# Patient Record
Sex: MALE | Race: WHITE | NOT HISPANIC OR LATINO | Employment: OTHER | URBAN - METROPOLITAN AREA
[De-identification: names, ages, dates, MRNs, and addresses within clinical notes are randomized per-mention and may not be internally consistent; named-entity substitution may affect disease eponyms.]

---

## 2019-09-09 ENCOUNTER — APPOINTMENT (OUTPATIENT)
Dept: RADIOLOGY | Facility: MEDICAL CENTER | Age: 79
DRG: 057 | End: 2019-09-09
Attending: EMERGENCY MEDICINE
Payer: MEDICARE

## 2019-09-09 ENCOUNTER — HOSPITAL ENCOUNTER (INPATIENT)
Facility: MEDICAL CENTER | Age: 79
LOS: 1 days | DRG: 057 | End: 2019-09-11
Attending: EMERGENCY MEDICINE | Admitting: INTERNAL MEDICINE
Payer: MEDICARE

## 2019-09-09 LAB
ALBUMIN SERPL BCP-MCNC: 4.3 G/DL (ref 3.2–4.9)
ALBUMIN/GLOB SERPL: 1.8 G/DL
ALP SERPL-CCNC: 70 U/L (ref 30–99)
ALT SERPL-CCNC: 24 U/L (ref 2–50)
ANION GAP SERPL CALC-SCNC: 17 MMOL/L (ref 0–11.9)
APTT PPP: 26.1 SEC (ref 24.7–36)
AST SERPL-CCNC: 23 U/L (ref 12–45)
BASOPHILS # BLD AUTO: 0.6 % (ref 0–1.8)
BASOPHILS # BLD: 0.04 K/UL (ref 0–0.12)
BILIRUB SERPL-MCNC: 0.9 MG/DL (ref 0.1–1.5)
BUN SERPL-MCNC: 19 MG/DL (ref 8–22)
CALCIUM SERPL-MCNC: 9.5 MG/DL (ref 8.5–10.5)
CHLORIDE SERPL-SCNC: 101 MMOL/L (ref 96–112)
CO2 SERPL-SCNC: 22 MMOL/L (ref 20–33)
CREAT SERPL-MCNC: 1.28 MG/DL (ref 0.5–1.4)
EKG IMPRESSION: NORMAL
EOSINOPHIL # BLD AUTO: 0.15 K/UL (ref 0–0.51)
EOSINOPHIL NFR BLD: 2.3 % (ref 0–6.9)
ERYTHROCYTE [DISTWIDTH] IN BLOOD BY AUTOMATED COUNT: 43.6 FL (ref 35.9–50)
GLOBULIN SER CALC-MCNC: 2.4 G/DL (ref 1.9–3.5)
GLUCOSE SERPL-MCNC: 125 MG/DL (ref 65–99)
HCT VFR BLD AUTO: 41.9 % (ref 42–52)
HGB BLD-MCNC: 14 G/DL (ref 14–18)
IMM GRANULOCYTES # BLD AUTO: 0.03 K/UL (ref 0–0.11)
IMM GRANULOCYTES NFR BLD AUTO: 0.5 % (ref 0–0.9)
INR PPP: 0.99 (ref 0.87–1.13)
LYMPHOCYTES # BLD AUTO: 1.69 K/UL (ref 1–4.8)
LYMPHOCYTES NFR BLD: 25.4 % (ref 22–41)
MCH RBC QN AUTO: 33.5 PG (ref 27–33)
MCHC RBC AUTO-ENTMCNC: 33.4 G/DL (ref 33.7–35.3)
MCV RBC AUTO: 100.2 FL (ref 81.4–97.8)
MONOCYTES # BLD AUTO: 0.58 K/UL (ref 0–0.85)
MONOCYTES NFR BLD AUTO: 8.7 % (ref 0–13.4)
NEUTROPHILS # BLD AUTO: 4.17 K/UL (ref 1.82–7.42)
NEUTROPHILS NFR BLD: 62.5 % (ref 44–72)
NRBC # BLD AUTO: 0 K/UL
NRBC BLD-RTO: 0 /100 WBC
PLATELET # BLD AUTO: 157 K/UL (ref 164–446)
PMV BLD AUTO: 10.5 FL (ref 9–12.9)
POTASSIUM SERPL-SCNC: 3.5 MMOL/L (ref 3.6–5.5)
PROT SERPL-MCNC: 6.7 G/DL (ref 6–8.2)
PROTHROMBIN TIME: 13.3 SEC (ref 12–14.6)
RBC # BLD AUTO: 4.18 M/UL (ref 4.7–6.1)
SODIUM SERPL-SCNC: 140 MMOL/L (ref 135–145)
TROPONIN T SERPL-MCNC: 14 NG/L (ref 6–19)
WBC # BLD AUTO: 6.7 K/UL (ref 4.8–10.8)

## 2019-09-09 PROCEDURE — 70496 CT ANGIOGRAPHY HEAD: CPT

## 2019-09-09 PROCEDURE — 85025 COMPLETE CBC W/AUTO DIFF WBC: CPT

## 2019-09-09 PROCEDURE — 84484 ASSAY OF TROPONIN QUANT: CPT

## 2019-09-09 PROCEDURE — 0042T CT-CEREBRAL PERFUSION ANALYSIS: CPT

## 2019-09-09 PROCEDURE — 94760 N-INVAS EAR/PLS OXIMETRY 1: CPT

## 2019-09-09 PROCEDURE — 70450 CT HEAD/BRAIN W/O DYE: CPT

## 2019-09-09 PROCEDURE — 700117 HCHG RX CONTRAST REV CODE 255: Performed by: EMERGENCY MEDICINE

## 2019-09-09 PROCEDURE — 99285 EMERGENCY DEPT VISIT HI MDM: CPT

## 2019-09-09 PROCEDURE — 70498 CT ANGIOGRAPHY NECK: CPT

## 2019-09-09 PROCEDURE — 36415 COLL VENOUS BLD VENIPUNCTURE: CPT

## 2019-09-09 PROCEDURE — 304561 HCHG STAT O2

## 2019-09-09 PROCEDURE — 85610 PROTHROMBIN TIME: CPT

## 2019-09-09 PROCEDURE — 93005 ELECTROCARDIOGRAM TRACING: CPT | Performed by: EMERGENCY MEDICINE

## 2019-09-09 PROCEDURE — 80053 COMPREHEN METABOLIC PANEL: CPT

## 2019-09-09 PROCEDURE — 85730 THROMBOPLASTIN TIME PARTIAL: CPT

## 2019-09-09 RX ORDER — CARVEDILOL 12.5 MG/1
12.5 TABLET ORAL 2 TIMES DAILY WITH MEALS
Status: ON HOLD | COMMUNITY
End: 2022-01-01

## 2019-09-09 RX ORDER — HYDROCHLOROTHIAZIDE 25 MG/1
25 TABLET ORAL DAILY
Status: ON HOLD | COMMUNITY
End: 2022-01-01

## 2019-09-09 RX ORDER — LISINOPRIL 10 MG/1
40 TABLET ORAL DAILY
Status: ON HOLD | COMMUNITY
End: 2022-01-01

## 2019-09-09 RX ORDER — AMLODIPINE BESYLATE 5 MG/1
10 TABLET ORAL 2 TIMES DAILY
Status: ON HOLD | COMMUNITY
End: 2022-01-01

## 2019-09-09 RX ORDER — HYDROCODONE BITARTRATE AND ACETAMINOPHEN 5; 325 MG/1; MG/1
1-2 TABLET ORAL EVERY 4 HOURS PRN
Status: ON HOLD | COMMUNITY
End: 2022-01-01

## 2019-09-09 RX ORDER — ATORVASTATIN CALCIUM 20 MG/1
80 TABLET, FILM COATED ORAL NIGHTLY
Status: ON HOLD | COMMUNITY
End: 2019-09-11

## 2019-09-09 RX ORDER — CLONIDINE HYDROCHLORIDE 0.1 MG/1
0.1 TABLET ORAL 3 TIMES DAILY
Status: ON HOLD | COMMUNITY
End: 2022-01-01

## 2019-09-09 RX ADMIN — IOHEXOL 120 ML: 350 INJECTION, SOLUTION INTRAVENOUS at 23:41

## 2019-09-09 ASSESSMENT — ENCOUNTER SYMPTOMS
ABDOMINAL PAIN: 0
VOMITING: 0
FEVER: 0
NAUSEA: 0
LOSS OF CONSCIOUSNESS: 1
CHILLS: 0

## 2019-09-10 ENCOUNTER — APPOINTMENT (OUTPATIENT)
Dept: RADIOLOGY | Facility: MEDICAL CENTER | Age: 79
DRG: 057 | End: 2019-09-10
Attending: PSYCHIATRY & NEUROLOGY
Payer: MEDICARE

## 2019-09-10 PROBLEM — R56.9 SEIZURE (HCC): Status: ACTIVE | Noted: 2019-09-10

## 2019-09-10 PROBLEM — E78.5 HYPERLIPIDEMIA: Status: ACTIVE | Noted: 2019-09-10

## 2019-09-10 PROBLEM — I10 ESSENTIAL HYPERTENSION: Status: ACTIVE | Noted: 2019-09-10

## 2019-09-10 PROBLEM — G45.9 TIA (TRANSIENT ISCHEMIC ATTACK): Status: ACTIVE | Noted: 2019-09-10

## 2019-09-10 LAB
ABO + RH BLD: NORMAL
ABO GROUP BLD: NORMAL
BLD GP AB SCN SERPL QL: NORMAL
EST. AVERAGE GLUCOSE BLD GHB EST-MCNC: 103 MG/DL
HBA1C MFR BLD: 5.2 % (ref 0–5.6)
RH BLD: NORMAL

## 2019-09-10 PROCEDURE — 86901 BLOOD TYPING SEROLOGIC RH(D): CPT

## 2019-09-10 PROCEDURE — 99232 SBSQ HOSP IP/OBS MODERATE 35: CPT | Mod: 25 | Performed by: PSYCHIATRY & NEUROLOGY

## 2019-09-10 PROCEDURE — 700111 HCHG RX REV CODE 636 W/ 250 OVERRIDE (IP): Performed by: INTERNAL MEDICINE

## 2019-09-10 PROCEDURE — 70553 MRI BRAIN STEM W/O & W/DYE: CPT

## 2019-09-10 PROCEDURE — 95951 EEG: CPT | Mod: 52 | Performed by: PSYCHIATRY & NEUROLOGY

## 2019-09-10 PROCEDURE — 92610 EVALUATE SWALLOWING FUNCTION: CPT

## 2019-09-10 PROCEDURE — 700105 HCHG RX REV CODE 258: Performed by: INTERNAL MEDICINE

## 2019-09-10 PROCEDURE — 700102 HCHG RX REV CODE 250 W/ 637 OVERRIDE(OP): Performed by: HOSPITALIST

## 2019-09-10 PROCEDURE — 95951 EEG: CPT | Mod: 26,52 | Performed by: PSYCHIATRY & NEUROLOGY

## 2019-09-10 PROCEDURE — 97161 PT EVAL LOW COMPLEX 20 MIN: CPT

## 2019-09-10 PROCEDURE — 71045 X-RAY EXAM CHEST 1 VIEW: CPT

## 2019-09-10 PROCEDURE — 86850 RBC ANTIBODY SCREEN: CPT

## 2019-09-10 PROCEDURE — 99223 1ST HOSP IP/OBS HIGH 75: CPT | Performed by: INTERNAL MEDICINE

## 2019-09-10 PROCEDURE — 36415 COLL VENOUS BLD VENIPUNCTURE: CPT

## 2019-09-10 PROCEDURE — 700102 HCHG RX REV CODE 250 W/ 637 OVERRIDE(OP): Performed by: INTERNAL MEDICINE

## 2019-09-10 PROCEDURE — 83036 HEMOGLOBIN GLYCOSYLATED A1C: CPT

## 2019-09-10 PROCEDURE — 86900 BLOOD TYPING SEROLOGIC ABO: CPT

## 2019-09-10 PROCEDURE — A9270 NON-COVERED ITEM OR SERVICE: HCPCS | Performed by: INTERNAL MEDICINE

## 2019-09-10 PROCEDURE — A9270 NON-COVERED ITEM OR SERVICE: HCPCS | Performed by: HOSPITALIST

## 2019-09-10 PROCEDURE — 770020 HCHG ROOM/CARE - TELE (206)

## 2019-09-10 PROCEDURE — 4A10X4Z MONITORING OF CENTRAL NERVOUS ELECTRICAL ACTIVITY, EXTERNAL APPROACH: ICD-10-PCS | Performed by: PSYCHIATRY & NEUROLOGY

## 2019-09-10 PROCEDURE — 97165 OT EVAL LOW COMPLEX 30 MIN: CPT

## 2019-09-10 RX ORDER — ONDANSETRON 4 MG/1
4 TABLET, ORALLY DISINTEGRATING ORAL EVERY 4 HOURS PRN
Status: DISCONTINUED | OUTPATIENT
Start: 2019-09-10 | End: 2019-09-11 | Stop reason: HOSPADM

## 2019-09-10 RX ORDER — ASPIRIN 300 MG/1
300 SUPPOSITORY RECTAL DAILY
Status: DISCONTINUED | OUTPATIENT
Start: 2019-09-10 | End: 2019-09-11 | Stop reason: HOSPADM

## 2019-09-10 RX ORDER — ASPIRIN 81 MG/1
324 TABLET, CHEWABLE ORAL DAILY
Status: DISCONTINUED | OUTPATIENT
Start: 2019-09-10 | End: 2019-09-11 | Stop reason: HOSPADM

## 2019-09-10 RX ORDER — SODIUM CHLORIDE 9 MG/ML
INJECTION, SOLUTION INTRAVENOUS CONTINUOUS
Status: DISCONTINUED | OUTPATIENT
Start: 2019-09-10 | End: 2019-09-11 | Stop reason: HOSPADM

## 2019-09-10 RX ORDER — ACETAMINOPHEN 325 MG/1
650 TABLET ORAL EVERY 6 HOURS PRN
Status: DISCONTINUED | OUTPATIENT
Start: 2019-09-10 | End: 2019-09-11 | Stop reason: HOSPADM

## 2019-09-10 RX ORDER — ONDANSETRON 2 MG/ML
4 INJECTION INTRAMUSCULAR; INTRAVENOUS EVERY 4 HOURS PRN
Status: DISCONTINUED | OUTPATIENT
Start: 2019-09-10 | End: 2019-09-11 | Stop reason: HOSPADM

## 2019-09-10 RX ORDER — POLYETHYLENE GLYCOL 3350 17 G/17G
1 POWDER, FOR SOLUTION ORAL
Status: DISCONTINUED | OUTPATIENT
Start: 2019-09-10 | End: 2019-09-11 | Stop reason: HOSPADM

## 2019-09-10 RX ORDER — LABETALOL HYDROCHLORIDE 5 MG/ML
10 INJECTION, SOLUTION INTRAVENOUS EVERY 4 HOURS PRN
Status: DISCONTINUED | OUTPATIENT
Start: 2019-09-10 | End: 2019-09-11 | Stop reason: HOSPADM

## 2019-09-10 RX ORDER — AMOXICILLIN 250 MG
2 CAPSULE ORAL 2 TIMES DAILY
Status: DISCONTINUED | OUTPATIENT
Start: 2019-09-10 | End: 2019-09-11 | Stop reason: HOSPADM

## 2019-09-10 RX ORDER — LEVETIRACETAM 500 MG/1
500 TABLET ORAL 2 TIMES DAILY
Status: DISCONTINUED | OUTPATIENT
Start: 2019-09-10 | End: 2019-09-11 | Stop reason: HOSPADM

## 2019-09-10 RX ORDER — HYDRALAZINE HYDROCHLORIDE 20 MG/ML
10 INJECTION INTRAMUSCULAR; INTRAVENOUS
Status: DISCONTINUED | OUTPATIENT
Start: 2019-09-10 | End: 2019-09-11 | Stop reason: HOSPADM

## 2019-09-10 RX ORDER — ATORVASTATIN CALCIUM 40 MG/1
40 TABLET, FILM COATED ORAL EVERY EVENING
Status: DISCONTINUED | OUTPATIENT
Start: 2019-09-10 | End: 2019-09-11 | Stop reason: HOSPADM

## 2019-09-10 RX ORDER — LORAZEPAM 2 MG/ML
2 INJECTION INTRAMUSCULAR
Status: DISCONTINUED | OUTPATIENT
Start: 2019-09-10 | End: 2019-09-11 | Stop reason: HOSPADM

## 2019-09-10 RX ORDER — ASPIRIN 325 MG
325 TABLET ORAL DAILY
Status: DISCONTINUED | OUTPATIENT
Start: 2019-09-10 | End: 2019-09-11 | Stop reason: HOSPADM

## 2019-09-10 RX ORDER — BISACODYL 10 MG
10 SUPPOSITORY, RECTAL RECTAL
Status: DISCONTINUED | OUTPATIENT
Start: 2019-09-10 | End: 2019-09-11 | Stop reason: HOSPADM

## 2019-09-10 RX ADMIN — SODIUM CHLORIDE: 9 INJECTION, SOLUTION INTRAVENOUS at 02:30

## 2019-09-10 RX ADMIN — ENOXAPARIN SODIUM 40 MG: 100 INJECTION SUBCUTANEOUS at 05:24

## 2019-09-10 RX ADMIN — ATORVASTATIN CALCIUM 40 MG: 40 TABLET, FILM COATED ORAL at 17:23

## 2019-09-10 RX ADMIN — LEVETIRACETAM 500 MG: 500 TABLET ORAL at 17:23

## 2019-09-10 RX ADMIN — ASPIRIN 300 MG: 300 SUPPOSITORY RECTAL at 05:24

## 2019-09-10 ASSESSMENT — COGNITIVE AND FUNCTIONAL STATUS - GENERAL
SUGGESTED CMS G CODE MODIFIER MOBILITY: CJ
DAILY ACTIVITIY SCORE: 24
SUGGESTED CMS G CODE MODIFIER DAILY ACTIVITY: CH
CLIMB 3 TO 5 STEPS WITH RAILING: A LITTLE
WALKING IN HOSPITAL ROOM: A LITTLE
MOBILITY SCORE: 21
STANDING UP FROM CHAIR USING ARMS: A LITTLE

## 2019-09-10 ASSESSMENT — LIFESTYLE VARIABLES
TOTAL SCORE: 0
HAVE PEOPLE ANNOYED YOU BY CRITICIZING YOUR DRINKING: NO
TOTAL SCORE: 0
CONSUMPTION TOTAL: NEGATIVE
ALCOHOL_USE: YES
DOES PATIENT WANT TO STOP DRINKING: NO
ON A TYPICAL DAY WHEN YOU DRINK ALCOHOL HOW MANY DRINKS DO YOU HAVE: 2
EVER HAD A DRINK FIRST THING IN THE MORNING TO STEADY YOUR NERVES TO GET RID OF A HANGOVER: NO
EVER FELT BAD OR GUILTY ABOUT YOUR DRINKING: NO
AVERAGE NUMBER OF DAYS PER WEEK YOU HAVE A DRINK CONTAINING ALCOHOL: 7
HOW MANY TIMES IN THE PAST YEAR HAVE YOU HAD 5 OR MORE DRINKS IN A DAY: 0
EVER_SMOKED: NEVER
HAVE YOU EVER FELT YOU SHOULD CUT DOWN ON YOUR DRINKING: NO
TOTAL SCORE: 0
EVER_SMOKED: NEVER

## 2019-09-10 ASSESSMENT — ENCOUNTER SYMPTOMS
DIAPHORESIS: 0
FEVER: 0
WEAKNESS: 1
DIZZINESS: 0
FOCAL WEAKNESS: 1
SPUTUM PRODUCTION: 0
SORE THROAT: 0
FLANK PAIN: 0
COUGH: 0
PALPITATIONS: 0
SHORTNESS OF BREATH: 0
ABDOMINAL PAIN: 0
NECK PAIN: 0
BLURRED VISION: 0
WHEEZING: 0
DIARRHEA: 0
MYALGIAS: 0
NAUSEA: 0
BRUISES/BLEEDS EASILY: 0
CHILLS: 0
SEIZURES: 1
BACK PAIN: 0
BLOOD IN STOOL: 0
HEADACHES: 0
VOMITING: 0

## 2019-09-10 ASSESSMENT — PATIENT HEALTH QUESTIONNAIRE - PHQ9
SUM OF ALL RESPONSES TO PHQ9 QUESTIONS 1 AND 2: 0
1. LITTLE INTEREST OR PLEASURE IN DOING THINGS: NOT AT ALL
2. FEELING DOWN, DEPRESSED, IRRITABLE, OR HOPELESS: NOT AT ALL

## 2019-09-10 ASSESSMENT — GAIT ASSESSMENTS
GAIT LEVEL OF ASSIST: SUPERVISED
DISTANCE (FEET): 300
DEVIATION: INCREASED BASE OF SUPPORT;DECREASED HEEL STRIKE;DECREASED TOE OFF

## 2019-09-10 ASSESSMENT — COPD QUESTIONNAIRES
DURING THE PAST 4 WEEKS HOW MUCH DID YOU FEEL SHORT OF BREATH: NONE/LITTLE OF THE TIME
COPD SCREENING SCORE: 2
DO YOU EVER COUGH UP ANY MUCUS OR PHLEGM?: NO/ONLY WITH OCCASIONAL COLDS OR INFECTIONS
HAVE YOU SMOKED AT LEAST 100 CIGARETTES IN YOUR ENTIRE LIFE: NO/DON'T KNOW

## 2019-09-10 ASSESSMENT — ACTIVITIES OF DAILY LIVING (ADL): TOILETING: INDEPENDENT

## 2019-09-10 NOTE — ED NOTES
Assist RN: Report given at bedside to Neuroscience RN Sandra. Patient transported to floor via gurney with cardiac monitor in place accompanied by Neuroscience RN x 2 and family. All belongings accounted for.

## 2019-09-10 NOTE — PROGRESS NOTES
2 RN skin check with Dk, RN    Heels, elbows, and buttocks are pink and blanching  Feet/toes are dry and cracked  Blood on lips and cut on tongue from his fall late last night at home  Patient cleaned up and in hospital clothing at this time

## 2019-09-10 NOTE — CARE PLAN
Problem: Communication  Goal: The ability to communicate needs accurately and effectively will improve  Outcome: PROGRESSING AS EXPECTED     Problem: Safety  Goal: Will remain free from injury  Outcome: PROGRESSING AS EXPECTED  Goal: Will remain free from falls  Outcome: PROGRESSING AS EXPECTED     Problem: Knowledge Deficit  Goal: Knowledge of disease process/condition, treatment plan, diagnostic tests, and medications will improve  Outcome: PROGRESSING AS EXPECTED

## 2019-09-10 NOTE — ASSESSMENT & PLAN NOTE
Patient's presentation is concerning for seizure and Brandyn's paralysis which has completely resolved, rule out TIA/stroke  Patient will be placed on continuous cardiac monitoring to evaluate for any arrhythmias  Q4 hours neuro checks  I will order MRI brain and EEG  Check 2-D echo  Patient will be started on full dose aspirin and high intensity statin  Allow permissive hypertension  Check TSH, lipid panel and hemoglobin A1c  PTOT and ST evaluation

## 2019-09-10 NOTE — ASSESSMENT & PLAN NOTE
Hold oral antihypertensives  Allow permissive hypertension  IV labetalol and hydralazine PRN for SBP greater than 220 and DBP greater than 120

## 2019-09-10 NOTE — H&P
Hospital Medicine History & Physical Note    Date of Service  9/10/2019    Primary Care Physician  No primary care provider on file.    Consultants  Neurology     Code Status  Full code    Chief Complaint  Altered mental status    History of Presenting Illness  79 y.o. male with a past medical history of CAD, dyslipidemia, hypertension, peripheral arterial disease status post bilateral CEA, TIA, sinus pause status post pacemaker placement who presented 9/9/2019 with unresponsiveness and seizure-like activity.  History is obtained from the wife at bedside.  Family at around 10:35 PM the wife rushed to the patient's room after she heard some moaning noises from his room.  The patient was found down and had defecated on the floor.  The patient was altered and not responding to his wife.  EMS was called and found the patient to be awake but completely disoriented with dense paralysis in his left upper and lower extremity and left sided neglect.  En route to the hospital the patient's symptoms started to resolve.  At the time of my evaluation the patient has no focal weakness, is alert and oriented x3, though he is still slow to respond.  Patient was evaluated by neurology in the ER    EKG interpreted by me reveals atrial paced rhythm  Chest x-ray interpreted by me reveals no acute cardiopulmonary process    The patient patient was admitted to [facility] in June 2019 for altered mental status and TIA-like symptoms.  I reviewed the medical records from the facility which are summarized as follows:    Sodium 144, potassium 3.7, chloride 109, CO2 27, anion gap 8, glucose 116, BUN 19, creatinine 1.3    WBC 10.3, hemoglobin 15.4, platelets 171    Urine toxicology negative    Urine analysis negative for infection    CT head without contrast: Old lacunar type infarction is seen in the right basal ganglia extending superiorly to the central cerebrum in the right frontoparietal junction region unchanged  from December 7, 2018.  No acute pathology is appreciated.    MRI brain without contrast: No acute cerebrovascular accident, mass or hemorrhage are seen.  Old small lacunar infarctions are present bilaterally with evidence of old small vessel vascular disease.    MRA without contrast: No aneurysm is seen.  Severe focal narrowing of the basilar artery is present of 75% but the artery is patent.  Left vertebral artery is larger than the right distally.  The posterior inferior cerebellar arteries and superior cerebellar arteries bilaterally are intact.  The bilateral internal carotids, anterior Perez arteries, A1 segments and middle cerebral arteries are intact.  Areas of focal narrowing are seen in the branch of the right and left middle cerebral arteries from severe atherosclerotic disease.  Areas of narrowing are seen in the right posterior cerebral artery.  Truncation and areas of narrowing are seen in the left cerebral artery from arteriosclerotic disease.  Ultrasound carotid duplex bilateral: No significant narrowing is seen in the right or left common or internal carotid artery.  Narrowing of greater than 70% is present in the right and left external carotid artery.  Vertebral arteries have antegrade flow but there is resistive waveform right vertebral artery suggestive of a distal area of significant narrowing without complete occlusion.    Patient was noted to have a 6-second pause during his hospitalization for which she was divided by cardiology  and underwent placement of dual-chamber pacemaker.      Review of Systems  Review of Systems   Constitutional: Negative for chills, diaphoresis and fever.   HENT: Negative for hearing loss and sore throat.    Eyes: Negative for blurred vision.   Respiratory: Negative for cough, sputum production, shortness of breath and wheezing.    Cardiovascular: Negative for chest pain, palpitations and leg swelling.   Gastrointestinal: Negative for abdominal pain,  blood in stool, diarrhea, nausea and vomiting.   Genitourinary: Negative for dysuria, flank pain and urgency.   Musculoskeletal: Negative for back pain, joint pain, myalgias and neck pain.   Skin: Negative for rash.   Neurological: Positive for focal weakness, seizures and weakness. Negative for dizziness and headaches.   Endo/Heme/Allergies: Does not bruise/bleed easily.   Psychiatric/Behavioral: Negative for suicidal ideas.   All other systems reviewed and are negative.      Past Medical History   has a past medical history of Chronic pain, Hyperlipidemia, Hypertension, and Pacemaker.    Surgical History   has a past surgical history that includes pacemaker insertion.     Family History  No pertinent family    Social History   Denies smoking or significant alcohol use    Allergies  No Known Allergies    Medications  Prior to Admission Medications   Prescriptions Last Dose Informant Patient Reported? Taking?   HYDROcodone-acetaminophen (NORCO) 5-325 MG Tab per tablet   Yes Yes   Sig: Take 1-2 Tabs by mouth every four hours as needed.   amLODIPine (NORVASC) 10 MG Tab   Yes Yes   Sig: Take 10 mg by mouth every day.   atorvastatin (LIPITOR) 20 MG Tab   Yes Yes   Sig: Take 20 mg by mouth every evening.   carvedilol (COREG) 12.5 MG Tab   Yes Yes   Sig: Take 12.5 mg by mouth 2 times a day, with meals.   cloNIDine (CATAPRES) 0.1 MG Tab   Yes Yes   Sig: Take 0.1 mg by mouth 2 times a day.   hydroCHLOROthiazide (HYDRODIURIL) 50 MG Tab   Yes Yes   Sig: Take 50 mg by mouth every day.   lisinopril (PRINIVIL) 10 MG Tab   Yes Yes   Sig: Take 10 mg by mouth every day.      Facility-Administered Medications: None       Physical Exam  Temp:  [36.6 °C (97.8 °F)] 36.6 °C (97.8 °F)  Pulse:  [] 60  Resp:  [12-18] 18  BP: (116-182)/(57-90) 126/59  SpO2:  [95 %-98 %] 96 %    Physical Exam   Constitutional: He is oriented to person, place, and time. He appears well-developed and well-nourished. No distress.   HENT:   Head:  Normocephalic and atraumatic.   Mouth/Throat: Oropharynx is clear and moist.   Eyes: Pupils are equal, round, and reactive to light. Conjunctivae are normal. No scleral icterus.   Neck: Normal range of motion. Neck supple.   Cardiovascular: Normal rate, regular rhythm and normal heart sounds.   Pulmonary/Chest: Effort normal and breath sounds normal. No respiratory distress. He has no wheezes. He has no rales.   Abdominal: Soft. Bowel sounds are normal. He exhibits no distension. There is no tenderness. There is no rebound.   Musculoskeletal: Normal range of motion. He exhibits no edema or tenderness.   Lymphadenopathy:     He has no cervical adenopathy.   Neurological: He is alert and oriented to person, place, and time. No cranial nerve deficit. Coordination normal.   Speech intact  No facial droop  Strength and sensation intact bilaterally   Skin: Skin is warm. No rash noted.   Psychiatric: He has a normal mood and affect. His behavior is normal.   Nursing note and vitals reviewed.      Laboratory:  Recent Labs     09/09/19 2319   WBC 6.7   RBC 4.18*   HEMOGLOBIN 14.0   HEMATOCRIT 41.9*   .2*   MCH 33.5*   MCHC 33.4*   RDW 43.6   PLATELETCT 157*   MPV 10.5     Recent Labs     09/09/19  2319   SODIUM 140   POTASSIUM 3.5*   CHLORIDE 101   CO2 22   GLUCOSE 125*   BUN 19   CREATININE 1.28   CALCIUM 9.5     Recent Labs     09/09/19  2319   ALTSGPT 24   ASTSGOT 23   ALKPHOSPHAT 70   TBILIRUBIN 0.9   GLUCOSE 125*     Recent Labs     09/09/19 2319   APTT 26.1   INR 0.99     No results for input(s): NTPROBNP in the last 72 hours.      Recent Labs     09/09/19  2319   TROPONINT 14       Urinalysis:    No results found     Imaging:  DX-CHEST-PORTABLE (1 VIEW)   Final Result      No acute cardiopulmonary abnormality.      CT-CTA HEAD WITH & W/O-POST PROCESS   Final Result      1.  Significant multifocal irregularity and narrowing of the intradural right vertebral artery, the basilar artery and bilateral posterior  cerebral arteries detail.   2.  Narrowing of the proximal M2 segments middle cerebral arteries, right greater than left.   3.  Chronic ischemic changes.      CT-CTA NECK WITH & W/O-POST PROCESSING   Final Result      1.  Luminal irregularity of the proximal internal carotid arteries without significant stenosis.   2.  At least moderate focal stenosis of the proximal external carotid arteries.   3.  Stenosis at the origin of the nondominant right vertebral artery and distal right vertebral artery.      CT-CEREBRAL PERFUSION ANALYSIS   Final Result      1.  Cerebral blood flow less than 30% likely representing completed infarct = 0 mL.      2.  T Max more than 6 seconds likely representing combination of completed infarct and ischemia = 0 mL.      3.  Mismatched volume likely representing ischemic brain/penumbra = None      4.  Please note that the cerebral perfusion was performed on the limited brain tissue around the basal ganglia region. Infarct/ischemia outside the CT perfusion sections can be missed in this study.      CT-HEAD W/O   Final Result      1.  Chronic microvascular ischemic type changes. 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   2.  Mild generalized atrophy.   3.  No acute intracranial abnormality.      MR-BRAIN-W/O    (Results Pending)   EC-ECHOCARDIOGRAM COMPLETE W/O CONT    (Results Pending)         Assessment/Plan:  I anticipate this patient will require at least two midnights for appropriate medical management, necessitating inpatient admission.    Seizure (HCC)- (present on admission)  Assessment & Plan  Patient's presentation is concerning for seizure and Brandyn's paralysis which has completely resolved, rule out TIA/stroke  Patient will be placed on continuous cardiac monitoring to evaluate for any arrhythmias  Q4 hours neuro checks  I will order MRI brain and EEG  Check 2-D echo  Patient will be started on full dose aspirin and high intensity statin  Allow permissive hypertension  Check TSH, lipid  panel and hemoglobin A1c  PTOT and ST evaluation          Hyperlipidemia- (present on admission)  Assessment & Plan  Continue atorvastatin    Essential hypertension- (present on admission)  Assessment & Plan  Hold oral antihypertensives  Allow permissive hypertension  IV labetalol and hydralazine PRN for SBP greater than 220 and DBP greater than 120    TIA (transient ischemic attack)- (present on admission)  Assessment & Plan  History of previous TIA  Continue aspirin and Lipitor      VTE prophylaxis: Lovenox

## 2019-09-10 NOTE — PROGRESS NOTES
Patient arrived to the unit at 0300. Patient AOx4, denies any HA, CP, N/V, SOB at this time. Skin check and NIH completed, VSS, tele in place. Oriented patient to the unit and all questions answered at this time.    0330: NIHSS complete upon admission to the unit. No deficits noticed other than a slight left facial droop, confirmed with another RN. This RN failed the patient's dysphagia screening, made the patient NPO, and ordered a speech consult. Patient understands and is compliant.

## 2019-09-10 NOTE — PROGRESS NOTES
IV in the patients right AC was flushed and attached to the contrast tubing. Blood started to back flow up the tubing and was pulsating. Per RN Alistair the IV will be removed when the patient returns to the ED. IV in the patients left AC was flushed and was used without incident.

## 2019-09-10 NOTE — PROCEDURES
VIDEO ELECTROENCEPHALOGRAM REPORT      Referring provider: Dr. Dewey.     DOS: 9/10/2019 (total recording of 25 minutes).     INDICATION:  Gilmer Mae 79 y.o. male presenting with weakness.     CURRENT ANTIEPILEPTIC REGIMEN: None.     TECHNIQUE: 30 channel video electroencephalogram (EEG) was performed in accordance with the international 10-20 system. The study was reviewed in bipolar and referential montages. The recording examined the patient during wakeful and drowsy/sleep state(s).     DESCRIPTION OF THE RECORD:  During the wakefulness, the background showed a symmetrical 9 Hz alpha activity posteriorly with amplitude of 70 mV.  There was reactivity to eye closure/opening.  A normal anterior-posterior gradient was noted with faster beta frequencies seen anteriorly.  During drowsiness, theta/delta frequencies were seen.    During the sleep state, background shows diffuse high-amplitude 4-5 Hz delta activity.  Symmetrical high-amplitude sleep spindles and vertex sharps were seen in the leads over the central regions.     ACTIVATION PROCEDURES:   Intermittent Photic stimulation was performed in a stepwise fashion from 1 to 30 Hz and elicited a normal response (photic driving), most noticeable in the posterior leads.    ICTAL AND/OR INTERICTAL FINDINGS:   No focal or generalized epileptiform activity noted. No regional slowing was seen during this routine study.  No clinical events or seizures were reported or recorded during the study.     EKG: sampling of the EKG recording demonstrated sinus rhythm.     EVENTS:  None.     INTERPRETATION:  This is a normal video EEG recording in the awake, drowsy, and sleep state(s).  Clinical correlation is recommended.    Note: A normal EEG does not rule out epilepsy.  If the clinical suspicion remains high for seizures, a prolonged recording to capture clinical or subclinical events may be helpful.        Gino Ko MD   Epilepsy and Neurodiagnostics.    Clinical  of Neurology [facility].   Diplomate in Neurology, Epilepsy, and Electrodiagnostic Medicine.   Office: [phone]  Fax: [phone]

## 2019-09-10 NOTE — DISCHARGE PLANNING
Medical Social Work    Referral: Stroke    Intervention: Pt is a 79 year old male brought in by ANTHONY from home for possible stroke.  Pt is Gilmer Mae (: 1940).  Per ANTHONY pt's wife is on her way.    Plan: SW will follow.

## 2019-09-10 NOTE — PROGRESS NOTES
Please refer to full H&P by my colleague.  Patient admitted for seizure-like activity.  This appears to be his second admission for the same neuro hospitalist has been consulted and recommends Keppra 500 twice daily, EEG has been ordered  I will start him on aspirin and statin  We will also obtain MRI with and without contrast to rule out other pathologic causes

## 2019-09-10 NOTE — ED PROVIDER NOTES
ED Provider Note    CHIEF COMPLAINT  Chief Complaint   Patient presents with   • Possible Stroke       HPI  Gilmer Mae is a 79 y.o. male who presents brought in by ambulance for potential stroke.  Patient apparently called out to family and then fell on the ground, patient had a large bowel movement shortly thereafter..  He was altered per EMS upon their arrival.  He was ANO x0 with dense paralysis in left upper and lower extremity and face.  Apparently per EMS patient also had neglect on the left and was not tracking past midline on the left.  Throughout the patient's brief transfer to the emergency department his symptoms began to resolve.  Upon arrival to the emergency department patient knows his name and has no complaints.  He denies any chest pain headache abdominal pain.  He follows commands but he remains mildly altered and therefore the history remains limited.    REVIEW OF SYSTEMS  Review of Systems   Constitutional: Negative for chills and fever.   Cardiovascular: Negative for chest pain.   Gastrointestinal: Negative for abdominal pain, nausea and vomiting.   Genitourinary: Negative for dysuria and urgency.   Neurological: Positive for loss of consciousness.       See HPI for further details. All other systems are negative.     PAST MEDICAL HISTORY   has a past medical history of Chronic pain, Hyperlipidemia, Hypertension, and Pacemaker.    SOCIAL HISTORY  Social History     Tobacco Use   • Smoking status: Unknown If Ever Smoked   Substance and Sexual Activity   • Alcohol use: Not on file   • Drug use: Not on file   • Sexual activity: Not on file       SURGICAL HISTORY   has a past surgical history that includes pacemaker insertion.    CURRENT MEDICATIONS  Home Medications     Reviewed by Theresa Fernandez R.N. (Registered Nurse) on 09/09/19 at 1826  Med List Status: Partial   Medication Last Dose Status   amLODIPine (NORVASC) 10 MG Tab  Active   atorvastatin (LIPITOR) 20 MG Tab  Active   carvedilol  (COREG) 12.5 MG Tab  Active   cloNIDine (CATAPRES) 0.1 MG Tab  Active   hydroCHLOROthiazide (HYDRODIURIL) 50 MG Tab  Active   HYDROcodone-acetaminophen (NORCO) 5-325 MG Tab per tablet  Active   lisinopril (PRINIVIL) 10 MG Tab  Active                ALLERGIES  No Known Allergies    PHYSICAL EXAM  Physical Exam   Constitutional: He appears well-developed and well-nourished.   HENT:   Head: Normocephalic and atraumatic.   Bilateral tongue contusions   Eyes: Pupils are equal, round, and reactive to light. Conjunctivae are normal.   Neck: Normal range of motion. Neck supple.   Cardiovascular: Normal rate and regular rhythm.   Pulmonary/Chest: Effort normal and breath sounds normal.   Abdominal: Soft. Bowel sounds are normal. He exhibits no distension. There is no tenderness.   Musculoskeletal:   C/T/L without any midline TTP or bony abn/stepoffs     No bony abn of clavicles, shoulders, elbows wrists and hands without any TTP or major ROM limitation; compartments soft    No chest TTP on compression    Abd without any TTP or ecchymosis    Pelvis is stable on compression    BL hips, knees ankle without TTP or major limitations of ROM; compartments soft    All distal pulses are intact     no focal motor or sensory deficit        Neurological: He is alert.   Oriented to person and place, unsure of context of visit or year. Distal and proximal strength 5/5 in upper and lower extremities. Normal gait. No dysmetria. No sensation deficits. No visual field deficits. Cranial nerves intact.  No word finding difficulty, no dysarthria.  No facial asymmetry.  No neglect.   Skin: Skin is warm and dry.   Psychiatric: He has a normal mood and affect. His behavior is normal.         DIAGNOSTIC STUDIES / PROCEDURES    EKG  See below    LABS  Results for orders placed or performed during the hospital encounter of 09/09/19   CBC WITH DIFFERENTIAL   Result Value Ref Range    WBC 6.7 4.8 - 10.8 K/uL    RBC 4.18 (L) 4.70 - 6.10 M/uL     Hemoglobin 14.0 14.0 - 18.0 g/dL    Hematocrit 41.9 (L) 42.0 - 52.0 %    .2 (H) 81.4 - 97.8 fL    MCH 33.5 (H) 27.0 - 33.0 pg    MCHC 33.4 (L) 33.7 - 35.3 g/dL    RDW 43.6 35.9 - 50.0 fL    Platelet Count 157 (L) 164 - 446 K/uL    MPV 10.5 9.0 - 12.9 fL    Neutrophils-Polys 62.50 44.00 - 72.00 %    Lymphocytes 25.40 22.00 - 41.00 %    Monocytes 8.70 0.00 - 13.40 %    Eosinophils 2.30 0.00 - 6.90 %    Basophils 0.60 0.00 - 1.80 %    Immature Granulocytes 0.50 0.00 - 0.90 %    Nucleated RBC 0.00 /100 WBC    Neutrophils (Absolute) 4.17 1.82 - 7.42 K/uL    Lymphs (Absolute) 1.69 1.00 - 4.80 K/uL    Monos (Absolute) 0.58 0.00 - 0.85 K/uL    Eos (Absolute) 0.15 0.00 - 0.51 K/uL    Baso (Absolute) 0.04 0.00 - 0.12 K/uL    Immature Granulocytes (abs) 0.03 0.00 - 0.11 K/uL    NRBC (Absolute) 0.00 K/uL   COMP METABOLIC PANEL   Result Value Ref Range    Sodium 140 135 - 145 mmol/L    Potassium 3.5 (L) 3.6 - 5.5 mmol/L    Chloride 101 96 - 112 mmol/L    Co2 22 20 - 33 mmol/L    Anion Gap 17.0 (H) 0.0 - 11.9    Glucose 125 (H) 65 - 99 mg/dL    Bun 19 8 - 22 mg/dL    Creatinine 1.28 0.50 - 1.40 mg/dL    Calcium 9.5 8.5 - 10.5 mg/dL    AST(SGOT) 23 12 - 45 U/L    ALT(SGPT) 24 2 - 50 U/L    Alkaline Phosphatase 70 30 - 99 U/L    Total Bilirubin 0.9 0.1 - 1.5 mg/dL    Albumin 4.3 3.2 - 4.9 g/dL    Total Protein 6.7 6.0 - 8.2 g/dL    Globulin 2.4 1.9 - 3.5 g/dL    A-G Ratio 1.8 g/dL   PROTHROMBIN TIME   Result Value Ref Range    PT 13.3 12.0 - 14.6 sec    INR 0.99 0.87 - 1.13   APTT   Result Value Ref Range    APTT 26.1 24.7 - 36.0 sec   TROPONIN   Result Value Ref Range    Troponin T 14 6 - 19 ng/L   ESTIMATED GFR   Result Value Ref Range    GFR If African American >60 >60 mL/min/1.73 m 2    GFR If Non African American 54 (A) >60 mL/min/1.73 m 2   EKG (NOW)   Result Value Ref Range    Report       [facility]    Test Date:  2019-09-09  Pt Name:    ZOE SARMIENTO                 Department:  [facility]  MRN:        [id number]                      Room:        04  Gender:     Male                         Technician: [id number]  :        1940                   Requested By:ALYSSA ZAVALA  Order #:    [id number]                    Reading MD: Yaneli Brown MD    Measurements  Intervals                                Axis  Rate:       73                           P:  WI:         169                          QRS:        -33  QRSD:       119                          T:          7  QT:         460  QTc:        507    Interpretive Statements  Atrial paced rhythm, left axis deviation poor R wave progression.  No ST  elevation or depression  Electronically Signed On 2019 23:57:38 PDT by Yaneli Brown MD           RADIOLOGY  CT-CTA HEAD WITH & W/O-POST PROCESS   Final Result      1.  Significant multifocal irregularity and narrowing of the intradural right vertebral artery, the basilar artery and bilateral posterior cerebral arteries detail.   2.  Narrowing of the proximal M2 segments middle cerebral arteries, right greater than left.   3.  Chronic ischemic changes.      CT-CEREBRAL PERFUSION ANALYSIS   Final Result      1.  Cerebral blood flow less than 30% likely representing completed infarct = 0 mL.      2.  T Max more than 6 seconds likely representing combination of completed infarct and ischemia = 0 mL.      3.  Mismatched volume likely representing ischemic brain/penumbra = None      4.  Please note that the cerebral perfusion was performed on the limited brain tissue around the basal ganglia region. Infarct/ischemia outside the CT perfusion sections can be missed in this study.      CT-HEAD W/O   Final Result      1.  Chronic microvascular ischemic type changes. 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   2.  Mild generalized atrophy.   3.  No acute intracranial abnormality.      DX-CHEST-PORTABLE (1 VIEW)    (Results Pending)   CT-CTA NECK WITH & W/O-POST PROCESSING    (Results Pending)       38 minutes of  critical care time were spent taking care of this patient.    COURSE & MEDICAL DECISION MAKING  Pertinent Labs & Imaging studies reviewed. (See chart for details)  Well-appearing patient here with symptoms most concerning for possible TIA versus Brandyn's paralysis and seizure.  Encouragingly patient's symptoms have entirely resolved although he is mildly altered here.  Will check for possible metabolic cause.  Patient will need CT and CTA for possible CVA.  Will also need to be admitted for further work-up.  Awaiting EKG and basic labs.  Patient without any cardiopulmonary complaints however this could have been a syncopal event.  No evidence of major trauma on exam aside from tongue bite  Apparently per wife patient was seen at [facility] for similar episode.  We have requested records.  Patient CT is read as negative.  Patient has been evaluated by neurology who believes paralysis was likely cause paralysis and patient symptoms are most consistent with seizure.  They do not want any seizure medicine started at this point.  We will like patient admitted for MRI and further work-up.  Will admit to hospitalist.  CT does reveal narrowing without thrombosis of vertebral arteries and M2.    FINAL IMPRESSION  1.  Transient lateral paralysis, focal weakness, altered mental status    Electronically signed by: Kevin Groves, 9/9/2019 11:29 PM

## 2019-09-10 NOTE — THERAPY
"Speech Language Therapy Clinical Swallow Evaluation completed.    Functional Status:  Pt AAOx3, with confusion to date.  He was noted to have a mild left sided facial droop in oral exam, however no dysarthria was noted.  Vocal quality was strong and cough was strong.  Pt consumed PO trials of ice chips, nectars, puree, soft solids, crackers and thin liquids without any overt s/sx of aspiration.  Vocal quality remained clear throughout evaluation and laryngeal elevation palpated as complete with all textures.  Recommend a regular diet with thin liquids.  SLP will continue to follow to ensure diet tolerance.      Recommendations - Diet:  Regular, Thin Liquid                          Strategies: Monitor during meals and Head of Bed at 90 Degrees                          Medication Administration: Medication Administration : Whole with Liquid Wash    Plan of Care: Will benefit from Speech Therapy 3 times per week    Post-Acute Therapy: Anticipate that the patient will have no further speech therapy needs after discharge from the hospital.    See \"Rehab Therapy-Acute\" Patient Summary Report for complete documentation. Thank you for the consult.  "

## 2019-09-10 NOTE — CONSULTS
Neurology Consult    Requesting Physician: Kevin Groves M.D.  Patient name:      Gilmer Mae  :         1940  MRN:         [id number]  Date of Service: 2019    Chief Complaint: ED Acute stroke code Chief Complaint   Patient presents with   • Possible Stroke       History of Present Illness:  Gilmer Mae is a 79 y.o. male  Who has a last known well that is a little uncertain at this time but was heard yelling from his room at 10:35pm and family rushed in to find him on the floor incontinent of stool and poorly responsive.  EMS was called and when they arrived they noted a left gaze preference and left sided weakness.  He was not answering questions and minimally following commands.  A stroke was alerted pre-arrival.  The patient started improving en route and by the time I evaluated him after the CT scan he had no focal weakness but remained confused.  He is not sure why he is in the hospital.  He cannot tell me what the last thing he recollects at home.  He denies any previous history of stroke or seizure.      Time of symptom onset: 10:35  TLKW: unknown          No alleviating or exacerbating factors identified. No other associated symptoms reported.      ROS: The patient has blood on his mouth from biting his tongue.  He denies any other complaints but may not be reliable due to confusion.    Allergies:  No Known Allergies    Hospital Medications:  No current facility-administered medications for this encounter.     Current Outpatient Medications:   •  lisinopril (PRINIVIL) 10 MG Tab, Take 10 mg by mouth every day., Disp: , Rfl:   •  hydroCHLOROthiazide (HYDRODIURIL) 50 MG Tab, Take 50 mg by mouth every day., Disp: , Rfl:   •  atorvastatin (LIPITOR) 20 MG Tab, Take 20 mg by mouth every evening., Disp: , Rfl:   •  HYDROcodone-acetaminophen (NORCO) 5-325 MG Tab per tablet, Take 1-2 Tabs by mouth every four hours as needed., Disp: , Rfl:   •  amLODIPine (NORVASC) 10 MG Tab, Take 10 mg by mouth every  day., Disp: , Rfl:   •  carvedilol (COREG) 12.5 MG Tab, Take 12.5 mg by mouth 2 times a day, with meals., Disp: , Rfl:   •  cloNIDine (CATAPRES) 0.1 MG Tab, Take 0.1 mg by mouth 2 times a day., Disp: , Rfl:       Past Medical History:  Past Medical History:   Diagnosis Date   • Chronic pain    • Hyperlipidemia    • Hypertension    • Pacemaker        Social History:  Social History     Socioeconomic History   • Marital status:      Spouse name: Not on file   • Number of children: Not on file   • Years of education: Not on file   • Highest education level: Not on file   Occupational History   • Not on file   Social Needs   • Financial resource strain: Not on file   • Food insecurity:     Worry: Not on file     Inability: Not on file   • Transportation needs:     Medical: Not on file     Non-medical: Not on file   Tobacco Use   • Smoking status: Unknown If Ever Smoked   Substance and Sexual Activity   • Alcohol use: Not on file   • Drug use: Not on file   • Sexual activity: Not on file   Lifestyle   • Physical activity:     Days per week: Not on file     Minutes per session: Not on file   • Stress: Not on file   Relationships   • Social connections:     Talks on phone: Not on file     Gets together: Not on file     Attends Confucianism service: Not on file     Active member of club or organization: Not on file     Attends meetings of clubs or organizations: Not on file     Relationship status: Not on file   • Intimate partner violence:     Fear of current or ex partner: Not on file     Emotionally abused: Not on file     Physically abused: Not on file     Forced sexual activity: Not on file   Other Topics Concern   • Not on file   Social History Narrative   • Not on file       Family History (If relevant):  History reviewed. No pertinent family history.    Vitals:  Vitals:    09/09/19 2324 09/09/19 2325 09/09/19 2342   BP:  (!) 182/90 140/67   Pulse:  (!) 104 71   Resp:  18 16   Temp:  36.6 °C (97.8 °F)    TempSrc:  " Temporal    SpO2:  98% 97%   Weight: 83.9 kg (185 lb)     Height: 1.727 m (5' 8\")         Physical Exam:  GENERAL:  Lying in the hospital bed, confused  HEENT:  Multiple small lacerations on both sides of the tongue  MENTAL STATUS:  Awake, alert, oriented times 3.  Speech is fluent, comprehension is intact.  CRANIAL NERVES:  PERRL, EOMI with no nystagmus, face is symmetric, facial sensation is intact, tongue is in the midline, palate is symmetric.  MOTOR:  5/5 throughout  REFLEXES:  2+ and symmetric, toes are downgoing bilaterally  SENSATION:  Intact to light touch and proprioception throughout  COORDINATION:  Normal finger to nose and heel to shin bilaterally  GAIT:  Deferred              NIH Stroke Scale:    1a. Level of Consciousness (Alert, drowsy, etc): 0= Alert    1b. LOC Questions (Month, age): 1= Answers one correctly    1c. LOC Commands (Open/close eyes make fist/let go): 0= Obeys both correctly    2.   Best Gaze (Eyes open - patient follows examiner's finger on face): 0= Normal    3.   Visual Fields (introduce visual stimulus/threat to patient's field quadrants): 0= No visual loss    4.   Facial Paresis (Show teeth, raise eyebrows and squeeze eyes shut): 0= Normal     5a. Motor Arm - Left (Elevate arm to 90 degrees if patient is sitting, 45 degrees if  supine): 0= No drift    5b. Motor Arm - Right (Elevate arm to 90 degrees if patient is sitting, 45 degrees if supine): 0= No drift    6a. Motor Leg - Left (Elevate leg 30 degrees with patient supine): 0= No drift    6b. Motor Leg - Right  (Elevate leg 30 degrees with patient supine): 0= No drift    7.   Limb Ataxia (Finger-nose, heel down shin): 0= No ataxia    8.   Sensory (Pin prick to face, arm, trunk and leg - compare side to side): 0= Normal    9.  Best Language (Name item, describe a picture and read sentences): 0= No aphasia    10. Dysarthria (Evaluate speech clarity by patient repeating listed words): 0= Normal articulation    11. Extinction and " Inattention (Use information from prior testing to identify neglect or  double simultaneous stimuli testing): 0= No neglect    Total NIH Score: 1  Decision NOT to give alteplase:  not disabling deficit per assessing MD       Imaging reviewed &  Visualized by me:    CT HEAD W/O CONTRAST: No acute changes  CTA HEAD: no large vessel occlusion  CTA NECK:   CTP HEAD:     Laboratory:  Lab Results   Component Value Date/Time    PLATELETCT 157 (L) 09/09/2019 2319     Lab Results   Component Value Date/Time    PROTHROMBTM 13.3 09/09/2019 2319    INR 0.99 09/09/2019 2319    APTT 26.1 09/09/2019 2319     No results found for: GLUCOSE  No results found for: CREATININE  No results found for: IFAFRICA, IFNOTAFR    Assessment:     Mr. Mae was evaluated as a stroke alert but almost certainly suffered an unwitnessed generalized seizure at home.  This is apparently a first time seizure.  Etiology unclear but he is rapidly coming back to baseline.  No indication for an infectious cause at this time.        Recommendations:   1.  Admit for new seizure work up and observation  2.  Hold on seizure medication for now given presumed first time seizure  3.  Get routine EEG  4.  He appears to have a pacemaker so may not be able to get MRI  5.  Low threshold for LP if any signs of infection develop  6.  Neurology will continue to follow.

## 2019-09-10 NOTE — PROGRESS NOTES
Pt's AICD set to MRI safe mode by rep prior to MRI. During MRI scan, pt monitored with ECG and SPO2. Pt tolerated scan well. Device returned to pre MRI state by rep after scan. Returned to floor via wheelchair

## 2019-09-10 NOTE — THERAPY
"Physical Therapy Evaluation completed.   Bed Mobility:  Supine to Sit: Supervised  Transfers: Sit to Stand: Supervised  Gait: Level Of Assist: Supervised with No Equipment Needed       Plan of Care: Will benefit from Physical Therapy 3 times per week  Discharge Recommendations: Equipment: No Equipment Needed. Post-acute therapy Discharge to home with outpatient or home health for additional skilled therapy services.    Pt is 78 y/o M hospitalized 2/2 unresponsiveness and seizure-like activity. Pt additionally found to have L sided paralysis and neglect, MRI pending. Pt performing functional mobility at SPV level. Pt demonstrate amb x300ft without AD and SPV. He demonstrated good balance with negotiation of obstacles in his pathway. Pt demonstrating safe negotiation of full flight of stairs with single HR. Pt demonstrating equal strength B. Anticipate DC home when medically cleared.    See \"Rehab Therapy-Acute\" Patient Summary Report for complete documentation.     "

## 2019-09-10 NOTE — ED TRIAGE NOTES
"Gilmer Mae  79 y.o.  Chief Complaint   Patient presents with   • Possible Stroke     BIB EMS for above. Stroke Alert Activated PTA per protocol. ERP Dr. Groves present with patient upon arrival to ED.    ONSET: 2230    Per EMS report: Patient was getting up out of bed. Family heard him yell then fall and hit the floor. At that time patient was incontinent of stool and extremely confused. Upon EMS arrival Patient was A & O x 0, not speaking or following commands. Noted with marked L facial droop and L-sided weakness.    Per family patient had a similar episode in the past, and after that he got a pacemaker placed.    Upon arrival patient Alert and oriented, conversing and answering questions appropriately. Stating \"What hospital am I at? Why am I here?\" No facial droop of deficits noted. EMS states that patient's symptoms showed marked improvement en route to this hospital.    Fingerstick 118 for EMS.    Blood drawn and sent to lab. Patient to CT scan with vitals monitor in place accompanied by ED RN.    Report given to Hardik Hill RN Tuyet.  "

## 2019-09-10 NOTE — PROGRESS NOTES
Neurology Consult Daily Progress Note  Chief Complaint: Transient Altered Mental Status     History of Present Illness: A 79 y.o. male with PMHx of ?sick sinus syndrome with pacemake in placed on June 2019, HTN was brought to ER after an episode of Transient Altered Mental Status . Around 10:30 PM his wife stated that he is yelling & LOC x 5 mis associated with tongue biting & bowel incontinence , pt was confused for about an hour , EMS was called and when they arrived they noted a left gaze preference and left sided weakness.  He was not answering questions and minimally following commands.  Stroke alerted on arrival.  Per pt & wife, pt did have a similar episode of altered mentation on June 2019, went into [facility] , CT head & MRi brain there: nil acute. But he was having 10sec - 20 sec pauses on Tele, so pacemaker was placed. Also pt drinks 2-3 beers everyday & last drink was on 9/8 with dinner. Denied any withdrawal in the past since he never try to stop drinking.     Interval Update: Pt is AAO x 4 , no neurological focal deficits noted.     ROS:   Review of Systems   Constitutional: Negative for chills, diaphoresis and fever.   HENT: Negative for hearing loss and sore throat.    Eyes: Negative for blurred vision.   Respiratory: Negative for cough, sputum production, shortness of breath and wheezing.    Cardiovascular: Negative for chest pain, palpitations and leg swelling.   Gastrointestinal: Negative for abdominal pain, blood in stool, diarrhea, nausea and vomiting.   Genitourinary: Negative for dysuria, flank pain and urgency.   Musculoskeletal: Negative for back pain, joint pain, myalgias and neck pain.   Skin: Negative for rash.   Neurological:  Negative for dizziness and headaches, focal weakness / numbness.   Endo/Heme/Allergies: Does not bruise/bleed easily.   Psychiatric/Behavioral: Negative for suicidal ideas.   All other systems reviewed and are negative.    Allergies:  No Known  Allergies    Hospital Medications:    Current Facility-Administered Medications:   •  senna-docusate (PERICOLACE or SENOKOT S) 8.6-50 MG per tablet 2 Tab, 2 Tab, Oral, BID, Stopped at 09/10/19 0600 **AND** polyethylene glycol/lytes (MIRALAX) PACKET 1 Packet, 1 Packet, Oral, QDAY PRN **AND** magnesium hydroxide (MILK OF MAGNESIA) suspension 30 mL, 30 mL, Oral, QDAY PRN **AND** bisacodyl (DULCOLAX) suppository 10 mg, 10 mg, Rectal, QDAY PRN, Isacc Dewey M.D.  •  Pharmacy consult request - Allow for permissive hypertension: SBP up to 220 mmHg/DBP up to 120 mmHg x 48 hours, , Other, PHARMACY TO DOSE, Isacc Dewey M.D.  •  Respiratory Care per Protocol, , Nebulization, Continuous RT, Isacc Dewey M.D.  •  NS infusion, , Intravenous, Continuous, Isacc Dewey M.D., Last Rate: 75 mL/hr at 09/10/19 0230  •  enoxaparin (LOVENOX) inj 40 mg, 40 mg, Subcutaneous, DAILY, Isacc Dewey M.D., 40 mg at 09/10/19 0524  •  acetaminophen (TYLENOL) tablet 650 mg, 650 mg, Oral, Q6HRS PRN, Isacc Dewey M.D.  •  ondansetron (ZOFRAN) syringe/vial injection 4 mg, 4 mg, Intravenous, Q4HRS PRN, Isacc Dewey M.D.  •  ondansetron (ZOFRAN ODT) dispertab 4 mg, 4 mg, Oral, Q4HRS PRN, Isacc Dewey M.D.  •  LORazepam (ATIVAN) injection 2 mg, 2 mg, Intravenous, Q10 MIN PRN, Isacc Dewey M.D.  •  labetalol (NORMODYNE,TRANDATE) injection 10 mg, 10 mg, Intravenous, Q4HRS PRN **OR** hydrALAZINE (APRESOLINE) injection 10 mg, 10 mg, Intravenous, Q2HRS PRN, Isacc Zuleima, M.D.  •  aspirin (ASA) tablet 325 mg, 325 mg, Oral, DAILY **OR** aspirin (ASA) chewable tab 324 mg, 324 mg, Oral, DAILY **OR** aspirin (ASA) suppository 300 mg, 300 mg, Rectal, DAILY, Isacc Dewey M.D., 300 mg at 09/10/19 0524  •  atorvastatin (LIPITOR) tablet 40 mg, 40 mg, Oral, Q EVENING, Isacc Dewey M.D.  •  levETIRAcetam (KEPPRA) tablet 500 mg, 500 mg, Oral, BID, Teresita Ricketts M.D.      Vitals:  Vitals:    09/10/19 0310 09/10/19 0415 09/10/19 0800 09/10/19 1200   BP: 142/66  132/60  147/67   Pulse: 71 70 61 61   Resp: 16 16 15 14   Temp: 36.4 °C (97.5 °F)  37 °C (98.6 °F) 36.7 °C (98 °F)   TempSrc: Temporal  Temporal Temporal   SpO2: 94% 94% 92% 94%   Weight: 77.5 kg (170 lb 13.7 oz)      Height:           Physical Exam:  GPhysical Exam   Constitutional:  No distress.   HENT:   Head: Normocephalic and atraumatic.   Mouth/Throat: Oropharynx is clear and moist. Bruises on bilateral lateral tongue.   Eyes: Pupils are equal, round, and reactive to light. Conjunctivae are normal. No scleral icterus.   Neck: Normal range of motion. Neck supple.   Cardiovascular: Systolic murmur in aortic area radiating to neck.    Pulmonary/Chest: Effort normal and breath sounds normal. No respiratory distress. He has no wheezes. He has no rales.   Abdominal: Soft. Bowel sounds are normal. He exhibits no distension. There is no tenderness. There is no rebound.   Musculoskeletal: Normal range of motion. He exhibits no edema or tenderness.   Lymphadenopathy:     He has no cervical adenopathy.   Neurological: He is alert and oriented to person, place, and time. No cranial nerve deficit. Coordination normal.   Motor 5/5 throughout.   Normal speech. No facial droop. CN 2-12 & sensation grossly intact. DTRs 2+ symmetrical.   Skin: Skin is warm. No rash noted.   Psychiatric: He has a normal mood and affect. His behavior is normal.   Nursing note and vitals reviewed.    Laboratory:  Lab Results   Component Value Date/Time    PLATELETCT 157 (L) 09/09/2019 2319     Lab Results   Component Value Date/Time    PROTHROMBTM 13.3 09/09/2019 2319    INR 0.99 09/09/2019 2319    APTT 26.1 09/09/2019 2319       Assessment & Plan:     #Mr. Mae is a 78 yo man was being evaluated as a stroke alert but almost certainly suffered from seizure episode at home. Not sure whether this is his first or 2nd episode of seizure. Etiology less likely from infection or brain mass.   - On Neuro floor, frequent Neuro check   - Fall / Seizure /  Aspiration precautions   - CT head showed Chronic microvascular ischemic type changes. 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  Narrowing of the proximal M2 segments middle cerebral arteries, right greater than left.  - EEG this AM showed no epileptic activities   - UDS ordered   - MRI brain with & w/o contrast ordered , his pacemaker is MRI friendly , will follow MRI result   - recommended to start Keppra 500 mg BID   - continue ASA & Statin   - Paperwork to [facility] to be sent by primary team     ---------    The patient was seen and examined, I agree with plan above, SEE ANY CORRECTIONS BELOW.    I saw and evaluated the patient and discussed the management with the resident staff. I reviewed Dr. Patterson's note and agree with the resident's findings and plan as documented in the note except as documented below. The chart was reviewed and summarized.  As listed in reviewed note any/all available labs, imaging, vitals were reviewed and neuroimaging visualized. Available nursing, consultant, and resident notes were reviewed.    79-year-old man with a second epileptic event, the first being in April or June.  Transient left gaze and postictal left-sided weakness suggestive of a right-sided focus.  Pending MRI brain imaging.  In the setting of 2 seemingly unprovoked events, recommend Keppra.  Secondary stroke prevention with antiplatelet and statin.  Remainder of plan as above.    Yovani Chris MD  Director, Comprehensive Stroke Center, [facility]  Neurohospitalist, [facility]  Clinical  of Neurology, [facility] [employer]  t) [phone] (f) [phone]    Referring Provider-  Teresita Ricketts,  M.D.

## 2019-09-10 NOTE — CARE PLAN
Problem: Safety  Goal: Will remain free from injury  Outcome: PROGRESSING AS EXPECTED  Goal: Will remain free from falls  Outcome: PROGRESSING AS EXPECTED  Note:   Uses call light appropriately      Problem: Knowledge Deficit  Goal: Knowledge of disease process/condition, treatment plan, diagnostic tests, and medications will improve  Outcome: PROGRESSING AS EXPECTED  Goal: Knowledge of the prescribed therapeutic regimen will improve  Outcome: PROGRESSING AS EXPECTED  Note:   Stroke education given, MRI screening form and procedure explained at bedside, verbalizes understanding

## 2019-09-11 ENCOUNTER — APPOINTMENT (OUTPATIENT)
Dept: CARDIOLOGY | Facility: MEDICAL CENTER | Age: 79
DRG: 057 | End: 2019-09-11
Attending: INTERNAL MEDICINE
Payer: MEDICARE

## 2019-09-11 VITALS
OXYGEN SATURATION: 94 % | WEIGHT: 170.86 LBS | HEART RATE: 60 BPM | DIASTOLIC BLOOD PRESSURE: 83 MMHG | RESPIRATION RATE: 18 BRPM | HEIGHT: 68 IN | SYSTOLIC BLOOD PRESSURE: 182 MMHG | TEMPERATURE: 98.2 F | BODY MASS INDEX: 25.89 KG/M2

## 2019-09-11 PROBLEM — R56.9 SEIZURE (HCC): Status: RESOLVED | Noted: 2019-09-10 | Resolved: 2019-09-11

## 2019-09-11 LAB
ANION GAP SERPL CALC-SCNC: 10 MMOL/L (ref 0–11.9)
BASOPHILS # BLD AUTO: 0.2 % (ref 0–1.8)
BASOPHILS # BLD: 0.02 K/UL (ref 0–0.12)
BUN SERPL-MCNC: 11 MG/DL (ref 8–22)
CALCIUM SERPL-MCNC: 9.1 MG/DL (ref 8.5–10.5)
CHLORIDE SERPL-SCNC: 103 MMOL/L (ref 96–112)
CHOLEST SERPL-MCNC: 117 MG/DL (ref 100–199)
CO2 SERPL-SCNC: 28 MMOL/L (ref 20–33)
CREAT SERPL-MCNC: 1.17 MG/DL (ref 0.5–1.4)
EOSINOPHIL # BLD AUTO: 0.02 K/UL (ref 0–0.51)
EOSINOPHIL NFR BLD: 0.2 % (ref 0–6.9)
ERYTHROCYTE [DISTWIDTH] IN BLOOD BY AUTOMATED COUNT: 44.1 FL (ref 35.9–50)
GLUCOSE SERPL-MCNC: 109 MG/DL (ref 65–99)
HCT VFR BLD AUTO: 42.8 % (ref 42–52)
HDLC SERPL-MCNC: 48 MG/DL
HGB BLD-MCNC: 14.2 G/DL (ref 14–18)
IMM GRANULOCYTES # BLD AUTO: 0.02 K/UL (ref 0–0.11)
IMM GRANULOCYTES NFR BLD AUTO: 0.2 % (ref 0–0.9)
LDLC SERPL CALC-MCNC: 52 MG/DL
LV EJECT FRACT  99904: 70
LV EJECT FRACT MOD 2C 99903: 80.48
LV EJECT FRACT MOD 4C 99902: 74.72
LV EJECT FRACT MOD BP 99901: 75.25
LYMPHOCYTES # BLD AUTO: 1.81 K/UL (ref 1–4.8)
LYMPHOCYTES NFR BLD: 22 % (ref 22–41)
MAGNESIUM SERPL-MCNC: 2 MG/DL (ref 1.5–2.5)
MCH RBC QN AUTO: 33.6 PG (ref 27–33)
MCHC RBC AUTO-ENTMCNC: 33.2 G/DL (ref 33.7–35.3)
MCV RBC AUTO: 101.4 FL (ref 81.4–97.8)
MONOCYTES # BLD AUTO: 0.76 K/UL (ref 0–0.85)
MONOCYTES NFR BLD AUTO: 9.2 % (ref 0–13.4)
NEUTROPHILS # BLD AUTO: 5.59 K/UL (ref 1.82–7.42)
NEUTROPHILS NFR BLD: 68.2 % (ref 44–72)
NRBC # BLD AUTO: 0 K/UL
NRBC BLD-RTO: 0 /100 WBC
PLATELET # BLD AUTO: 144 K/UL (ref 164–446)
PMV BLD AUTO: 10.6 FL (ref 9–12.9)
POTASSIUM SERPL-SCNC: 3.3 MMOL/L (ref 3.6–5.5)
RBC # BLD AUTO: 4.22 M/UL (ref 4.7–6.1)
SODIUM SERPL-SCNC: 141 MMOL/L (ref 135–145)
TRIGL SERPL-MCNC: 83 MG/DL (ref 0–149)
WBC # BLD AUTO: 8.2 K/UL (ref 4.8–10.8)

## 2019-09-11 PROCEDURE — 700102 HCHG RX REV CODE 250 W/ 637 OVERRIDE(OP): Performed by: HOSPITALIST

## 2019-09-11 PROCEDURE — A9270 NON-COVERED ITEM OR SERVICE: HCPCS | Performed by: INTERNAL MEDICINE

## 2019-09-11 PROCEDURE — 80048 BASIC METABOLIC PNL TOTAL CA: CPT

## 2019-09-11 PROCEDURE — 36415 COLL VENOUS BLD VENIPUNCTURE: CPT

## 2019-09-11 PROCEDURE — 700111 HCHG RX REV CODE 636 W/ 250 OVERRIDE (IP): Performed by: INTERNAL MEDICINE

## 2019-09-11 PROCEDURE — 93306 TTE W/DOPPLER COMPLETE: CPT | Mod: 26 | Performed by: INTERNAL MEDICINE

## 2019-09-11 PROCEDURE — 99231 SBSQ HOSP IP/OBS SF/LOW 25: CPT | Performed by: PSYCHIATRY & NEUROLOGY

## 2019-09-11 PROCEDURE — 80061 LIPID PANEL: CPT

## 2019-09-11 PROCEDURE — A9270 NON-COVERED ITEM OR SERVICE: HCPCS | Performed by: HOSPITALIST

## 2019-09-11 PROCEDURE — 85025 COMPLETE CBC W/AUTO DIFF WBC: CPT

## 2019-09-11 PROCEDURE — 93306 TTE W/DOPPLER COMPLETE: CPT

## 2019-09-11 PROCEDURE — 700102 HCHG RX REV CODE 250 W/ 637 OVERRIDE(OP): Performed by: INTERNAL MEDICINE

## 2019-09-11 PROCEDURE — 83735 ASSAY OF MAGNESIUM: CPT

## 2019-09-11 PROCEDURE — 99239 HOSP IP/OBS DSCHRG MGMT >30: CPT | Performed by: HOSPITALIST

## 2019-09-11 RX ORDER — LEVETIRACETAM 500 MG/1
500 TABLET ORAL 2 TIMES DAILY
Qty: 60 TAB | Refills: 0 | Status: SHIPPED | OUTPATIENT
Start: 2019-09-11

## 2019-09-11 RX ORDER — ATORVASTATIN CALCIUM 40 MG/1
40 TABLET, FILM COATED ORAL EVERY EVENING
Qty: 30 TAB | Refills: 0 | Status: ON HOLD | OUTPATIENT
Start: 2019-09-11 | End: 2022-01-01

## 2019-09-11 RX ADMIN — ASPIRIN 325 MG: 325 TABLET, FILM COATED ORAL at 05:39

## 2019-09-11 RX ADMIN — SENNOSIDES, DOCUSATE SODIUM 2 TABLET: 50; 8.6 TABLET, FILM COATED ORAL at 05:39

## 2019-09-11 RX ADMIN — LEVETIRACETAM 500 MG: 500 TABLET ORAL at 05:39

## 2019-09-11 NOTE — CARE PLAN
Problem: Safety  Goal: Will remain free from injury  Outcome: PROGRESSING AS EXPECTED  Bed alarm on, call light w/in reach, hourly rounding place. Non skid socks on. Reinforced using call light prior to getting up.  Problem: Pain Management  Goal: Pain level will decrease to patient's comfort goal  Outcome: PROGRESSING AS EXPECTED   Denies pain

## 2019-09-11 NOTE — PROGRESS NOTES
"Oriented x4 at times, moments of confusion for example patient got up at 0000 to go to restroom but stated he was, \"Getting a bottle for the baby\". Pt is impulsive and does not call to go to restroom. Voids w/ frequency. Denies pain. No seizures witnessed. Bed alarm on.  "

## 2019-09-11 NOTE — DISCHARGE SUMMARY
Discharge Summary    CHIEF COMPLAINT ON ADMISSION  Chief Complaint   Patient presents with   • Possible Stroke       Reason for Admission  Poss stroke     Admission Date  9/9/2019    CODE STATUS  Full Code    HPI & HOSPITAL COURSE  Gilmer Mae is a 79 y.o. male  Who has a last known well that is a little uncertain at this time but was heard yelling from his room at 10:35pm and family rushed in to find him on the floor incontinent of stool and poorly responsive.  EMS was called and when they arrived they noted a left gaze preference and left sided weakness.  He was not answering questions and minimally following commands.  A stroke was alerted pre-arrival.  The patient started improving en route . CT scan he had no focal weakness but remained confused.    Stroke protocol was initiated.  Patient was admitted for further management.  Patient rapidly returned back to baseline.  Vitals were stable labs were monitored closely.  Neurology was consulted.  Patient had a EEG done which was normal.  Patient had MRI brain done which showed previous infarcts however no acute masslike lesions, abscess. 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  Narrowing of the proximal M2 segments middle cerebral arteries, right greater than left.  She was started on Keppra 500 mg twice daily as this is his second episode in this past couple of months these are likely secondary to recent stroke.  At this time patient is medically stable to be discharged home and can follow-up with neurology in 2 weeks.  Patient understood and agreed with above plan discussed with patient that he should not be driving anymore , paperwork to [facility] to be sent by .         Therefore, he is discharged in fair and stable  condition to home with close outpatient follow-up.    The patient recovered much more quickly than anticipated on admission.    Discharge Date  9/11/19    FOLLOW UP ITEMS POST DISCHARGE  pcp  Neurology    DISCHARGE DIAGNOSES  Active Problems:    TIA (transient ischemic attack) POA: Yes    Essential hypertension POA: Yes    Hyperlipidemia POA: Yes  Resolved Problems:    Seizure (HCC) POA: Yes      FOLLOW UP  No future appointments.  No follow-up provider specified.    MEDICATIONS ON DISCHARGE     Medication List      START taking these medications      Instructions   levETIRAcetam 500 MG Tabs  Commonly known as:  KEPPRA   Take 1 Tab by mouth 2 Times a Day.  Dose:  500 mg        CHANGE how you take these medications      Instructions   atorvastatin 40 MG Tabs  What changed:    · medication strength  · how much to take  · when to take this  Commonly known as:  LIPITOR   Take 1 Tab by mouth every evening.  Dose:  40 mg        CONTINUE taking these medications      Instructions   amLODIPine 10 MG Tabs  Commonly known as:  NORVASC   Take 10 mg by mouth every day.  Dose:  10 mg     ASPIRIN EC PO   Take 81 mg by mouth every day.  Dose:  81 mg     carvedilol 12.5 MG Tabs  Commonly known as:  COREG   Take 12.5 mg by mouth 2 times a day, with meals.  Dose:  12.5 mg     cloNIDine 0.1 MG Tabs  Commonly known as:  CATAPRES   Take 0.1 mg by mouth 2 times a day.  Dose:  0.1 mg     hydroCHLOROthiazide 50 MG Tabs  Commonly known as:  HYDRODIURIL   Take 25 mg by mouth every day.  Dose:  25 mg     HYDROcodone-acetaminophen 5-325 MG Tabs per tablet  Commonly known as:  NORCO   Take 1-2 Tabs by mouth every four hours as needed.  Dose:  1-2 Tab     lisinopril 10 MG Tabs  Commonly known as:  PRINIVIL   Take 40 mg by mouth every day.  Dose:  40 mg            Allergies  No Known Allergies    DIET  Orders Placed This Encounter   Procedures   • Diet Order Regular     Standing Status:   Standing     Number of Occurrences:   1     Order Specific  Question:   Diet:     Answer:   Regular [1]       ACTIVITY  As tolerated.  Weight bearing as tolerated    CONSULTATIONS  Neurology    PROCEDURES  EEG    LABORATORY  Lab Results   Component Value Date    SODIUM 141 09/11/2019    POTASSIUM 3.3 (L) 09/11/2019    CHLORIDE 103 09/11/2019    CO2 28 09/11/2019    GLUCOSE 109 (H) 09/11/2019    BUN 11 09/11/2019    CREATININE 1.17 09/11/2019        Lab Results   Component Value Date    WBC 8.2 09/11/2019    HEMOGLOBIN 14.2 09/11/2019    HEMATOCRIT 42.8 09/11/2019    PLATELETCT 144 (L) 09/11/2019        Total time of the discharge process exceeds 37 minutes.

## 2019-09-11 NOTE — PROGRESS NOTES
Neurology Consult Daily Progress Note  Chief Complaint: Transient Altered Mental Status     History of Present Illness: A 79 y.o. male with PMHx of ?sick sinus syndrome with pacemake in placed on June 2019, HTN was brought to ER after an episode of Transient Altered Mental Status . Around 10:30 PM his wife stated that he is yelling & LOC x 5 mis associated with tongue biting & bowel incontinence , pt was confused for about an hour , EMS was called and when they arrived they noted a left gaze preference and left sided weakness.  He was not answering questions and minimally following commands.  Stroke alerted on arrival.  Per pt & wife, pt did have a similar episode of altered mentation on June 2019, went into [facility] , CT head & MRi brain there: nil acute. But he was having 10sec - 20 sec pauses on Tele, so pacemaker was placed. Also pt drinks 2-3 beers everyday & last drink was on 9/8 with dinner. Denied any withdrawal in the past since he never try to stop drinking.     Interval Update: Pt is AAO x 4 , no neurological focal deficits noted. BP elevated this AM.     ROS:   Review of Systems   Constitutional: Negative for chills, diaphoresis and fever.   HENT: Negative for hearing loss and sore throat.    Eyes: Negative for blurred vision.   Respiratory: Negative for cough, sputum production, shortness of breath and wheezing.    Cardiovascular: Negative for chest pain, palpitations and leg swelling.   Gastrointestinal: Negative for abdominal pain, blood in stool, diarrhea, nausea and vomiting.   Genitourinary: Negative for dysuria, flank pain and urgency.   Musculoskeletal: Negative for back pain, joint pain, myalgias and neck pain.   Skin: Negative for rash.   Neurological:  Negative for dizziness and headaches, focal weakness / numbness.   Endo/Heme/Allergies: Does not bruise/bleed easily.   Psychiatric/Behavioral: Negative for suicidal ideas.   All other systems reviewed and are  negative.    Allergies:  No Known Allergies    Hospital Medications:    Current Facility-Administered Medications:   •  senna-docusate (PERICOLACE or SENOKOT S) 8.6-50 MG per tablet 2 Tab, 2 Tab, Oral, BID, 2 Tab at 09/11/19 0539 **AND** polyethylene glycol/lytes (MIRALAX) PACKET 1 Packet, 1 Packet, Oral, QDAY PRN **AND** magnesium hydroxide (MILK OF MAGNESIA) suspension 30 mL, 30 mL, Oral, QDAY PRN **AND** bisacodyl (DULCOLAX) suppository 10 mg, 10 mg, Rectal, QDAY PRN, Isacc Dewey M.D.  •  Pharmacy consult request - Allow for permissive hypertension: SBP up to 220 mmHg/DBP up to 120 mmHg x 48 hours, , Other, PHARMACY TO DOSE, Isacc Dewey M.D.  •  Respiratory Care per Protocol, , Nebulization, Continuous RT, Isacc Dewey M.D.  •  NS infusion, , Intravenous, Continuous, Isacc Dewey M.D., Last Rate: 75 mL/hr at 09/10/19 0230  •  enoxaparin (LOVENOX) inj 40 mg, 40 mg, Subcutaneous, DAILY, Isacc Dewey M.D., 40 mg at 09/10/19 0524  •  acetaminophen (TYLENOL) tablet 650 mg, 650 mg, Oral, Q6HRS PRN, Isacc Dewey M.D.  •  ondansetron (ZOFRAN) syringe/vial injection 4 mg, 4 mg, Intravenous, Q4HRS PRN, Isacc Dewey M.D.  •  ondansetron (ZOFRAN ODT) dispertab 4 mg, 4 mg, Oral, Q4HRS PRN, Isacc Dewey M.D.  •  LORazepam (ATIVAN) injection 2 mg, 2 mg, Intravenous, Q10 MIN PRN, Isacc Dewey M.D.  •  labetalol (NORMODYNE,TRANDATE) injection 10 mg, 10 mg, Intravenous, Q4HRS PRN **OR** hydrALAZINE (APRESOLINE) injection 10 mg, 10 mg, Intravenous, Q2HRS PRN, Isacc Dewey M.D.  •  aspirin (ASA) tablet 325 mg, 325 mg, Oral, DAILY, 325 mg at 09/11/19 0539 **OR** aspirin (ASA) chewable tab 324 mg, 324 mg, Oral, DAILY **OR** aspirin (ASA) suppository 300 mg, 300 mg, Rectal, DAILY, Isacc Dewey M.D., 300 mg at 09/10/19 0524  •  atorvastatin (LIPITOR) tablet 40 mg, 40 mg, Oral, Q EVENING, Isacc Dewey M.D., 40 mg at 09/10/19 1723  •  levETIRAcetam (KEPPRA) tablet 500 mg, 500 mg, Oral, BID, Teresita Ricketts M.D., 500 mg at 09/11/19  0539      Vitals:  Vitals:    09/10/19 2000 09/11/19 0000 09/11/19 0400 09/11/19 0800   BP: 142/51 136/68 (!) 168/66 (!) 182/83   Pulse: 64 60 65 60   Resp: 16 16 16 18   Temp: 36.5 °C (97.7 °F) 36.6 °C (97.9 °F) 36.4 °C (97.6 °F) 36.8 °C (98.2 °F)   TempSrc: Temporal Temporal Temporal Temporal   SpO2: 92% 95% 96% 94%   Weight:       Height:           Physical Exam:  GPhysical Exam   Constitutional:  No distress.   HENT:   Head: Normocephalic and atraumatic.   Mouth/Throat: Oropharynx is clear and moist. Bruises on bilateral lateral tongue.   Eyes: Pupils are equal, round, and reactive to light. Conjunctivae are normal. No scleral icterus.   Neck: Normal range of motion. Neck supple.   Cardiovascular: Systolic murmur in aortic area radiating to neck.    Pulmonary/Chest: Effort normal and breath sounds normal. No respiratory distress. He has no wheezes. He has no rales.   Abdominal: Soft. Bowel sounds are normal. He exhibits no distension. There is no tenderness. There is no rebound.   Musculoskeletal: Normal range of motion. He exhibits no edema or tenderness.   Lymphadenopathy:     He has no cervical adenopathy.   Neurological: He is alert and oriented to person, place, and time. No cranial nerve deficit. Coordination normal.   Motor 5/5 throughout.   Normal speech. No facial droop. CN 2-12 & sensation grossly intact. DTRs 2+ symmetrical.   Skin: Skin is warm. No rash noted.   Psychiatric: He has a normal mood and affect. His behavior is normal.   Nursing note and vitals reviewed.    Laboratory:  Lab Results   Component Value Date/Time    PLATELETCT 157 (L) 09/09/2019 2319     Lab Results   Component Value Date/Time    PROTHROMBTM 13.3 09/09/2019 2319    INR 0.99 09/09/2019 2319    APTT 26.1 09/09/2019 2319       Assessment & Plan:     #Mr. Mae is a 80 yo man was being evaluated as a stroke alert but almost certainly suffered from seizure episode at home. Not sure whether this is his first or 2nd episode of  seizure. Etiology less likely from infection or brain mass.   - On Neuro floor, frequent Neuro check   - Fall / Seizure / Aspiration precautions   - CT head showed Chronic microvascular ischemic type changes. 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  Narrowing of the proximal M2 segments middle cerebral arteries, right greater than left.  - EEG this AM showed no epileptic activities   - UDS ordered & pending   - MRI brain with & w/o contrast completed  - continue Keppra 500 mg BID   - continue ASA & Statin   - BP elevated this AM, keep the BP goal at normotensive.   - Paperworks to [facility] to be sent by primary team   - Neurology will sign off.     ---------------    The patient was seen and examined, I agree with plan above, SEE ANY CORRECTIONS BELOW.    I saw and evaluated the patient and discussed the management with the resident staff. I reviewed Dr. Patterson's note and agree with the resident's findings and plan as documented in the note except as documented below. The chart was reviewed and summarized.  As listed in reviewed note any/all available labs, imaging, vitals were reviewed and neuroimaging visualized. Available nursing, consultant, and resident notes were reviewed.    Yovani Chris MD  Director, Comprehensive Stroke Center, [facility]  Neurohospitalist, [facility]  Clinical  of Neurology, [facility] [employer]  t) [phone] (f) [phone]

## 2019-09-11 NOTE — PROGRESS NOTES
Pt AO x4, on RA, tele in place, regular diet. ambulates with SBA and is impulsive at times. Discharge planned for today, contacted with SW to set up transportation home. Fall precautions in place.

## 2019-09-11 NOTE — THERAPY
"Occupational Therapy Evaluation completed.   Functional Status:    80 y/o male admitted to hospital for possible stroke after having GLF at home followed by incontinence of stool and confusion. Upon entry, pt was on the phone with his wife stating \"How did I get here?\" and \"You mean I have to stay the night?\" He was also noted to be trying to put his watch on. Following his phone call he was agreeable to mobilize. He got OOB and stood with supv, already wearing pants and was able to manage them with toileting. He ambulated down the aj and back without AD, mildly unsteady but no bridger LOB. Pt is oriented, however he is confused and is aware that he is \"all mixed up.\" Will continue working with pt in this setting to maximize safety prior to return home.     Plan of Care: Will benefit from Occupational Therapy 2 times per week  Discharge Recommendations:  Equipment: Will Continue to Assess for Equipment Needs. Post-acute therapy Anticipate that the patient will have no further occupational therapy needs after discharge from the hospital.       See \"Rehab Therapy-Acute\" Patient Summary Report for complete documentation.    "

## 2019-09-11 NOTE — DISCHARGE INSTRUCTIONS
"Discharge Instructions    Discharged to home by car with relative. Discharged via wheelchair, hospital escort: Yes.  Special equipment needed: Not Applicable    Be sure to schedule a follow-up appointment with your primary care doctor or any specialists as instructed.     Discharge Plan:   Influenza Vaccine Indication: Patient Refuses    I understand that a diet low in cholesterol, fat, and sodium is recommended for good health. Unless I have been given specific instructions below for another diet, I accept this instruction as my diet prescription.   Other diet: regular healthy diet    Special Instructions:     Stroke/CVA/TIA/Hemorrhagic Ischemia Discharge Instructions  You have had a stroke. Your risk factors have been identified as follows:  Age - Over 55  Gender - Men are at a higher risk than women  High blood pressure  Heart disease  Previous TIAs or \"mini strokes\"  Smoking  High Cholesterol and lipids  It is important that you reduce your risk factors to avoid another stroke in the future. Here are some general guidelines to follow:  · Eat healthy - avoid food high in fat.  · Get regular exercise.  · Maintain a healthy weight.  · Avoid smoking.  · Avoid alcohol and illegal drug use.  · Take your medications as directed.  For more information regarding risk factors, refer to pages 17-19 in your Stroke Patient Education Guide. Stroke Education Guide was given to patient.    Warning signs of a stroke include (which can also be found on page 3 of your Stroke Patient Education Guide):  · Sudden numbness of weakness of the face, arm or leg (especially on one side of the body).  · Sudden confusion, trouble speaking or understanding.  · Sudden trouble seeing in one or both eyes.  · Sudden trouble walking, dizziness, loss of balance or coordination.  · Sudden severe headache with no known cause.  It is very important to get treatment quickly when a stroke occurs. If you experience any of the above warning signs, call " [phone] immediately.     Some patients who have had a stroke will be going home on a blood thinner medication called Warfarin (Coumadin).  This medication requires very close monitoring and follow up.  This follow up can be provided by either your Primary Care Physician or by [facility] Outpatient Anticoagulation Service.  The Outpatient Anticoagulation Service is located at the [place] [facility] at [facility] ([facility]).  If you do not know when your follow up appointment is scheduled, call [phone] to verify your appointment time.      · Is patient discharged on Warfarin / Coumadin?   No     Depression / Suicide Risk    As you are discharged from this [facility] [place], it is important to learn how to keep safe from harming yourself.    Recognize the warning signs:  · Abrupt changes in personality, positive or negative- including increase in energy   · Giving away possessions  · Change in eating patterns- significant weight changes-  positive or negative  · Change in sleeping patterns- unable to sleep or sleeping all the time   · Unwillingness or inability to communicate  · Depression  · Unusual sadness, discouragement and loneliness  · Talk of wanting to die  · Neglect of personal appearance   · Rebelliousness- reckless behavior  · Withdrawal from people/activities they love  · Confusion- inability to concentrate     If you or a loved one observes any of these behaviors or has concerns about self-harm, here's what you can do:  · Talk about it- your feelings and reasons for harming yourself  · Remove any means that you might use to hurt yourself (examples: pills, rope, extension cords, firearm)  · Get professional help from the community (Mental Health, Substance Abuse, psychological counseling)  · Do not be alone:Call your Safe Contact- someone whom you trust who will be there for you.  · Call your local CRISIS HOTLINE 086-4802 or 109-711-0362  · Call your  local Children's Mobile Crisis Response Team [place] (038) 332-1115 or www.[employer]  · Call the toll free National Suicide Prevention Hotlines   · National Suicide Prevention Lifeline 157-179-XQWB (1974)  · [employer] Hope Line Network 800-SUICIDE (764-8391)      Discharge Instructions per Teresita Ricketts M.D.    You are admitted to the hospital for a seizure like activity again in the setting of previous strokes.  At this time we have started you on antiseizure medications please take those as prescribed and follow-up with neurology in 2 weeks    DIET: As tolerated    ACTIVITY: As tolerated    DIAGNOSIS: Seizure activity in the setting of previous stroke    Return to ER if symptoms recur or worsen including loss of consciousness.  Tongue biting,weakness, confusion, speech problems, facial droop

## 2019-09-11 NOTE — DISCHARGE PLANNING
Anticipated Discharge Disposition: Home    Action: LSW informed by bedside RNMartha that pt may need transportation upon d/c. LSW met with pt at bedside and pt states that his son will be providing transportation. Pt called son while LSW at bedside and confirmed. Son to bring pt's clothes and wallet.     Barriers to Discharge: None    Plan: LSW to assist as needed.

## 2019-09-11 NOTE — CARE PLAN
Problem: Safety  Goal: Will remain free from injury  Outcome: PROGRESSING AS EXPECTED  Note:   Reviewed patient's mobility status, discussed with care team of patient needs, verifying appropriate safety precautions in place, providing patient education, ensuring call lights are within reach, non-slip socks in use, evaluating needs alarms & monitoring every shift, continuing with current plan of care.       Problem: Knowledge Deficit  Goal: Knowledge of disease process/condition, treatment plan, diagnostic tests, and medications will improve  Outcome: PROGRESSING AS EXPECTED  Note:   Educated patient about POC, activities, encouraging patient to ask questions, providing answers to patient's questions, educating patient about medications, encouraging patient involvement in care process. Continuing with current POC.

## 2019-09-25 NOTE — DOCUMENTATION QUERY
"                                                                         [facility]                                                                       Query Response Note      PATIENT:               ZOE SARMIENTO  ACCT #:                  [id number]  MRN:                     [id number]  :                      1940  ADMIT DATE:       2019 11:18 PM  DISCH DATE:        2019 12:11 PM  RESPONDING  PROVIDER #:        [id number]           QUERY TEXT:    There is conflicting documentation in the medical record.      Seizures \"likely secondary to recent stroke\" is documented on the D/C Summary    TIA POA: Yes  is documented documented on the D/C Summary    Based on treatment, clinical findings and risk factors, can this documentation be further clarified? If an appropriate response is not listed below, please respond with a new note.    The patient's Clinical Indicators include:  Patient was admitted after seizure at home, concern was for stroke.  Patient has a past medical history of stroke and a prior seizure in April or .  Please clarify the documentation regarding the discharge diagnosis.  Options provided:   -- Seizure due to prior stroke   -- TIA   -- Unable to determine      Query created by: Zaida Cerna on 2019 11:12 AM    RESPONSE TEXT:    Seizure due to prior stroke          Electronically signed by:  WILD ROBISON 2019 1:06 PM              "

## 2021-01-14 DIAGNOSIS — Z23 NEED FOR VACCINATION: ICD-10-CM

## 2021-02-03 ENCOUNTER — IMMUNIZATION (OUTPATIENT)
Dept: FAMILY PLANNING/WOMEN'S HEALTH CLINIC | Facility: IMMUNIZATION CENTER | Age: 81
End: 2021-02-03
Attending: INTERNAL MEDICINE
Payer: MEDICARE

## 2021-02-03 DIAGNOSIS — Z23 ENCOUNTER FOR VACCINATION: Primary | ICD-10-CM

## 2021-02-03 DIAGNOSIS — Z23 NEED FOR VACCINATION: ICD-10-CM

## 2021-02-03 PROCEDURE — 0001A PFIZER SARS-COV-2 VACCINE: CPT

## 2021-02-03 PROCEDURE — 91300 PFIZER SARS-COV-2 VACCINE: CPT

## 2021-02-24 ENCOUNTER — IMMUNIZATION (OUTPATIENT)
Dept: FAMILY PLANNING/WOMEN'S HEALTH CLINIC | Facility: IMMUNIZATION CENTER | Age: 81
End: 2021-02-24
Attending: INTERNAL MEDICINE
Payer: MEDICARE

## 2021-02-24 DIAGNOSIS — Z23 ENCOUNTER FOR VACCINATION: Primary | ICD-10-CM

## 2021-02-24 PROCEDURE — 0002A PFIZER SARS-COV-2 VACCINE: CPT

## 2021-02-24 PROCEDURE — 91300 PFIZER SARS-COV-2 VACCINE: CPT

## 2022-01-01 ENCOUNTER — APPOINTMENT (OUTPATIENT)
Dept: RADIOLOGY | Facility: MEDICAL CENTER | Age: 82
DRG: 871 | End: 2022-01-01
Attending: HOSPITALIST
Payer: MEDICARE

## 2022-01-01 ENCOUNTER — APPOINTMENT (OUTPATIENT)
Dept: RADIOLOGY | Facility: MEDICAL CENTER | Age: 82
DRG: 853 | End: 2022-01-01
Attending: EMERGENCY MEDICINE
Payer: MEDICARE

## 2022-01-01 ENCOUNTER — APPOINTMENT (OUTPATIENT)
Dept: CARDIOLOGY | Facility: MEDICAL CENTER | Age: 82
DRG: 853 | End: 2022-01-01
Attending: INTERNAL MEDICINE
Payer: MEDICARE

## 2022-01-01 ENCOUNTER — ANESTHESIA EVENT (OUTPATIENT)
Dept: SURGERY | Facility: MEDICAL CENTER | Age: 82
DRG: 853 | End: 2022-01-01
Payer: MEDICARE

## 2022-01-01 ENCOUNTER — HOSPITAL ENCOUNTER (INPATIENT)
Facility: MEDICAL CENTER | Age: 82
LOS: 6 days | DRG: 871 | End: 2023-01-04
Attending: EMERGENCY MEDICINE | Admitting: HOSPITALIST
Payer: MEDICARE

## 2022-01-01 ENCOUNTER — APPOINTMENT (OUTPATIENT)
Dept: RADIOLOGY | Facility: MEDICAL CENTER | Age: 82
DRG: 853 | End: 2022-01-01
Attending: HOSPITALIST
Payer: MEDICARE

## 2022-01-01 ENCOUNTER — APPOINTMENT (OUTPATIENT)
Dept: RADIOLOGY | Facility: MEDICAL CENTER | Age: 82
DRG: 853 | End: 2022-01-01
Attending: INTERNAL MEDICINE
Payer: MEDICARE

## 2022-01-01 ENCOUNTER — APPOINTMENT (OUTPATIENT)
Dept: RADIOLOGY | Facility: MEDICAL CENTER | Age: 82
DRG: 853 | End: 2022-01-01
Attending: SURGERY
Payer: MEDICARE

## 2022-01-01 ENCOUNTER — APPOINTMENT (OUTPATIENT)
Dept: CARDIOLOGY | Facility: MEDICAL CENTER | Age: 82
DRG: 853 | End: 2022-01-01
Attending: NURSE PRACTITIONER
Payer: MEDICARE

## 2022-01-01 ENCOUNTER — ANESTHESIA (OUTPATIENT)
Dept: SURGERY | Facility: MEDICAL CENTER | Age: 82
DRG: 853 | End: 2022-01-01
Payer: MEDICARE

## 2022-01-01 ENCOUNTER — APPOINTMENT (OUTPATIENT)
Dept: RADIOLOGY | Facility: MEDICAL CENTER | Age: 82
DRG: 853 | End: 2022-01-01
Payer: MEDICARE

## 2022-01-01 ENCOUNTER — APPOINTMENT (OUTPATIENT)
Dept: RADIOLOGY | Facility: MEDICAL CENTER | Age: 82
DRG: 871 | End: 2022-01-01
Attending: EMERGENCY MEDICINE
Payer: MEDICARE

## 2022-01-01 ENCOUNTER — HOSPITAL ENCOUNTER (INPATIENT)
Facility: MEDICAL CENTER | Age: 82
LOS: 21 days | DRG: 853 | End: 2022-12-19
Attending: EMERGENCY MEDICINE | Admitting: HOSPITALIST
Payer: MEDICARE

## 2022-01-01 ENCOUNTER — HOSPITAL ENCOUNTER (OUTPATIENT)
Dept: RADIOLOGY | Facility: MEDICAL CENTER | Age: 82
End: 2022-12-06
Attending: INTERNAL MEDICINE
Payer: MEDICARE

## 2022-01-01 VITALS
WEIGHT: 174.6 LBS | HEART RATE: 65 BPM | TEMPERATURE: 99 F | HEIGHT: 68 IN | SYSTOLIC BLOOD PRESSURE: 150 MMHG | BODY MASS INDEX: 26.46 KG/M2 | OXYGEN SATURATION: 93 % | DIASTOLIC BLOOD PRESSURE: 75 MMHG | RESPIRATION RATE: 18 BRPM

## 2022-01-01 DIAGNOSIS — Z86.69 HISTORY OF BELL'S PALSY: ICD-10-CM

## 2022-01-01 DIAGNOSIS — I46.9 CARDIAC ARREST (HCC): ICD-10-CM

## 2022-01-01 DIAGNOSIS — M86.9 OSTEOMYELITIS OF RIGHT LOWER EXTREMITY (HCC): ICD-10-CM

## 2022-01-01 DIAGNOSIS — I95.89 OTHER SPECIFIED HYPOTENSION: ICD-10-CM

## 2022-01-01 DIAGNOSIS — N30.00 ACUTE CYSTITIS WITHOUT HEMATURIA: ICD-10-CM

## 2022-01-01 DIAGNOSIS — G51.0 BELL'S PALSY: ICD-10-CM

## 2022-01-01 DIAGNOSIS — D64.9 ANEMIA, UNSPECIFIED TYPE: ICD-10-CM

## 2022-01-01 DIAGNOSIS — I82.B12: ICD-10-CM

## 2022-01-01 DIAGNOSIS — N17.9 AKI (ACUTE KIDNEY INJURY) (HCC): ICD-10-CM

## 2022-01-01 DIAGNOSIS — K92.1 HEMATOCHEZIA: ICD-10-CM

## 2022-01-01 DIAGNOSIS — J18.9 PNEUMONIA DUE TO INFECTIOUS ORGANISM, UNSPECIFIED LATERALITY, UNSPECIFIED PART OF LUNG: ICD-10-CM

## 2022-01-01 DIAGNOSIS — I51.81 STRESS-INDUCED CARDIOMYOPATHY: ICD-10-CM

## 2022-01-01 LAB
ABO GROUP BLD: NORMAL
ALBUMIN SERPL BCP-MCNC: 2.2 G/DL (ref 3.2–4.9)
ALBUMIN SERPL BCP-MCNC: 2.6 G/DL (ref 3.2–4.9)
ALBUMIN SERPL BCP-MCNC: 2.6 G/DL (ref 3.2–4.9)
ALBUMIN SERPL BCP-MCNC: 3.2 G/DL (ref 3.2–4.9)
ALBUMIN SERPL BCP-MCNC: 3.3 G/DL (ref 3.2–4.9)
ALBUMIN SERPL BCP-MCNC: 3.4 G/DL (ref 3.2–4.9)
ALBUMIN SERPL BCP-MCNC: 3.5 G/DL (ref 3.2–4.9)
ALBUMIN SERPL BCP-MCNC: 3.6 G/DL (ref 3.2–4.9)
ALBUMIN SERPL BCP-MCNC: 3.8 G/DL (ref 3.2–4.9)
ALBUMIN/GLOB SERPL: 0.8 G/DL
ALBUMIN/GLOB SERPL: 1 G/DL
ALBUMIN/GLOB SERPL: 1 G/DL
ALBUMIN/GLOB SERPL: 1.1 G/DL
ALBUMIN/GLOB SERPL: 1.2 G/DL
ALBUMIN/GLOB SERPL: 1.2 G/DL
ALBUMIN/GLOB SERPL: 1.3 G/DL
ALBUMIN/GLOB SERPL: 1.4 G/DL
ALP SERPL-CCNC: 103 U/L (ref 30–99)
ALP SERPL-CCNC: 109 U/L (ref 30–99)
ALP SERPL-CCNC: 114 U/L (ref 30–99)
ALP SERPL-CCNC: 122 U/L (ref 30–99)
ALP SERPL-CCNC: 123 U/L (ref 30–99)
ALP SERPL-CCNC: 127 U/L (ref 30–99)
ALP SERPL-CCNC: 142 U/L (ref 30–99)
ALP SERPL-CCNC: 149 U/L (ref 30–99)
ALP SERPL-CCNC: 83 U/L (ref 30–99)
ALP SERPL-CCNC: 94 U/L (ref 30–99)
ALT SERPL-CCNC: 13 U/L (ref 2–50)
ALT SERPL-CCNC: 13 U/L (ref 2–50)
ALT SERPL-CCNC: 14 U/L (ref 2–50)
ALT SERPL-CCNC: 14 U/L (ref 2–50)
ALT SERPL-CCNC: 15 U/L (ref 2–50)
ALT SERPL-CCNC: 18 U/L (ref 2–50)
ALT SERPL-CCNC: 18 U/L (ref 2–50)
ALT SERPL-CCNC: 20 U/L (ref 2–50)
ALT SERPL-CCNC: 36 U/L (ref 2–50)
ALT SERPL-CCNC: 50 U/L (ref 2–50)
AMORPH CRY #/AREA URNS HPF: PRESENT /HPF
ANION GAP SERPL CALC-SCNC: 10 MMOL/L (ref 7–16)
ANION GAP SERPL CALC-SCNC: 11 MMOL/L (ref 7–16)
ANION GAP SERPL CALC-SCNC: 12 MMOL/L (ref 7–16)
ANION GAP SERPL CALC-SCNC: 13 MMOL/L (ref 7–16)
ANION GAP SERPL CALC-SCNC: 15 MMOL/L (ref 7–16)
ANION GAP SERPL CALC-SCNC: 17 MMOL/L (ref 7–16)
ANION GAP SERPL CALC-SCNC: 19 MMOL/L (ref 7–16)
ANION GAP SERPL CALC-SCNC: 5 MMOL/L (ref 7–16)
ANION GAP SERPL CALC-SCNC: 7 MMOL/L (ref 7–16)
ANION GAP SERPL CALC-SCNC: 7 MMOL/L (ref 7–16)
ANION GAP SERPL CALC-SCNC: 8 MMOL/L (ref 7–16)
ANION GAP SERPL CALC-SCNC: 9 MMOL/L (ref 7–16)
ANISOCYTOSIS BLD QL SMEAR: ABNORMAL
ANISOCYTOSIS BLD QL SMEAR: ABNORMAL
APPEARANCE UR: CLEAR
APPEARANCE UR: CLEAR
APTT PPP: 36.6 SEC (ref 24.7–36)
AST SERPL-CCNC: 22 U/L (ref 12–45)
AST SERPL-CCNC: 24 U/L (ref 12–45)
AST SERPL-CCNC: 24 U/L (ref 12–45)
AST SERPL-CCNC: 25 U/L (ref 12–45)
AST SERPL-CCNC: 29 U/L (ref 12–45)
AST SERPL-CCNC: 29 U/L (ref 12–45)
AST SERPL-CCNC: 30 U/L (ref 12–45)
AST SERPL-CCNC: 33 U/L (ref 12–45)
AST SERPL-CCNC: 59 U/L (ref 12–45)
AST SERPL-CCNC: 82 U/L (ref 12–45)
BACTERIA #/AREA URNS HPF: ABNORMAL /HPF
BACTERIA #/AREA URNS HPF: ABNORMAL /HPF
BACTERIA BLD CULT: NORMAL
BACTERIA BLD CULT: NORMAL
BARCODED ABORH UBTYP: 600
BARCODED ABORH UBTYP: 6200
BARCODED PRD CODE UBPRD: NORMAL
BARCODED PRD CODE UBPRD: NORMAL
BARCODED UNIT NUM UBUNT: NORMAL
BARCODED UNIT NUM UBUNT: NORMAL
BASE EXCESS BLDA CALC-SCNC: -2 MMOL/L (ref -4–3)
BASE EXCESS BLDA CALC-SCNC: -2 MMOL/L (ref -4–3)
BASE EXCESS BLDA CALC-SCNC: 1 MMOL/L (ref -4–3)
BASE EXCESS BLDA CALC-SCNC: 1 MMOL/L (ref -4–3)
BASE EXCESS BLDA CALC-SCNC: 2 MMOL/L (ref -4–3)
BASE EXCESS BLDA CALC-SCNC: 20 MMOL/L (ref -4–3)
BASE EXCESS BLDA CALC-SCNC: 20 MMOL/L (ref -4–3)
BASOPHILS # BLD AUTO: 0 % (ref 0–1.8)
BASOPHILS # BLD AUTO: 0.1 % (ref 0–1.8)
BASOPHILS # BLD AUTO: 0.2 % (ref 0–1.8)
BASOPHILS # BLD AUTO: 0.3 % (ref 0–1.8)
BASOPHILS # BLD AUTO: 0.3 % (ref 0–1.8)
BASOPHILS # BLD AUTO: 0.4 % (ref 0–1.8)
BASOPHILS # BLD AUTO: 0.4 % (ref 0–1.8)
BASOPHILS # BLD AUTO: 0.6 % (ref 0–1.8)
BASOPHILS # BLD: 0 K/UL (ref 0–0.12)
BASOPHILS # BLD: 0.01 K/UL (ref 0–0.12)
BASOPHILS # BLD: 0.02 K/UL (ref 0–0.12)
BASOPHILS # BLD: 0.02 K/UL (ref 0–0.12)
BASOPHILS # BLD: 0.03 K/UL (ref 0–0.12)
BASOPHILS # BLD: 0.04 K/UL (ref 0–0.12)
BASOPHILS # BLD: 0.04 K/UL (ref 0–0.12)
BILIRUB SERPL-MCNC: 0.6 MG/DL (ref 0.1–1.5)
BILIRUB SERPL-MCNC: 0.7 MG/DL (ref 0.1–1.5)
BILIRUB SERPL-MCNC: 0.9 MG/DL (ref 0.1–1.5)
BILIRUB SERPL-MCNC: 0.9 MG/DL (ref 0.1–1.5)
BILIRUB SERPL-MCNC: 1 MG/DL (ref 0.1–1.5)
BILIRUB SERPL-MCNC: 1.1 MG/DL (ref 0.1–1.5)
BILIRUB UR QL STRIP.AUTO: ABNORMAL
BILIRUB UR QL STRIP.AUTO: NEGATIVE
BLD GP AB SCN SERPL QL: NORMAL
BODY TEMPERATURE: ABNORMAL DEGREES
BREATHS SETTING VENT: 16
BREATHS SETTING VENT: 18
BUN SERPL-MCNC: 122 MG/DL (ref 8–22)
BUN SERPL-MCNC: 16 MG/DL (ref 8–22)
BUN SERPL-MCNC: 17 MG/DL (ref 8–22)
BUN SERPL-MCNC: 18 MG/DL (ref 8–22)
BUN SERPL-MCNC: 20 MG/DL (ref 8–22)
BUN SERPL-MCNC: 20 MG/DL (ref 8–22)
BUN SERPL-MCNC: 21 MG/DL (ref 8–22)
BUN SERPL-MCNC: 21 MG/DL (ref 8–22)
BUN SERPL-MCNC: 22 MG/DL (ref 8–22)
BUN SERPL-MCNC: 23 MG/DL (ref 8–22)
BUN SERPL-MCNC: 23 MG/DL (ref 8–22)
BUN SERPL-MCNC: 24 MG/DL (ref 8–22)
BUN SERPL-MCNC: 26 MG/DL (ref 8–22)
BUN SERPL-MCNC: 31 MG/DL (ref 8–22)
BUN SERPL-MCNC: 33 MG/DL (ref 8–22)
BUN SERPL-MCNC: 51 MG/DL (ref 8–22)
BUN SERPL-MCNC: 85 MG/DL (ref 8–22)
BUN SERPL-MCNC: 89 MG/DL (ref 8–22)
CA-I BLD ISE-SCNC: 1.89 MMOL/L (ref 1.1–1.3)
CALCIUM ALBUM COR SERPL-MCNC: 9 MG/DL (ref 8.5–10.5)
CALCIUM SERPL-MCNC: 7.3 MG/DL (ref 8.5–10.5)
CALCIUM SERPL-MCNC: 7.4 MG/DL (ref 8.5–10.5)
CALCIUM SERPL-MCNC: 7.4 MG/DL (ref 8.5–10.5)
CALCIUM SERPL-MCNC: 7.5 MG/DL (ref 8.5–10.5)
CALCIUM SERPL-MCNC: 7.6 MG/DL (ref 8.5–10.5)
CALCIUM SERPL-MCNC: 7.7 MG/DL (ref 8.5–10.5)
CALCIUM SERPL-MCNC: 7.7 MG/DL (ref 8.5–10.5)
CALCIUM SERPL-MCNC: 7.8 MG/DL (ref 8.5–10.5)
CALCIUM SERPL-MCNC: 7.8 MG/DL (ref 8.5–10.5)
CALCIUM SERPL-MCNC: 7.9 MG/DL (ref 8.5–10.5)
CALCIUM SERPL-MCNC: 8 MG/DL (ref 8.5–10.5)
CALCIUM SERPL-MCNC: 8.2 MG/DL (ref 8.5–10.5)
CALCIUM SERPL-MCNC: 8.5 MG/DL (ref 8.5–10.5)
CALCIUM SERPL-MCNC: 8.6 MG/DL (ref 8.5–10.5)
CALCIUM SERPL-MCNC: 8.6 MG/DL (ref 8.5–10.5)
CALCIUM SERPL-MCNC: 8.8 MG/DL (ref 8.5–10.5)
CALCIUM SERPL-MCNC: 8.9 MG/DL (ref 8.5–10.5)
CALCIUM SERPL-MCNC: 8.9 MG/DL (ref 8.5–10.5)
CALCIUM SERPL-MCNC: 9 MG/DL (ref 8.5–10.5)
CALCIUM SERPL-MCNC: 9 MG/DL (ref 8.5–10.5)
CALCIUM SERPL-MCNC: 9.1 MG/DL (ref 8.5–10.5)
CALCIUM SERPL-MCNC: 9.1 MG/DL (ref 8.5–10.5)
CHLORIDE SERPL-SCNC: 102 MMOL/L (ref 96–112)
CHLORIDE SERPL-SCNC: 105 MMOL/L (ref 96–112)
CHLORIDE SERPL-SCNC: 107 MMOL/L (ref 96–112)
CHLORIDE SERPL-SCNC: 108 MMOL/L (ref 96–112)
CHLORIDE SERPL-SCNC: 109 MMOL/L (ref 96–112)
CHLORIDE SERPL-SCNC: 109 MMOL/L (ref 96–112)
CHLORIDE SERPL-SCNC: 110 MMOL/L (ref 96–112)
CHLORIDE SERPL-SCNC: 111 MMOL/L (ref 96–112)
CHLORIDE SERPL-SCNC: 112 MMOL/L (ref 96–112)
CHLORIDE SERPL-SCNC: 113 MMOL/L (ref 96–112)
CHLORIDE SERPL-SCNC: 114 MMOL/L (ref 96–112)
CHLORIDE SERPL-SCNC: 114 MMOL/L (ref 96–112)
CHLORIDE SERPL-SCNC: 115 MMOL/L (ref 96–112)
CHLORIDE SERPL-SCNC: 118 MMOL/L (ref 96–112)
CHLORIDE SERPL-SCNC: 98 MMOL/L (ref 96–112)
CO2 BLDA-SCNC: 22 MMOL/L (ref 20–33)
CO2 BLDA-SCNC: 23 MMOL/L (ref 20–33)
CO2 BLDA-SCNC: 24 MMOL/L (ref 20–33)
CO2 BLDA-SCNC: 25 MMOL/L (ref 20–33)
CO2 BLDA-SCNC: 28 MMOL/L (ref 20–33)
CO2 BLDA-SCNC: 44 MMOL/L (ref 20–33)
CO2 BLDA-SCNC: 44 MMOL/L (ref 20–33)
CO2 SERPL-SCNC: 19 MMOL/L (ref 20–33)
CO2 SERPL-SCNC: 20 MMOL/L (ref 20–33)
CO2 SERPL-SCNC: 20 MMOL/L (ref 20–33)
CO2 SERPL-SCNC: 21 MMOL/L (ref 20–33)
CO2 SERPL-SCNC: 22 MMOL/L (ref 20–33)
CO2 SERPL-SCNC: 23 MMOL/L (ref 20–33)
CO2 SERPL-SCNC: 23 MMOL/L (ref 20–33)
CO2 SERPL-SCNC: 24 MMOL/L (ref 20–33)
CO2 SERPL-SCNC: 25 MMOL/L (ref 20–33)
CO2 SERPL-SCNC: 26 MMOL/L (ref 20–33)
CO2 SERPL-SCNC: 27 MMOL/L (ref 20–33)
CO2 SERPL-SCNC: 28 MMOL/L (ref 20–33)
CO2 SERPL-SCNC: 29 MMOL/L (ref 20–33)
COLOR UR: YELLOW
COLOR UR: YELLOW
COMMENT 1642: NORMAL
COMPONENT R 8504R: NORMAL
COMPONENT R 8504R: NORMAL
CREAT SERPL-MCNC: 0.67 MG/DL (ref 0.5–1.4)
CREAT SERPL-MCNC: 0.71 MG/DL (ref 0.5–1.4)
CREAT SERPL-MCNC: 0.73 MG/DL (ref 0.5–1.4)
CREAT SERPL-MCNC: 0.76 MG/DL (ref 0.5–1.4)
CREAT SERPL-MCNC: 0.78 MG/DL (ref 0.5–1.4)
CREAT SERPL-MCNC: 0.8 MG/DL (ref 0.5–1.4)
CREAT SERPL-MCNC: 0.84 MG/DL (ref 0.5–1.4)
CREAT SERPL-MCNC: 0.88 MG/DL (ref 0.5–1.4)
CREAT SERPL-MCNC: 0.89 MG/DL (ref 0.5–1.4)
CREAT SERPL-MCNC: 0.89 MG/DL (ref 0.5–1.4)
CREAT SERPL-MCNC: 0.9 MG/DL (ref 0.5–1.4)
CREAT SERPL-MCNC: 0.91 MG/DL (ref 0.5–1.4)
CREAT SERPL-MCNC: 0.91 MG/DL (ref 0.5–1.4)
CREAT SERPL-MCNC: 0.92 MG/DL (ref 0.5–1.4)
CREAT SERPL-MCNC: 0.93 MG/DL (ref 0.5–1.4)
CREAT SERPL-MCNC: 1.02 MG/DL (ref 0.5–1.4)
CREAT SERPL-MCNC: 1.04 MG/DL (ref 0.5–1.4)
CREAT SERPL-MCNC: 1.05 MG/DL (ref 0.5–1.4)
CREAT SERPL-MCNC: 1.17 MG/DL (ref 0.5–1.4)
CREAT SERPL-MCNC: 1.2 MG/DL (ref 0.5–1.4)
CREAT SERPL-MCNC: 1.3 MG/DL (ref 0.5–1.4)
CREAT SERPL-MCNC: 1.63 MG/DL (ref 0.5–1.4)
CREAT SERPL-MCNC: 1.94 MG/DL (ref 0.5–1.4)
CREAT SERPL-MCNC: 4.67 MG/DL (ref 0.5–1.4)
CREAT UR-MCNC: 250.73 MG/DL
CREAT UR-MCNC: 251.69 MG/DL
CRP SERPL HS-MCNC: 3.92 MG/DL (ref 0–0.75)
CRP SERPL HS-MCNC: 4.47 MG/DL (ref 0–0.75)
CRP SERPL HS-MCNC: 5.05 MG/DL (ref 0–0.75)
DELSYS IDSYS: ABNORMAL
EKG IMPRESSION: NORMAL
END TIDAL CARBON DIOXIDE IECO2: 23 MMHG
END TIDAL CARBON DIOXIDE IECO2: 25 MMHG
END TIDAL CARBON DIOXIDE IECO2: 26 MMHG
EOSINOPHIL # BLD AUTO: 0 K/UL (ref 0–0.51)
EOSINOPHIL # BLD AUTO: 0 K/UL (ref 0–0.51)
EOSINOPHIL # BLD AUTO: 0.01 K/UL (ref 0–0.51)
EOSINOPHIL # BLD AUTO: 0.02 K/UL (ref 0–0.51)
EOSINOPHIL # BLD AUTO: 0.03 K/UL (ref 0–0.51)
EOSINOPHIL # BLD AUTO: 0.03 K/UL (ref 0–0.51)
EOSINOPHIL # BLD AUTO: 0.04 K/UL (ref 0–0.51)
EOSINOPHIL # BLD AUTO: 0.05 K/UL (ref 0–0.51)
EOSINOPHIL NFR BLD: 0 % (ref 0–6.9)
EOSINOPHIL NFR BLD: 0 % (ref 0–6.9)
EOSINOPHIL NFR BLD: 0.1 % (ref 0–6.9)
EOSINOPHIL NFR BLD: 0.2 % (ref 0–6.9)
EOSINOPHIL NFR BLD: 0.3 % (ref 0–6.9)
EOSINOPHIL NFR BLD: 0.4 % (ref 0–6.9)
EOSINOPHIL NFR BLD: 0.4 % (ref 0–6.9)
EOSINOPHIL NFR BLD: 0.5 % (ref 0–6.9)
EOSINOPHIL NFR BLD: 0.7 % (ref 0–6.9)
EPI CELLS #/AREA URNS HPF: ABNORMAL /HPF
EPI CELLS #/AREA URNS HPF: NEGATIVE /HPF
ERYTHROCYTE [DISTWIDTH] IN BLOOD BY AUTOMATED COUNT: 43.8 FL (ref 35.9–50)
ERYTHROCYTE [DISTWIDTH] IN BLOOD BY AUTOMATED COUNT: 44.1 FL (ref 35.9–50)
ERYTHROCYTE [DISTWIDTH] IN BLOOD BY AUTOMATED COUNT: 44.4 FL (ref 35.9–50)
ERYTHROCYTE [DISTWIDTH] IN BLOOD BY AUTOMATED COUNT: 44.5 FL (ref 35.9–50)
ERYTHROCYTE [DISTWIDTH] IN BLOOD BY AUTOMATED COUNT: 45.2 FL (ref 35.9–50)
ERYTHROCYTE [DISTWIDTH] IN BLOOD BY AUTOMATED COUNT: 45.8 FL (ref 35.9–50)
ERYTHROCYTE [DISTWIDTH] IN BLOOD BY AUTOMATED COUNT: 46.8 FL (ref 35.9–50)
ERYTHROCYTE [DISTWIDTH] IN BLOOD BY AUTOMATED COUNT: 48.1 FL (ref 35.9–50)
ERYTHROCYTE [DISTWIDTH] IN BLOOD BY AUTOMATED COUNT: 48.8 FL (ref 35.9–50)
ERYTHROCYTE [DISTWIDTH] IN BLOOD BY AUTOMATED COUNT: 51.1 FL (ref 35.9–50)
ERYTHROCYTE [DISTWIDTH] IN BLOOD BY AUTOMATED COUNT: 51.2 FL (ref 35.9–50)
ERYTHROCYTE [DISTWIDTH] IN BLOOD BY AUTOMATED COUNT: 51.6 FL (ref 35.9–50)
ERYTHROCYTE [DISTWIDTH] IN BLOOD BY AUTOMATED COUNT: 52.3 FL (ref 35.9–50)
ERYTHROCYTE [DISTWIDTH] IN BLOOD BY AUTOMATED COUNT: 56.4 FL (ref 35.9–50)
ERYTHROCYTE [DISTWIDTH] IN BLOOD BY AUTOMATED COUNT: 58.3 FL (ref 35.9–50)
ERYTHROCYTE [DISTWIDTH] IN BLOOD BY AUTOMATED COUNT: 60.6 FL (ref 35.9–50)
ERYTHROCYTE [DISTWIDTH] IN BLOOD BY AUTOMATED COUNT: 61.1 FL (ref 35.9–50)
ERYTHROCYTE [DISTWIDTH] IN BLOOD BY AUTOMATED COUNT: 63.3 FL (ref 35.9–50)
ERYTHROCYTE [DISTWIDTH] IN BLOOD BY AUTOMATED COUNT: 65.1 FL (ref 35.9–50)
ERYTHROCYTE [DISTWIDTH] IN BLOOD BY AUTOMATED COUNT: 70.9 FL (ref 35.9–50)
ERYTHROCYTE [DISTWIDTH] IN BLOOD BY AUTOMATED COUNT: 71.8 FL (ref 35.9–50)
ERYTHROCYTE [SEDIMENTATION RATE] IN BLOOD BY WESTERGREN METHOD: 102 MM/HOUR (ref 0–20)
ERYTHROCYTE [SEDIMENTATION RATE] IN BLOOD BY WESTERGREN METHOD: 122 MM/HOUR (ref 0–20)
ERYTHROCYTE [SEDIMENTATION RATE] IN BLOOD BY WESTERGREN METHOD: 91 MM/HOUR (ref 0–20)
EST. AVERAGE GLUCOSE BLD GHB EST-MCNC: 100 MG/DL
FOLATE SERPL-MCNC: 4.8 NG/ML
GFR SERPLBLD CREATININE-BSD FMLA CKD-EPI: 12 ML/MIN/1.73 M 2
GFR SERPLBLD CREATININE-BSD FMLA CKD-EPI: 34 ML/MIN/1.73 M 2
GFR SERPLBLD CREATININE-BSD FMLA CKD-EPI: 42 ML/MIN/1.73 M 2
GFR SERPLBLD CREATININE-BSD FMLA CKD-EPI: 55 ML/MIN/1.73 M 2
GFR SERPLBLD CREATININE-BSD FMLA CKD-EPI: 61 ML/MIN/1.73 M 2
GFR SERPLBLD CREATININE-BSD FMLA CKD-EPI: 63 ML/MIN/1.73 M 2
GFR SERPLBLD CREATININE-BSD FMLA CKD-EPI: 71 ML/MIN/1.73 M 2
GFR SERPLBLD CREATININE-BSD FMLA CKD-EPI: 72 ML/MIN/1.73 M 2
GFR SERPLBLD CREATININE-BSD FMLA CKD-EPI: 74 ML/MIN/1.73 M 2
GFR SERPLBLD CREATININE-BSD FMLA CKD-EPI: 83 ML/MIN/1.73 M 2
GFR SERPLBLD CREATININE-BSD FMLA CKD-EPI: 84 ML/MIN/1.73 M 2
GFR SERPLBLD CREATININE-BSD FMLA CKD-EPI: 85 ML/MIN/1.73 M 2
GFR SERPLBLD CREATININE-BSD FMLA CKD-EPI: 85 ML/MIN/1.73 M 2
GFR SERPLBLD CREATININE-BSD FMLA CKD-EPI: 86 ML/MIN/1.73 M 2
GFR SERPLBLD CREATININE-BSD FMLA CKD-EPI: 87 ML/MIN/1.73 M 2
GFR SERPLBLD CREATININE-BSD FMLA CKD-EPI: 88 ML/MIN/1.73 M 2
GFR SERPLBLD CREATININE-BSD FMLA CKD-EPI: 89 ML/MIN/1.73 M 2
GFR SERPLBLD CREATININE-BSD FMLA CKD-EPI: 90 ML/MIN/1.73 M 2
GFR SERPLBLD CREATININE-BSD FMLA CKD-EPI: 92 ML/MIN/1.73 M 2
GFR SERPLBLD CREATININE-BSD FMLA CKD-EPI: 92 ML/MIN/1.73 M 2
GFR SERPLBLD CREATININE-BSD FMLA CKD-EPI: 94 ML/MIN/1.73 M 2
GLOBULIN SER CALC-MCNC: 2.3 G/DL (ref 1.9–3.5)
GLOBULIN SER CALC-MCNC: 2.7 G/DL (ref 1.9–3.5)
GLOBULIN SER CALC-MCNC: 2.8 G/DL (ref 1.9–3.5)
GLOBULIN SER CALC-MCNC: 2.8 G/DL (ref 1.9–3.5)
GLOBULIN SER CALC-MCNC: 2.9 G/DL (ref 1.9–3.5)
GLOBULIN SER CALC-MCNC: 3 G/DL (ref 1.9–3.5)
GLOBULIN SER CALC-MCNC: 3.3 G/DL (ref 1.9–3.5)
GLOBULIN SER CALC-MCNC: 3.4 G/DL (ref 1.9–3.5)
GLUCOSE BLD STRIP.AUTO-MCNC: 101 MG/DL (ref 65–99)
GLUCOSE BLD STRIP.AUTO-MCNC: 103 MG/DL (ref 65–99)
GLUCOSE BLD STRIP.AUTO-MCNC: 104 MG/DL (ref 65–99)
GLUCOSE BLD STRIP.AUTO-MCNC: 105 MG/DL (ref 65–99)
GLUCOSE BLD STRIP.AUTO-MCNC: 106 MG/DL (ref 65–99)
GLUCOSE BLD STRIP.AUTO-MCNC: 106 MG/DL (ref 65–99)
GLUCOSE BLD STRIP.AUTO-MCNC: 107 MG/DL (ref 65–99)
GLUCOSE BLD STRIP.AUTO-MCNC: 111 MG/DL (ref 65–99)
GLUCOSE BLD STRIP.AUTO-MCNC: 114 MG/DL (ref 65–99)
GLUCOSE BLD STRIP.AUTO-MCNC: 122 MG/DL (ref 65–99)
GLUCOSE BLD STRIP.AUTO-MCNC: 136 MG/DL (ref 65–99)
GLUCOSE BLD STRIP.AUTO-MCNC: 141 MG/DL (ref 65–99)
GLUCOSE BLD STRIP.AUTO-MCNC: 186 MG/DL (ref 65–99)
GLUCOSE BLD STRIP.AUTO-MCNC: 81 MG/DL (ref 65–99)
GLUCOSE BLD STRIP.AUTO-MCNC: 82 MG/DL (ref 65–99)
GLUCOSE BLD STRIP.AUTO-MCNC: 85 MG/DL (ref 65–99)
GLUCOSE BLD STRIP.AUTO-MCNC: 86 MG/DL (ref 65–99)
GLUCOSE BLD STRIP.AUTO-MCNC: 88 MG/DL (ref 65–99)
GLUCOSE BLD STRIP.AUTO-MCNC: 89 MG/DL (ref 65–99)
GLUCOSE BLD STRIP.AUTO-MCNC: 90 MG/DL (ref 65–99)
GLUCOSE BLD STRIP.AUTO-MCNC: 92 MG/DL (ref 65–99)
GLUCOSE BLD STRIP.AUTO-MCNC: 93 MG/DL (ref 65–99)
GLUCOSE BLD STRIP.AUTO-MCNC: 94 MG/DL (ref 65–99)
GLUCOSE BLD STRIP.AUTO-MCNC: 96 MG/DL (ref 65–99)
GLUCOSE BLD STRIP.AUTO-MCNC: 98 MG/DL (ref 65–99)
GLUCOSE SERPL-MCNC: 100 MG/DL (ref 65–99)
GLUCOSE SERPL-MCNC: 101 MG/DL (ref 65–99)
GLUCOSE SERPL-MCNC: 103 MG/DL (ref 65–99)
GLUCOSE SERPL-MCNC: 104 MG/DL (ref 65–99)
GLUCOSE SERPL-MCNC: 107 MG/DL (ref 65–99)
GLUCOSE SERPL-MCNC: 107 MG/DL (ref 65–99)
GLUCOSE SERPL-MCNC: 108 MG/DL (ref 65–99)
GLUCOSE SERPL-MCNC: 110 MG/DL (ref 65–99)
GLUCOSE SERPL-MCNC: 113 MG/DL (ref 65–99)
GLUCOSE SERPL-MCNC: 116 MG/DL (ref 65–99)
GLUCOSE SERPL-MCNC: 119 MG/DL (ref 65–99)
GLUCOSE SERPL-MCNC: 119 MG/DL (ref 65–99)
GLUCOSE SERPL-MCNC: 120 MG/DL (ref 65–99)
GLUCOSE SERPL-MCNC: 127 MG/DL (ref 65–99)
GLUCOSE SERPL-MCNC: 130 MG/DL (ref 65–99)
GLUCOSE SERPL-MCNC: 133 MG/DL (ref 65–99)
GLUCOSE SERPL-MCNC: 137 MG/DL (ref 65–99)
GLUCOSE SERPL-MCNC: 157 MG/DL (ref 65–99)
GLUCOSE SERPL-MCNC: 184 MG/DL (ref 65–99)
GLUCOSE SERPL-MCNC: 188 MG/DL (ref 65–99)
GLUCOSE SERPL-MCNC: 86 MG/DL (ref 65–99)
GLUCOSE SERPL-MCNC: 91 MG/DL (ref 65–99)
GLUCOSE SERPL-MCNC: 92 MG/DL (ref 65–99)
GLUCOSE SERPL-MCNC: 95 MG/DL (ref 65–99)
GLUCOSE SERPL-MCNC: 98 MG/DL (ref 65–99)
GLUCOSE SERPL-MCNC: 99 MG/DL (ref 65–99)
GLUCOSE UR STRIP.AUTO-MCNC: NEGATIVE MG/DL
GLUCOSE UR STRIP.AUTO-MCNC: NEGATIVE MG/DL
GRAN CASTS #/AREA URNS LPF: ABNORMAL /LPF
HBA1C MFR BLD: 5.1 % (ref 4–5.6)
HCO3 BLDA-SCNC: 21.2 MMOL/L (ref 17–25)
HCO3 BLDA-SCNC: 22.2 MMOL/L (ref 17–25)
HCO3 BLDA-SCNC: 23.2 MMOL/L (ref 17–25)
HCO3 BLDA-SCNC: 23.6 MMOL/L (ref 17–25)
HCO3 BLDA-SCNC: 27 MMOL/L (ref 17–25)
HCO3 BLDA-SCNC: 42.7 MMOL/L (ref 17–25)
HCO3 BLDA-SCNC: 42.8 MMOL/L (ref 17–25)
HCT VFR BLD AUTO: 20.5 % (ref 42–52)
HCT VFR BLD AUTO: 22.7 % (ref 42–52)
HCT VFR BLD AUTO: 23 % (ref 42–52)
HCT VFR BLD AUTO: 23.1 % (ref 42–52)
HCT VFR BLD AUTO: 23.8 % (ref 42–52)
HCT VFR BLD AUTO: 24.7 % (ref 42–52)
HCT VFR BLD AUTO: 26.3 % (ref 42–52)
HCT VFR BLD AUTO: 26.4 % (ref 42–52)
HCT VFR BLD AUTO: 27.1 % (ref 42–52)
HCT VFR BLD AUTO: 27.6 % (ref 42–52)
HCT VFR BLD AUTO: 27.8 % (ref 42–52)
HCT VFR BLD AUTO: 28.3 % (ref 42–52)
HCT VFR BLD AUTO: 28.7 % (ref 42–52)
HCT VFR BLD AUTO: 29.1 % (ref 42–52)
HCT VFR BLD AUTO: 29.1 % (ref 42–52)
HCT VFR BLD AUTO: 30.3 % (ref 42–52)
HCT VFR BLD AUTO: 31.2 % (ref 42–52)
HCT VFR BLD AUTO: 31.8 % (ref 42–52)
HCT VFR BLD AUTO: 31.9 % (ref 42–52)
HCT VFR BLD AUTO: 33.4 % (ref 42–52)
HCT VFR BLD AUTO: 34.6 % (ref 42–52)
HCT VFR BLD AUTO: 34.6 % (ref 42–52)
HCT VFR BLD AUTO: 35.1 % (ref 42–52)
HCT VFR BLD AUTO: 36.2 % (ref 42–52)
HCT VFR BLD AUTO: 36.6 % (ref 42–52)
HCT VFR BLD AUTO: 37.4 % (ref 42–52)
HGB BLD-MCNC: 10.1 G/DL (ref 14–18)
HGB BLD-MCNC: 10.6 G/DL (ref 14–18)
HGB BLD-MCNC: 10.9 G/DL (ref 14–18)
HGB BLD-MCNC: 11.7 G/DL (ref 14–18)
HGB BLD-MCNC: 11.8 G/DL (ref 14–18)
HGB BLD-MCNC: 11.8 G/DL (ref 14–18)
HGB BLD-MCNC: 11.9 G/DL (ref 14–18)
HGB BLD-MCNC: 12.3 G/DL (ref 14–18)
HGB BLD-MCNC: 6.4 G/DL (ref 14–18)
HGB BLD-MCNC: 7.2 G/DL (ref 14–18)
HGB BLD-MCNC: 7.3 G/DL (ref 14–18)
HGB BLD-MCNC: 7.3 G/DL (ref 14–18)
HGB BLD-MCNC: 7.8 G/DL (ref 14–18)
HGB BLD-MCNC: 8 G/DL (ref 14–18)
HGB BLD-MCNC: 8.6 G/DL (ref 14–18)
HGB BLD-MCNC: 8.7 G/DL (ref 14–18)
HGB BLD-MCNC: 8.8 G/DL (ref 14–18)
HGB BLD-MCNC: 8.8 G/DL (ref 14–18)
HGB BLD-MCNC: 8.9 G/DL (ref 14–18)
HGB BLD-MCNC: 9.1 G/DL (ref 14–18)
HGB BLD-MCNC: 9.1 G/DL (ref 14–18)
HGB BLD-MCNC: 9.2 G/DL (ref 14–18)
HGB BLD-MCNC: 9.4 G/DL (ref 14–18)
HGB BLD-MCNC: 9.7 G/DL (ref 14–18)
HGB RETIC QN AUTO: 36.8 PG/CELL (ref 29–35)
HGB RETIC QN AUTO: 38.1 PG/CELL (ref 29–35)
HOROWITZ INDEX BLDA+IHG-RTO: 390 MM[HG]
HOROWITZ INDEX BLDA+IHG-RTO: 423 MM[HG]
HOROWITZ INDEX BLDA+IHG-RTO: 440 MM[HG]
HOROWITZ INDEX BLDA+IHG-RTO: 443 MM[HG]
HOROWITZ INDEX BLDA+IHG-RTO: 458 MM[HG]
HOROWITZ INDEX BLDA+IHG-RTO: 479 MM[HG]
HOROWITZ INDEX BLDA+IHG-RTO: 952 MM[HG]
HYALINE CASTS #/AREA URNS LPF: >10 /LPF
HYALINE CASTS #/AREA URNS LPF: ABNORMAL /LPF
HYPOCHROMIA BLD QL SMEAR: ABNORMAL
IMM GRANULOCYTES # BLD AUTO: 0.05 K/UL (ref 0–0.11)
IMM GRANULOCYTES # BLD AUTO: 0.05 K/UL (ref 0–0.11)
IMM GRANULOCYTES # BLD AUTO: 0.06 K/UL (ref 0–0.11)
IMM GRANULOCYTES # BLD AUTO: 0.06 K/UL (ref 0–0.11)
IMM GRANULOCYTES # BLD AUTO: 0.07 K/UL (ref 0–0.11)
IMM GRANULOCYTES # BLD AUTO: 0.08 K/UL (ref 0–0.11)
IMM GRANULOCYTES # BLD AUTO: 0.09 K/UL (ref 0–0.11)
IMM GRANULOCYTES # BLD AUTO: 0.1 K/UL (ref 0–0.11)
IMM GRANULOCYTES # BLD AUTO: 0.11 K/UL (ref 0–0.11)
IMM GRANULOCYTES # BLD AUTO: 0.12 K/UL (ref 0–0.11)
IMM GRANULOCYTES # BLD AUTO: 0.13 K/UL (ref 0–0.11)
IMM GRANULOCYTES # BLD AUTO: 0.17 K/UL (ref 0–0.11)
IMM GRANULOCYTES # BLD AUTO: 0.18 K/UL (ref 0–0.11)
IMM GRANULOCYTES NFR BLD AUTO: 0.7 % (ref 0–0.9)
IMM GRANULOCYTES NFR BLD AUTO: 0.8 % (ref 0–0.9)
IMM GRANULOCYTES NFR BLD AUTO: 0.9 % (ref 0–0.9)
IMM GRANULOCYTES NFR BLD AUTO: 1 % (ref 0–0.9)
IMM GRANULOCYTES NFR BLD AUTO: 1 % (ref 0–0.9)
IMM GRANULOCYTES NFR BLD AUTO: 1.2 % (ref 0–0.9)
IMM GRANULOCYTES NFR BLD AUTO: 1.3 % (ref 0–0.9)
IMM GRANULOCYTES NFR BLD AUTO: 1.4 % (ref 0–0.9)
IMM GRANULOCYTES NFR BLD AUTO: 1.5 % (ref 0–0.9)
IMM GRANULOCYTES NFR BLD AUTO: 1.6 % (ref 0–0.9)
IMM GRANULOCYTES NFR BLD AUTO: 1.7 % (ref 0–0.9)
IMM GRANULOCYTES NFR BLD AUTO: 2.2 % (ref 0–0.9)
IMM RETICS NFR: 29.4 % (ref 9.3–17.4)
IMM RETICS NFR: 8.3 % (ref 9.3–17.4)
INR PPP: 1.08 (ref 0.87–1.13)
INR PPP: 1.16 (ref 0.87–1.13)
IRON SATN MFR SERPL: 37 % (ref 15–55)
IRON SERPL-MCNC: 69 UG/DL (ref 50–180)
KETONES UR STRIP.AUTO-MCNC: 15 MG/DL
KETONES UR STRIP.AUTO-MCNC: NEGATIVE MG/DL
LACTATE BLD-SCNC: 0.9 MMOL/L (ref 0.5–2)
LACTATE BLD-SCNC: 1.6 MMOL/L (ref 0.5–2)
LACTATE BLD-SCNC: 2.9 MMOL/L (ref 0.5–2)
LACTATE SERPL-SCNC: 1 MMOL/L (ref 0.5–2)
LACTATE SERPL-SCNC: 1 MMOL/L (ref 0.5–2)
LACTATE SERPL-SCNC: 1.2 MMOL/L (ref 0.5–2)
LACTATE SERPL-SCNC: 1.3 MMOL/L (ref 0.5–2)
LEUKOCYTE ESTERASE UR QL STRIP.AUTO: ABNORMAL
LEUKOCYTE ESTERASE UR QL STRIP.AUTO: NEGATIVE
LIPASE SERPL-CCNC: 31 U/L (ref 11–82)
LV EJECT FRACT  99904: 30
LV EJECT FRACT  99904: 40
LV EJECT FRACT MOD 2C 99903: 33.03
LV EJECT FRACT MOD 4C 99902: 31.93
LV EJECT FRACT MOD BP 99901: 31
LYMPHOCYTES # BLD AUTO: 0.42 K/UL (ref 1–4.8)
LYMPHOCYTES # BLD AUTO: 0.74 K/UL (ref 1–4.8)
LYMPHOCYTES # BLD AUTO: 0.77 K/UL (ref 1–4.8)
LYMPHOCYTES # BLD AUTO: 0.77 K/UL (ref 1–4.8)
LYMPHOCYTES # BLD AUTO: 0.87 K/UL (ref 1–4.8)
LYMPHOCYTES # BLD AUTO: 1.03 K/UL (ref 1–4.8)
LYMPHOCYTES # BLD AUTO: 1.07 K/UL (ref 1–4.8)
LYMPHOCYTES # BLD AUTO: 1.09 K/UL (ref 1–4.8)
LYMPHOCYTES # BLD AUTO: 1.12 K/UL (ref 1–4.8)
LYMPHOCYTES # BLD AUTO: 1.16 K/UL (ref 1–4.8)
LYMPHOCYTES # BLD AUTO: 1.18 K/UL (ref 1–4.8)
LYMPHOCYTES # BLD AUTO: 1.2 K/UL (ref 1–4.8)
LYMPHOCYTES # BLD AUTO: 1.25 K/UL (ref 1–4.8)
LYMPHOCYTES # BLD AUTO: 1.28 K/UL (ref 1–4.8)
LYMPHOCYTES # BLD AUTO: 1.49 K/UL (ref 1–4.8)
LYMPHOCYTES # BLD AUTO: 1.88 K/UL (ref 1–4.8)
LYMPHOCYTES # BLD AUTO: 2.23 K/UL (ref 1–4.8)
LYMPHOCYTES NFR BLD: 11.1 % (ref 22–41)
LYMPHOCYTES NFR BLD: 11.5 % (ref 22–41)
LYMPHOCYTES NFR BLD: 12.1 % (ref 22–41)
LYMPHOCYTES NFR BLD: 12.6 % (ref 22–41)
LYMPHOCYTES NFR BLD: 15.1 % (ref 22–41)
LYMPHOCYTES NFR BLD: 15.4 % (ref 22–41)
LYMPHOCYTES NFR BLD: 15.6 % (ref 22–41)
LYMPHOCYTES NFR BLD: 16.3 % (ref 22–41)
LYMPHOCYTES NFR BLD: 17.5 % (ref 22–41)
LYMPHOCYTES NFR BLD: 18.6 % (ref 22–41)
LYMPHOCYTES NFR BLD: 19.7 % (ref 22–41)
LYMPHOCYTES NFR BLD: 20.2 % (ref 22–41)
LYMPHOCYTES NFR BLD: 20.3 % (ref 22–41)
LYMPHOCYTES NFR BLD: 5.2 % (ref 22–41)
LYMPHOCYTES NFR BLD: 6.1 % (ref 22–41)
LYMPHOCYTES NFR BLD: 8.6 % (ref 22–41)
LYMPHOCYTES NFR BLD: 9.1 % (ref 22–41)
MACROCYTES BLD QL SMEAR: ABNORMAL
MACROCYTES BLD QL SMEAR: ABNORMAL
MAGNESIUM SERPL-MCNC: 1.7 MG/DL (ref 1.5–2.5)
MAGNESIUM SERPL-MCNC: 1.7 MG/DL (ref 1.5–2.5)
MAGNESIUM SERPL-MCNC: 1.9 MG/DL (ref 1.5–2.5)
MAGNESIUM SERPL-MCNC: 2 MG/DL (ref 1.5–2.5)
MAGNESIUM SERPL-MCNC: 2.1 MG/DL (ref 1.5–2.5)
MAGNESIUM SERPL-MCNC: 2.2 MG/DL (ref 1.5–2.5)
MAGNESIUM SERPL-MCNC: 2.5 MG/DL (ref 1.5–2.5)
MANUAL DIFF BLD: NORMAL
MCH RBC QN AUTO: 33 PG (ref 27–33)
MCH RBC QN AUTO: 33 PG (ref 27–33)
MCH RBC QN AUTO: 33.5 PG (ref 27–33)
MCH RBC QN AUTO: 33.6 PG (ref 27–33)
MCH RBC QN AUTO: 33.8 PG (ref 27–33)
MCH RBC QN AUTO: 33.8 PG (ref 27–33)
MCH RBC QN AUTO: 33.9 PG (ref 27–33)
MCH RBC QN AUTO: 34 PG (ref 27–33)
MCH RBC QN AUTO: 34 PG (ref 27–33)
MCH RBC QN AUTO: 34.1 PG (ref 27–33)
MCH RBC QN AUTO: 34.2 PG (ref 27–33)
MCH RBC QN AUTO: 34.3 PG (ref 27–33)
MCH RBC QN AUTO: 34.4 PG (ref 27–33)
MCH RBC QN AUTO: 34.8 PG (ref 27–33)
MCHC RBC AUTO-ENTMCNC: 30.9 G/DL (ref 33.7–35.3)
MCHC RBC AUTO-ENTMCNC: 31.1 G/DL (ref 33.7–35.3)
MCHC RBC AUTO-ENTMCNC: 31.2 G/DL (ref 33.7–35.3)
MCHC RBC AUTO-ENTMCNC: 31.3 G/DL (ref 33.7–35.3)
MCHC RBC AUTO-ENTMCNC: 31.3 G/DL (ref 33.7–35.3)
MCHC RBC AUTO-ENTMCNC: 31.6 G/DL (ref 33.7–35.3)
MCHC RBC AUTO-ENTMCNC: 32.2 G/DL (ref 33.7–35.3)
MCHC RBC AUTO-ENTMCNC: 32.3 G/DL (ref 33.7–35.3)
MCHC RBC AUTO-ENTMCNC: 32.8 G/DL (ref 33.7–35.3)
MCHC RBC AUTO-ENTMCNC: 33 G/DL (ref 33.7–35.3)
MCHC RBC AUTO-ENTMCNC: 33.1 G/DL (ref 33.7–35.3)
MCHC RBC AUTO-ENTMCNC: 33.3 G/DL (ref 33.7–35.3)
MCHC RBC AUTO-ENTMCNC: 33.3 G/DL (ref 33.7–35.3)
MCHC RBC AUTO-ENTMCNC: 33.9 G/DL (ref 33.7–35.3)
MCHC RBC AUTO-ENTMCNC: 34 G/DL (ref 33.7–35.3)
MCHC RBC AUTO-ENTMCNC: 34.1 G/DL (ref 33.7–35.3)
MCHC RBC AUTO-ENTMCNC: 34.2 G/DL (ref 33.7–35.3)
MCHC RBC AUTO-ENTMCNC: 34.3 G/DL (ref 33.7–35.3)
MCHC RBC AUTO-ENTMCNC: 34.4 G/DL (ref 33.7–35.3)
MCV RBC AUTO: 100 FL (ref 81.4–97.8)
MCV RBC AUTO: 100 FL (ref 81.4–97.8)
MCV RBC AUTO: 100.6 FL (ref 81.4–97.8)
MCV RBC AUTO: 100.7 FL (ref 81.4–97.8)
MCV RBC AUTO: 101.3 FL (ref 81.4–97.8)
MCV RBC AUTO: 101.6 FL (ref 81.4–97.8)
MCV RBC AUTO: 101.9 FL (ref 81.4–97.8)
MCV RBC AUTO: 103 FL (ref 81.4–97.8)
MCV RBC AUTO: 103.5 FL (ref 81.4–97.8)
MCV RBC AUTO: 104.4 FL (ref 81.4–97.8)
MCV RBC AUTO: 105.8 FL (ref 81.4–97.8)
MCV RBC AUTO: 106 FL (ref 81.4–97.8)
MCV RBC AUTO: 106.1 FL (ref 81.4–97.8)
MCV RBC AUTO: 106.1 FL (ref 81.4–97.8)
MCV RBC AUTO: 107 FL (ref 81.4–97.8)
MCV RBC AUTO: 108 FL (ref 81.4–97.8)
MCV RBC AUTO: 108.6 FL (ref 81.4–97.8)
MCV RBC AUTO: 109 FL (ref 81.4–97.8)
MCV RBC AUTO: 109.7 FL (ref 81.4–97.8)
MCV RBC AUTO: 110.2 FL (ref 81.4–97.8)
MCV RBC AUTO: 99.7 FL (ref 81.4–97.8)
MICRO URNS: ABNORMAL
MICRO URNS: ABNORMAL
MICROALBUMIN UR-MCNC: 1.4 MG/DL
MICROALBUMIN/CREAT UR: 6 MG/G (ref 0–30)
MODE IMODE: ABNORMAL
MONOCYTES # BLD AUTO: 0.25 K/UL (ref 0–0.85)
MONOCYTES # BLD AUTO: 0.3 K/UL (ref 0–0.85)
MONOCYTES # BLD AUTO: 0.44 K/UL (ref 0–0.85)
MONOCYTES # BLD AUTO: 0.47 K/UL (ref 0–0.85)
MONOCYTES # BLD AUTO: 0.5 K/UL (ref 0–0.85)
MONOCYTES # BLD AUTO: 0.51 K/UL (ref 0–0.85)
MONOCYTES # BLD AUTO: 0.52 K/UL (ref 0–0.85)
MONOCYTES # BLD AUTO: 0.57 K/UL (ref 0–0.85)
MONOCYTES # BLD AUTO: 0.6 K/UL (ref 0–0.85)
MONOCYTES # BLD AUTO: 0.66 K/UL (ref 0–0.85)
MONOCYTES # BLD AUTO: 0.67 K/UL (ref 0–0.85)
MONOCYTES # BLD AUTO: 0.71 K/UL (ref 0–0.85)
MONOCYTES # BLD AUTO: 0.73 K/UL (ref 0–0.85)
MONOCYTES # BLD AUTO: 0.82 K/UL (ref 0–0.85)
MONOCYTES # BLD AUTO: 0.88 K/UL (ref 0–0.85)
MONOCYTES # BLD AUTO: 0.94 K/UL (ref 0–0.85)
MONOCYTES # BLD AUTO: 1.18 K/UL (ref 0–0.85)
MONOCYTES NFR BLD AUTO: 1.7 % (ref 0–13.4)
MONOCYTES NFR BLD AUTO: 10.8 % (ref 0–13.4)
MONOCYTES NFR BLD AUTO: 4.4 % (ref 0–13.4)
MONOCYTES NFR BLD AUTO: 5.9 % (ref 0–13.4)
MONOCYTES NFR BLD AUTO: 7.2 % (ref 0–13.4)
MONOCYTES NFR BLD AUTO: 7.2 % (ref 0–13.4)
MONOCYTES NFR BLD AUTO: 7.3 % (ref 0–13.4)
MONOCYTES NFR BLD AUTO: 7.3 % (ref 0–13.4)
MONOCYTES NFR BLD AUTO: 7.6 % (ref 0–13.4)
MONOCYTES NFR BLD AUTO: 7.9 % (ref 0–13.4)
MONOCYTES NFR BLD AUTO: 8 % (ref 0–13.4)
MONOCYTES NFR BLD AUTO: 8 % (ref 0–13.4)
MONOCYTES NFR BLD AUTO: 8.1 % (ref 0–13.4)
MONOCYTES NFR BLD AUTO: 8.2 % (ref 0–13.4)
MONOCYTES NFR BLD AUTO: 9.1 % (ref 0–13.4)
MONOCYTES NFR BLD AUTO: 9.3 % (ref 0–13.4)
MONOCYTES NFR BLD AUTO: 9.7 % (ref 0–13.4)
MORPHOLOGY BLD-IMP: NORMAL
MORPHOLOGY BLD-IMP: NORMAL
NEUTROPHILS # BLD AUTO: 10.74 K/UL (ref 1.82–7.42)
NEUTROPHILS # BLD AUTO: 11.77 K/UL (ref 1.82–7.42)
NEUTROPHILS # BLD AUTO: 13.78 K/UL (ref 1.82–7.42)
NEUTROPHILS # BLD AUTO: 4.3 K/UL (ref 1.82–7.42)
NEUTROPHILS # BLD AUTO: 4.48 K/UL (ref 1.82–7.42)
NEUTROPHILS # BLD AUTO: 4.83 K/UL (ref 1.82–7.42)
NEUTROPHILS # BLD AUTO: 5.14 K/UL (ref 1.82–7.42)
NEUTROPHILS # BLD AUTO: 5.15 K/UL (ref 1.82–7.42)
NEUTROPHILS # BLD AUTO: 5.32 K/UL (ref 1.82–7.42)
NEUTROPHILS # BLD AUTO: 5.36 K/UL (ref 1.82–7.42)
NEUTROPHILS # BLD AUTO: 5.41 K/UL (ref 1.82–7.42)
NEUTROPHILS # BLD AUTO: 5.53 K/UL (ref 1.82–7.42)
NEUTROPHILS # BLD AUTO: 5.57 K/UL (ref 1.82–7.42)
NEUTROPHILS # BLD AUTO: 6.06 K/UL (ref 1.82–7.42)
NEUTROPHILS # BLD AUTO: 6.55 K/UL (ref 1.82–7.42)
NEUTROPHILS # BLD AUTO: 7.38 K/UL (ref 1.82–7.42)
NEUTROPHILS # BLD AUTO: 8.74 K/UL (ref 1.82–7.42)
NEUTROPHILS NFR BLD: 67.3 % (ref 44–72)
NEUTROPHILS NFR BLD: 69.3 % (ref 44–72)
NEUTROPHILS NFR BLD: 70.5 % (ref 44–72)
NEUTROPHILS NFR BLD: 70.5 % (ref 44–72)
NEUTROPHILS NFR BLD: 72.5 % (ref 44–72)
NEUTROPHILS NFR BLD: 73 % (ref 44–72)
NEUTROPHILS NFR BLD: 74.8 % (ref 44–72)
NEUTROPHILS NFR BLD: 75.2 % (ref 44–72)
NEUTROPHILS NFR BLD: 75.4 % (ref 44–72)
NEUTROPHILS NFR BLD: 77.4 % (ref 44–72)
NEUTROPHILS NFR BLD: 78.1 % (ref 44–72)
NEUTROPHILS NFR BLD: 78.3 % (ref 44–72)
NEUTROPHILS NFR BLD: 79.1 % (ref 44–72)
NEUTROPHILS NFR BLD: 82.5 % (ref 44–72)
NEUTROPHILS NFR BLD: 84.4 % (ref 44–72)
NEUTROPHILS NFR BLD: 88.4 % (ref 44–72)
NEUTROPHILS NFR BLD: 93.1 % (ref 44–72)
NITRITE UR QL STRIP.AUTO: NEGATIVE
NITRITE UR QL STRIP.AUTO: POSITIVE
NRBC # BLD AUTO: 0 K/UL
NRBC BLD-RTO: 0 /100 WBC
NT-PROBNP SERPL IA-MCNC: 502 PG/ML (ref 0–125)
NT-PROBNP SERPL IA-MCNC: 6241 PG/ML (ref 0–125)
O2/TOTAL GAS SETTING VFR VENT: 100 %
O2/TOTAL GAS SETTING VFR VENT: 30 %
O2/TOTAL GAS SETTING VFR VENT: 30 %
O2/TOTAL GAS SETTING VFR VENT: 40 %
O2/TOTAL GAS SETTING VFR VENT: 50 %
OVALOCYTES BLD QL SMEAR: NORMAL
PATHOLOGY CONSULT NOTE: NORMAL
PCO2 BLDA: 25 MMHG (ref 26–37)
PCO2 BLDA: 26 MMHG (ref 26–37)
PCO2 BLDA: 30.7 MMHG (ref 26–37)
PCO2 BLDA: 34.6 MMHG (ref 26–37)
PCO2 BLDA: 39.5 MMHG (ref 26–37)
PCO2 BLDA: 40.4 MMHG (ref 26–37)
PCO2 BLDA: 41.5 MMHG (ref 26–37)
PCO2 TEMP ADJ BLDA: 24.3 MMHG (ref 26–37)
PCO2 TEMP ADJ BLDA: 25.1 MMHG (ref 26–37)
PCO2 TEMP ADJ BLDA: 28.7 MMHG (ref 26–37)
PCO2 TEMP ADJ BLDA: 34.1 MMHG (ref 26–37)
PCO2 TEMP ADJ BLDA: 38.4 MMHG (ref 26–37)
PCO2 TEMP ADJ BLDA: 39.5 MMHG (ref 26–37)
PEEP END EXPIRATORY PRESSURE IPEEP: 8 CMH20
PERCENT MINUTE VOLUME IPMV: 100
PH BLDA: 7.42 [PH] (ref 7.4–7.5)
PH BLDA: 7.42 [PH] (ref 7.4–7.5)
PH BLDA: 7.49 [PH] (ref 7.4–7.5)
PH BLDA: 7.52 [PH] (ref 7.4–7.5)
PH BLDA: 7.58 [PH] (ref 7.4–7.5)
PH BLDA: 7.63 [PH] (ref 7.4–7.5)
PH BLDA: 7.64 [PH] (ref 7.4–7.5)
PH TEMP ADJ BLDA: 7.42 [PH] (ref 7.4–7.5)
PH TEMP ADJ BLDA: 7.52 [PH] (ref 7.4–7.5)
PH TEMP ADJ BLDA: 7.53 [PH] (ref 7.4–7.5)
PH TEMP ADJ BLDA: 7.58 [PH] (ref 7.4–7.5)
PH TEMP ADJ BLDA: 7.64 [PH] (ref 7.4–7.5)
PH TEMP ADJ BLDA: 7.65 [PH] (ref 7.4–7.5)
PH UR STRIP.AUTO: 5.5 [PH] (ref 5–8)
PH UR STRIP.AUTO: 5.5 [PH] (ref 5–8)
PHOSPHATE SERPL-MCNC: 1.8 MG/DL (ref 2.5–4.5)
PHOSPHATE SERPL-MCNC: 1.8 MG/DL (ref 2.5–4.5)
PHOSPHATE SERPL-MCNC: 2.6 MG/DL (ref 2.5–4.5)
PHOSPHATE SERPL-MCNC: 2.8 MG/DL (ref 2.5–4.5)
PHOSPHATE SERPL-MCNC: 3.1 MG/DL (ref 2.5–4.5)
PHOSPHATE SERPL-MCNC: 3.4 MG/DL (ref 2.5–4.5)
PHOSPHATE SERPL-MCNC: 3.5 MG/DL (ref 2.5–4.5)
PHOSPHATE SERPL-MCNC: 3.6 MG/DL (ref 2.5–4.5)
PLATELET # BLD AUTO: 105 K/UL (ref 164–446)
PLATELET # BLD AUTO: 116 K/UL (ref 164–446)
PLATELET # BLD AUTO: 123 K/UL (ref 164–446)
PLATELET # BLD AUTO: 124 K/UL (ref 164–446)
PLATELET # BLD AUTO: 126 K/UL (ref 164–446)
PLATELET # BLD AUTO: 132 K/UL (ref 164–446)
PLATELET # BLD AUTO: 153 K/UL (ref 164–446)
PLATELET # BLD AUTO: 161 K/UL (ref 164–446)
PLATELET # BLD AUTO: 166 K/UL (ref 164–446)
PLATELET # BLD AUTO: 168 K/UL (ref 164–446)
PLATELET # BLD AUTO: 168 K/UL (ref 164–446)
PLATELET # BLD AUTO: 177 K/UL (ref 164–446)
PLATELET # BLD AUTO: 179 K/UL (ref 164–446)
PLATELET # BLD AUTO: 180 K/UL (ref 164–446)
PLATELET # BLD AUTO: 189 K/UL (ref 164–446)
PLATELET # BLD AUTO: 206 K/UL (ref 164–446)
PLATELET # BLD AUTO: 224 K/UL (ref 164–446)
PLATELET # BLD AUTO: 225 K/UL (ref 164–446)
PLATELET # BLD AUTO: 238 K/UL (ref 164–446)
PLATELET # BLD AUTO: 255 K/UL (ref 164–446)
PLATELET # BLD AUTO: 275 K/UL (ref 164–446)
PLATELET BLD QL SMEAR: NORMAL
PLATELET BLD QL SMEAR: NORMAL
PMV BLD AUTO: 10.1 FL (ref 9–12.9)
PMV BLD AUTO: 10.1 FL (ref 9–12.9)
PMV BLD AUTO: 10.3 FL (ref 9–12.9)
PMV BLD AUTO: 10.3 FL (ref 9–12.9)
PMV BLD AUTO: 10.5 FL (ref 9–12.9)
PMV BLD AUTO: 10.5 FL (ref 9–12.9)
PMV BLD AUTO: 10.6 FL (ref 9–12.9)
PMV BLD AUTO: 10.7 FL (ref 9–12.9)
PMV BLD AUTO: 10.8 FL (ref 9–12.9)
PMV BLD AUTO: 10.9 FL (ref 9–12.9)
PMV BLD AUTO: 11 FL (ref 9–12.9)
PMV BLD AUTO: 11 FL (ref 9–12.9)
PMV BLD AUTO: 11.1 FL (ref 9–12.9)
PMV BLD AUTO: 11.1 FL (ref 9–12.9)
PMV BLD AUTO: 11.2 FL (ref 9–12.9)
PMV BLD AUTO: 11.2 FL (ref 9–12.9)
PMV BLD AUTO: 11.3 FL (ref 9–12.9)
PO2 BLDA: 117 MMHG (ref 64–87)
PO2 BLDA: 127 MMHG (ref 64–87)
PO2 BLDA: 177 MMHG (ref 64–87)
PO2 BLDA: 440 MMHG (ref 64–87)
PO2 BLDA: 458 MMHG (ref 64–87)
PO2 BLDA: 476 MMHG (ref 64–87)
PO2 BLDA: 479 MMHG (ref 64–87)
PO2 TEMP ADJ BLDA: 115 MMHG (ref 64–87)
PO2 TEMP ADJ BLDA: 122 MMHG (ref 64–87)
PO2 TEMP ADJ BLDA: 174 MMHG (ref 64–87)
PO2 TEMP ADJ BLDA: 440 MMHG (ref 64–87)
PO2 TEMP ADJ BLDA: 451 MMHG (ref 64–87)
PO2 TEMP ADJ BLDA: 469 MMHG (ref 64–87)
POIKILOCYTOSIS BLD QL SMEAR: NORMAL
POLYCHROMASIA BLD QL SMEAR: NORMAL
POTASSIUM BLD-SCNC: 2.5 MMOL/L (ref 3.6–5.5)
POTASSIUM SERPL-SCNC: 3 MMOL/L (ref 3.6–5.5)
POTASSIUM SERPL-SCNC: 3 MMOL/L (ref 3.6–5.5)
POTASSIUM SERPL-SCNC: 3.1 MMOL/L (ref 3.6–5.5)
POTASSIUM SERPL-SCNC: 3.3 MMOL/L (ref 3.6–5.5)
POTASSIUM SERPL-SCNC: 3.3 MMOL/L (ref 3.6–5.5)
POTASSIUM SERPL-SCNC: 3.4 MMOL/L (ref 3.6–5.5)
POTASSIUM SERPL-SCNC: 3.5 MMOL/L (ref 3.6–5.5)
POTASSIUM SERPL-SCNC: 3.5 MMOL/L (ref 3.6–5.5)
POTASSIUM SERPL-SCNC: 3.6 MMOL/L (ref 3.6–5.5)
POTASSIUM SERPL-SCNC: 3.7 MMOL/L (ref 3.6–5.5)
POTASSIUM SERPL-SCNC: 3.8 MMOL/L (ref 3.6–5.5)
POTASSIUM SERPL-SCNC: 3.9 MMOL/L (ref 3.6–5.5)
POTASSIUM SERPL-SCNC: 4 MMOL/L (ref 3.6–5.5)
POTASSIUM SERPL-SCNC: 4.2 MMOL/L (ref 3.6–5.5)
POTASSIUM SERPL-SCNC: 4.2 MMOL/L (ref 3.6–5.5)
POTASSIUM SERPL-SCNC: 4.3 MMOL/L (ref 3.6–5.5)
PROCALCITONIN SERPL-MCNC: 0.19 NG/ML
PROCALCITONIN SERPL-MCNC: 0.55 NG/ML
PRODUCT TYPE UPROD: NORMAL
PRODUCT TYPE UPROD: NORMAL
PROLACTIN SERPL-MCNC: 18.3 NG/ML (ref 2.1–17.7)
PROT SERPL-MCNC: 4.9 G/DL (ref 6–8.2)
PROT SERPL-MCNC: 4.9 G/DL (ref 6–8.2)
PROT SERPL-MCNC: 5 G/DL (ref 6–8.2)
PROT SERPL-MCNC: 5.6 G/DL (ref 6–8.2)
PROT SERPL-MCNC: 5.9 G/DL (ref 6–8.2)
PROT SERPL-MCNC: 6.3 G/DL (ref 6–8.2)
PROT SERPL-MCNC: 6.4 G/DL (ref 6–8.2)
PROT SERPL-MCNC: 6.4 G/DL (ref 6–8.2)
PROT SERPL-MCNC: 6.7 G/DL (ref 6–8.2)
PROT SERPL-MCNC: 6.9 G/DL (ref 6–8.2)
PROT UR QL STRIP: NEGATIVE MG/DL
PROT UR QL STRIP: NEGATIVE MG/DL
PROT UR-MCNC: 23 MG/DL (ref 0–15)
PROT/CREAT UR: 91 MG/G (ref 15–68)
PROTHROMBIN TIME: 13.9 SEC (ref 12–14.6)
PROTHROMBIN TIME: 14.7 SEC (ref 12–14.6)
RBC # BLD AUTO: 1.86 M/UL (ref 4.7–6.1)
RBC # BLD AUTO: 2.12 M/UL (ref 4.7–6.1)
RBC # BLD AUTO: 2.13 M/UL (ref 4.7–6.1)
RBC # BLD AUTO: 2.14 M/UL (ref 4.7–6.1)
RBC # BLD AUTO: 2.28 M/UL (ref 4.7–6.1)
RBC # BLD AUTO: 2.54 M/UL (ref 4.7–6.1)
RBC # BLD AUTO: 2.56 M/UL (ref 4.7–6.1)
RBC # BLD AUTO: 2.59 M/UL (ref 4.7–6.1)
RBC # BLD AUTO: 2.67 M/UL (ref 4.7–6.1)
RBC # BLD AUTO: 2.68 M/UL (ref 4.7–6.1)
RBC # BLD AUTO: 2.69 M/UL (ref 4.7–6.1)
RBC # BLD AUTO: 2.72 M/UL (ref 4.7–6.1)
RBC # BLD AUTO: 2.75 M/UL (ref 4.7–6.1)
RBC # BLD AUTO: 2.94 M/UL (ref 4.7–6.1)
RBC # BLD AUTO: 2.99 M/UL (ref 4.7–6.1)
RBC # BLD AUTO: 3.13 M/UL (ref 4.7–6.1)
RBC # BLD AUTO: 3.17 M/UL (ref 4.7–6.1)
RBC # BLD AUTO: 3.2 M/UL (ref 4.7–6.1)
RBC # BLD AUTO: 3.46 M/UL (ref 4.7–6.1)
RBC # BLD AUTO: 3.46 M/UL (ref 4.7–6.1)
RBC # BLD AUTO: 3.63 M/UL (ref 4.7–6.1)
RBC # URNS HPF: ABNORMAL /HPF
RBC # URNS HPF: ABNORMAL /HPF
RBC BLD AUTO: PRESENT
RBC BLD AUTO: PRESENT
RBC UR QL AUTO: ABNORMAL
RBC UR QL AUTO: ABNORMAL
RETICS # AUTO: 0.05 M/UL (ref 0.04–0.06)
RETICS # AUTO: 0.05 M/UL (ref 0.04–0.06)
RETICS/RBC NFR: 1.4 % (ref 0.8–2.1)
RETICS/RBC NFR: 2.2 % (ref 0.8–2.1)
RH BLD: NORMAL
SAO2 % BLDA: 100 % (ref 93–99)
SAO2 % BLDA: 99 % (ref 93–99)
SAO2 % BLDA: 99 % (ref 93–99)
SARS-COV+SARS-COV-2 AG RESP QL IA.RAPID: NOTDETECTED
SARS-COV+SARS-COV-2 AG RESP QL IA.RAPID: NOTDETECTED
SCCMEC + MECA PNL NOSE NAA+PROBE: NEGATIVE
SCCMEC + MECA PNL NOSE NAA+PROBE: NEGATIVE
SIGNIFICANT IND 70042: NORMAL
SIGNIFICANT IND 70042: NORMAL
SITE SITE: NORMAL
SITE SITE: NORMAL
SODIUM BLD-SCNC: 155 MMOL/L (ref 135–145)
SODIUM SERPL-SCNC: 137 MMOL/L (ref 135–145)
SODIUM SERPL-SCNC: 142 MMOL/L (ref 135–145)
SODIUM SERPL-SCNC: 143 MMOL/L (ref 135–145)
SODIUM SERPL-SCNC: 143 MMOL/L (ref 135–145)
SODIUM SERPL-SCNC: 144 MMOL/L (ref 135–145)
SODIUM SERPL-SCNC: 145 MMOL/L (ref 135–145)
SODIUM SERPL-SCNC: 146 MMOL/L (ref 135–145)
SODIUM SERPL-SCNC: 147 MMOL/L (ref 135–145)
SODIUM SERPL-SCNC: 148 MMOL/L (ref 135–145)
SODIUM SERPL-SCNC: 149 MMOL/L (ref 135–145)
SOURCE SOURCE: NORMAL
SOURCE SOURCE: NORMAL
SP GR UR STRIP.AUTO: 1.02
SP GR UR STRIP.AUTO: >=1.03
SPECIMEN DRAWN FROM PATIENT: ABNORMAL
SPECIMEN SOURCE: NORMAL
SPECIMEN SOURCE: NORMAL
SPERM #/AREA URNS HPF: ABNORMAL /HPF
TIBC SERPL-MCNC: 187 UG/DL (ref 250–450)
TIDAL VOLUME IVT: 420 ML
TIDAL VOLUME IVT: 420 ML
TROPONIN T SERPL-MCNC: 33 NG/L (ref 6–19)
TROPONIN T SERPL-MCNC: 55 NG/L (ref 6–19)
TROPONIN T SERPL-MCNC: 58 NG/L (ref 6–19)
TSH SERPL DL<=0.005 MIU/L-ACNC: 0.97 UIU/ML (ref 0.38–5.33)
UIBC SERPL-MCNC: 118 UG/DL (ref 110–370)
UNIT STATUS USTAT: NORMAL
UNIT STATUS USTAT: NORMAL
UROBILINOGEN UR STRIP.AUTO-MCNC: 0.2 MG/DL
UROBILINOGEN UR STRIP.AUTO-MCNC: 1 MG/DL
VIT B12 SERPL-MCNC: 731 PG/ML (ref 211–911)
WBC # BLD AUTO: 10.6 K/UL (ref 4.8–10.8)
WBC # BLD AUTO: 11 K/UL (ref 4.8–10.8)
WBC # BLD AUTO: 11.2 K/UL (ref 4.8–10.8)
WBC # BLD AUTO: 12.6 K/UL (ref 4.8–10.8)
WBC # BLD AUTO: 13 K/UL (ref 4.8–10.8)
WBC # BLD AUTO: 13.9 K/UL (ref 4.8–10.8)
WBC # BLD AUTO: 14.4 K/UL (ref 4.8–10.8)
WBC # BLD AUTO: 14.8 K/UL (ref 4.8–10.8)
WBC # BLD AUTO: 5.9 K/UL (ref 4.8–10.8)
WBC # BLD AUTO: 6.1 K/UL (ref 4.8–10.8)
WBC # BLD AUTO: 6.4 K/UL (ref 4.8–10.8)
WBC # BLD AUTO: 6.8 K/UL (ref 4.8–10.8)
WBC # BLD AUTO: 6.9 K/UL (ref 4.8–10.8)
WBC # BLD AUTO: 7.1 K/UL (ref 4.8–10.8)
WBC # BLD AUTO: 7.1 K/UL (ref 4.8–10.8)
WBC # BLD AUTO: 7.4 K/UL (ref 4.8–10.8)
WBC # BLD AUTO: 8 K/UL (ref 4.8–10.8)
WBC # BLD AUTO: 8.7 K/UL (ref 4.8–10.8)
WBC # BLD AUTO: 9.3 K/UL (ref 4.8–10.8)
WBC #/AREA URNS HPF: ABNORMAL /HPF
WBC #/AREA URNS HPF: ABNORMAL /HPF

## 2022-01-01 PROCEDURE — 93005 ELECTROCARDIOGRAM TRACING: CPT | Performed by: HOSPITALIST

## 2022-01-01 PROCEDURE — 700102 HCHG RX REV CODE 250 W/ 637 OVERRIDE(OP): Performed by: STUDENT IN AN ORGANIZED HEALTH CARE EDUCATION/TRAINING PROGRAM

## 2022-01-01 PROCEDURE — 700101 HCHG RX REV CODE 250: Performed by: INTERNAL MEDICINE

## 2022-01-01 PROCEDURE — 770022 HCHG ROOM/CARE - ICU (200)

## 2022-01-01 PROCEDURE — 84100 ASSAY OF PHOSPHORUS: CPT

## 2022-01-01 PROCEDURE — 700111 HCHG RX REV CODE 636 W/ 250 OVERRIDE (IP)

## 2022-01-01 PROCEDURE — 84443 ASSAY THYROID STIM HORMONE: CPT

## 2022-01-01 PROCEDURE — 99233 SBSQ HOSP IP/OBS HIGH 50: CPT | Mod: GC | Performed by: HOSPITALIST

## 2022-01-01 PROCEDURE — 700102 HCHG RX REV CODE 250 W/ 637 OVERRIDE(OP): Performed by: HOSPITALIST

## 2022-01-01 PROCEDURE — 94799 UNLISTED PULMONARY SVC/PX: CPT

## 2022-01-01 PROCEDURE — 36415 COLL VENOUS BLD VENIPUNCTURE: CPT

## 2022-01-01 PROCEDURE — 85610 PROTHROMBIN TIME: CPT

## 2022-01-01 PROCEDURE — 80048 BASIC METABOLIC PNL TOTAL CA: CPT

## 2022-01-01 PROCEDURE — 51702 INSERT TEMP BLADDER CATH: CPT

## 2022-01-01 PROCEDURE — 84484 ASSAY OF TROPONIN QUANT: CPT

## 2022-01-01 PROCEDURE — 700101 HCHG RX REV CODE 250

## 2022-01-01 PROCEDURE — 80069 RENAL FUNCTION PANEL: CPT

## 2022-01-01 PROCEDURE — B41F1ZZ FLUOROSCOPY OF RIGHT LOWER EXTREMITY ARTERIES USING LOW OSMOLAR CONTRAST: ICD-10-PCS | Performed by: INTERNAL MEDICINE

## 2022-01-01 PROCEDURE — 74018 RADEX ABDOMEN 1 VIEW: CPT

## 2022-01-01 PROCEDURE — 94150 VITAL CAPACITY TEST: CPT

## 2022-01-01 PROCEDURE — 99222 1ST HOSP IP/OBS MODERATE 55: CPT

## 2022-01-01 PROCEDURE — 700111 HCHG RX REV CODE 636 W/ 250 OVERRIDE (IP): Performed by: HOSPITALIST

## 2022-01-01 PROCEDURE — 85025 COMPLETE CBC W/AUTO DIFF WBC: CPT

## 2022-01-01 PROCEDURE — 85014 HEMATOCRIT: CPT

## 2022-01-01 PROCEDURE — 97602 WOUND(S) CARE NON-SELECTIVE: CPT

## 2022-01-01 PROCEDURE — 94003 VENT MGMT INPAT SUBQ DAY: CPT

## 2022-01-01 PROCEDURE — 700102 HCHG RX REV CODE 250 W/ 637 OVERRIDE(OP): Performed by: SURGERY

## 2022-01-01 PROCEDURE — A9270 NON-COVERED ITEM OR SERVICE: HCPCS | Performed by: STUDENT IN AN ORGANIZED HEALTH CARE EDUCATION/TRAINING PROGRAM

## 2022-01-01 PROCEDURE — 160036 HCHG PACU - EA ADDL 30 MINS PHASE I: Performed by: SURGERY

## 2022-01-01 PROCEDURE — 85652 RBC SED RATE AUTOMATED: CPT

## 2022-01-01 PROCEDURE — 700117 HCHG RX CONTRAST REV CODE 255: Performed by: INTERNAL MEDICINE

## 2022-01-01 PROCEDURE — 700117 HCHG RX CONTRAST REV CODE 255: Performed by: HOSPITALIST

## 2022-01-01 PROCEDURE — 700111 HCHG RX REV CODE 636 W/ 250 OVERRIDE (IP): Performed by: SURGERY

## 2022-01-01 PROCEDURE — 700102 HCHG RX REV CODE 250 W/ 637 OVERRIDE(OP): Performed by: INTERNAL MEDICINE

## 2022-01-01 PROCEDURE — 700111 HCHG RX REV CODE 636 W/ 250 OVERRIDE (IP): Performed by: INTERNAL MEDICINE

## 2022-01-01 PROCEDURE — 99100 ANES PT EXTEME AGE<1 YR&>70: CPT | Performed by: ANESTHESIOLOGY

## 2022-01-01 PROCEDURE — 84295 ASSAY OF SERUM SODIUM: CPT

## 2022-01-01 PROCEDURE — 86850 RBC ANTIBODY SCREEN: CPT

## 2022-01-01 PROCEDURE — A9270 NON-COVERED ITEM OR SERVICE: HCPCS | Performed by: INTERNAL MEDICINE

## 2022-01-01 PROCEDURE — 770020 HCHG ROOM/CARE - TELE (206)

## 2022-01-01 PROCEDURE — 36556 INSERT NON-TUNNEL CV CATH: CPT | Performed by: ANESTHESIOLOGY

## 2022-01-01 PROCEDURE — 87040 BLOOD CULTURE FOR BACTERIA: CPT

## 2022-01-01 PROCEDURE — 99498 ADVNCD CARE PLAN ADDL 30 MIN: CPT | Performed by: NURSE PRACTITIONER

## 2022-01-01 PROCEDURE — 700105 HCHG RX REV CODE 258: Performed by: HOSPITALIST

## 2022-01-01 PROCEDURE — 99291 CRITICAL CARE FIRST HOUR: CPT | Performed by: INTERNAL MEDICINE

## 2022-01-01 PROCEDURE — 82043 UR ALBUMIN QUANTITATIVE: CPT

## 2022-01-01 PROCEDURE — 92612 ENDOSCOPY SWALLOW (FEES) VID: CPT

## 2022-01-01 PROCEDURE — 93971 EXTREMITY STUDY: CPT | Mod: LT

## 2022-01-01 PROCEDURE — 82746 ASSAY OF FOLIC ACID SERUM: CPT

## 2022-01-01 PROCEDURE — 92950 HEART/LUNG RESUSCITATION CPR: CPT | Performed by: INTERNAL MEDICINE

## 2022-01-01 PROCEDURE — 33233 REMOVAL OF PM GENERATOR: CPT | Performed by: INTERNAL MEDICINE

## 2022-01-01 PROCEDURE — 700101 HCHG RX REV CODE 250: Performed by: STUDENT IN AN ORGANIZED HEALTH CARE EDUCATION/TRAINING PROGRAM

## 2022-01-01 PROCEDURE — 99291 CRITICAL CARE FIRST HOUR: CPT | Mod: FS | Performed by: INTERNAL MEDICINE

## 2022-01-01 PROCEDURE — 86923 COMPATIBILITY TEST ELECTRIC: CPT

## 2022-01-01 PROCEDURE — A9270 NON-COVERED ITEM OR SERVICE: HCPCS | Performed by: SURGERY

## 2022-01-01 PROCEDURE — 99232 SBSQ HOSP IP/OBS MODERATE 35: CPT | Performed by: INTERNAL MEDICINE

## 2022-01-01 PROCEDURE — 97162 PT EVAL MOD COMPLEX 30 MIN: CPT

## 2022-01-01 PROCEDURE — A9270 NON-COVERED ITEM OR SERVICE: HCPCS | Performed by: HOSPITALIST

## 2022-01-01 PROCEDURE — 83735 ASSAY OF MAGNESIUM: CPT

## 2022-01-01 PROCEDURE — 303105 HCHG CATHETER EXTRA

## 2022-01-01 PROCEDURE — 99153 MOD SED SAME PHYS/QHP EA: CPT

## 2022-01-01 PROCEDURE — 37799 UNLISTED PX VASCULAR SURGERY: CPT

## 2022-01-01 PROCEDURE — 64445 NJX AA&/STRD SCIATIC NRV IMG: CPT | Mod: 59,RT | Performed by: STUDENT IN AN ORGANIZED HEALTH CARE EDUCATION/TRAINING PROGRAM

## 2022-01-01 PROCEDURE — 99233 SBSQ HOSP IP/OBS HIGH 50: CPT | Performed by: INTERNAL MEDICINE

## 2022-01-01 PROCEDURE — 36430 TRANSFUSION BLD/BLD COMPNT: CPT

## 2022-01-01 PROCEDURE — 99100 ANES PT EXTEME AGE<1 YR&>70: CPT | Performed by: STUDENT IN AN ORGANIZED HEALTH CARE EDUCATION/TRAINING PROGRAM

## 2022-01-01 PROCEDURE — 83880 ASSAY OF NATRIURETIC PEPTIDE: CPT

## 2022-01-01 PROCEDURE — 86140 C-REACTIVE PROTEIN: CPT

## 2022-01-01 PROCEDURE — P9016 RBC LEUKOCYTES REDUCED: HCPCS

## 2022-01-01 PROCEDURE — 700105 HCHG RX REV CODE 258: Performed by: EMERGENCY MEDICINE

## 2022-01-01 PROCEDURE — 84156 ASSAY OF PROTEIN URINE: CPT

## 2022-01-01 PROCEDURE — C1751 CATH, INF, PER/CENT/MIDLINE: HCPCS

## 2022-01-01 PROCEDURE — 93010 ELECTROCARDIOGRAM REPORT: CPT | Mod: 59 | Performed by: INTERNAL MEDICINE

## 2022-01-01 PROCEDURE — 93010 ELECTROCARDIOGRAM REPORT: CPT | Performed by: INTERNAL MEDICINE

## 2022-01-01 PROCEDURE — 770006 HCHG ROOM/CARE - MED/SURG/GYN SEMI*

## 2022-01-01 PROCEDURE — 87186 SC STD MICRODIL/AGAR DIL: CPT | Mod: 91

## 2022-01-01 PROCEDURE — 33225 L VENTRIC PACING LEAD ADD-ON: CPT | Performed by: INTERNAL MEDICINE

## 2022-01-01 PROCEDURE — 64447 NJX AA&/STRD FEMORAL NRV IMG: CPT | Performed by: SURGERY

## 2022-01-01 PROCEDURE — 99285 EMERGENCY DEPT VISIT HI MDM: CPT

## 2022-01-01 PROCEDURE — 71045 X-RAY EXAM CHEST 1 VIEW: CPT

## 2022-01-01 PROCEDURE — 99152 MOD SED SAME PHYS/QHP 5/>YRS: CPT | Performed by: INTERNAL MEDICINE

## 2022-01-01 PROCEDURE — 700105 HCHG RX REV CODE 258

## 2022-01-01 PROCEDURE — 02HV33Z INSERTION OF INFUSION DEVICE INTO SUPERIOR VENA CAVA, PERCUTANEOUS APPROACH: ICD-10-PCS | Performed by: EMERGENCY MEDICINE

## 2022-01-01 PROCEDURE — 83605 ASSAY OF LACTIC ACID: CPT

## 2022-01-01 PROCEDURE — 87426 SARSCOV CORONAVIRUS AG IA: CPT

## 2022-01-01 PROCEDURE — 99223 1ST HOSP IP/OBS HIGH 75: CPT | Mod: 57 | Performed by: INTERNAL MEDICINE

## 2022-01-01 PROCEDURE — 97168 OT RE-EVAL EST PLAN CARE: CPT

## 2022-01-01 PROCEDURE — 0JH608Z INSERTION OF DEFIBRILLATOR GENERATOR INTO CHEST SUBCUTANEOUS TISSUE AND FASCIA, OPEN APPROACH: ICD-10-PCS | Performed by: INTERNAL MEDICINE

## 2022-01-01 PROCEDURE — 82962 GLUCOSE BLOOD TEST: CPT | Mod: 91

## 2022-01-01 PROCEDURE — 99291 CRITICAL CARE FIRST HOUR: CPT | Mod: 25 | Performed by: INTERNAL MEDICINE

## 2022-01-01 PROCEDURE — 93005 ELECTROCARDIOGRAM TRACING: CPT | Performed by: INTERNAL MEDICINE

## 2022-01-01 PROCEDURE — 92950 HEART/LUNG RESUSCITATION CPR: CPT

## 2022-01-01 PROCEDURE — 160002 HCHG RECOVERY MINUTES (STAT): Performed by: SURGERY

## 2022-01-01 PROCEDURE — 93971 EXTREMITY STUDY: CPT | Mod: RT

## 2022-01-01 PROCEDURE — 93926 LOWER EXTREMITY STUDY: CPT | Mod: RT

## 2022-01-01 PROCEDURE — 76700 US EXAM ABDOM COMPLETE: CPT

## 2022-01-01 PROCEDURE — 99221 1ST HOSP IP/OBS SF/LOW 40: CPT | Performed by: INTERNAL MEDICINE

## 2022-01-01 PROCEDURE — 85046 RETICYTE/HGB CONCENTRATE: CPT

## 2022-01-01 PROCEDURE — 99497 ADVNCD CARE PLAN 30 MIN: CPT | Performed by: NURSE PRACTITIONER

## 2022-01-01 PROCEDURE — 85027 COMPLETE CBC AUTOMATED: CPT

## 2022-01-01 PROCEDURE — 93306 TTE W/DOPPLER COMPLETE: CPT

## 2022-01-01 PROCEDURE — 80053 COMPREHEN METABOLIC PANEL: CPT

## 2022-01-01 PROCEDURE — 4A023N7 MEASUREMENT OF CARDIAC SAMPLING AND PRESSURE, LEFT HEART, PERCUTANEOUS APPROACH: ICD-10-PCS | Performed by: INTERNAL MEDICINE

## 2022-01-01 PROCEDURE — 700111 HCHG RX REV CODE 636 W/ 250 OVERRIDE (IP): Performed by: ANESTHESIOLOGY

## 2022-01-01 PROCEDURE — 93010 ELECTROCARDIOGRAM REPORT: CPT | Mod: 59,76 | Performed by: INTERNAL MEDICINE

## 2022-01-01 PROCEDURE — 99233 SBSQ HOSP IP/OBS HIGH 50: CPT | Performed by: STUDENT IN AN ORGANIZED HEALTH CARE EDUCATION/TRAINING PROGRAM

## 2022-01-01 PROCEDURE — 82803 BLOOD GASES ANY COMBINATION: CPT

## 2022-01-01 PROCEDURE — 99223 1ST HOSP IP/OBS HIGH 75: CPT | Performed by: INTERNAL MEDICINE

## 2022-01-01 PROCEDURE — 700117 HCHG RX CONTRAST REV CODE 255: Performed by: SURGERY

## 2022-01-01 PROCEDURE — 82330 ASSAY OF CALCIUM: CPT

## 2022-01-01 PROCEDURE — 99232 SBSQ HOSP IP/OBS MODERATE 35: CPT | Mod: GC | Performed by: HOSPITALIST

## 2022-01-01 PROCEDURE — 160009 HCHG ANES TIME/MIN: Performed by: SURGERY

## 2022-01-01 PROCEDURE — 0Y6H0Z1 DETACHMENT AT RIGHT LOWER LEG, HIGH, OPEN APPROACH: ICD-10-PCS | Performed by: SURGERY

## 2022-01-01 PROCEDURE — 70450 CT HEAD/BRAIN W/O DYE: CPT

## 2022-01-01 PROCEDURE — 96365 THER/PROPH/DIAG IV INF INIT: CPT

## 2022-01-01 PROCEDURE — 88311 DECALCIFY TISSUE: CPT

## 2022-01-01 PROCEDURE — 96375 TX/PRO/DX INJ NEW DRUG ADDON: CPT

## 2022-01-01 PROCEDURE — 160029 HCHG SURGERY MINUTES - 1ST 30 MINS LEVEL 4: Performed by: SURGERY

## 2022-01-01 PROCEDURE — 86900 BLOOD TYPING SEROLOGIC ABO: CPT

## 2022-01-01 PROCEDURE — 99291 CRITICAL CARE FIRST HOUR: CPT | Performed by: HOSPITALIST

## 2022-01-01 PROCEDURE — 700111 HCHG RX REV CODE 636 W/ 250 OVERRIDE (IP): Performed by: STUDENT IN AN ORGANIZED HEALTH CARE EDUCATION/TRAINING PROGRAM

## 2022-01-01 PROCEDURE — 82570 ASSAY OF URINE CREATININE: CPT

## 2022-01-01 PROCEDURE — 51798 US URINE CAPACITY MEASURE: CPT

## 2022-01-01 PROCEDURE — 700105 HCHG RX REV CODE 258: Performed by: INTERNAL MEDICINE

## 2022-01-01 PROCEDURE — 01482 ANES OPN LWR L/A/F RAD RESCJ: CPT | Performed by: STUDENT IN AN ORGANIZED HEALTH CARE EDUCATION/TRAINING PROGRAM

## 2022-01-01 PROCEDURE — 94760 N-INVAS EAR/PLS OXIMETRY 1: CPT

## 2022-01-01 PROCEDURE — 76942 ECHO GUIDE FOR BIOPSY: CPT | Mod: 26 | Performed by: STUDENT IN AN ORGANIZED HEALTH CARE EDUCATION/TRAINING PROGRAM

## 2022-01-01 PROCEDURE — 84132 ASSAY OF SERUM POTASSIUM: CPT | Mod: 91

## 2022-01-01 PROCEDURE — 85730 THROMBOPLASTIN TIME PARTIAL: CPT

## 2022-01-01 PROCEDURE — 36620 INSERTION CATHETER ARTERY: CPT | Performed by: STUDENT IN AN ORGANIZED HEALTH CARE EDUCATION/TRAINING PROGRAM

## 2022-01-01 PROCEDURE — 99233 SBSQ HOSP IP/OBS HIGH 50: CPT | Performed by: HOSPITALIST

## 2022-01-01 PROCEDURE — 160048 HCHG OR STATISTICAL LEVEL 1-5: Performed by: SURGERY

## 2022-01-01 PROCEDURE — 700105 HCHG RX REV CODE 258: Performed by: STUDENT IN AN ORGANIZED HEALTH CARE EDUCATION/TRAINING PROGRAM

## 2022-01-01 PROCEDURE — 160028 HCHG SURGERY MINUTES - 1ST 30 MINS LEVEL 3: Performed by: SURGERY

## 2022-01-01 PROCEDURE — 99221 1ST HOSP IP/OBS SF/LOW 40: CPT | Performed by: SURGERY

## 2022-01-01 PROCEDURE — 770000 HCHG ROOM/CARE - INTERMEDIATE ICU *

## 2022-01-01 PROCEDURE — 83735 ASSAY OF MAGNESIUM: CPT | Mod: 91

## 2022-01-01 PROCEDURE — 97535 SELF CARE MNGMENT TRAINING: CPT

## 2022-01-01 PROCEDURE — 93325 DOPPLER ECHO COLOR FLOW MAPG: CPT

## 2022-01-01 PROCEDURE — 01270 ANES PX ARTERIES UPR LEG NOS: CPT | Performed by: ANESTHESIOLOGY

## 2022-01-01 PROCEDURE — 700111 HCHG RX REV CODE 636 W/ 250 OVERRIDE (IP): Performed by: EMERGENCY MEDICINE

## 2022-01-01 PROCEDURE — 83036 HEMOGLOBIN GLYCOSYLATED A1C: CPT

## 2022-01-01 PROCEDURE — G0278 ILIAC ART ANGIO,CARDIAC CATH: HCPCS | Performed by: INTERNAL MEDICINE

## 2022-01-01 PROCEDURE — 33249 INSJ/RPLCMT DEFIB W/LEAD(S): CPT

## 2022-01-01 PROCEDURE — 83605 ASSAY OF LACTIC ACID: CPT | Mod: 91

## 2022-01-01 PROCEDURE — 85007 BL SMEAR W/DIFF WBC COUNT: CPT

## 2022-01-01 PROCEDURE — 93005 ELECTROCARDIOGRAM TRACING: CPT

## 2022-01-01 PROCEDURE — 84146 ASSAY OF PROLACTIN: CPT

## 2022-01-01 PROCEDURE — 80053 COMPREHEN METABOLIC PANEL: CPT | Mod: 91

## 2022-01-01 PROCEDURE — 99231 SBSQ HOSP IP/OBS SF/LOW 25: CPT | Performed by: INTERNAL MEDICINE

## 2022-01-01 PROCEDURE — 87086 URINE CULTURE/COLONY COUNT: CPT

## 2022-01-01 PROCEDURE — 87641 MR-STAPH DNA AMP PROBE: CPT

## 2022-01-01 PROCEDURE — 86901 BLOOD TYPING SEROLOGIC RH(D): CPT

## 2022-01-01 PROCEDURE — 99232 SBSQ HOSP IP/OBS MODERATE 35: CPT

## 2022-01-01 PROCEDURE — 700117 HCHG RX CONTRAST REV CODE 255: Performed by: NURSE PRACTITIONER

## 2022-01-01 PROCEDURE — 99223 1ST HOSP IP/OBS HIGH 75: CPT | Mod: AI | Performed by: HOSPITALIST

## 2022-01-01 PROCEDURE — 160039 HCHG SURGERY MINUTES - EA ADDL 1 MIN LEVEL 3: Performed by: SURGERY

## 2022-01-01 PROCEDURE — 81001 URINALYSIS AUTO W/SCOPE: CPT

## 2022-01-01 PROCEDURE — 85018 HEMOGLOBIN: CPT

## 2022-01-01 PROCEDURE — 3E0T3BZ INTRODUCTION OF ANESTHETIC AGENT INTO PERIPHERAL NERVES AND PLEXI, PERCUTANEOUS APPROACH: ICD-10-PCS | Performed by: STUDENT IN AN ORGANIZED HEALTH CARE EDUCATION/TRAINING PROGRAM

## 2022-01-01 PROCEDURE — 92526 ORAL FUNCTION THERAPY: CPT

## 2022-01-01 PROCEDURE — 99232 SBSQ HOSP IP/OBS MODERATE 35: CPT | Mod: FS | Performed by: INTERNAL MEDICINE

## 2022-01-01 PROCEDURE — 97164 PT RE-EVAL EST PLAN CARE: CPT

## 2022-01-01 PROCEDURE — 30233N1 TRANSFUSION OF NONAUTOLOGOUS RED BLOOD CELLS INTO PERIPHERAL VEIN, PERCUTANEOUS APPROACH: ICD-10-PCS | Performed by: EMERGENCY MEDICINE

## 2022-01-01 PROCEDURE — 97530 THERAPEUTIC ACTIVITIES: CPT

## 2022-01-01 PROCEDURE — 94002 VENT MGMT INPAT INIT DAY: CPT

## 2022-01-01 PROCEDURE — 83540 ASSAY OF IRON: CPT

## 2022-01-01 PROCEDURE — 160041 HCHG SURGERY MINUTES - EA ADDL 1 MIN LEVEL 4: Performed by: SURGERY

## 2022-01-01 PROCEDURE — 94640 AIRWAY INHALATION TREATMENT: CPT

## 2022-01-01 PROCEDURE — 36556 INSERT NON-TUNNEL CV CATH: CPT

## 2022-01-01 PROCEDURE — 36620 INSERTION CATHETER ARTERY: CPT | Performed by: ANESTHESIOLOGY

## 2022-01-01 PROCEDURE — 83690 ASSAY OF LIPASE: CPT

## 2022-01-01 PROCEDURE — 92610 EVALUATE SWALLOWING FUNCTION: CPT

## 2022-01-01 PROCEDURE — 84145 PROCALCITONIN (PCT): CPT

## 2022-01-01 PROCEDURE — 82962 GLUCOSE BLOOD TEST: CPT

## 2022-01-01 PROCEDURE — 99233 SBSQ HOSP IP/OBS HIGH 50: CPT | Mod: 25 | Performed by: INTERNAL MEDICINE

## 2022-01-01 PROCEDURE — 94669 MECHANICAL CHEST WALL OSCILL: CPT

## 2022-01-01 PROCEDURE — 0JPT0PZ REMOVAL OF CARDIAC RHYTHM RELATED DEVICE FROM TRUNK SUBCUTANEOUS TISSUE AND FASCIA, OPEN APPROACH: ICD-10-PCS | Performed by: INTERNAL MEDICINE

## 2022-01-01 PROCEDURE — 88307 TISSUE EXAM BY PATHOLOGIST: CPT

## 2022-01-01 PROCEDURE — 27880 AMPUTATION OF LOWER LEG: CPT | Mod: RT | Performed by: SURGERY

## 2022-01-01 PROCEDURE — B240ZZ3 ULTRASONOGRAPHY OF SINGLE CORONARY ARTERY, INTRAVASCULAR: ICD-10-PCS | Performed by: INTERNAL MEDICINE

## 2022-01-01 PROCEDURE — A9270 NON-COVERED ITEM OR SERVICE: HCPCS

## 2022-01-01 PROCEDURE — 87640 STAPH A DNA AMP PROBE: CPT

## 2022-01-01 PROCEDURE — 96366 THER/PROPH/DIAG IV INF ADDON: CPT

## 2022-01-01 PROCEDURE — 71275 CT ANGIOGRAPHY CHEST: CPT

## 2022-01-01 PROCEDURE — 33249 INSJ/RPLCMT DEFIB W/LEAD(S): CPT | Performed by: INTERNAL MEDICINE

## 2022-01-01 PROCEDURE — 700101 HCHG RX REV CODE 250: Performed by: ANESTHESIOLOGY

## 2022-01-01 PROCEDURE — 02HK3KZ INSERTION OF DEFIBRILLATOR LEAD INTO RIGHT VENTRICLE, PERCUTANEOUS APPROACH: ICD-10-PCS | Performed by: INTERNAL MEDICINE

## 2022-01-01 PROCEDURE — 87040 BLOOD CULTURE FOR BACTERIA: CPT | Mod: 91

## 2022-01-01 PROCEDURE — 700102 HCHG RX REV CODE 250 W/ 637 OVERRIDE(OP)

## 2022-01-01 PROCEDURE — 84132 ASSAY OF SERUM POTASSIUM: CPT

## 2022-01-01 PROCEDURE — 160035 HCHG PACU - 1ST 60 MINS PHASE I: Performed by: SURGERY

## 2022-01-01 PROCEDURE — 99239 HOSP IP/OBS DSCHRG MGMT >30: CPT | Performed by: INTERNAL MEDICINE

## 2022-01-01 PROCEDURE — 73718 MRI LOWER EXTREMITY W/O DYE: CPT | Mod: RT

## 2022-01-01 PROCEDURE — 97166 OT EVAL MOD COMPLEX 45 MIN: CPT

## 2022-01-01 PROCEDURE — 5A12012 PERFORMANCE OF CARDIAC OUTPUT, SINGLE, MANUAL: ICD-10-PCS | Performed by: INTERNAL MEDICINE

## 2022-01-01 PROCEDURE — 99292 CRITICAL CARE ADDL 30 MIN: CPT | Mod: 25 | Performed by: INTERNAL MEDICINE

## 2022-01-01 PROCEDURE — C1894 INTRO/SHEATH, NON-LASER: HCPCS | Performed by: SURGERY

## 2022-01-01 PROCEDURE — 75635 CT ANGIO ABDOMINAL ARTERIES: CPT

## 2022-01-01 PROCEDURE — 93005 ELECTROCARDIOGRAM TRACING: CPT | Performed by: EMERGENCY MEDICINE

## 2022-01-01 PROCEDURE — 02H63KZ INSERTION OF DEFIBRILLATOR LEAD INTO RIGHT ATRIUM, PERCUTANEOUS APPROACH: ICD-10-PCS | Performed by: INTERNAL MEDICINE

## 2022-01-01 PROCEDURE — 64445 NJX AA&/STRD SCIATIC NRV IMG: CPT | Performed by: SURGERY

## 2022-01-01 PROCEDURE — 700105 HCHG RX REV CODE 258: Performed by: ANESTHESIOLOGY

## 2022-01-01 PROCEDURE — 93458 L HRT ARTERY/VENTRICLE ANGIO: CPT | Mod: 26 | Performed by: INTERNAL MEDICINE

## 2022-01-01 PROCEDURE — 30233N1 TRANSFUSION OF NONAUTOLOGOUS RED BLOOD CELLS INTO PERIPHERAL VEIN, PERCUTANEOUS APPROACH: ICD-10-PCS | Performed by: STUDENT IN AN ORGANIZED HEALTH CARE EDUCATION/TRAINING PROGRAM

## 2022-01-01 PROCEDURE — 82607 VITAMIN B-12: CPT

## 2022-01-01 PROCEDURE — 83550 IRON BINDING TEST: CPT

## 2022-01-01 PROCEDURE — B2111ZZ FLUOROSCOPY OF MULTIPLE CORONARY ARTERIES USING LOW OSMOLAR CONTRAST: ICD-10-PCS | Performed by: INTERNAL MEDICINE

## 2022-01-01 PROCEDURE — 99497 ADVNCD CARE PLAN 30 MIN: CPT | Performed by: INTERNAL MEDICINE

## 2022-01-01 PROCEDURE — 97530 THERAPEUTIC ACTIVITIES: CPT | Mod: CQ

## 2022-01-01 PROCEDURE — L4398 FOOT DROP SPLINT PRE OTS: HCPCS

## 2022-01-01 PROCEDURE — 92978 ENDOLUMINL IVUS OCT C 1ST: CPT | Mod: 26,LM | Performed by: INTERNAL MEDICINE

## 2022-01-01 PROCEDURE — 73630 X-RAY EXAM OF FOOT: CPT | Mod: RT

## 2022-01-01 PROCEDURE — 502240 HCHG MISC OR SUPPLY RC 0272: Performed by: SURGERY

## 2022-01-01 PROCEDURE — 96374 THER/PROPH/DIAG INJ IV PUSH: CPT

## 2022-01-01 PROCEDURE — 93306 TTE W/DOPPLER COMPLETE: CPT | Mod: 26 | Performed by: INTERNAL MEDICINE

## 2022-01-01 PROCEDURE — 93308 TTE F-UP OR LMTD: CPT | Mod: 26 | Performed by: INTERNAL MEDICINE

## 2022-01-01 RX ORDER — CARVEDILOL 6.25 MG/1
6.25 TABLET ORAL 2 TIMES DAILY WITH MEALS
Status: DISCONTINUED | OUTPATIENT
Start: 2022-01-01 | End: 2022-01-01

## 2022-01-01 RX ORDER — POLYETHYLENE GLYCOL 3350 17 G/17G
1 POWDER, FOR SOLUTION ORAL
Status: DISCONTINUED | OUTPATIENT
Start: 2022-01-01 | End: 2022-01-01 | Stop reason: HOSPADM

## 2022-01-01 RX ORDER — ATORVASTATIN CALCIUM 80 MG/1
80 TABLET, FILM COATED ORAL DAILY
Status: DISCONTINUED | OUTPATIENT
Start: 2022-01-01 | End: 2023-01-01 | Stop reason: HOSPADM

## 2022-01-01 RX ORDER — OXYCODONE HYDROCHLORIDE 10 MG/1
10 TABLET ORAL EVERY 4 HOURS PRN
Qty: 10 TABLET | Refills: 0 | Status: SHIPPED | OUTPATIENT
Start: 2022-01-01 | End: 2022-01-01

## 2022-01-01 RX ORDER — SODIUM BICARBONATE 650 MG/1
1300 TABLET ORAL 2 TIMES DAILY
Status: DISCONTINUED | OUTPATIENT
Start: 2022-01-01 | End: 2022-01-01

## 2022-01-01 RX ORDER — HEPARIN SODIUM 5000 [USP'U]/ML
5000 INJECTION, SOLUTION INTRAVENOUS; SUBCUTANEOUS EVERY 12 HOURS
Status: DISCONTINUED | OUTPATIENT
Start: 2022-01-01 | End: 2022-01-01

## 2022-01-01 RX ORDER — HYDROMORPHONE HYDROCHLORIDE 1 MG/ML
0.2 INJECTION, SOLUTION INTRAMUSCULAR; INTRAVENOUS; SUBCUTANEOUS
Status: DISCONTINUED | OUTPATIENT
Start: 2022-01-01 | End: 2022-01-01 | Stop reason: HOSPADM

## 2022-01-01 RX ORDER — POTASSIUM CHLORIDE 7.45 MG/ML
10 INJECTION INTRAVENOUS ONCE
Status: COMPLETED | OUTPATIENT
Start: 2022-01-01 | End: 2022-01-01

## 2022-01-01 RX ORDER — ATORVASTATIN CALCIUM 40 MG/1
40 TABLET, FILM COATED ORAL EVERY EVENING
Status: DISCONTINUED | OUTPATIENT
Start: 2022-01-01 | End: 2022-01-01

## 2022-01-01 RX ORDER — ONDANSETRON 4 MG/1
4 TABLET, ORALLY DISINTEGRATING ORAL EVERY 4 HOURS PRN
Status: DISCONTINUED | OUTPATIENT
Start: 2022-01-01 | End: 2022-01-01

## 2022-01-01 RX ORDER — ONDANSETRON 2 MG/ML
4 INJECTION INTRAMUSCULAR; INTRAVENOUS
Status: DISCONTINUED | OUTPATIENT
Start: 2022-01-01 | End: 2022-01-01 | Stop reason: HOSPADM

## 2022-01-01 RX ORDER — HYDROCHLOROTHIAZIDE 25 MG/1
25 TABLET ORAL DAILY
Status: DISCONTINUED | OUTPATIENT
Start: 2022-01-01 | End: 2022-01-01

## 2022-01-01 RX ORDER — OXYCODONE HYDROCHLORIDE 10 MG/1
10 TABLET ORAL
Status: DISCONTINUED | OUTPATIENT
Start: 2022-01-01 | End: 2022-01-01

## 2022-01-01 RX ORDER — DEXTROSE MONOHYDRATE 50 MG/ML
INJECTION, SOLUTION INTRAVENOUS CONTINUOUS
Status: ACTIVE | OUTPATIENT
Start: 2022-01-01 | End: 2022-01-01

## 2022-01-01 RX ORDER — HYDROMORPHONE HYDROCHLORIDE 1 MG/ML
0.1 INJECTION, SOLUTION INTRAMUSCULAR; INTRAVENOUS; SUBCUTANEOUS
Status: DISCONTINUED | OUTPATIENT
Start: 2022-01-01 | End: 2022-01-01 | Stop reason: HOSPADM

## 2022-01-01 RX ORDER — OXYCODONE HCL 5 MG/5 ML
10 SOLUTION, ORAL ORAL
Status: DISCONTINUED | OUTPATIENT
Start: 2022-01-01 | End: 2022-01-01 | Stop reason: HOSPADM

## 2022-01-01 RX ORDER — BISACODYL 10 MG
10 SUPPOSITORY, RECTAL RECTAL
Status: DISCONTINUED | OUTPATIENT
Start: 2022-01-01 | End: 2023-01-01 | Stop reason: HOSPADM

## 2022-01-01 RX ORDER — CLONIDINE HYDROCHLORIDE 0.1 MG/1
0.1 TABLET ORAL 2 TIMES DAILY
Status: DISCONTINUED | OUTPATIENT
Start: 2022-01-01 | End: 2022-01-01

## 2022-01-01 RX ORDER — MIDAZOLAM HYDROCHLORIDE 1 MG/ML
INJECTION INTRAMUSCULAR; INTRAVENOUS
Status: COMPLETED
Start: 2022-01-01 | End: 2022-01-01

## 2022-01-01 RX ORDER — CARVEDILOL 6.25 MG/1
6.25 TABLET ORAL 2 TIMES DAILY
Status: CANCELLED | OUTPATIENT
Start: 2022-01-01

## 2022-01-01 RX ORDER — GABAPENTIN 100 MG/1
100 CAPSULE ORAL 2 TIMES DAILY
COMMUNITY

## 2022-01-01 RX ORDER — OXYCODONE HCL 5 MG/5 ML
5 SOLUTION, ORAL ORAL
Status: DISCONTINUED | OUTPATIENT
Start: 2022-01-01 | End: 2022-01-01 | Stop reason: HOSPADM

## 2022-01-01 RX ORDER — CALCIUM CHLORIDE 100 MG/ML
INJECTION INTRAVENOUS; INTRAVENTRICULAR
Status: COMPLETED | OUTPATIENT
Start: 2022-01-01 | End: 2022-01-01

## 2022-01-01 RX ORDER — ACETAMINOPHEN 325 MG/1
650 TABLET ORAL EVERY 4 HOURS PRN
Status: ON HOLD | COMMUNITY
Start: 2022-01-01 | End: 2022-01-01

## 2022-01-01 RX ORDER — CEFTRIAXONE 2 G/1
2000 INJECTION, POWDER, FOR SOLUTION INTRAMUSCULAR; INTRAVENOUS ONCE
Status: COMPLETED | OUTPATIENT
Start: 2022-01-01 | End: 2022-01-01

## 2022-01-01 RX ORDER — CARVEDILOL 6.25 MG/1
6.25 TABLET ORAL 2 TIMES DAILY
Status: ON HOLD | COMMUNITY
End: 2022-01-01

## 2022-01-01 RX ORDER — ACETAMINOPHEN 325 MG/1
650 TABLET ORAL EVERY 6 HOURS PRN
Status: DISCONTINUED | OUTPATIENT
Start: 2022-01-01 | End: 2023-01-01 | Stop reason: HOSPADM

## 2022-01-01 RX ORDER — LEVETIRACETAM 500 MG/5ML
500 INJECTION, SOLUTION, CONCENTRATE INTRAVENOUS EVERY 12 HOURS
Status: DISCONTINUED | OUTPATIENT
Start: 2022-01-01 | End: 2022-01-01

## 2022-01-01 RX ORDER — SODIUM CHLORIDE 9 MG/ML
INJECTION, SOLUTION INTRAVENOUS CONTINUOUS
Status: DISCONTINUED | OUTPATIENT
Start: 2022-01-01 | End: 2022-01-01

## 2022-01-01 RX ORDER — ACETAMINOPHEN 325 MG/1
650 TABLET ORAL EVERY 4 HOURS PRN
COMMUNITY

## 2022-01-01 RX ORDER — ENOXAPARIN SODIUM 100 MG/ML
40 INJECTION SUBCUTANEOUS DAILY
Status: DISCONTINUED | OUTPATIENT
Start: 2022-01-01 | End: 2022-01-01

## 2022-01-01 RX ORDER — OXYCODONE HYDROCHLORIDE 10 MG/1
10 TABLET ORAL EVERY 6 HOURS PRN
Qty: 20 TABLET | Refills: 0 | Status: SHIPPED | OUTPATIENT
Start: 2022-01-01 | End: 2022-01-01 | Stop reason: SDUPTHER

## 2022-01-01 RX ORDER — AMOXICILLIN 250 MG
2 CAPSULE ORAL 2 TIMES DAILY
Status: DISCONTINUED | OUTPATIENT
Start: 2022-01-01 | End: 2022-01-01

## 2022-01-01 RX ORDER — POTASSIUM CHLORIDE 20 MEQ/1
40 TABLET, EXTENDED RELEASE ORAL EVERY 6 HOURS
Status: COMPLETED | OUTPATIENT
Start: 2022-01-01 | End: 2022-01-01

## 2022-01-01 RX ORDER — HYDROMORPHONE HYDROCHLORIDE 1 MG/ML
1 INJECTION, SOLUTION INTRAMUSCULAR; INTRAVENOUS; SUBCUTANEOUS
Status: DISCONTINUED | OUTPATIENT
Start: 2022-01-01 | End: 2022-01-01

## 2022-01-01 RX ORDER — POLYETHYLENE GLYCOL 3350 17 G/17G
1 POWDER, FOR SOLUTION ORAL
Status: DISCONTINUED | OUTPATIENT
Start: 2022-01-01 | End: 2022-01-01

## 2022-01-01 RX ORDER — DEXMEDETOMIDINE HYDROCHLORIDE 4 UG/ML
.1-1.5 INJECTION, SOLUTION INTRAVENOUS CONTINUOUS
Status: DISCONTINUED | OUTPATIENT
Start: 2022-01-01 | End: 2022-01-01

## 2022-01-01 RX ORDER — CARVEDILOL 12.5 MG/1
12.5 TABLET ORAL 2 TIMES DAILY WITH MEALS
Qty: 60 TABLET | Status: ON HOLD
Start: 2022-01-01 | End: 2023-01-01

## 2022-01-01 RX ORDER — DIPHENHYDRAMINE HYDROCHLORIDE 50 MG/ML
12.5 INJECTION INTRAMUSCULAR; INTRAVENOUS
Status: DISCONTINUED | OUTPATIENT
Start: 2022-01-01 | End: 2022-01-01 | Stop reason: HOSPADM

## 2022-01-01 RX ORDER — OXYCODONE HYDROCHLORIDE 10 MG/1
10 TABLET ORAL EVERY 4 HOURS PRN
COMMUNITY
End: 2023-01-01

## 2022-01-01 RX ORDER — CARVEDILOL 6.25 MG/1
6.25 TABLET ORAL ONCE
Status: COMPLETED | OUTPATIENT
Start: 2022-01-01 | End: 2022-01-01

## 2022-01-01 RX ORDER — SODIUM CHLORIDE, SODIUM LACTATE, POTASSIUM CHLORIDE, AND CALCIUM CHLORIDE .6; .31; .03; .02 G/100ML; G/100ML; G/100ML; G/100ML
30 INJECTION, SOLUTION INTRAVENOUS
Status: DISCONTINUED | OUTPATIENT
Start: 2022-01-01 | End: 2022-01-01

## 2022-01-01 RX ORDER — ASPIRIN 81 MG/1
81 TABLET, CHEWABLE ORAL DAILY
Status: DISCONTINUED | OUTPATIENT
Start: 2022-01-01 | End: 2022-01-01

## 2022-01-01 RX ORDER — CEPHALEXIN 500 MG/1
500 CAPSULE ORAL 4 TIMES DAILY
Status: ON HOLD | COMMUNITY
Start: 2022-01-01 | End: 2022-01-01

## 2022-01-01 RX ORDER — OXYCODONE HYDROCHLORIDE 5 MG/1
5 TABLET ORAL
Status: DISCONTINUED | OUTPATIENT
Start: 2022-01-01 | End: 2022-01-01

## 2022-01-01 RX ORDER — AMLODIPINE BESYLATE 5 MG/1
5 TABLET ORAL DAILY
Status: DISCONTINUED | OUTPATIENT
Start: 2022-01-01 | End: 2022-01-01

## 2022-01-01 RX ORDER — DEXAMETHASONE SODIUM PHOSPHATE 4 MG/ML
4 INJECTION, SOLUTION INTRA-ARTICULAR; INTRALESIONAL; INTRAMUSCULAR; INTRAVENOUS; SOFT TISSUE
Status: DISCONTINUED | OUTPATIENT
Start: 2022-01-01 | End: 2022-01-01 | Stop reason: HOSPADM

## 2022-01-01 RX ORDER — GABAPENTIN 100 MG/1
100 CAPSULE ORAL 3 TIMES DAILY
Status: ON HOLD | COMMUNITY
Start: 2022-01-01 | End: 2022-01-01

## 2022-01-01 RX ORDER — DEXTROSE MONOHYDRATE, SODIUM CHLORIDE, AND POTASSIUM CHLORIDE 50; 1.49; 4.5 G/1000ML; G/1000ML; G/1000ML
INJECTION, SOLUTION INTRAVENOUS CONTINUOUS
Status: DISCONTINUED | OUTPATIENT
Start: 2022-01-01 | End: 2022-01-01

## 2022-01-01 RX ORDER — POTASSIUM CHLORIDE 20 MEQ/1
60 TABLET, EXTENDED RELEASE ORAL ONCE
Status: COMPLETED | OUTPATIENT
Start: 2022-01-01 | End: 2022-01-01

## 2022-01-01 RX ORDER — NOREPINEPHRINE BITARTRATE 0.03 MG/ML
0-1 INJECTION, SOLUTION INTRAVENOUS CONTINUOUS
Status: DISCONTINUED | OUTPATIENT
Start: 2022-01-01 | End: 2022-01-01

## 2022-01-01 RX ORDER — SODIUM CHLORIDE, SODIUM LACTATE, POTASSIUM CHLORIDE, CALCIUM CHLORIDE 600; 310; 30; 20 MG/100ML; MG/100ML; MG/100ML; MG/100ML
INJECTION, SOLUTION INTRAVENOUS CONTINUOUS
Status: DISCONTINUED | OUTPATIENT
Start: 2022-01-01 | End: 2022-01-01 | Stop reason: HOSPADM

## 2022-01-01 RX ORDER — MAGNESIUM SULFATE HEPTAHYDRATE 40 MG/ML
2 INJECTION, SOLUTION INTRAVENOUS ONCE
Status: DISCONTINUED | OUTPATIENT
Start: 2022-01-01 | End: 2022-01-01

## 2022-01-01 RX ORDER — SODIUM CHLORIDE, SODIUM LACTATE, POTASSIUM CHLORIDE, AND CALCIUM CHLORIDE .6; .31; .03; .02 G/100ML; G/100ML; G/100ML; G/100ML
1000 INJECTION, SOLUTION INTRAVENOUS
Status: DISCONTINUED | OUTPATIENT
Start: 2022-01-01 | End: 2022-01-01

## 2022-01-01 RX ORDER — AMIODARONE HYDROCHLORIDE 200 MG/1
200 TABLET ORAL DAILY
Status: DISCONTINUED | OUTPATIENT
Start: 2022-01-01 | End: 2022-01-01 | Stop reason: HOSPADM

## 2022-01-01 RX ORDER — LIDOCAINE HYDROCHLORIDE 20 MG/ML
INJECTION, SOLUTION INFILTRATION; PERINEURAL
Status: COMPLETED
Start: 2022-01-01 | End: 2022-01-01

## 2022-01-01 RX ORDER — SODIUM CHLORIDE 9 MG/ML
1000 INJECTION, SOLUTION INTRAVENOUS ONCE
Status: COMPLETED | OUTPATIENT
Start: 2022-01-01 | End: 2022-01-01

## 2022-01-01 RX ORDER — LISINOPRIL 20 MG/1
40 TABLET ORAL DAILY
Status: DISCONTINUED | OUTPATIENT
Start: 2022-01-01 | End: 2022-01-01

## 2022-01-01 RX ORDER — M-VIT,TX,IRON,MINS/CALC/FOLIC 27MG-0.4MG
1 TABLET ORAL DAILY
COMMUNITY
End: 2023-01-01

## 2022-01-01 RX ORDER — EPINEPHRINE HCL IN 0.9 % NACL 4MG/250ML
PLASTIC BAG, INJECTION (ML) INTRAVENOUS
Status: COMPLETED | OUTPATIENT
Start: 2022-01-01 | End: 2022-01-01

## 2022-01-01 RX ORDER — PHENYLEPHRINE HCL IN 0.9% NACL 0.5 MG/5ML
100-300 SYRINGE (ML) INTRAVENOUS
Status: ACTIVE | OUTPATIENT
Start: 2022-01-01 | End: 2022-01-01

## 2022-01-01 RX ORDER — LISINOPRIL 10 MG/1
10 TABLET ORAL
Status: DISCONTINUED | OUTPATIENT
Start: 2022-01-01 | End: 2022-01-01

## 2022-01-01 RX ORDER — LEVETIRACETAM 500 MG/1
500 TABLET ORAL 2 TIMES DAILY
Status: DISCONTINUED | OUTPATIENT
Start: 2022-01-01 | End: 2022-01-01 | Stop reason: HOSPADM

## 2022-01-01 RX ORDER — AMLODIPINE BESYLATE 5 MG/1
10 TABLET ORAL DAILY
Status: DISCONTINUED | OUTPATIENT
Start: 2022-01-01 | End: 2022-01-01

## 2022-01-01 RX ORDER — ONDANSETRON 2 MG/ML
4 INJECTION INTRAMUSCULAR; INTRAVENOUS EVERY 4 HOURS PRN
Status: DISCONTINUED | OUTPATIENT
Start: 2022-01-01 | End: 2022-01-01 | Stop reason: HOSPADM

## 2022-01-01 RX ORDER — FAMOTIDINE 20 MG/1
20 TABLET, FILM COATED ORAL DAILY
Status: DISCONTINUED | OUTPATIENT
Start: 2022-01-01 | End: 2022-01-01

## 2022-01-01 RX ORDER — HYDROMORPHONE HYDROCHLORIDE 1 MG/ML
0.4 INJECTION, SOLUTION INTRAMUSCULAR; INTRAVENOUS; SUBCUTANEOUS
Status: DISCONTINUED | OUTPATIENT
Start: 2022-01-01 | End: 2022-01-01 | Stop reason: HOSPADM

## 2022-01-01 RX ORDER — DEXTROSE MONOHYDRATE 50 MG/ML
INJECTION, SOLUTION INTRAVENOUS CONTINUOUS
Status: DISCONTINUED | OUTPATIENT
Start: 2022-01-01 | End: 2022-01-01

## 2022-01-01 RX ORDER — DEXTROSE MONOHYDRATE 50 MG/ML
INJECTION, SOLUTION INTRAVENOUS CONTINUOUS
Status: DISCONTINUED | OUTPATIENT
Start: 2022-01-01 | End: 2022-01-01 | Stop reason: HOSPADM

## 2022-01-01 RX ORDER — NOREPINEPHRINE BITARTRATE 0.03 MG/ML
INJECTION, SOLUTION INTRAVENOUS
Status: COMPLETED
Start: 2022-01-01 | End: 2022-01-01

## 2022-01-01 RX ORDER — POTASSIUM CHLORIDE 14.9 MG/ML
20 INJECTION INTRAVENOUS ONCE
Status: COMPLETED | OUTPATIENT
Start: 2022-01-01 | End: 2022-01-01

## 2022-01-01 RX ORDER — AMIODARONE HYDROCHLORIDE 200 MG/1
200 TABLET ORAL DAILY
Status: DISCONTINUED | OUTPATIENT
Start: 2022-01-01 | End: 2023-01-01 | Stop reason: HOSPADM

## 2022-01-01 RX ORDER — ACETAMINOPHEN 325 MG/1
650 TABLET ORAL EVERY 4 HOURS PRN
Status: DISCONTINUED | OUTPATIENT
Start: 2022-01-01 | End: 2022-01-01

## 2022-01-01 RX ORDER — HYDROMORPHONE HYDROCHLORIDE 1 MG/ML
0.5 INJECTION, SOLUTION INTRAMUSCULAR; INTRAVENOUS; SUBCUTANEOUS
Status: DISCONTINUED | OUTPATIENT
Start: 2022-01-01 | End: 2022-01-01 | Stop reason: HOSPADM

## 2022-01-01 RX ORDER — BISACODYL 10 MG
10 SUPPOSITORY, RECTAL RECTAL
Status: DISCONTINUED | OUTPATIENT
Start: 2022-01-01 | End: 2022-01-01 | Stop reason: HOSPADM

## 2022-01-01 RX ORDER — MIRTAZAPINE 15 MG/1
15 TABLET, FILM COATED ORAL NIGHTLY
Status: ON HOLD | COMMUNITY
Start: 2022-01-01 | End: 2023-01-01

## 2022-01-01 RX ORDER — POTASSIUM CHLORIDE 29.8 MG/ML
40 INJECTION INTRAVENOUS ONCE
Status: DISCONTINUED | OUTPATIENT
Start: 2022-01-01 | End: 2022-01-01

## 2022-01-01 RX ORDER — OXYCODONE HYDROCHLORIDE 10 MG/1
10 TABLET ORAL
Status: DISCONTINUED | OUTPATIENT
Start: 2022-01-01 | End: 2022-01-01 | Stop reason: HOSPADM

## 2022-01-01 RX ORDER — PHENYLEPHRINE HYDROCHLORIDE 10 MG/ML
INJECTION, SOLUTION INTRAMUSCULAR; INTRAVENOUS; SUBCUTANEOUS
Status: DISPENSED
Start: 2022-01-01 | End: 2022-01-01

## 2022-01-01 RX ORDER — HEPARIN SODIUM 5000 [USP'U]/ML
5000 INJECTION, SOLUTION INTRAVENOUS; SUBCUTANEOUS EVERY 8 HOURS
Status: DISCONTINUED | OUTPATIENT
Start: 2022-01-01 | End: 2022-01-01

## 2022-01-01 RX ORDER — DEXTROSE MONOHYDRATE, SODIUM CHLORIDE, AND POTASSIUM CHLORIDE 50; 1.49; 4.5 G/1000ML; G/1000ML; G/1000ML
INJECTION, SOLUTION INTRAVENOUS CONTINUOUS
Status: DISPENSED | OUTPATIENT
Start: 2022-01-01 | End: 2022-01-01

## 2022-01-01 RX ORDER — HYDROMORPHONE HYDROCHLORIDE 1 MG/ML
0.5 INJECTION, SOLUTION INTRAMUSCULAR; INTRAVENOUS; SUBCUTANEOUS ONCE
Status: COMPLETED | OUTPATIENT
Start: 2022-01-01 | End: 2022-01-01

## 2022-01-01 RX ORDER — MORPHINE SULFATE 4 MG/ML
4 INJECTION INTRAVENOUS
Status: DISCONTINUED | OUTPATIENT
Start: 2022-01-01 | End: 2022-01-01

## 2022-01-01 RX ORDER — FUROSEMIDE 20 MG/1
20 TABLET ORAL 2 TIMES DAILY
COMMUNITY

## 2022-01-01 RX ORDER — LIDOCAINE HYDROCHLORIDE 20 MG/ML
INJECTION, SOLUTION EPIDURAL; INFILTRATION; INTRACAUDAL; PERINEURAL PRN
Status: DISCONTINUED | OUTPATIENT
Start: 2022-01-01 | End: 2022-01-01 | Stop reason: SURG

## 2022-01-01 RX ORDER — OXYCODONE HYDROCHLORIDE 5 MG/1
5 TABLET ORAL EVERY 4 HOURS PRN
Status: DISCONTINUED | OUTPATIENT
Start: 2022-01-01 | End: 2022-01-01

## 2022-01-01 RX ORDER — ACETAMINOPHEN 325 MG/1
650 TABLET ORAL EVERY 6 HOURS PRN
Status: DISCONTINUED | OUTPATIENT
Start: 2022-01-01 | End: 2022-01-01

## 2022-01-01 RX ORDER — HALOPERIDOL 5 MG/ML
1 INJECTION INTRAMUSCULAR
Status: DISCONTINUED | OUTPATIENT
Start: 2022-01-01 | End: 2022-01-01 | Stop reason: HOSPADM

## 2022-01-01 RX ORDER — IPRATROPIUM BROMIDE AND ALBUTEROL SULFATE 2.5; .5 MG/3ML; MG/3ML
3 SOLUTION RESPIRATORY (INHALATION)
Status: DISCONTINUED | OUTPATIENT
Start: 2022-01-01 | End: 2022-01-01

## 2022-01-01 RX ORDER — GABAPENTIN 100 MG/1
100 CAPSULE ORAL 3 TIMES DAILY
Status: CANCELLED | OUTPATIENT
Start: 2022-01-01

## 2022-01-01 RX ORDER — GABAPENTIN 100 MG/1
100 CAPSULE ORAL 2 TIMES DAILY
Status: DISCONTINUED | OUTPATIENT
Start: 2022-01-01 | End: 2022-01-01

## 2022-01-01 RX ORDER — CARVEDILOL 12.5 MG/1
12.5 TABLET ORAL 2 TIMES DAILY WITH MEALS
Status: DISCONTINUED | OUTPATIENT
Start: 2022-01-01 | End: 2022-01-01

## 2022-01-01 RX ORDER — ONDANSETRON 2 MG/ML
4 INJECTION INTRAMUSCULAR; INTRAVENOUS EVERY 4 HOURS PRN
Status: DISCONTINUED | OUTPATIENT
Start: 2022-01-01 | End: 2022-01-01

## 2022-01-01 RX ORDER — IBUPROFEN 800 MG/1
800 TABLET ORAL 3 TIMES DAILY PRN
Status: DISCONTINUED | OUTPATIENT
Start: 2022-01-01 | End: 2022-01-01 | Stop reason: HOSPADM

## 2022-01-01 RX ORDER — CEFAZOLIN SODIUM 1 G/3ML
INJECTION, POWDER, FOR SOLUTION INTRAMUSCULAR; INTRAVENOUS
Status: COMPLETED
Start: 2022-01-01 | End: 2022-01-01

## 2022-01-01 RX ORDER — DIPHENHYDRAMINE HYDROCHLORIDE 50 MG/ML
25 INJECTION INTRAMUSCULAR; INTRAVENOUS EVERY 6 HOURS PRN
Status: DISCONTINUED | OUTPATIENT
Start: 2022-01-01 | End: 2022-01-01 | Stop reason: HOSPADM

## 2022-01-01 RX ORDER — DEXAMETHASONE SODIUM PHOSPHATE 4 MG/ML
INJECTION, SOLUTION INTRA-ARTICULAR; INTRALESIONAL; INTRAMUSCULAR; INTRAVENOUS; SOFT TISSUE PRN
Status: DISCONTINUED | OUTPATIENT
Start: 2022-01-01 | End: 2022-01-01 | Stop reason: SURG

## 2022-01-01 RX ORDER — ENOXAPARIN SODIUM 100 MG/ML
40 INJECTION SUBCUTANEOUS DAILY
Status: DISCONTINUED | OUTPATIENT
Start: 2022-01-01 | End: 2022-01-01 | Stop reason: HOSPADM

## 2022-01-01 RX ORDER — SODIUM CHLORIDE 450 MG/100ML
INJECTION, SOLUTION INTRAVENOUS CONTINUOUS
Status: DISCONTINUED | OUTPATIENT
Start: 2022-01-01 | End: 2022-01-01

## 2022-01-01 RX ORDER — SODIUM CHLORIDE, SODIUM LACTATE, POTASSIUM CHLORIDE, AND CALCIUM CHLORIDE .6; .31; .03; .02 G/100ML; G/100ML; G/100ML; G/100ML
250 INJECTION, SOLUTION INTRAVENOUS ONCE
Status: COMPLETED | OUTPATIENT
Start: 2022-01-01 | End: 2022-01-01

## 2022-01-01 RX ORDER — FUROSEMIDE 10 MG/ML
40 INJECTION INTRAMUSCULAR; INTRAVENOUS ONCE
Status: COMPLETED | OUTPATIENT
Start: 2022-01-01 | End: 2022-01-01

## 2022-01-01 RX ORDER — DEXMEDETOMIDINE HYDROCHLORIDE 4 UG/ML
INJECTION, SOLUTION INTRAVENOUS
Status: COMPLETED
Start: 2022-01-01 | End: 2022-01-01

## 2022-01-01 RX ORDER — ONDANSETRON 4 MG/1
4 TABLET, ORALLY DISINTEGRATING ORAL EVERY 4 HOURS PRN
Status: DISCONTINUED | OUTPATIENT
Start: 2022-01-01 | End: 2022-01-01 | Stop reason: HOSPADM

## 2022-01-01 RX ORDER — ENEMA 19; 7 G/133ML; G/133ML
1 ENEMA RECTAL ONCE
Status: COMPLETED | OUTPATIENT
Start: 2022-01-01 | End: 2022-01-01

## 2022-01-01 RX ORDER — SCOLOPAMINE TRANSDERMAL SYSTEM 1 MG/1
1 PATCH, EXTENDED RELEASE TRANSDERMAL
Status: DISCONTINUED | OUTPATIENT
Start: 2022-01-01 | End: 2022-01-01 | Stop reason: HOSPADM

## 2022-01-01 RX ORDER — ACETAMINOPHEN 325 MG/1
650 TABLET ORAL EVERY 6 HOURS
Status: DISCONTINUED | OUTPATIENT
Start: 2022-01-01 | End: 2022-01-01 | Stop reason: HOSPADM

## 2022-01-01 RX ORDER — SODIUM CHLORIDE, SODIUM LACTATE, POTASSIUM CHLORIDE, CALCIUM CHLORIDE 600; 310; 30; 20 MG/100ML; MG/100ML; MG/100ML; MG/100ML
INJECTION, SOLUTION INTRAVENOUS
Status: DISCONTINUED | OUTPATIENT
Start: 2022-01-01 | End: 2022-01-01 | Stop reason: SURG

## 2022-01-01 RX ORDER — DEXMEDETOMIDINE HYDROCHLORIDE 4 UG/ML
0-1.5 INJECTION, SOLUTION INTRAVENOUS CONTINUOUS
Status: DISCONTINUED | OUTPATIENT
Start: 2022-01-01 | End: 2022-01-01

## 2022-01-01 RX ORDER — BISACODYL 10 MG
10 SUPPOSITORY, RECTAL RECTAL
Status: DISCONTINUED | OUTPATIENT
Start: 2022-01-01 | End: 2022-01-01

## 2022-01-01 RX ORDER — HEPARIN SODIUM 200 [USP'U]/100ML
INJECTION, SOLUTION INTRAVENOUS
Status: COMPLETED
Start: 2022-01-01 | End: 2022-01-01

## 2022-01-01 RX ORDER — LEVETIRACETAM 500 MG/1
500 TABLET ORAL 2 TIMES DAILY
Status: DISCONTINUED | OUTPATIENT
Start: 2022-01-01 | End: 2022-01-01

## 2022-01-01 RX ORDER — ROCURONIUM BROMIDE 10 MG/ML
INJECTION, SOLUTION INTRAVENOUS PRN
Status: DISCONTINUED | OUTPATIENT
Start: 2022-01-01 | End: 2022-01-01 | Stop reason: SURG

## 2022-01-01 RX ORDER — POTASSIUM CHLORIDE 20 MEQ/1
40 TABLET, EXTENDED RELEASE ORAL ONCE
Status: COMPLETED | OUTPATIENT
Start: 2022-01-01 | End: 2022-01-01

## 2022-01-01 RX ORDER — CLOPIDOGREL BISULFATE 75 MG/1
75 TABLET ORAL
Status: ON HOLD | COMMUNITY
Start: 2022-01-01 | End: 2022-01-01

## 2022-01-01 RX ORDER — AMIODARONE HYDROCHLORIDE 200 MG/1
200 TABLET ORAL DAILY
Status: DISCONTINUED | OUTPATIENT
Start: 2022-01-01 | End: 2022-01-01

## 2022-01-01 RX ORDER — CARVEDILOL 12.5 MG/1
12.5 TABLET ORAL 2 TIMES DAILY WITH MEALS
Status: DISCONTINUED | OUTPATIENT
Start: 2022-01-01 | End: 2022-01-01 | Stop reason: HOSPADM

## 2022-01-01 RX ORDER — POLYETHYLENE GLYCOL 3350 17 G/17G
1 POWDER, FOR SOLUTION ORAL
Status: DISCONTINUED | OUTPATIENT
Start: 2022-01-01 | End: 2023-01-01 | Stop reason: HOSPADM

## 2022-01-01 RX ORDER — BUPIVACAINE HYDROCHLORIDE 2.5 MG/ML
INJECTION, SOLUTION EPIDURAL; INFILTRATION; INTRACAUDAL
Status: COMPLETED
Start: 2022-01-01 | End: 2022-01-01

## 2022-01-01 RX ORDER — AZITHROMYCIN 500 MG/5ML
500 INJECTION, POWDER, LYOPHILIZED, FOR SOLUTION INTRAVENOUS EVERY 24 HOURS
Status: COMPLETED | OUTPATIENT
Start: 2023-01-01 | End: 2023-01-01

## 2022-01-01 RX ORDER — VERAPAMIL HYDROCHLORIDE 2.5 MG/ML
INJECTION, SOLUTION INTRAVENOUS
Status: COMPLETED
Start: 2022-01-01 | End: 2022-01-01

## 2022-01-01 RX ORDER — PHENYLEPHRINE HCL IN 0.9% NACL 0.5 MG/5ML
SYRINGE (ML) INTRAVENOUS
Status: COMPLETED | OUTPATIENT
Start: 2022-01-01 | End: 2022-01-01

## 2022-01-01 RX ORDER — HEPARIN SODIUM 1000 [USP'U]/ML
INJECTION, SOLUTION INTRAVENOUS; SUBCUTANEOUS
Status: COMPLETED
Start: 2022-01-01 | End: 2022-01-01

## 2022-01-01 RX ORDER — LINEZOLID 600 MG/1
600 TABLET, FILM COATED ORAL EVERY 12 HOURS
Status: DISCONTINUED | OUTPATIENT
Start: 2022-01-01 | End: 2022-01-01

## 2022-01-01 RX ORDER — SODIUM CHLORIDE, SODIUM LACTATE, POTASSIUM CHLORIDE, CALCIUM CHLORIDE 600; 310; 30; 20 MG/100ML; MG/100ML; MG/100ML; MG/100ML
INJECTION, SOLUTION INTRAVENOUS CONTINUOUS
Status: DISCONTINUED | OUTPATIENT
Start: 2022-01-01 | End: 2022-01-01

## 2022-01-01 RX ORDER — SODIUM CHLORIDE 9 MG/ML
INJECTION, SOLUTION INTRAVENOUS CONTINUOUS
Status: ACTIVE | OUTPATIENT
Start: 2022-01-01 | End: 2022-01-01

## 2022-01-01 RX ORDER — FAMOTIDINE 20 MG/1
20 TABLET, FILM COATED ORAL EVERY 12 HOURS
Status: DISCONTINUED | OUTPATIENT
Start: 2022-01-01 | End: 2022-01-01

## 2022-01-01 RX ORDER — PHENYLEPHRINE HCL IN 0.9% NACL 0.5 MG/5ML
SYRINGE (ML) INTRAVENOUS
Status: ACTIVE
Start: 2022-01-01 | End: 2022-01-01

## 2022-01-01 RX ORDER — AMLODIPINE BESYLATE 10 MG/1
5 TABLET ORAL DAILY
Status: DISCONTINUED | OUTPATIENT
Start: 2022-01-01 | End: 2022-01-01

## 2022-01-01 RX ORDER — EPINEPHRINE HCL IN 0.9 % NACL 4MG/250ML
0-.5 PLASTIC BAG, INJECTION (ML) INTRAVENOUS CONTINUOUS
Status: DISCONTINUED | OUTPATIENT
Start: 2022-01-01 | End: 2022-01-01

## 2022-01-01 RX ORDER — MORPHINE SULFATE 4 MG/ML
2 INJECTION INTRAVENOUS EVERY 4 HOURS PRN
Status: DISCONTINUED | OUTPATIENT
Start: 2022-01-01 | End: 2022-01-01

## 2022-01-01 RX ORDER — CEFAZOLIN SODIUM 1 G/3ML
INJECTION, POWDER, FOR SOLUTION INTRAMUSCULAR; INTRAVENOUS PRN
Status: DISCONTINUED | OUTPATIENT
Start: 2022-01-01 | End: 2022-01-01 | Stop reason: SURG

## 2022-01-01 RX ORDER — ATORVASTATIN CALCIUM 40 MG/1
80 TABLET, FILM COATED ORAL DAILY
Status: CANCELLED | OUTPATIENT
Start: 2022-01-01

## 2022-01-01 RX ORDER — KETOROLAC TROMETHAMINE 30 MG/ML
15 INJECTION, SOLUTION INTRAMUSCULAR; INTRAVENOUS EVERY 6 HOURS
Status: COMPLETED | OUTPATIENT
Start: 2022-01-01 | End: 2022-01-01

## 2022-01-01 RX ORDER — ASPIRIN 300 MG/1
300 SUPPOSITORY RECTAL EVERY 6 HOURS PRN
Status: DISCONTINUED | OUTPATIENT
Start: 2022-01-01 | End: 2022-01-01

## 2022-01-01 RX ORDER — EPINEPHRINE 1 MG/ML(1)
AMPUL (ML) INJECTION PRN
Status: DISCONTINUED | OUTPATIENT
Start: 2022-01-01 | End: 2022-01-01 | Stop reason: SURG

## 2022-01-01 RX ORDER — GABAPENTIN 100 MG/1
200 CAPSULE ORAL 2 TIMES DAILY
Status: DISCONTINUED | OUTPATIENT
Start: 2022-01-01 | End: 2022-01-01 | Stop reason: HOSPADM

## 2022-01-01 RX ORDER — HALOPERIDOL 5 MG/ML
1 INJECTION INTRAMUSCULAR EVERY 6 HOURS PRN
Status: DISCONTINUED | OUTPATIENT
Start: 2022-01-01 | End: 2022-01-01

## 2022-01-01 RX ORDER — OXYCODONE HCL 10 MG/1
10 TABLET, FILM COATED, EXTENDED RELEASE ORAL EVERY 12 HOURS PRN
COMMUNITY

## 2022-01-01 RX ORDER — ATORVASTATIN CALCIUM 40 MG/1
40 TABLET, FILM COATED ORAL EVERY EVENING
Status: DISCONTINUED | OUTPATIENT
Start: 2022-01-01 | End: 2022-01-01 | Stop reason: HOSPADM

## 2022-01-01 RX ORDER — GABAPENTIN 100 MG/1
100 CAPSULE ORAL 2 TIMES DAILY
Qty: 90 CAPSULE | Status: SHIPPED
Start: 2022-01-01 | End: 2022-01-01

## 2022-01-01 RX ORDER — LEVETIRACETAM 500 MG/1
500 TABLET ORAL 2 TIMES DAILY
Status: DISCONTINUED | OUTPATIENT
Start: 2022-01-01 | End: 2023-01-01 | Stop reason: HOSPADM

## 2022-01-01 RX ORDER — AMOXICILLIN 250 MG
2 CAPSULE ORAL 2 TIMES DAILY
Status: DISCONTINUED | OUTPATIENT
Start: 2022-01-01 | End: 2023-01-01 | Stop reason: HOSPADM

## 2022-01-01 RX ORDER — DEXAMETHASONE SODIUM PHOSPHATE 4 MG/ML
INJECTION, SOLUTION INTRA-ARTICULAR; INTRALESIONAL; INTRAMUSCULAR; INTRAVENOUS; SOFT TISSUE
Status: COMPLETED | OUTPATIENT
Start: 2022-01-01 | End: 2022-01-01

## 2022-01-01 RX ORDER — LISINOPRIL 20 MG/1
20 TABLET ORAL
Status: DISCONTINUED | OUTPATIENT
Start: 2022-01-01 | End: 2022-01-01

## 2022-01-01 RX ORDER — DEXTROSE AND SODIUM CHLORIDE 5; .45 G/100ML; G/100ML
INJECTION, SOLUTION INTRAVENOUS CONTINUOUS
Status: DISCONTINUED | OUTPATIENT
Start: 2022-01-01 | End: 2022-01-01

## 2022-01-01 RX ORDER — ROPIVACAINE HYDROCHLORIDE 5 MG/ML
INJECTION, SOLUTION EPIDURAL; INFILTRATION; PERINEURAL
Status: COMPLETED | OUTPATIENT
Start: 2022-01-01 | End: 2022-01-01

## 2022-01-01 RX ORDER — LISINOPRIL 10 MG/1
10 TABLET ORAL
Status: DISCONTINUED | OUTPATIENT
Start: 2022-01-01 | End: 2022-01-01 | Stop reason: HOSPADM

## 2022-01-01 RX ORDER — PSEUDOEPHEDRINE HCL 30 MG
100 TABLET ORAL 2 TIMES DAILY
Qty: 60 CAPSULE | Status: SHIPPED
Start: 2022-01-01 | End: 2022-01-01

## 2022-01-01 RX ORDER — IPRATROPIUM BROMIDE AND ALBUTEROL SULFATE 2.5; .5 MG/3ML; MG/3ML
3 SOLUTION RESPIRATORY (INHALATION)
Status: DISCONTINUED | OUTPATIENT
Start: 2022-01-01 | End: 2022-01-01 | Stop reason: HOSPADM

## 2022-01-01 RX ORDER — OXYCODONE HYDROCHLORIDE 5 MG/1
5 TABLET ORAL
Status: DISCONTINUED | OUTPATIENT
Start: 2022-01-01 | End: 2022-01-01 | Stop reason: HOSPADM

## 2022-01-01 RX ORDER — ACETAMINOPHEN 325 MG/1
650 TABLET ORAL EVERY 6 HOURS PRN
Status: DISCONTINUED | OUTPATIENT
Start: 2022-01-01 | End: 2022-01-01 | Stop reason: HOSPADM

## 2022-01-01 RX ORDER — AMIODARONE HYDROCHLORIDE 200 MG/1
200 TABLET ORAL DAILY
Qty: 30 TABLET | Status: SHIPPED
Start: 2022-01-01

## 2022-01-01 RX ORDER — ASPIRIN 300 MG/1
300 SUPPOSITORY RECTAL ONCE
Status: COMPLETED | OUTPATIENT
Start: 2022-01-01 | End: 2022-01-01

## 2022-01-01 RX ORDER — OXYCODONE HYDROCHLORIDE 10 MG/1
20 TABLET ORAL
Status: DISCONTINUED | OUTPATIENT
Start: 2022-01-01 | End: 2022-01-01

## 2022-01-01 RX ORDER — GABAPENTIN 100 MG/1
200 CAPSULE ORAL 2 TIMES DAILY
Qty: 90 CAPSULE | Status: SHIPPED
Start: 2022-01-01 | End: 2022-01-01

## 2022-01-01 RX ORDER — GABAPENTIN 100 MG/1
100 CAPSULE ORAL 2 TIMES DAILY
Status: DISCONTINUED | OUTPATIENT
Start: 2022-01-01 | End: 2023-01-01 | Stop reason: HOSPADM

## 2022-01-01 RX ORDER — POTASSIUM CHLORIDE 20 MEQ/1
40 TABLET, EXTENDED RELEASE ORAL EVERY 6 HOURS
Status: DISCONTINUED | OUTPATIENT
Start: 2022-01-01 | End: 2022-01-01

## 2022-01-01 RX ORDER — ACETAMINOPHEN 325 MG/1
650 TABLET ORAL EVERY 6 HOURS PRN
Qty: 30 TABLET | Refills: 0 | Status: SHIPPED
Start: 2022-01-01 | End: 2022-01-01

## 2022-01-01 RX ORDER — CLONIDINE HYDROCHLORIDE 0.1 MG/1
0.1 TABLET ORAL 2 TIMES DAILY PRN
Status: DISCONTINUED | OUTPATIENT
Start: 2022-01-01 | End: 2022-01-01

## 2022-01-01 RX ORDER — ONDANSETRON 2 MG/ML
INJECTION INTRAMUSCULAR; INTRAVENOUS PRN
Status: DISCONTINUED | OUTPATIENT
Start: 2022-01-01 | End: 2022-01-01 | Stop reason: SURG

## 2022-01-01 RX ORDER — LIDOCAINE 50 MG/G
1 PATCH TOPICAL EVERY 24 HOURS
Status: DISCONTINUED | OUTPATIENT
Start: 2022-01-01 | End: 2022-01-01 | Stop reason: HOSPADM

## 2022-01-01 RX ORDER — AMOXICILLIN 250 MG
2 CAPSULE ORAL 2 TIMES DAILY
Status: DISCONTINUED | OUTPATIENT
Start: 2022-01-01 | End: 2022-01-01 | Stop reason: HOSPADM

## 2022-01-01 RX ORDER — ASPIRIN 81 MG/1
81 TABLET, CHEWABLE ORAL DAILY
Status: DISCONTINUED | OUTPATIENT
Start: 2022-01-01 | End: 2022-01-01 | Stop reason: HOSPADM

## 2022-01-01 RX ORDER — LISINOPRIL 10 MG/1
10 TABLET ORAL DAILY
Qty: 30 TABLET | Status: ON HOLD
Start: 2022-01-01 | End: 2023-01-01

## 2022-01-01 RX ORDER — CEFTRIAXONE 1 G/1
1000 INJECTION, POWDER, FOR SOLUTION INTRAMUSCULAR; INTRAVENOUS ONCE
Status: COMPLETED | OUTPATIENT
Start: 2022-01-01 | End: 2022-01-01

## 2022-01-01 RX ORDER — LIDOCAINE 50 MG/G
1 PATCH TOPICAL EVERY 24 HOURS
Qty: 10 PATCH | Status: SHIPPED
Start: 2022-01-01 | End: 2022-01-01

## 2022-01-01 RX ORDER — MAGNESIUM SULFATE HEPTAHYDRATE 40 MG/ML
2 INJECTION, SOLUTION INTRAVENOUS ONCE
Status: COMPLETED | OUTPATIENT
Start: 2022-01-01 | End: 2022-01-01

## 2022-01-01 RX ORDER — SODIUM CHLORIDE 9 MG/ML
500 INJECTION, SOLUTION INTRAVENOUS ONCE
Status: COMPLETED | OUTPATIENT
Start: 2022-01-01 | End: 2022-01-01

## 2022-01-01 RX ORDER — HYDRALAZINE HYDROCHLORIDE 20 MG/ML
5 INJECTION INTRAMUSCULAR; INTRAVENOUS
Status: DISCONTINUED | OUTPATIENT
Start: 2022-01-01 | End: 2022-01-01 | Stop reason: HOSPADM

## 2022-01-01 RX ORDER — DRONABINOL 5 MG/1
5 CAPSULE ORAL
Status: DISCONTINUED | OUTPATIENT
Start: 2022-01-01 | End: 2022-01-01 | Stop reason: HOSPADM

## 2022-01-01 RX ORDER — ATORVASTATIN CALCIUM 80 MG/1
80 TABLET, FILM COATED ORAL DAILY
COMMUNITY

## 2022-01-01 RX ORDER — MAGNESIUM SULFATE HEPTAHYDRATE 40 MG/ML
4 INJECTION, SOLUTION INTRAVENOUS ONCE
Status: COMPLETED | OUTPATIENT
Start: 2022-01-01 | End: 2022-01-01

## 2022-01-01 RX ORDER — SODIUM CHLORIDE, SODIUM LACTATE, POTASSIUM CHLORIDE, AND CALCIUM CHLORIDE .6; .31; .03; .02 G/100ML; G/100ML; G/100ML; G/100ML
500 INJECTION, SOLUTION INTRAVENOUS ONCE
Status: COMPLETED | OUTPATIENT
Start: 2022-01-01 | End: 2022-01-01

## 2022-01-01 RX ORDER — DOCUSATE SODIUM 100 MG/1
100 CAPSULE, LIQUID FILLED ORAL 2 TIMES DAILY
Status: DISCONTINUED | OUTPATIENT
Start: 2022-01-01 | End: 2022-01-01 | Stop reason: HOSPADM

## 2022-01-01 RX ORDER — HYDROMORPHONE HYDROCHLORIDE 1 MG/ML
0.5 INJECTION, SOLUTION INTRAMUSCULAR; INTRAVENOUS; SUBCUTANEOUS EVERY 4 HOURS PRN
Status: DISCONTINUED | OUTPATIENT
Start: 2022-01-01 | End: 2022-01-01

## 2022-01-01 RX ADMIN — CEFAZOLIN 2 G: 2 INJECTION, POWDER, FOR SOLUTION INTRAMUSCULAR; INTRAVENOUS at 18:35

## 2022-01-01 RX ADMIN — DOCUSATE SODIUM 50 MG AND SENNOSIDES 8.6 MG 2 TABLET: 8.6; 5 TABLET, FILM COATED ORAL at 17:43

## 2022-01-01 RX ADMIN — ATORVASTATIN CALCIUM 80 MG: 80 TABLET, FILM COATED ORAL at 16:08

## 2022-01-01 RX ADMIN — ENOXAPARIN SODIUM 40 MG: 40 INJECTION SUBCUTANEOUS at 18:09

## 2022-01-01 RX ADMIN — HYDROMORPHONE HYDROCHLORIDE 0.5 MG: 1 INJECTION, SOLUTION INTRAMUSCULAR; INTRAVENOUS; SUBCUTANEOUS at 10:01

## 2022-01-01 RX ADMIN — DOCUSATE SODIUM 50 MG AND SENNOSIDES 8.6 MG 2 TABLET: 8.6; 5 TABLET, FILM COATED ORAL at 18:10

## 2022-01-01 RX ADMIN — KETOROLAC TROMETHAMINE 15 MG: 30 INJECTION, SOLUTION INTRAMUSCULAR; INTRAVENOUS at 11:42

## 2022-01-01 RX ADMIN — GABAPENTIN 100 MG: 100 CAPSULE ORAL at 17:30

## 2022-01-01 RX ADMIN — DOCUSATE SODIUM 50 MG AND SENNOSIDES 8.6 MG 2 TABLET: 8.6; 5 TABLET, FILM COATED ORAL at 17:21

## 2022-01-01 RX ADMIN — POTASSIUM CHLORIDE 10 MEQ: 7.45 INJECTION INTRAVENOUS at 13:42

## 2022-01-01 RX ADMIN — FAMOTIDINE 20 MG: 20 TABLET, FILM COATED ORAL at 06:00

## 2022-01-01 RX ADMIN — AMIODARONE HYDROCHLORIDE 200 MG: 200 TABLET ORAL at 05:37

## 2022-01-01 RX ADMIN — AMLODIPINE BESYLATE 5 MG: 5 TABLET ORAL at 04:56

## 2022-01-01 RX ADMIN — SODIUM CHLORIDE, POTASSIUM CHLORIDE, SODIUM LACTATE AND CALCIUM CHLORIDE: 600; 310; 30; 20 INJECTION, SOLUTION INTRAVENOUS at 04:26

## 2022-01-01 RX ADMIN — AMIODARONE HYDROCHLORIDE 200 MG: 200 TABLET ORAL at 05:12

## 2022-01-01 RX ADMIN — AMPICILLIN SODIUM AND SULBACTAM SODIUM 3 G: 2; 1 INJECTION, POWDER, FOR SOLUTION INTRAMUSCULAR; INTRAVENOUS at 12:20

## 2022-01-01 RX ADMIN — FENTANYL CITRATE 100 MCG: 50 INJECTION, SOLUTION INTRAMUSCULAR; INTRAVENOUS at 05:04

## 2022-01-01 RX ADMIN — CEFAZOLIN 2 G: 330 INJECTION, POWDER, FOR SOLUTION INTRAMUSCULAR; INTRAVENOUS at 10:02

## 2022-01-01 RX ADMIN — LEVETIRACETAM 500 MG: 100 INJECTION, SOLUTION INTRAVENOUS at 17:51

## 2022-01-01 RX ADMIN — DOCUSATE SODIUM 100 MG: 100 CAPSULE, LIQUID FILLED ORAL at 06:00

## 2022-01-01 RX ADMIN — GABAPENTIN 200 MG: 100 CAPSULE ORAL at 06:05

## 2022-01-01 RX ADMIN — LEVETIRACETAM 500 MG: 500 TABLET, FILM COATED ORAL at 17:24

## 2022-01-01 RX ADMIN — OXYCODONE HYDROCHLORIDE 10 MG: 10 TABLET ORAL at 02:09

## 2022-01-01 RX ADMIN — HEPARIN SODIUM 5000 UNITS: 5000 INJECTION, SOLUTION INTRAVENOUS; SUBCUTANEOUS at 17:51

## 2022-01-01 RX ADMIN — OXYCODONE HYDROCHLORIDE 10 MG: 10 TABLET ORAL at 23:11

## 2022-01-01 RX ADMIN — CARVEDILOL 6.25 MG: 6.25 TABLET, FILM COATED ORAL at 18:35

## 2022-01-01 RX ADMIN — ASPIRIN 81 MG: 81 TABLET, COATED ORAL at 04:39

## 2022-01-01 RX ADMIN — GABAPENTIN 100 MG: 100 CAPSULE ORAL at 06:15

## 2022-01-01 RX ADMIN — GABAPENTIN 100 MG: 100 CAPSULE ORAL at 05:09

## 2022-01-01 RX ADMIN — CARVEDILOL 12.5 MG: 12.5 TABLET, FILM COATED ORAL at 09:22

## 2022-01-01 RX ADMIN — DOCUSATE SODIUM 50 MG AND SENNOSIDES 8.6 MG 2 TABLET: 8.6; 5 TABLET, FILM COATED ORAL at 17:51

## 2022-01-01 RX ADMIN — ATORVASTATIN CALCIUM 80 MG: 80 TABLET, FILM COATED ORAL at 17:00

## 2022-01-01 RX ADMIN — DRONABINOL 5 MG: 5 CAPSULE ORAL at 12:26

## 2022-01-01 RX ADMIN — AMIODARONE HYDROCHLORIDE 200 MG: 200 TABLET ORAL at 06:02

## 2022-01-01 RX ADMIN — EPINEPHRINE 1 MG: 1 INJECTION INTRAMUSCULAR; INTRAVENOUS; SUBCUTANEOUS at 10:14

## 2022-01-01 RX ADMIN — OXYCODONE HYDROCHLORIDE 10 MG: 10 TABLET ORAL at 15:15

## 2022-01-01 RX ADMIN — KETOROLAC TROMETHAMINE 15 MG: 30 INJECTION, SOLUTION INTRAMUSCULAR; INTRAVENOUS at 00:41

## 2022-01-01 RX ADMIN — KETOROLAC TROMETHAMINE 15 MG: 30 INJECTION, SOLUTION INTRAMUSCULAR; INTRAVENOUS at 12:10

## 2022-01-01 RX ADMIN — CARVEDILOL 6.25 MG: 6.25 TABLET, FILM COATED ORAL at 17:22

## 2022-01-01 RX ADMIN — ASPIRIN 81 MG: 81 TABLET, COATED ORAL at 05:38

## 2022-01-01 RX ADMIN — OXYCODONE 5 MG: 5 TABLET ORAL at 12:28

## 2022-01-01 RX ADMIN — OXYCODONE HYDROCHLORIDE 10 MG: 10 TABLET ORAL at 08:17

## 2022-01-01 RX ADMIN — CARVEDILOL 12.5 MG: 12.5 TABLET, FILM COATED ORAL at 17:21

## 2022-01-01 RX ADMIN — AMPICILLIN SODIUM AND SULBACTAM SODIUM 3 G: 2; 1 INJECTION, POWDER, FOR SOLUTION INTRAMUSCULAR; INTRAVENOUS at 20:15

## 2022-01-01 RX ADMIN — CARVEDILOL 6.25 MG: 6.25 TABLET, FILM COATED ORAL at 09:30

## 2022-01-01 RX ADMIN — INSULIN HUMAN 2 UNITS: 100 INJECTION, SOLUTION PARENTERAL at 17:57

## 2022-01-01 RX ADMIN — MIDAZOLAM HYDROCHLORIDE 2 MG: 1 INJECTION, SOLUTION INTRAMUSCULAR; INTRAVENOUS at 13:44

## 2022-01-01 RX ADMIN — GABAPENTIN 100 MG: 100 CAPSULE ORAL at 05:58

## 2022-01-01 RX ADMIN — OXYCODONE HYDROCHLORIDE 10 MG: 10 TABLET ORAL at 06:01

## 2022-01-01 RX ADMIN — ATORVASTATIN CALCIUM 40 MG: 40 TABLET, FILM COATED ORAL at 17:22

## 2022-01-01 RX ADMIN — LEVETIRACETAM 500 MG: 500 TABLET, FILM COATED ORAL at 05:58

## 2022-01-01 RX ADMIN — ATORVASTATIN CALCIUM 40 MG: 40 TABLET, FILM COATED ORAL at 18:09

## 2022-01-01 RX ADMIN — DOCUSATE SODIUM 50 MG AND SENNOSIDES 8.6 MG 2 TABLET: 8.6; 5 TABLET, FILM COATED ORAL at 16:08

## 2022-01-01 RX ADMIN — AMPICILLIN SODIUM AND SULBACTAM SODIUM 3 G: 2; 1 INJECTION, POWDER, FOR SOLUTION INTRAMUSCULAR; INTRAVENOUS at 12:34

## 2022-01-01 RX ADMIN — GABAPENTIN 100 MG: 100 CAPSULE ORAL at 04:39

## 2022-01-01 RX ADMIN — CEFTRIAXONE SODIUM 2000 MG: 2 INJECTION, POWDER, FOR SOLUTION INTRAMUSCULAR; INTRAVENOUS at 20:11

## 2022-01-01 RX ADMIN — AMPICILLIN SODIUM AND SULBACTAM SODIUM 3 G: 2; 1 INJECTION, POWDER, FOR SOLUTION INTRAMUSCULAR; INTRAVENOUS at 17:26

## 2022-01-01 RX ADMIN — KETOROLAC TROMETHAMINE 15 MG: 30 INJECTION, SOLUTION INTRAMUSCULAR; INTRAVENOUS at 17:43

## 2022-01-01 RX ADMIN — LEVETIRACETAM 500 MG: 100 INJECTION, SOLUTION INTRAVENOUS at 17:23

## 2022-01-01 RX ADMIN — HEPARIN SODIUM 5000 UNITS: 5000 INJECTION, SOLUTION INTRAVENOUS; SUBCUTANEOUS at 17:22

## 2022-01-01 RX ADMIN — DOCUSATE SODIUM 50 MG AND SENNOSIDES 8.6 MG 2 TABLET: 8.6; 5 TABLET, FILM COATED ORAL at 05:12

## 2022-01-01 RX ADMIN — HYDRALAZINE HYDROCHLORIDE 5 MG: 20 INJECTION INTRAMUSCULAR; INTRAVENOUS at 19:48

## 2022-01-01 RX ADMIN — NOREPINEPHRINE BITARTRATE 0.2 MCG/KG/MIN: 0.03 INJECTION, SOLUTION INTRAVENOUS at 18:26

## 2022-01-01 RX ADMIN — AMPICILLIN SODIUM AND SULBACTAM SODIUM 3 G: 2; 1 INJECTION, POWDER, FOR SOLUTION INTRAMUSCULAR; INTRAVENOUS at 06:27

## 2022-01-01 RX ADMIN — POTASSIUM CHLORIDE 20 MEQ: 14.9 INJECTION, SOLUTION INTRAVENOUS at 13:22

## 2022-01-01 RX ADMIN — ASPIRIN 81 MG: 81 TABLET, CHEWABLE ORAL at 05:29

## 2022-01-01 RX ADMIN — OXYCODONE HYDROCHLORIDE 10 MG: 10 TABLET ORAL at 14:43

## 2022-01-01 RX ADMIN — DOCUSATE SODIUM 50 MG AND SENNOSIDES 8.6 MG 2 TABLET: 8.6; 5 TABLET, FILM COATED ORAL at 17:24

## 2022-01-01 RX ADMIN — ATORVASTATIN CALCIUM 40 MG: 40 TABLET, FILM COATED ORAL at 17:30

## 2022-01-01 RX ADMIN — DOCUSATE SODIUM 50 MG AND SENNOSIDES 8.6 MG 2 TABLET: 8.6; 5 TABLET, FILM COATED ORAL at 06:15

## 2022-01-01 RX ADMIN — GABAPENTIN 100 MG: 100 CAPSULE ORAL at 05:25

## 2022-01-01 RX ADMIN — DRONABINOL 5 MG: 5 CAPSULE ORAL at 12:07

## 2022-01-01 RX ADMIN — DEXTROSE MONOHYDRATE: 50 INJECTION, SOLUTION INTRAVENOUS at 12:51

## 2022-01-01 RX ADMIN — OXYCODONE HYDROCHLORIDE 10 MG: 10 TABLET ORAL at 05:39

## 2022-01-01 RX ADMIN — LEVETIRACETAM 500 MG: 100 INJECTION, SOLUTION INTRAVENOUS at 17:57

## 2022-01-01 RX ADMIN — LISINOPRIL 10 MG: 10 TABLET ORAL at 06:02

## 2022-01-01 RX ADMIN — ATORVASTATIN CALCIUM 40 MG: 40 TABLET, FILM COATED ORAL at 17:24

## 2022-01-01 RX ADMIN — POTASSIUM CHLORIDE, DEXTROSE MONOHYDRATE AND SODIUM CHLORIDE: 150; 5; 450 INJECTION, SOLUTION INTRAVENOUS at 22:20

## 2022-01-01 RX ADMIN — LEVETIRACETAM 500 MG: 100 INJECTION, SOLUTION INTRAVENOUS at 05:40

## 2022-01-01 RX ADMIN — LIDOCAINE HYDROCHLORIDE 100 MG: 20 INJECTION, SOLUTION EPIDURAL; INFILTRATION; INTRACAUDAL at 10:02

## 2022-01-01 RX ADMIN — ATORVASTATIN CALCIUM 40 MG: 40 TABLET, FILM COATED ORAL at 17:21

## 2022-01-01 RX ADMIN — PROPOFOL 50 MG: 10 INJECTION, EMULSION INTRAVENOUS at 10:02

## 2022-01-01 RX ADMIN — POTASSIUM CHLORIDE 20 MEQ: 14.9 INJECTION, SOLUTION INTRAVENOUS at 01:33

## 2022-01-01 RX ADMIN — LEVETIRACETAM 500 MG: 500 TABLET, FILM COATED ORAL at 17:50

## 2022-01-01 RX ADMIN — ACETAMINOPHEN 650 MG: 325 TABLET, FILM COATED ORAL at 05:59

## 2022-01-01 RX ADMIN — GABAPENTIN 100 MG: 100 CAPSULE ORAL at 05:12

## 2022-01-01 RX ADMIN — POTASSIUM CHLORIDE 20 MEQ: 14.9 INJECTION, SOLUTION INTRAVENOUS at 07:58

## 2022-01-01 RX ADMIN — SODIUM CHLORIDE 1000 ML: 9 INJECTION, SOLUTION INTRAVENOUS at 17:35

## 2022-01-01 RX ADMIN — LINEZOLID 600 MG: 600 TABLET, FILM COATED ORAL at 04:56

## 2022-01-01 RX ADMIN — KETOROLAC TROMETHAMINE 15 MG: 30 INJECTION, SOLUTION INTRAMUSCULAR; INTRAVENOUS at 01:05

## 2022-01-01 RX ADMIN — SODIUM CHLORIDE, POTASSIUM CHLORIDE, SODIUM LACTATE AND CALCIUM CHLORIDE: 600; 310; 30; 20 INJECTION, SOLUTION INTRAVENOUS at 17:29

## 2022-01-01 RX ADMIN — OXYCODONE HYDROCHLORIDE 10 MG: 10 TABLET ORAL at 08:31

## 2022-01-01 RX ADMIN — DOCUSATE SODIUM 50 MG AND SENNOSIDES 8.6 MG 2 TABLET: 8.6; 5 TABLET, FILM COATED ORAL at 05:25

## 2022-01-01 RX ADMIN — SODIUM CHLORIDE, POTASSIUM CHLORIDE, SODIUM LACTATE AND CALCIUM CHLORIDE 250 ML: 600; 310; 30; 20 INJECTION, SOLUTION INTRAVENOUS at 08:05

## 2022-01-01 RX ADMIN — OXYCODONE HYDROCHLORIDE 10 MG: 10 TABLET ORAL at 09:33

## 2022-01-01 RX ADMIN — GABAPENTIN 100 MG: 100 CAPSULE ORAL at 17:51

## 2022-01-01 RX ADMIN — EPHEDRINE SULFATE 15 MG: 50 INJECTION, SOLUTION INTRAVENOUS at 17:50

## 2022-01-01 RX ADMIN — GABAPENTIN 100 MG: 100 CAPSULE ORAL at 17:50

## 2022-01-01 RX ADMIN — POTASSIUM CHLORIDE 20 MEQ: 14.9 INJECTION, SOLUTION INTRAVENOUS at 23:28

## 2022-01-01 RX ADMIN — GABAPENTIN 100 MG: 100 CAPSULE ORAL at 04:35

## 2022-01-01 RX ADMIN — DOCUSATE SODIUM 50 MG AND SENNOSIDES 8.6 MG 2 TABLET: 8.6; 5 TABLET, FILM COATED ORAL at 05:38

## 2022-01-01 RX ADMIN — DOCUSATE SODIUM 50 MG AND SENNOSIDES 8.6 MG 2 TABLET: 8.6; 5 TABLET, FILM COATED ORAL at 05:47

## 2022-01-01 RX ADMIN — LEVETIRACETAM 500 MG: 500 TABLET, FILM COATED ORAL at 05:09

## 2022-01-01 RX ADMIN — OXYCODONE HYDROCHLORIDE 10 MG: 10 TABLET ORAL at 15:33

## 2022-01-01 RX ADMIN — FAMOTIDINE 20 MG: 10 INJECTION, SOLUTION INTRAVENOUS at 17:57

## 2022-01-01 RX ADMIN — SODIUM CHLORIDE, POTASSIUM CHLORIDE, SODIUM LACTATE AND CALCIUM CHLORIDE: 600; 310; 30; 20 INJECTION, SOLUTION INTRAVENOUS at 17:09

## 2022-01-01 RX ADMIN — ROPIVACAINE HYDROCHLORIDE 25 ML: 5 INJECTION, SOLUTION EPIDURAL; INFILTRATION; PERINEURAL at 17:30

## 2022-01-01 RX ADMIN — LEVETIRACETAM 500 MG: 100 INJECTION, SOLUTION INTRAVENOUS at 06:09

## 2022-01-01 RX ADMIN — LINEZOLID 600 MG: 600 TABLET, FILM COATED ORAL at 16:30

## 2022-01-01 RX ADMIN — EPINEPHRINE 1 MG: 1 INJECTION INTRAMUSCULAR; INTRAVENOUS; SUBCUTANEOUS at 10:50

## 2022-01-01 RX ADMIN — ACETAMINOPHEN 650 MG: 325 TABLET, FILM COATED ORAL at 06:05

## 2022-01-01 RX ADMIN — ASPIRIN 81 MG: 81 TABLET, CHEWABLE ORAL at 17:22

## 2022-01-01 RX ADMIN — POTASSIUM CHLORIDE 40 MEQ: 1500 TABLET, EXTENDED RELEASE ORAL at 17:00

## 2022-01-01 RX ADMIN — CARVEDILOL 6.25 MG: 6.25 TABLET, FILM COATED ORAL at 07:51

## 2022-01-01 RX ADMIN — OXYCODONE HYDROCHLORIDE 10 MG: 10 TABLET ORAL at 02:28

## 2022-01-01 RX ADMIN — DRONABINOL 5 MG: 5 CAPSULE ORAL at 17:36

## 2022-01-01 RX ADMIN — OXYCODONE HYDROCHLORIDE 10 MG: 10 TABLET ORAL at 05:44

## 2022-01-01 RX ADMIN — POTASSIUM CHLORIDE 10 MEQ: 7.45 INJECTION INTRAVENOUS at 12:26

## 2022-01-01 RX ADMIN — POTASSIUM CHLORIDE 10 MEQ: 7.46 INJECTION, SOLUTION INTRAVENOUS at 18:36

## 2022-01-01 RX ADMIN — FENTANYL CITRATE 50 MCG: 50 INJECTION, SOLUTION INTRAMUSCULAR; INTRAVENOUS at 20:22

## 2022-01-01 RX ADMIN — LEVETIRACETAM 500 MG: 100 INJECTION, SOLUTION INTRAVENOUS at 06:17

## 2022-01-01 RX ADMIN — GABAPENTIN 200 MG: 100 CAPSULE ORAL at 17:58

## 2022-01-01 RX ADMIN — LEVETIRACETAM 500 MG: 500 TABLET, FILM COATED ORAL at 17:58

## 2022-01-01 RX ADMIN — NOREPINEPHRINE BITARTRATE 0.2 MCG/KG/MIN: 1 INJECTION, SOLUTION, CONCENTRATE INTRAVENOUS at 08:05

## 2022-01-01 RX ADMIN — CARVEDILOL 12.5 MG: 12.5 TABLET, FILM COATED ORAL at 17:58

## 2022-01-01 RX ADMIN — LEVETIRACETAM 500 MG: 500 TABLET, FILM COATED ORAL at 04:39

## 2022-01-01 RX ADMIN — GABAPENTIN 100 MG: 100 CAPSULE ORAL at 17:43

## 2022-01-01 RX ADMIN — LEVETIRACETAM 500 MG: 500 TABLET, FILM COATED ORAL at 18:42

## 2022-01-01 RX ADMIN — AMIODARONE HYDROCHLORIDE 200 MG: 200 TABLET ORAL at 05:25

## 2022-01-01 RX ADMIN — GABAPENTIN 100 MG: 100 CAPSULE ORAL at 06:00

## 2022-01-01 RX ADMIN — LEVETIRACETAM 500 MG: 500 TABLET, FILM COATED ORAL at 18:34

## 2022-01-01 RX ADMIN — LEVETIRACETAM 500 MG: 500 TABLET, FILM COATED ORAL at 05:38

## 2022-01-01 RX ADMIN — DEXAMETHASONE SODIUM PHOSPHATE 4 MG: 4 INJECTION, SOLUTION INTRA-ARTICULAR; INTRALESIONAL; INTRAMUSCULAR; INTRAVENOUS; SOFT TISSUE at 17:30

## 2022-01-01 RX ADMIN — ATORVASTATIN CALCIUM 40 MG: 40 TABLET, FILM COATED ORAL at 18:35

## 2022-01-01 RX ADMIN — GABAPENTIN 100 MG: 100 CAPSULE ORAL at 17:25

## 2022-01-01 RX ADMIN — ASPIRIN 81 MG: 81 TABLET, CHEWABLE ORAL at 05:11

## 2022-01-01 RX ADMIN — LIDOCAINE 1 PATCH: 50 PATCH TOPICAL at 10:42

## 2022-01-01 RX ADMIN — GABAPENTIN 100 MG: 100 CAPSULE ORAL at 17:00

## 2022-01-01 RX ADMIN — LIDOCAINE 1 PATCH: 50 PATCH TOPICAL at 12:07

## 2022-01-01 RX ADMIN — ATORVASTATIN CALCIUM 40 MG: 40 TABLET, FILM COATED ORAL at 17:58

## 2022-01-01 RX ADMIN — LISINOPRIL 10 MG: 10 TABLET ORAL at 04:35

## 2022-01-01 RX ADMIN — EPINEPHRINE 0.05 MCG/KG/MIN: 1 INJECTION INTRAMUSCULAR; INTRAVENOUS; SUBCUTANEOUS at 11:12

## 2022-01-01 RX ADMIN — DEXMEDETOMIDINE 0.2 MCG/KG/HR: 200 INJECTION, SOLUTION INTRAVENOUS at 11:30

## 2022-01-01 RX ADMIN — ATORVASTATIN CALCIUM 40 MG: 40 TABLET, FILM COATED ORAL at 18:56

## 2022-01-01 RX ADMIN — EPINEPHRINE 0.4 MCG/KG/MIN: 1 INJECTION INTRAMUSCULAR; INTRAVENOUS; SUBCUTANEOUS at 10:35

## 2022-01-01 RX ADMIN — AMLODIPINE BESYLATE 5 MG: 5 TABLET ORAL at 12:28

## 2022-01-01 RX ADMIN — POTASSIUM CHLORIDE 10 MEQ: 7.46 INJECTION, SOLUTION INTRAVENOUS at 04:25

## 2022-01-01 RX ADMIN — GABAPENTIN 100 MG: 100 CAPSULE ORAL at 05:38

## 2022-01-01 RX ADMIN — CARVEDILOL 12.5 MG: 12.5 TABLET, FILM COATED ORAL at 10:21

## 2022-01-01 RX ADMIN — AMPICILLIN SODIUM AND SULBACTAM SODIUM 3 G: 2; 1 INJECTION, POWDER, FOR SOLUTION INTRAMUSCULAR; INTRAVENOUS at 18:46

## 2022-01-01 RX ADMIN — ASPIRIN 81 MG: 81 TABLET, CHEWABLE ORAL at 05:38

## 2022-01-01 RX ADMIN — MAGNESIUM SULFATE HEPTAHYDRATE 2 G: 40 INJECTION, SOLUTION INTRAVENOUS at 14:28

## 2022-01-01 RX ADMIN — AMPICILLIN SODIUM AND SULBACTAM SODIUM 3 G: 2; 1 INJECTION, POWDER, FOR SOLUTION INTRAMUSCULAR; INTRAVENOUS at 12:32

## 2022-01-01 RX ADMIN — DOCUSATE SODIUM 50 MG AND SENNOSIDES 8.6 MG 2 TABLET: 8.6; 5 TABLET, FILM COATED ORAL at 18:00

## 2022-01-01 RX ADMIN — CARVEDILOL 12.5 MG: 12.5 TABLET, FILM COATED ORAL at 17:30

## 2022-01-01 RX ADMIN — LEVETIRACETAM 500 MG: 500 TABLET, FILM COATED ORAL at 05:29

## 2022-01-01 RX ADMIN — FENTANYL CITRATE 100 MCG: 50 INJECTION, SOLUTION INTRAMUSCULAR; INTRAVENOUS at 19:20

## 2022-01-01 RX ADMIN — KETOROLAC TROMETHAMINE 15 MG: 30 INJECTION, SOLUTION INTRAMUSCULAR; INTRAVENOUS at 06:08

## 2022-01-01 RX ADMIN — OXYCODONE HYDROCHLORIDE 10 MG: 10 TABLET ORAL at 21:49

## 2022-01-01 RX ADMIN — ACETAMINOPHEN 650 MG: 325 TABLET, FILM COATED ORAL at 01:06

## 2022-01-01 RX ADMIN — AMIODARONE HYDROCHLORIDE 1 MG/MIN: 1.8 INJECTION, SOLUTION INTRAVENOUS at 13:58

## 2022-01-01 RX ADMIN — DEXTROSE MONOHYDRATE: 50 INJECTION, SOLUTION INTRAVENOUS at 11:26

## 2022-01-01 RX ADMIN — ASPIRIN 81 MG: 81 TABLET, CHEWABLE ORAL at 06:05

## 2022-01-01 RX ADMIN — MAGNESIUM SULFATE HEPTAHYDRATE 4 G: 40 INJECTION, SOLUTION INTRAVENOUS at 15:57

## 2022-01-01 RX ADMIN — NOREPINEPHRINE BITARTRATE 0.3 MCG/KG/MIN: 1 INJECTION, SOLUTION, CONCENTRATE INTRAVENOUS at 13:16

## 2022-01-01 RX ADMIN — CARVEDILOL 6.25 MG: 6.25 TABLET, FILM COATED ORAL at 17:24

## 2022-01-01 RX ADMIN — AMPICILLIN SODIUM AND SULBACTAM SODIUM 3 G: 2; 1 INJECTION, POWDER, FOR SOLUTION INTRAMUSCULAR; INTRAVENOUS at 00:13

## 2022-01-01 RX ADMIN — CARVEDILOL 6.25 MG: 6.25 TABLET, FILM COATED ORAL at 10:30

## 2022-01-01 RX ADMIN — HEPARIN SODIUM 5000 UNITS: 5000 INJECTION, SOLUTION INTRAVENOUS; SUBCUTANEOUS at 06:12

## 2022-01-01 RX ADMIN — KETOROLAC TROMETHAMINE 15 MG: 30 INJECTION, SOLUTION INTRAMUSCULAR; INTRAVENOUS at 12:07

## 2022-01-01 RX ADMIN — LISINOPRIL 10 MG: 10 TABLET ORAL at 06:01

## 2022-01-01 RX ADMIN — GABAPENTIN 200 MG: 100 CAPSULE ORAL at 17:21

## 2022-01-01 RX ADMIN — LISINOPRIL 10 MG: 10 TABLET ORAL at 05:38

## 2022-01-01 RX ADMIN — AMIODARONE HYDROCHLORIDE 1 MG/MIN: 1.8 INJECTION, SOLUTION INTRAVENOUS at 19:14

## 2022-01-01 RX ADMIN — POTASSIUM CHLORIDE 60 MEQ: 1500 TABLET, EXTENDED RELEASE ORAL at 11:25

## 2022-01-01 RX ADMIN — DOCUSATE SODIUM 50 MG AND SENNOSIDES 8.6 MG 2 TABLET: 8.6; 5 TABLET, FILM COATED ORAL at 06:01

## 2022-01-01 RX ADMIN — LEVETIRACETAM 500 MG: 100 INJECTION, SOLUTION INTRAVENOUS at 17:10

## 2022-01-01 RX ADMIN — CARVEDILOL 12.5 MG: 12.5 TABLET, FILM COATED ORAL at 09:01

## 2022-01-01 RX ADMIN — ATORVASTATIN CALCIUM 40 MG: 40 TABLET, FILM COATED ORAL at 18:15

## 2022-01-01 RX ADMIN — AMPICILLIN SODIUM AND SULBACTAM SODIUM 3 G: 2; 1 INJECTION, POWDER, FOR SOLUTION INTRAMUSCULAR; INTRAVENOUS at 17:58

## 2022-01-01 RX ADMIN — AMPICILLIN SODIUM AND SULBACTAM SODIUM 3 G: 2; 1 INJECTION, POWDER, FOR SOLUTION INTRAMUSCULAR; INTRAVENOUS at 11:57

## 2022-01-01 RX ADMIN — LEVETIRACETAM 500 MG: 100 INJECTION, SOLUTION INTRAVENOUS at 06:04

## 2022-01-01 RX ADMIN — GABAPENTIN 100 MG: 100 CAPSULE ORAL at 05:27

## 2022-01-01 RX ADMIN — AMLODIPINE BESYLATE 5 MG: 5 TABLET ORAL at 04:54

## 2022-01-01 RX ADMIN — AMPICILLIN SODIUM AND SULBACTAM SODIUM 3 G: 2; 1 INJECTION, POWDER, FOR SOLUTION INTRAMUSCULAR; INTRAVENOUS at 00:22

## 2022-01-01 RX ADMIN — HEPARIN SODIUM 5000 UNITS: 5000 INJECTION, SOLUTION INTRAVENOUS; SUBCUTANEOUS at 18:33

## 2022-01-01 RX ADMIN — AMPICILLIN SODIUM AND SULBACTAM SODIUM 3 G: 2; 1 INJECTION, POWDER, FOR SOLUTION INTRAMUSCULAR; INTRAVENOUS at 05:25

## 2022-01-01 RX ADMIN — LIDOCAINE 1 PATCH: 50 PATCH TOPICAL at 09:46

## 2022-01-01 RX ADMIN — DRONABINOL 5 MG: 5 CAPSULE ORAL at 11:10

## 2022-01-01 RX ADMIN — ASPIRIN 81 MG: 81 TABLET, COATED ORAL at 05:26

## 2022-01-01 RX ADMIN — HEPARIN SODIUM 5000 UNITS: 5000 INJECTION, SOLUTION INTRAVENOUS; SUBCUTANEOUS at 05:24

## 2022-01-01 RX ADMIN — NITROGLYCERIN 10 ML: 20 INJECTION INTRAVENOUS at 11:55

## 2022-01-01 RX ADMIN — LIDOCAINE HYDROCHLORIDE: 20 INJECTION, SOLUTION INFILTRATION; PERINEURAL at 13:42

## 2022-01-01 RX ADMIN — AMLODIPINE BESYLATE 5 MG: 5 TABLET ORAL at 05:47

## 2022-01-01 RX ADMIN — DOCUSATE SODIUM 100 MG: 100 CAPSULE, LIQUID FILLED ORAL at 06:01

## 2022-01-01 RX ADMIN — AMIODARONE HYDROCHLORIDE 200 MG: 200 TABLET ORAL at 06:00

## 2022-01-01 RX ADMIN — CARVEDILOL 6.25 MG: 6.25 TABLET, FILM COATED ORAL at 17:10

## 2022-01-01 RX ADMIN — OXYCODONE HYDROCHLORIDE 10 MG: 10 TABLET ORAL at 09:11

## 2022-01-01 RX ADMIN — CARVEDILOL 6.25 MG: 6.25 TABLET, FILM COATED ORAL at 17:50

## 2022-01-01 RX ADMIN — CEFTRIAXONE SODIUM 1000 MG: 10 INJECTION, POWDER, FOR SOLUTION INTRAVENOUS at 21:52

## 2022-01-01 RX ADMIN — ACETAMINOPHEN 650 MG: 325 TABLET, FILM COATED ORAL at 17:43

## 2022-01-01 RX ADMIN — DRONABINOL 5 MG: 5 CAPSULE ORAL at 20:20

## 2022-01-01 RX ADMIN — LEVETIRACETAM 500 MG: 500 TABLET, FILM COATED ORAL at 18:56

## 2022-01-01 RX ADMIN — POTASSIUM CHLORIDE 20 MEQ: 14.9 INJECTION, SOLUTION INTRAVENOUS at 19:46

## 2022-01-01 RX ADMIN — ATORVASTATIN CALCIUM 40 MG: 40 TABLET, FILM COATED ORAL at 17:36

## 2022-01-01 RX ADMIN — OXYCODONE HYDROCHLORIDE 10 MG: 10 TABLET ORAL at 18:00

## 2022-01-01 RX ADMIN — SODIUM CHLORIDE: 9 INJECTION, SOLUTION INTRAVENOUS at 15:15

## 2022-01-01 RX ADMIN — DOCUSATE SODIUM 50 MG AND SENNOSIDES 8.6 MG 2 TABLET: 8.6; 5 TABLET, FILM COATED ORAL at 05:54

## 2022-01-01 RX ADMIN — DRONABINOL 5 MG: 5 CAPSULE ORAL at 17:43

## 2022-01-01 RX ADMIN — AMIODARONE HYDROCHLORIDE 200 MG: 200 TABLET ORAL at 05:57

## 2022-01-01 RX ADMIN — LIDOCAINE HYDROCHLORIDE 40 MG: 20 INJECTION, SOLUTION EPIDURAL; INFILTRATION; INTRACAUDAL at 17:42

## 2022-01-01 RX ADMIN — OXYCODONE 5 MG: 5 TABLET ORAL at 00:41

## 2022-01-01 RX ADMIN — ONDANSETRON 4 MG: 2 INJECTION INTRAMUSCULAR; INTRAVENOUS at 18:50

## 2022-01-01 RX ADMIN — AMPICILLIN SODIUM AND SULBACTAM SODIUM 3 G: 2; 1 INJECTION, POWDER, FOR SOLUTION INTRAMUSCULAR; INTRAVENOUS at 12:58

## 2022-01-01 RX ADMIN — DOCUSATE SODIUM 50 MG AND SENNOSIDES 8.6 MG 2 TABLET: 8.6; 5 TABLET, FILM COATED ORAL at 05:58

## 2022-01-01 RX ADMIN — AMPICILLIN SODIUM AND SULBACTAM SODIUM 3 G: 2; 1 INJECTION, POWDER, FOR SOLUTION INTRAMUSCULAR; INTRAVENOUS at 04:59

## 2022-01-01 RX ADMIN — CARVEDILOL 12.5 MG: 12.5 TABLET, FILM COATED ORAL at 17:43

## 2022-01-01 RX ADMIN — EPHEDRINE SULFATE 10 MG: 50 INJECTION INTRAMUSCULAR; INTRAVENOUS; SUBCUTANEOUS at 10:05

## 2022-01-01 RX ADMIN — LEVETIRACETAM 500 MG: 500 TABLET, FILM COATED ORAL at 06:05

## 2022-01-01 RX ADMIN — AMIODARONE HYDROCHLORIDE 200 MG: 200 TABLET ORAL at 05:42

## 2022-01-01 RX ADMIN — LEVETIRACETAM 500 MG: 500 TABLET, FILM COATED ORAL at 17:36

## 2022-01-01 RX ADMIN — GABAPENTIN 100 MG: 100 CAPSULE ORAL at 04:56

## 2022-01-01 RX ADMIN — DOCUSATE SODIUM 50 MG AND SENNOSIDES 8.6 MG 2 TABLET: 8.6; 5 TABLET, FILM COATED ORAL at 17:57

## 2022-01-01 RX ADMIN — LEVETIRACETAM 500 MG: 500 TABLET, FILM COATED ORAL at 05:26

## 2022-01-01 RX ADMIN — CLONIDINE HYDROCHLORIDE 0.1 MG: 0.1 TABLET ORAL at 21:06

## 2022-01-01 RX ADMIN — IOHEXOL 100 ML: 350 INJECTION, SOLUTION INTRAVENOUS at 17:54

## 2022-01-01 RX ADMIN — KETOROLAC TROMETHAMINE 15 MG: 30 INJECTION, SOLUTION INTRAMUSCULAR; INTRAVENOUS at 17:21

## 2022-01-01 RX ADMIN — ACETAMINOPHEN 650 MG: 325 TABLET, FILM COATED ORAL at 12:07

## 2022-01-01 RX ADMIN — ASPIRIN 81 MG 81 MG: 81 TABLET ORAL at 06:15

## 2022-01-01 RX ADMIN — AMIODARONE HYDROCHLORIDE 0.5 MG/MIN: 1.8 INJECTION, SOLUTION INTRAVENOUS at 11:45

## 2022-01-01 RX ADMIN — AMIODARONE HYDROCHLORIDE 200 MG: 200 TABLET ORAL at 04:35

## 2022-01-01 RX ADMIN — ACETAMINOPHEN 650 MG: 325 TABLET, FILM COATED ORAL at 06:01

## 2022-01-01 RX ADMIN — SODIUM CHLORIDE: 9 INJECTION, SOLUTION INTRAVENOUS at 15:40

## 2022-01-01 RX ADMIN — ACETAMINOPHEN 650 MG: 325 TABLET, FILM COATED ORAL at 11:47

## 2022-01-01 RX ADMIN — AMPICILLIN SODIUM AND SULBACTAM SODIUM 3 G: 2; 1 INJECTION, POWDER, FOR SOLUTION INTRAMUSCULAR; INTRAVENOUS at 11:25

## 2022-01-01 RX ADMIN — FAMOTIDINE 20 MG: 20 TABLET, FILM COATED ORAL at 06:16

## 2022-01-01 RX ADMIN — AMIODARONE HYDROCHLORIDE 150 MG: 1.5 INJECTION, SOLUTION INTRAVENOUS at 13:46

## 2022-01-01 RX ADMIN — ATORVASTATIN CALCIUM 40 MG: 40 TABLET, FILM COATED ORAL at 18:42

## 2022-01-01 RX ADMIN — ACETAMINOPHEN 650 MG: 325 TABLET, FILM COATED ORAL at 05:11

## 2022-01-01 RX ADMIN — ATORVASTATIN CALCIUM 80 MG: 80 TABLET, FILM COATED ORAL at 22:05

## 2022-01-01 RX ADMIN — MORPHINE SULFATE 2 MG: 4 INJECTION INTRAVENOUS at 10:34

## 2022-01-01 RX ADMIN — Medication 50 MCG/HR: at 13:56

## 2022-01-01 RX ADMIN — CARVEDILOL 6.25 MG: 6.25 TABLET, FILM COATED ORAL at 08:54

## 2022-01-01 RX ADMIN — AMPICILLIN SODIUM AND SULBACTAM SODIUM 3 G: 2; 1 INJECTION, POWDER, FOR SOLUTION INTRAMUSCULAR; INTRAVENOUS at 13:09

## 2022-01-01 RX ADMIN — ASPIRIN 300 MG: 300 SUPPOSITORY RECTAL at 11:16

## 2022-01-01 RX ADMIN — LISINOPRIL 10 MG: 10 TABLET ORAL at 05:58

## 2022-01-01 RX ADMIN — GABAPENTIN 100 MG: 100 CAPSULE ORAL at 18:35

## 2022-01-01 RX ADMIN — DOCUSATE SODIUM 50 MG AND SENNOSIDES 8.6 MG 2 TABLET: 8.6; 5 TABLET, FILM COATED ORAL at 20:20

## 2022-01-01 RX ADMIN — CEFAZOLIN 2 G: 2 INJECTION, POWDER, FOR SOLUTION INTRAMUSCULAR; INTRAVENOUS at 05:42

## 2022-01-01 RX ADMIN — OXYCODONE HYDROCHLORIDE 10 MG: 10 TABLET ORAL at 07:24

## 2022-01-01 RX ADMIN — HUMAN ALBUMIN MICROSPHERES AND PERFLUTREN 3 ML: 10; .22 INJECTION, SOLUTION INTRAVENOUS at 15:45

## 2022-01-01 RX ADMIN — DOCUSATE SODIUM 100 MG: 100 CAPSULE, LIQUID FILLED ORAL at 17:58

## 2022-01-01 RX ADMIN — LEVETIRACETAM 500 MG: 100 INJECTION, SOLUTION INTRAVENOUS at 18:10

## 2022-01-01 RX ADMIN — CARVEDILOL 6.25 MG: 6.25 TABLET, FILM COATED ORAL at 18:34

## 2022-01-01 RX ADMIN — CARVEDILOL 12.5 MG: 12.5 TABLET, FILM COATED ORAL at 17:37

## 2022-01-01 RX ADMIN — DOCUSATE SODIUM 50 MG AND SENNOSIDES 8.6 MG 2 TABLET: 8.6; 5 TABLET, FILM COATED ORAL at 05:30

## 2022-01-01 RX ADMIN — OXYCODONE 5 MG: 5 TABLET ORAL at 00:00

## 2022-01-01 RX ADMIN — LEVETIRACETAM 500 MG: 500 TABLET, FILM COATED ORAL at 05:55

## 2022-01-01 RX ADMIN — POTASSIUM CHLORIDE 40 MEQ: 1500 TABLET, EXTENDED RELEASE ORAL at 19:31

## 2022-01-01 RX ADMIN — GABAPENTIN 100 MG: 100 CAPSULE ORAL at 17:10

## 2022-01-01 RX ADMIN — LEVETIRACETAM 500 MG: 500 TABLET, FILM COATED ORAL at 17:54

## 2022-01-01 RX ADMIN — POTASSIUM CHLORIDE 10 MEQ: 7.45 INJECTION INTRAVENOUS at 01:17

## 2022-01-01 RX ADMIN — POTASSIUM CHLORIDE 10 MEQ: 7.46 INJECTION, SOLUTION INTRAVENOUS at 01:06

## 2022-01-01 RX ADMIN — DRONABINOL 5 MG: 5 CAPSULE ORAL at 18:07

## 2022-01-01 RX ADMIN — FENTANYL CITRATE 100 MCG: 50 INJECTION, SOLUTION INTRAMUSCULAR; INTRAVENOUS at 03:07

## 2022-01-01 RX ADMIN — FENTANYL CITRATE 150 MCG: 50 INJECTION, SOLUTION INTRAMUSCULAR; INTRAVENOUS at 10:02

## 2022-01-01 RX ADMIN — CARVEDILOL 6.25 MG: 6.25 TABLET, FILM COATED ORAL at 08:24

## 2022-01-01 RX ADMIN — CARVEDILOL 12.5 MG: 12.5 TABLET, FILM COATED ORAL at 18:42

## 2022-01-01 RX ADMIN — LEVETIRACETAM 500 MG: 500 TABLET, FILM COATED ORAL at 22:05

## 2022-01-01 RX ADMIN — ASPIRIN 81 MG: 81 TABLET, CHEWABLE ORAL at 06:02

## 2022-01-01 RX ADMIN — LEVETIRACETAM 500 MG: 500 TABLET, FILM COATED ORAL at 04:35

## 2022-01-01 RX ADMIN — DEXMEDETOMIDINE 0.2 MCG/KG/HR: 200 INJECTION, SOLUTION INTRAVENOUS at 19:38

## 2022-01-01 RX ADMIN — OXYCODONE HYDROCHLORIDE 10 MG: 10 TABLET ORAL at 02:13

## 2022-01-01 RX ADMIN — ATORVASTATIN CALCIUM 40 MG: 40 TABLET, FILM COATED ORAL at 17:10

## 2022-01-01 RX ADMIN — CLONIDINE HYDROCHLORIDE 0.1 MG: 0.1 TABLET ORAL at 08:59

## 2022-01-01 RX ADMIN — LIDOCAINE 1 PATCH: 50 PATCH TOPICAL at 11:48

## 2022-01-01 RX ADMIN — DEXTROSE MONOHYDRATE: 50 INJECTION, SOLUTION INTRAVENOUS at 08:13

## 2022-01-01 RX ADMIN — GABAPENTIN 100 MG: 100 CAPSULE ORAL at 16:08

## 2022-01-01 RX ADMIN — FUROSEMIDE 40 MG: 10 INJECTION, SOLUTION INTRAMUSCULAR; INTRAVENOUS at 15:10

## 2022-01-01 RX ADMIN — IOHEXOL 26 ML: 350 INJECTION, SOLUTION INTRAVENOUS at 16:56

## 2022-01-01 RX ADMIN — FENTANYL CITRATE 50 MCG: 50 INJECTION, SOLUTION INTRAMUSCULAR; INTRAVENOUS at 12:06

## 2022-01-01 RX ADMIN — KETOROLAC TROMETHAMINE 15 MG: 30 INJECTION, SOLUTION INTRAMUSCULAR; INTRAVENOUS at 00:51

## 2022-01-01 RX ADMIN — SODIUM CHLORIDE: 4.5 INJECTION, SOLUTION INTRAVENOUS at 13:46

## 2022-01-01 RX ADMIN — ACETAMINOPHEN 650 MG: 325 TABLET, FILM COATED ORAL at 22:43

## 2022-01-01 RX ADMIN — OXYCODONE HYDROCHLORIDE 10 MG: 10 TABLET ORAL at 12:13

## 2022-01-01 RX ADMIN — LEVETIRACETAM 500 MG: 500 TABLET, FILM COATED ORAL at 17:30

## 2022-01-01 RX ADMIN — CARVEDILOL 6.25 MG: 6.25 TABLET, FILM COATED ORAL at 09:45

## 2022-01-01 RX ADMIN — CARVEDILOL 6.25 MG: 6.25 TABLET, FILM COATED ORAL at 18:09

## 2022-01-01 RX ADMIN — ENOXAPARIN SODIUM 40 MG: 40 INJECTION SUBCUTANEOUS at 17:21

## 2022-01-01 RX ADMIN — LIDOCAINE HYDROCHLORIDE: 20 INJECTION, SOLUTION INFILTRATION; PERINEURAL at 11:50

## 2022-01-01 RX ADMIN — POTASSIUM CHLORIDE 10 MEQ: 7.45 INJECTION INTRAVENOUS at 07:23

## 2022-01-01 RX ADMIN — CARVEDILOL 12.5 MG: 12.5 TABLET, FILM COATED ORAL at 08:06

## 2022-01-01 RX ADMIN — OXYCODONE 5 MG: 5 TABLET ORAL at 09:15

## 2022-01-01 RX ADMIN — ASPIRIN 81 MG: 81 TABLET, CHEWABLE ORAL at 05:58

## 2022-01-01 RX ADMIN — FENTANYL CITRATE 50 MCG: 50 INJECTION, SOLUTION INTRAMUSCULAR; INTRAVENOUS at 00:12

## 2022-01-01 RX ADMIN — OXYCODONE 5 MG: 5 TABLET ORAL at 06:39

## 2022-01-01 RX ADMIN — CEFAZOLIN 2000 MG: 330 INJECTION, POWDER, FOR SOLUTION INTRAMUSCULAR; INTRAVENOUS at 13:42

## 2022-01-01 RX ADMIN — CARVEDILOL 6.25 MG: 6.25 TABLET, FILM COATED ORAL at 09:59

## 2022-01-01 RX ADMIN — LEVETIRACETAM 500 MG: 500 TABLET, FILM COATED ORAL at 05:42

## 2022-01-01 RX ADMIN — HEPARIN SODIUM 5000 UNITS: 5000 INJECTION, SOLUTION INTRAVENOUS; SUBCUTANEOUS at 17:50

## 2022-01-01 RX ADMIN — MAGNESIUM HYDROXIDE 30 ML: 400 SUSPENSION ORAL at 15:17

## 2022-01-01 RX ADMIN — LEVETIRACETAM 500 MG: 500 TABLET, FILM COATED ORAL at 06:01

## 2022-01-01 RX ADMIN — AMPICILLIN SODIUM AND SULBACTAM SODIUM 3 G: 2; 1 INJECTION, POWDER, FOR SOLUTION INTRAMUSCULAR; INTRAVENOUS at 17:25

## 2022-01-01 RX ADMIN — LISINOPRIL 10 MG: 10 TABLET ORAL at 05:11

## 2022-01-01 RX ADMIN — LIDOCAINE 1 PATCH: 50 PATCH TOPICAL at 10:34

## 2022-01-01 RX ADMIN — LEVETIRACETAM 500 MG: 100 INJECTION, SOLUTION INTRAVENOUS at 06:00

## 2022-01-01 RX ADMIN — OXYCODONE 5 MG: 5 TABLET ORAL at 05:41

## 2022-01-01 RX ADMIN — CEFTRIAXONE SODIUM 1000 MG: 1 INJECTION, POWDER, FOR SOLUTION INTRAMUSCULAR; INTRAVENOUS at 15:15

## 2022-01-01 RX ADMIN — FAMOTIDINE 20 MG: 10 INJECTION INTRAVENOUS at 05:25

## 2022-01-01 RX ADMIN — DOCUSATE SODIUM 50 MG AND SENNOSIDES 8.6 MG 2 TABLET: 8.6; 5 TABLET, FILM COATED ORAL at 18:35

## 2022-01-01 RX ADMIN — OXYCODONE HYDROCHLORIDE 10 MG: 10 TABLET ORAL at 21:31

## 2022-01-01 RX ADMIN — AMPICILLIN SODIUM AND SULBACTAM SODIUM 3 G: 2; 1 INJECTION, POWDER, FOR SOLUTION INTRAMUSCULAR; INTRAVENOUS at 12:11

## 2022-01-01 RX ADMIN — LEVETIRACETAM 500 MG: 500 TABLET, FILM COATED ORAL at 05:47

## 2022-01-01 RX ADMIN — DEXTROSE MONOHYDRATE: 50 INJECTION, SOLUTION INTRAVENOUS at 11:56

## 2022-01-01 RX ADMIN — CARVEDILOL 6.25 MG: 6.25 TABLET, FILM COATED ORAL at 08:25

## 2022-01-01 RX ADMIN — POTASSIUM CHLORIDE 20 MEQ: 14.9 INJECTION, SOLUTION INTRAVENOUS at 02:12

## 2022-01-01 RX ADMIN — AMPICILLIN SODIUM AND SULBACTAM SODIUM 3 G: 2; 1 INJECTION, POWDER, FOR SOLUTION INTRAMUSCULAR; INTRAVENOUS at 13:48

## 2022-01-01 RX ADMIN — ENOXAPARIN SODIUM 40 MG: 40 INJECTION SUBCUTANEOUS at 17:58

## 2022-01-01 RX ADMIN — CLONIDINE HYDROCHLORIDE 0.1 MG: 0.1 TABLET ORAL at 16:29

## 2022-01-01 RX ADMIN — OXYCODONE HYDROCHLORIDE 10 MG: 10 TABLET ORAL at 17:13

## 2022-01-01 RX ADMIN — OXYCODONE 5 MG: 5 TABLET ORAL at 18:19

## 2022-01-01 RX ADMIN — DOCUSATE SODIUM 50 MG AND SENNOSIDES 8.6 MG 2 TABLET: 8.6; 5 TABLET, FILM COATED ORAL at 06:05

## 2022-01-01 RX ADMIN — ASPIRIN 81 MG: 81 TABLET, COATED ORAL at 05:47

## 2022-01-01 RX ADMIN — DOCUSATE SODIUM 50 MG AND SENNOSIDES 8.6 MG 2 TABLET: 8.6; 5 TABLET, FILM COATED ORAL at 17:10

## 2022-01-01 RX ADMIN — CARVEDILOL 12.5 MG: 12.5 TABLET, FILM COATED ORAL at 08:27

## 2022-01-01 RX ADMIN — AMPICILLIN SODIUM AND SULBACTAM SODIUM 3 G: 2; 1 INJECTION, POWDER, FOR SOLUTION INTRAMUSCULAR; INTRAVENOUS at 05:39

## 2022-01-01 RX ADMIN — AMIODARONE HYDROCHLORIDE 1 MG/MIN: 1.8 INJECTION, SOLUTION INTRAVENOUS at 22:27

## 2022-01-01 RX ADMIN — OXYCODONE HYDROCHLORIDE 10 MG: 10 TABLET ORAL at 08:40

## 2022-01-01 RX ADMIN — AMPICILLIN SODIUM AND SULBACTAM SODIUM 3 G: 2; 1 INJECTION, POWDER, FOR SOLUTION INTRAMUSCULAR; INTRAVENOUS at 00:28

## 2022-01-01 RX ADMIN — DOCUSATE SODIUM 50 MG AND SENNOSIDES 8.6 MG 2 TABLET: 8.6; 5 TABLET, FILM COATED ORAL at 04:54

## 2022-01-01 RX ADMIN — SODIUM BICARBONATE 50 MEQ: 84 INJECTION, SOLUTION INTRAVENOUS at 11:05

## 2022-01-01 RX ADMIN — MORPHINE SULFATE 2 MG: 4 INJECTION INTRAVENOUS at 09:40

## 2022-01-01 RX ADMIN — OXYCODONE 5 MG: 5 TABLET ORAL at 05:26

## 2022-01-01 RX ADMIN — ENOXAPARIN SODIUM 40 MG: 40 INJECTION SUBCUTANEOUS at 17:43

## 2022-01-01 RX ADMIN — LEVETIRACETAM 500 MG: 500 TABLET, FILM COATED ORAL at 06:00

## 2022-01-01 RX ADMIN — GABAPENTIN 100 MG: 100 CAPSULE ORAL at 17:54

## 2022-01-01 RX ADMIN — OXYCODONE 5 MG: 5 TABLET ORAL at 22:29

## 2022-01-01 RX ADMIN — DRONABINOL 5 MG: 5 CAPSULE ORAL at 11:48

## 2022-01-01 RX ADMIN — CEFTRIAXONE SODIUM 1000 MG: 10 INJECTION, POWDER, FOR SOLUTION INTRAVENOUS at 22:39

## 2022-01-01 RX ADMIN — DOCUSATE SODIUM 50 MG AND SENNOSIDES 8.6 MG 2 TABLET: 8.6; 5 TABLET, FILM COATED ORAL at 17:00

## 2022-01-01 RX ADMIN — AMPICILLIN SODIUM AND SULBACTAM SODIUM 3 G: 2; 1 INJECTION, POWDER, FOR SOLUTION INTRAMUSCULAR; INTRAVENOUS at 17:50

## 2022-01-01 RX ADMIN — OXYCODONE 5 MG: 5 TABLET ORAL at 05:30

## 2022-01-01 RX ADMIN — LEVETIRACETAM 500 MG: 500 TABLET, FILM COATED ORAL at 17:00

## 2022-01-01 RX ADMIN — DEXTROSE MONOHYDRATE: 50 INJECTION, SOLUTION INTRAVENOUS at 10:33

## 2022-01-01 RX ADMIN — GABAPENTIN 200 MG: 100 CAPSULE ORAL at 18:57

## 2022-01-01 RX ADMIN — SODIUM CHLORIDE, POTASSIUM CHLORIDE, SODIUM LACTATE AND CALCIUM CHLORIDE: 600; 310; 30; 20 INJECTION, SOLUTION INTRAVENOUS at 05:45

## 2022-01-01 RX ADMIN — ASPIRIN 81 MG: 81 TABLET, CHEWABLE ORAL at 04:35

## 2022-01-01 RX ADMIN — DOCUSATE SODIUM 100 MG: 100 CAPSULE, LIQUID FILLED ORAL at 17:42

## 2022-01-01 RX ADMIN — GABAPENTIN 100 MG: 100 CAPSULE ORAL at 06:01

## 2022-01-01 RX ADMIN — FENTANYL CITRATE 100 MCG: 50 INJECTION, SOLUTION INTRAMUSCULAR; INTRAVENOUS at 17:42

## 2022-01-01 RX ADMIN — MIDAZOLAM HYDROCHLORIDE 2 MG: 1 INJECTION, SOLUTION INTRAMUSCULAR; INTRAVENOUS at 11:55

## 2022-01-01 RX ADMIN — HEPARIN SODIUM 5000 UNITS: 5000 INJECTION, SOLUTION INTRAVENOUS; SUBCUTANEOUS at 05:03

## 2022-01-01 RX ADMIN — DOCUSATE SODIUM 100 MG: 100 CAPSULE, LIQUID FILLED ORAL at 06:05

## 2022-01-01 RX ADMIN — POTASSIUM CHLORIDE 10 MEQ: 7.45 INJECTION INTRAVENOUS at 06:02

## 2022-01-01 RX ADMIN — CARVEDILOL 12.5 MG: 12.5 TABLET, FILM COATED ORAL at 08:00

## 2022-01-01 RX ADMIN — HEPARIN SODIUM 5000 UNITS: 5000 INJECTION, SOLUTION INTRAVENOUS; SUBCUTANEOUS at 04:56

## 2022-01-01 RX ADMIN — OXYCODONE 5 MG: 5 TABLET ORAL at 03:01

## 2022-01-01 RX ADMIN — ATORVASTATIN CALCIUM 40 MG: 40 TABLET, FILM COATED ORAL at 17:55

## 2022-01-01 RX ADMIN — LEVETIRACETAM 500 MG: 500 TABLET, FILM COATED ORAL at 18:35

## 2022-01-01 RX ADMIN — GABAPENTIN 100 MG: 100 CAPSULE ORAL at 18:10

## 2022-01-01 RX ADMIN — GABAPENTIN 100 MG: 100 CAPSULE ORAL at 05:42

## 2022-01-01 RX ADMIN — AMPICILLIN SODIUM AND SULBACTAM SODIUM 3 G: 2; 1 INJECTION, POWDER, FOR SOLUTION INTRAMUSCULAR; INTRAVENOUS at 23:49

## 2022-01-01 RX ADMIN — LEVETIRACETAM 500 MG: 100 INJECTION, SOLUTION INTRAVENOUS at 05:44

## 2022-01-01 RX ADMIN — EPHEDRINE SULFATE 10 MG: 50 INJECTION INTRAMUSCULAR; INTRAVENOUS; SUBCUTANEOUS at 10:07

## 2022-01-01 RX ADMIN — HUMAN ALBUMIN MICROSPHERES AND PERFLUTREN 3 ML: 10; .22 INJECTION, SOLUTION INTRAVENOUS at 17:42

## 2022-01-01 RX ADMIN — ASPIRIN 81 MG 81 MG: 81 TABLET ORAL at 06:00

## 2022-01-01 RX ADMIN — OXYCODONE HYDROCHLORIDE 10 MG: 10 TABLET ORAL at 12:09

## 2022-01-01 RX ADMIN — LISINOPRIL 10 MG: 10 TABLET ORAL at 05:42

## 2022-01-01 RX ADMIN — GABAPENTIN 100 MG: 100 CAPSULE ORAL at 17:36

## 2022-01-01 RX ADMIN — ASPIRIN 81 MG: 81 TABLET, COATED ORAL at 17:29

## 2022-01-01 RX ADMIN — LEVETIRACETAM 500 MG: 500 TABLET, FILM COATED ORAL at 04:54

## 2022-01-01 RX ADMIN — DOCUSATE SODIUM 50 MG AND SENNOSIDES 8.6 MG 2 TABLET: 8.6; 5 TABLET, FILM COATED ORAL at 04:40

## 2022-01-01 RX ADMIN — OXYCODONE 5 MG: 5 TABLET ORAL at 17:36

## 2022-01-01 RX ADMIN — SUGAMMADEX 200 MG: 100 INJECTION, SOLUTION INTRAVENOUS at 18:50

## 2022-01-01 RX ADMIN — OXYCODONE HYDROCHLORIDE 10 MG: 10 TABLET ORAL at 11:55

## 2022-01-01 RX ADMIN — LEVETIRACETAM 500 MG: 500 TABLET, FILM COATED ORAL at 17:25

## 2022-01-01 RX ADMIN — CALCIUM CHLORIDE 1 G: 100 INJECTION INTRAVENOUS; INTRAVENTRICULAR at 11:02

## 2022-01-01 RX ADMIN — ROCURONIUM BROMIDE 30 MG: 10 INJECTION, SOLUTION INTRAVENOUS at 17:42

## 2022-01-01 RX ADMIN — AMPICILLIN SODIUM AND SULBACTAM SODIUM 3 G: 2; 1 INJECTION, POWDER, FOR SOLUTION INTRAMUSCULAR; INTRAVENOUS at 17:59

## 2022-01-01 RX ADMIN — SODIUM PHOSPHATE 133 ML: 7; 19 ENEMA RECTAL at 16:42

## 2022-01-01 RX ADMIN — ASPIRIN 81 MG 81 MG: 81 TABLET ORAL at 05:57

## 2022-01-01 RX ADMIN — HEPARIN SODIUM 5000 UNITS: 5000 INJECTION, SOLUTION INTRAVENOUS; SUBCUTANEOUS at 05:59

## 2022-01-01 RX ADMIN — AMIODARONE HYDROCHLORIDE 200 MG: 200 TABLET ORAL at 06:01

## 2022-01-01 RX ADMIN — AMIODARONE HYDROCHLORIDE 200 MG: 200 TABLET ORAL at 05:59

## 2022-01-01 RX ADMIN — GABAPENTIN 100 MG: 100 CAPSULE ORAL at 17:22

## 2022-01-01 RX ADMIN — ATORVASTATIN CALCIUM 40 MG: 40 TABLET, FILM COATED ORAL at 18:33

## 2022-01-01 RX ADMIN — ATORVASTATIN CALCIUM 40 MG: 40 TABLET, FILM COATED ORAL at 17:50

## 2022-01-01 RX ADMIN — SODIUM CHLORIDE, POTASSIUM CHLORIDE, SODIUM LACTATE AND CALCIUM CHLORIDE: 600; 310; 30; 20 INJECTION, SOLUTION INTRAVENOUS at 10:02

## 2022-01-01 RX ADMIN — LEVETIRACETAM 500 MG: 100 INJECTION, SOLUTION INTRAVENOUS at 17:22

## 2022-01-01 RX ADMIN — ENOXAPARIN SODIUM 40 MG: 40 INJECTION SUBCUTANEOUS at 17:22

## 2022-01-01 RX ADMIN — NOREPINEPHRINE BITARTRATE 0.26 MCG/KG/MIN: 1 INJECTION, SOLUTION, CONCENTRATE INTRAVENOUS at 00:28

## 2022-01-01 RX ADMIN — ASPIRIN 81 MG 81 MG: 81 TABLET ORAL at 06:01

## 2022-01-01 RX ADMIN — PHENYLEPHRINE HYDROCHLORIDE 0.25 MCG/KG/MIN: 10 INJECTION INTRAVENOUS at 17:54

## 2022-01-01 RX ADMIN — DOCUSATE SODIUM 50 MG AND SENNOSIDES 8.6 MG 2 TABLET: 8.6; 5 TABLET, FILM COATED ORAL at 17:22

## 2022-01-01 RX ADMIN — LISINOPRIL 20 MG: 20 TABLET ORAL at 05:47

## 2022-01-01 RX ADMIN — ACETAMINOPHEN 650 MG: 325 TABLET, FILM COATED ORAL at 21:03

## 2022-01-01 RX ADMIN — GABAPENTIN 100 MG: 100 CAPSULE ORAL at 05:57

## 2022-01-01 RX ADMIN — AMIODARONE HYDROCHLORIDE 0.5 MG/MIN: 1.8 INJECTION, SOLUTION INTRAVENOUS at 22:38

## 2022-01-01 RX ADMIN — GABAPENTIN 100 MG: 100 CAPSULE ORAL at 04:54

## 2022-01-01 RX ADMIN — ATORVASTATIN CALCIUM 40 MG: 40 TABLET, FILM COATED ORAL at 17:43

## 2022-01-01 RX ADMIN — ASPIRIN 81 MG: 81 TABLET, CHEWABLE ORAL at 06:01

## 2022-01-01 RX ADMIN — AMPICILLIN SODIUM AND SULBACTAM SODIUM 3 G: 2; 1 INJECTION, POWDER, FOR SOLUTION INTRAMUSCULAR; INTRAVENOUS at 05:00

## 2022-01-01 RX ADMIN — FENTANYL CITRATE 100 MCG: 50 INJECTION INTRAMUSCULAR; INTRAVENOUS at 13:43

## 2022-01-01 RX ADMIN — DOCUSATE SODIUM 50 MG AND SENNOSIDES 8.6 MG 2 TABLET: 8.6; 5 TABLET, FILM COATED ORAL at 06:08

## 2022-01-01 RX ADMIN — FENTANYL CITRATE 25 MCG: 50 INJECTION, SOLUTION INTRAMUSCULAR; INTRAVENOUS at 20:00

## 2022-01-01 RX ADMIN — OXYCODONE 5 MG: 5 TABLET ORAL at 20:23

## 2022-01-01 RX ADMIN — FENTANYL CITRATE 50 MCG: 50 INJECTION, SOLUTION INTRAMUSCULAR; INTRAVENOUS at 19:22

## 2022-01-01 RX ADMIN — DOCUSATE SODIUM 50 MG AND SENNOSIDES 8.6 MG 2 TABLET: 8.6; 5 TABLET, FILM COATED ORAL at 04:56

## 2022-01-01 RX ADMIN — ACETAMINOPHEN 650 MG: 325 TABLET, FILM COATED ORAL at 12:10

## 2022-01-01 RX ADMIN — AMPICILLIN SODIUM AND SULBACTAM SODIUM 3 G: 2; 1 INJECTION, POWDER, FOR SOLUTION INTRAMUSCULAR; INTRAVENOUS at 23:16

## 2022-01-01 RX ADMIN — GABAPENTIN 100 MG: 100 CAPSULE ORAL at 22:05

## 2022-01-01 RX ADMIN — ACETAMINOPHEN 650 MG: 325 TABLET, FILM COATED ORAL at 06:00

## 2022-01-01 RX ADMIN — NOREPINEPHRINE BITARTRATE 6.82 MCG/MIN: 1 INJECTION, SOLUTION, CONCENTRATE INTRAVENOUS at 11:12

## 2022-01-01 RX ADMIN — EPHEDRINE SULFATE 15 MG: 50 INJECTION, SOLUTION INTRAVENOUS at 18:20

## 2022-01-01 RX ADMIN — SODIUM CHLORIDE, POTASSIUM CHLORIDE, SODIUM LACTATE AND CALCIUM CHLORIDE 500 ML: 600; 310; 30; 20 INJECTION, SOLUTION INTRAVENOUS at 20:45

## 2022-01-01 RX ADMIN — LISINOPRIL 20 MG: 20 TABLET ORAL at 05:08

## 2022-01-01 RX ADMIN — LEVETIRACETAM 500 MG: 500 TABLET, FILM COATED ORAL at 17:42

## 2022-01-01 RX ADMIN — FENTANYL CITRATE 100 MCG: 50 INJECTION, SOLUTION INTRAMUSCULAR; INTRAVENOUS at 23:17

## 2022-01-01 RX ADMIN — AMPICILLIN SODIUM AND SULBACTAM SODIUM 3 G: 2; 1 INJECTION, POWDER, FOR SOLUTION INTRAMUSCULAR; INTRAVENOUS at 05:16

## 2022-01-01 RX ADMIN — CARVEDILOL 12.5 MG: 12.5 TABLET, FILM COATED ORAL at 08:12

## 2022-01-01 RX ADMIN — PROPOFOL 10 MCG/KG/MIN: 10 INJECTION, EMULSION INTRAVENOUS at 11:57

## 2022-01-01 RX ADMIN — DEXAMETHASONE SODIUM PHOSPHATE 4 MG: 4 INJECTION, SOLUTION INTRA-ARTICULAR; INTRALESIONAL; INTRAMUSCULAR; INTRAVENOUS; SOFT TISSUE at 17:46

## 2022-01-01 RX ADMIN — ATORVASTATIN CALCIUM 40 MG: 40 TABLET, FILM COATED ORAL at 17:51

## 2022-01-01 RX ADMIN — LEVETIRACETAM 500 MG: 500 TABLET, FILM COATED ORAL at 04:56

## 2022-01-01 RX ADMIN — IOHEXOL 110 ML: 350 INJECTION, SOLUTION INTRAVENOUS at 12:06

## 2022-01-01 RX ADMIN — HEPARIN SODIUM 2000 UNITS: 200 INJECTION, SOLUTION INTRAVENOUS at 11:54

## 2022-01-01 RX ADMIN — ROCURONIUM BROMIDE 50 MG: 10 INJECTION, SOLUTION INTRAVENOUS at 10:02

## 2022-01-01 RX ADMIN — FENTANYL CITRATE 50 MCG: 50 INJECTION, SOLUTION INTRAMUSCULAR; INTRAVENOUS at 18:10

## 2022-01-01 RX ADMIN — OXYCODONE 5 MG: 5 TABLET ORAL at 17:30

## 2022-01-01 RX ADMIN — AMPICILLIN SODIUM AND SULBACTAM SODIUM 3 G: 2; 1 INJECTION, POWDER, FOR SOLUTION INTRAMUSCULAR; INTRAVENOUS at 05:24

## 2022-01-01 RX ADMIN — CARVEDILOL 6.25 MG: 6.25 TABLET, FILM COATED ORAL at 17:25

## 2022-01-01 RX ADMIN — AMIODARONE HYDROCHLORIDE 1 MG/MIN: 1.8 INJECTION, SOLUTION INTRAVENOUS at 14:59

## 2022-01-01 RX ADMIN — SODIUM CHLORIDE, POTASSIUM CHLORIDE, SODIUM LACTATE AND CALCIUM CHLORIDE: 600; 310; 30; 20 INJECTION, SOLUTION INTRAVENOUS at 00:14

## 2022-01-01 RX ADMIN — OXYCODONE HYDROCHLORIDE 10 MG: 10 TABLET ORAL at 22:37

## 2022-01-01 RX ADMIN — KETOROLAC TROMETHAMINE 15 MG: 30 INJECTION, SOLUTION INTRAMUSCULAR; INTRAVENOUS at 05:11

## 2022-01-01 RX ADMIN — ACETAMINOPHEN 650 MG: 325 TABLET, FILM COATED ORAL at 12:26

## 2022-01-01 RX ADMIN — OXYCODONE HYDROCHLORIDE 10 MG: 10 TABLET ORAL at 15:32

## 2022-01-01 RX ADMIN — POTASSIUM PHOSPHATE, MONOBASIC AND POTASSIUM PHOSPHATE, DIBASIC 30 MMOL: 224; 236 INJECTION, SOLUTION, CONCENTRATE INTRAVENOUS at 00:46

## 2022-01-01 RX ADMIN — ENOXAPARIN SODIUM 40 MG: 40 INJECTION SUBCUTANEOUS at 17:10

## 2022-01-01 RX ADMIN — GABAPENTIN 100 MG: 100 CAPSULE ORAL at 18:42

## 2022-01-01 RX ADMIN — LISINOPRIL 10 MG: 10 TABLET ORAL at 05:57

## 2022-01-01 RX ADMIN — LISINOPRIL 10 MG: 10 TABLET ORAL at 05:30

## 2022-01-01 RX ADMIN — ASPIRIN 81 MG: 81 TABLET, COATED ORAL at 04:54

## 2022-01-01 RX ADMIN — ACETAMINOPHEN 650 MG: 325 TABLET, FILM COATED ORAL at 00:41

## 2022-01-01 RX ADMIN — GABAPENTIN 200 MG: 100 CAPSULE ORAL at 06:02

## 2022-01-01 RX ADMIN — DOCUSATE SODIUM 50 MG AND SENNOSIDES 8.6 MG 2 TABLET: 8.6; 5 TABLET, FILM COATED ORAL at 17:30

## 2022-01-01 RX ADMIN — EPINEPHRINE 0.5 MG: 1 INJECTION INTRAMUSCULAR; INTRAVENOUS; SUBCUTANEOUS at 10:26

## 2022-01-01 RX ADMIN — ACETAMINOPHEN 650 MG: 325 TABLET, FILM COATED ORAL at 22:55

## 2022-01-01 RX ADMIN — ASPIRIN 81 MG: 81 TABLET, COATED ORAL at 04:56

## 2022-01-01 RX ADMIN — AMIODARONE HYDROCHLORIDE 200 MG: 200 TABLET ORAL at 05:54

## 2022-01-01 RX ADMIN — POLYETHYLENE GLYCOL 3350 1 PACKET: 17 POWDER, FOR SOLUTION ORAL at 06:01

## 2022-01-01 RX ADMIN — BUPIVACAINE HYDROCHLORIDE: 2.5 INJECTION, SOLUTION EPIDURAL; INFILTRATION; INTRACAUDAL; PERINEURAL at 13:42

## 2022-01-01 RX ADMIN — DRONABINOL 5 MG: 5 CAPSULE ORAL at 17:30

## 2022-01-01 RX ADMIN — DOCUSATE SODIUM 50 MG AND SENNOSIDES 8.6 MG 2 TABLET: 8.6; 5 TABLET, FILM COATED ORAL at 18:42

## 2022-01-01 RX ADMIN — AMIODARONE HYDROCHLORIDE 1 MG/MIN: 1.8 INJECTION, SOLUTION INTRAVENOUS at 09:35

## 2022-01-01 RX ADMIN — HEPARIN SODIUM: 1000 INJECTION, SOLUTION INTRAVENOUS; SUBCUTANEOUS at 11:50

## 2022-01-01 RX ADMIN — DOCUSATE SODIUM 50 MG AND SENNOSIDES 8.6 MG 2 TABLET: 8.6; 5 TABLET, FILM COATED ORAL at 17:54

## 2022-01-01 RX ADMIN — NOREPINEPHRINE BITARTRATE 0.2 MCG/KG/MIN: 1 INJECTION, SOLUTION, CONCENTRATE INTRAVENOUS at 18:26

## 2022-01-01 RX ADMIN — SODIUM CHLORIDE, POTASSIUM CHLORIDE, SODIUM LACTATE AND CALCIUM CHLORIDE: 600; 310; 30; 20 INJECTION, SOLUTION INTRAVENOUS at 23:29

## 2022-01-01 RX ADMIN — AMPICILLIN SODIUM AND SULBACTAM SODIUM 3 G: 2; 1 INJECTION, POWDER, FOR SOLUTION INTRAMUSCULAR; INTRAVENOUS at 18:37

## 2022-01-01 RX ADMIN — PROPOFOL 100 MG: 10 INJECTION, EMULSION INTRAVENOUS at 17:42

## 2022-01-01 RX ADMIN — GABAPENTIN 100 MG: 100 CAPSULE ORAL at 17:24

## 2022-01-01 RX ADMIN — AMIODARONE HYDROCHLORIDE 200 MG: 200 TABLET ORAL at 05:29

## 2022-01-01 RX ADMIN — AMPICILLIN SODIUM AND SULBACTAM SODIUM 3 G: 2; 1 INJECTION, POWDER, FOR SOLUTION INTRAMUSCULAR; INTRAVENOUS at 23:51

## 2022-01-01 RX ADMIN — GABAPENTIN 200 MG: 100 CAPSULE ORAL at 06:01

## 2022-01-01 RX ADMIN — GABAPENTIN 100 MG: 100 CAPSULE ORAL at 18:33

## 2022-01-01 RX ADMIN — KETOROLAC TROMETHAMINE 15 MG: 30 INJECTION, SOLUTION INTRAMUSCULAR; INTRAVENOUS at 18:58

## 2022-01-01 RX ADMIN — LISINOPRIL 20 MG: 20 TABLET ORAL at 04:56

## 2022-01-01 RX ADMIN — DEXTROSE MONOHYDRATE: 50 INJECTION, SOLUTION INTRAVENOUS at 17:14

## 2022-01-01 RX ADMIN — ASPIRIN 81 MG: 81 TABLET, CHEWABLE ORAL at 05:42

## 2022-01-01 RX ADMIN — EPINEPHRINE 0.5 MG: 1 INJECTION INTRAMUSCULAR; INTRAVENOUS; SUBCUTANEOUS at 10:23

## 2022-01-01 RX ADMIN — FENTANYL CITRATE 100 MCG: 50 INJECTION, SOLUTION INTRAMUSCULAR; INTRAVENOUS at 06:06

## 2022-01-01 RX ADMIN — CEFAZOLIN 2 G: 330 INJECTION, POWDER, FOR SOLUTION INTRAMUSCULAR; INTRAVENOUS at 17:46

## 2022-01-01 RX ADMIN — LIDOCAINE 1 PATCH: 50 PATCH TOPICAL at 12:10

## 2022-01-01 RX ADMIN — AMIODARONE HYDROCHLORIDE 200 MG: 200 TABLET ORAL at 06:05

## 2022-01-01 RX ADMIN — AMPICILLIN SODIUM AND SULBACTAM SODIUM 3 G: 2; 1 INJECTION, POWDER, FOR SOLUTION INTRAMUSCULAR; INTRAVENOUS at 05:59

## 2022-01-01 RX ADMIN — OXYCODONE 5 MG: 5 TABLET ORAL at 08:00

## 2022-01-01 RX ADMIN — IOHEXOL 10 ML: 350 INJECTION, SOLUTION INTRAVENOUS at 14:15

## 2022-01-01 RX ADMIN — AMPICILLIN SODIUM AND SULBACTAM SODIUM 3 G: 2; 1 INJECTION, POWDER, FOR SOLUTION INTRAMUSCULAR; INTRAVENOUS at 23:41

## 2022-01-01 RX ADMIN — OXYCODONE HYDROCHLORIDE 10 MG: 10 TABLET ORAL at 17:25

## 2022-01-01 RX ADMIN — AMIODARONE HYDROCHLORIDE 1 MG/MIN: 1.8 INJECTION, SOLUTION INTRAVENOUS at 04:01

## 2022-01-01 RX ADMIN — OXYCODONE HYDROCHLORIDE 10 MG: 10 TABLET ORAL at 23:21

## 2022-01-01 RX ADMIN — LISINOPRIL 20 MG: 20 TABLET ORAL at 04:54

## 2022-01-01 RX ADMIN — CARVEDILOL 6.25 MG: 6.25 TABLET, FILM COATED ORAL at 08:22

## 2022-01-01 RX ADMIN — SODIUM CHLORIDE 500 ML: 9 INJECTION, SOLUTION INTRAVENOUS at 20:46

## 2022-01-01 RX ADMIN — LEVETIRACETAM 500 MG: 500 TABLET, FILM COATED ORAL at 16:08

## 2022-01-01 RX ADMIN — CARVEDILOL 12.5 MG: 12.5 TABLET, FILM COATED ORAL at 20:20

## 2022-01-01 RX ADMIN — POTASSIUM CHLORIDE 40 MEQ: 1500 TABLET, EXTENDED RELEASE ORAL at 21:03

## 2022-01-01 RX ADMIN — HYDROMORPHONE HYDROCHLORIDE 0.5 MG: 1 INJECTION, SOLUTION INTRAMUSCULAR; INTRAVENOUS; SUBCUTANEOUS at 00:22

## 2022-01-01 RX ADMIN — AMIODARONE HYDROCHLORIDE 1 MG/MIN: 1.8 INJECTION, SOLUTION INTRAVENOUS at 16:29

## 2022-01-01 RX ADMIN — AMLODIPINE BESYLATE 5 MG: 5 TABLET ORAL at 05:08

## 2022-01-01 RX ADMIN — SODIUM CHLORIDE: 4.5 INJECTION, SOLUTION INTRAVENOUS at 02:51

## 2022-01-01 RX ADMIN — ACETAMINOPHEN 650 MG: 325 TABLET, FILM COATED ORAL at 18:57

## 2022-01-01 RX ADMIN — AMIODARONE HYDROCHLORIDE 0.5 MG/MIN: 1.8 INJECTION, SOLUTION INTRAVENOUS at 06:05

## 2022-01-01 RX ADMIN — AMPICILLIN SODIUM AND SULBACTAM SODIUM 3 G: 2; 1 INJECTION, POWDER, FOR SOLUTION INTRAMUSCULAR; INTRAVENOUS at 11:48

## 2022-01-01 RX ADMIN — Medication 300 MCG: at 11:13

## 2022-01-01 RX ADMIN — AMPICILLIN SODIUM AND SULBACTAM SODIUM 3 G: 2; 1 INJECTION, POWDER, FOR SOLUTION INTRAMUSCULAR; INTRAVENOUS at 09:14

## 2022-01-01 RX ADMIN — CARVEDILOL 12.5 MG: 12.5 TABLET, FILM COATED ORAL at 08:18

## 2022-01-01 RX ADMIN — FENTANYL CITRATE 100 MCG: 50 INJECTION, SOLUTION INTRAMUSCULAR; INTRAVENOUS at 22:20

## 2022-01-01 RX ADMIN — DOCUSATE SODIUM 50 MG AND SENNOSIDES 8.6 MG 2 TABLET: 8.6; 5 TABLET, FILM COATED ORAL at 06:00

## 2022-01-01 RX ADMIN — GABAPENTIN 100 MG: 100 CAPSULE ORAL at 05:29

## 2022-01-01 RX ADMIN — LISINOPRIL 10 MG: 10 TABLET ORAL at 06:05

## 2022-01-01 RX ADMIN — FENTANYL CITRATE 50 MCG: 50 INJECTION, SOLUTION INTRAMUSCULAR; INTRAVENOUS at 01:27

## 2022-01-01 RX ADMIN — KETOROLAC TROMETHAMINE 15 MG: 30 INJECTION, SOLUTION INTRAMUSCULAR; INTRAVENOUS at 05:59

## 2022-01-01 RX ADMIN — CLONIDINE HYDROCHLORIDE 0.1 MG: 0.1 TABLET ORAL at 17:54

## 2022-01-01 RX ADMIN — ENOXAPARIN SODIUM 40 MG: 40 INJECTION SUBCUTANEOUS at 19:05

## 2022-01-01 RX ADMIN — POTASSIUM CHLORIDE 20 MEQ: 14.9 INJECTION, SOLUTION INTRAVENOUS at 19:40

## 2022-01-01 RX ADMIN — ASPIRIN 81 MG: 81 TABLET, COATED ORAL at 05:08

## 2022-01-01 RX ADMIN — AMPICILLIN SODIUM AND SULBACTAM SODIUM 3 G: 2; 1 INJECTION, POWDER, FOR SOLUTION INTRAMUSCULAR; INTRAVENOUS at 23:24

## 2022-01-01 RX ADMIN — GABAPENTIN 100 MG: 100 CAPSULE ORAL at 05:54

## 2022-01-01 RX ADMIN — MIDAZOLAM HYDROCHLORIDE 2 MG: 1 INJECTION, SOLUTION INTRAMUSCULAR; INTRAVENOUS at 13:58

## 2022-01-01 RX ADMIN — LEVETIRACETAM 500 MG: 500 TABLET, FILM COATED ORAL at 05:12

## 2022-01-01 RX ADMIN — CARVEDILOL 6.25 MG: 6.25 TABLET, FILM COATED ORAL at 17:54

## 2022-01-01 RX ADMIN — FENTANYL CITRATE 100 MCG: 50 INJECTION, SOLUTION INTRAMUSCULAR; INTRAVENOUS at 00:30

## 2022-01-01 RX ADMIN — ACETAMINOPHEN 650 MG: 325 TABLET, FILM COATED ORAL at 17:58

## 2022-01-01 RX ADMIN — LEVETIRACETAM 500 MG: 500 TABLET, FILM COATED ORAL at 17:21

## 2022-01-01 RX ADMIN — OXYCODONE HYDROCHLORIDE 20 MG: 10 TABLET ORAL at 11:22

## 2022-01-01 ASSESSMENT — PATIENT HEALTH QUESTIONNAIRE - PHQ9
SUM OF ALL RESPONSES TO PHQ9 QUESTIONS 1 AND 2: 0
2. FEELING DOWN, DEPRESSED, IRRITABLE, OR HOPELESS: NOT AT ALL
1. LITTLE INTEREST OR PLEASURE IN DOING THINGS: NOT AT ALL
1. LITTLE INTEREST OR PLEASURE IN DOING THINGS: NOT AT ALL
SUM OF ALL RESPONSES TO PHQ9 QUESTIONS 1 AND 2: 0
2. FEELING DOWN, DEPRESSED, IRRITABLE, OR HOPELESS: NOT AT ALL
SUM OF ALL RESPONSES TO PHQ9 QUESTIONS 1 AND 2: 0
SUM OF ALL RESPONSES TO PHQ9 QUESTIONS 1 AND 2: 0
1. LITTLE INTEREST OR PLEASURE IN DOING THINGS: NOT AT ALL
SUM OF ALL RESPONSES TO PHQ9 QUESTIONS 1 AND 2: 0
1. LITTLE INTEREST OR PLEASURE IN DOING THINGS: NOT AT ALL
1. LITTLE INTEREST OR PLEASURE IN DOING THINGS: NOT AT ALL
2. FEELING DOWN, DEPRESSED, IRRITABLE, OR HOPELESS: NOT AT ALL

## 2022-01-01 ASSESSMENT — FIBROSIS 4 INDEX
FIB4 SCORE: 3.22
FIB4 SCORE: 4.37
FIB4 SCORE: 3.41
FIB4 SCORE: 4.44
FIB4 SCORE: 4.16
FIB4 SCORE: 2.38
FIB4 SCORE: 7.36
FIB4 SCORE: 6.4
FIB4 SCORE: 4.94
FIB4 SCORE: 8
FIB4 SCORE: 2.38
FIB4 SCORE: 3.16
FIB4 SCORE: 3.82
FIB4 SCORE: 4.44
FIB4 SCORE: 5.14
FIB4 SCORE: 6.4
FIB4 SCORE: 5.14
FIB4 SCORE: 3.7

## 2022-01-01 ASSESSMENT — ENCOUNTER SYMPTOMS
BLURRED VISION: 0
ORTHOPNEA: 0
WEIGHT LOSS: 1
COUGH: 0
EYES NEGATIVE: 1
WEAKNESS: 1
TREMORS: 0
HEADACHES: 0
EYES NEGATIVE: 1
BACK PAIN: 0
NAUSEA: 0
HALLUCINATIONS: 0
EYE PAIN: 0
SPUTUM PRODUCTION: 0
COUGH: 1
DIZZINESS: 0
FATIGUE: 1
ABDOMINAL PAIN: 0
FALLS: 1
BACK PAIN: 0
TREMORS: 0
DOUBLE VISION: 0
SENSORY CHANGE: 0
COUGH: 0
SENSORY CHANGE: 0
BRUISES/BLEEDS EASILY: 0
VOMITING: 0
HALLUCINATIONS: 0
LOSS OF CONSCIOUSNESS: 1
SHORTNESS OF BREATH: 0
PALPITATIONS: 0
ABDOMINAL PAIN: 0
BACK PAIN: 0
WEAKNESS: 0
DIARRHEA: 0
MYALGIAS: 1
ABDOMINAL DISTENTION: 0
DIARRHEA: 0
MYALGIAS: 1
PHOTOPHOBIA: 0
WEAKNESS: 1
TREMORS: 0
LIGHT-HEADEDNESS: 0
SHORTNESS OF BREATH: 0
NUMBNESS: 0
CHILLS: 0
CHEST TIGHTNESS: 0
FEVER: 0
FATIGUE: 1
NAUSEA: 0
SHORTNESS OF BREATH: 0
HALLUCINATIONS: 1
ABDOMINAL PAIN: 0
NAUSEA: 0
SHORTNESS OF BREATH: 0
CHEST TIGHTNESS: 0
SPUTUM PRODUCTION: 0
SENSORY CHANGE: 0
SPUTUM PRODUCTION: 0
WEAKNESS: 1
CHILLS: 0
EYE DISCHARGE: 0
WEAKNESS: 1
BLURRED VISION: 0
PALPITATIONS: 0
HEADACHES: 0
SHORTNESS OF BREATH: 0
EYE REDNESS: 0
ACTIVITY CHANGE: 0
DIARRHEA: 0
SENSORY CHANGE: 0
DIAPHORESIS: 0
SPEECH CHANGE: 0
APNEA: 0
LOSS OF CONSCIOUSNESS: 0
BACK PAIN: 0
ORTHOPNEA: 0
MYALGIAS: 0
SENSORY CHANGE: 0
SPEECH CHANGE: 0
TREMORS: 0
CONSTIPATION: 0
VOMITING: 0
SPEECH DIFFICULTY: 0
NAUSEA: 0
PHOTOPHOBIA: 0
COUGH: 0
NAUSEA: 0
FALLS: 1
SHORTNESS OF BREATH: 0
WEAKNESS: 1
MYALGIAS: 1
BACK PAIN: 1
TROUBLE SWALLOWING: 0
SORE THROAT: 0
FOCAL WEAKNESS: 1
DOUBLE VISION: 0
PALPITATIONS: 0
NAUSEA: 0
MYALGIAS: 1
SPEECH CHANGE: 0
HALLUCINATIONS: 0
PSYCHIATRIC NEGATIVE: 1
SPEECH CHANGE: 0
MYALGIAS: 1
SENSORY CHANGE: 0
HEADACHES: 0
NECK PAIN: 0
CHILLS: 0
SPEECH CHANGE: 0
LOSS OF CONSCIOUSNESS: 0
CHILLS: 0
HEADACHES: 0
LOSS OF CONSCIOUSNESS: 1
NAUSEA: 0
ABDOMINAL PAIN: 0
COUGH: 1
MYALGIAS: 1
ORTHOPNEA: 0
WEAKNESS: 1
FALLS: 1
COUGH: 0
HEMATOLOGIC/LYMPHATIC NEGATIVE: 1
FEVER: 0
DEPRESSION: 0
SPEECH CHANGE: 0
DIZZINESS: 0
BRUISES/BLEEDS EASILY: 0
HALLUCINATIONS: 0
TREMORS: 0
FEVER: 0
COUGH: 1
EYE PAIN: 0
PHOTOPHOBIA: 0
DIZZINESS: 0
CONSTIPATION: 0
TINGLING: 0
WEAKNESS: 1
TREMORS: 0
TINGLING: 0
VOMITING: 0
FOCAL WEAKNESS: 0
FALLS: 1
WEIGHT LOSS: 0
EYE PAIN: 0
WEAKNESS: 1
NERVOUS/ANXIOUS: 0
SENSORY CHANGE: 0
WEIGHT LOSS: 0
ABDOMINAL PAIN: 0
FEVER: 0
CONSTIPATION: 0
ABDOMINAL PAIN: 0
DOUBLE VISION: 0
MYALGIAS: 1
BACK PAIN: 0
HALLUCINATIONS: 0
DIZZINESS: 0
PHOTOPHOBIA: 0
WEAKNESS: 1
BRUISES/BLEEDS EASILY: 0
FLANK PAIN: 0
WEIGHT LOSS: 0
TREMORS: 0
FATIGUE: 0
MYALGIAS: 0
CHEST TIGHTNESS: 0
FOCAL WEAKNESS: 1
COUGH: 0
FEVER: 0
HEADACHES: 0
CONSTIPATION: 0
ABDOMINAL PAIN: 0
VOMITING: 0
ARTHRALGIAS: 0
POLYDIPSIA: 0
EYE PAIN: 0
SPUTUM PRODUCTION: 0
CHOKING: 0
ABDOMINAL DISTENTION: 0
SHORTNESS OF BREATH: 0
JOINT SWELLING: 0
FALLS: 1
HEADACHES: 0
BLURRED VISION: 0
ENDOCRINE NEGATIVE: 1
FOCAL WEAKNESS: 0
ABDOMINAL PAIN: 0
ABDOMINAL PAIN: 0
DECREASED CONCENTRATION: 0
VOMITING: 0
ORTHOPNEA: 0
FEVER: 0
LOSS OF CONSCIOUSNESS: 1
ABDOMINAL PAIN: 0
HEADACHES: 0
NECK PAIN: 0
TINGLING: 0
CHILLS: 0
SPEECH CHANGE: 0
CHOKING: 0
TREMORS: 0
SHORTNESS OF BREATH: 0
MYALGIAS: 1
DIZZINESS: 0
HALLUCINATIONS: 0
NECK PAIN: 0
ABDOMINAL PAIN: 0
CONFUSION: 0
FOCAL WEAKNESS: 1
MYALGIAS: 1
WEIGHT LOSS: 0
COUGH: 0
BRUISES/BLEEDS EASILY: 0
DIARRHEA: 0
SENSORY CHANGE: 0
MYALGIAS: 0
SHORTNESS OF BREATH: 0
DIZZINESS: 0
NECK PAIN: 0
MYALGIAS: 1
MYALGIAS: 1
SENSORY CHANGE: 0
ABDOMINAL PAIN: 0
VOMITING: 0
MYALGIAS: 0
SORE THROAT: 0
CHOKING: 0
LOSS OF CONSCIOUSNESS: 0
FEVER: 0
SHORTNESS OF BREATH: 0
APNEA: 0
FOCAL WEAKNESS: 0
INSOMNIA: 0
FALLS: 1
DIZZINESS: 0
BACK PAIN: 0
FEVER: 0
SPEECH CHANGE: 0
DIARRHEA: 0
WHEEZING: 0
SENSORY CHANGE: 0
NERVOUS/ANXIOUS: 0
FOCAL WEAKNESS: 1
SHORTNESS OF BREATH: 0
BLOOD IN STOOL: 0
FEVER: 0
LOSS OF CONSCIOUSNESS: 1
WEIGHT LOSS: 0
DOUBLE VISION: 0
HALLUCINATIONS: 1
PALPITATIONS: 0
TINGLING: 0
DOUBLE VISION: 0
CONSTIPATION: 0
SEIZURES: 0
TINGLING: 1
VOMITING: 0
FEVER: 0
SPEECH CHANGE: 0
DIARRHEA: 0
BLURRED VISION: 0
TREMORS: 0
AGITATION: 0
INSOMNIA: 1
SENSORY CHANGE: 0
SHORTNESS OF BREATH: 0
MYALGIAS: 0
APNEA: 0
VOMITING: 0
WEAKNESS: 1
VOMITING: 0
VOMITING: 0
ABDOMINAL PAIN: 0
ORTHOPNEA: 0
ADENOPATHY: 0
STRIDOR: 0
VOMITING: 0
CHOKING: 0
FEVER: 0
CHEST TIGHTNESS: 0
MYALGIAS: 1
FOCAL WEAKNESS: 1
BLURRED VISION: 0
FEVER: 0
FEVER: 0
BLURRED VISION: 0
WEAKNESS: 1
HALLUCINATIONS: 0
TREMORS: 0
SHORTNESS OF BREATH: 0
NECK PAIN: 0
FEVER: 0
SPEECH CHANGE: 0
EYE ITCHING: 0
FOCAL WEAKNESS: 0
FOCAL WEAKNESS: 0
FOCAL WEAKNESS: 1
WEAKNESS: 1
NECK PAIN: 0
TREMORS: 0
MYALGIAS: 1
SPUTUM PRODUCTION: 0
ENDOCRINE NEGATIVE: 1
ABDOMINAL PAIN: 0
DIZZINESS: 0
SHORTNESS OF BREATH: 0
ABDOMINAL PAIN: 0
CHILLS: 0
PALPITATIONS: 0
HALLUCINATIONS: 1
MYALGIAS: 1
MYALGIAS: 1
DOUBLE VISION: 0
MYALGIAS: 1
ABDOMINAL PAIN: 1
WEAKNESS: 1
SHORTNESS OF BREATH: 0
VOMITING: 0
APPETITE CHANGE: 0
BLURRED VISION: 0
ABDOMINAL PAIN: 0
SPEECH CHANGE: 0
TINGLING: 0
PALPITATIONS: 0
CHEST TIGHTNESS: 0
POLYPHAGIA: 0
WEAKNESS: 1
ABDOMINAL PAIN: 0
ABDOMINAL PAIN: 0
DOUBLE VISION: 0
PALPITATIONS: 0
ABDOMINAL PAIN: 0
LIGHT-HEADEDNESS: 0
COLOR CHANGE: 0
CHEST TIGHTNESS: 0
DEPRESSION: 0
EYE PAIN: 0
VOMITING: 0
PSYCHIATRIC NEGATIVE: 1
PHOTOPHOBIA: 0
PALPITATIONS: 0
HALLUCINATIONS: 1
WOUND: 1
DIAPHORESIS: 0
FEVER: 0
COLOR CHANGE: 0

## 2022-01-01 ASSESSMENT — COGNITIVE AND FUNCTIONAL STATUS - GENERAL
MOVING TO AND FROM BED TO CHAIR: UNABLE
PERSONAL GROOMING: A LOT
SUGGESTED CMS G CODE MODIFIER MOBILITY: CL
STANDING UP FROM CHAIR USING ARMS: TOTAL
STANDING UP FROM CHAIR USING ARMS: TOTAL
MOBILITY SCORE: 14
DRESSING REGULAR LOWER BODY CLOTHING: TOTAL
CLIMB 3 TO 5 STEPS WITH RAILING: A LOT
TURNING FROM BACK TO SIDE WHILE IN FLAT BAD: UNABLE
HELP NEEDED FOR BATHING: A LOT
MOVING TO AND FROM BED TO CHAIR: A LITTLE
MOVING TO AND FROM BED TO CHAIR: UNABLE
TOILETING: A LOT
CLIMB 3 TO 5 STEPS WITH RAILING: TOTAL
PERSONAL GROOMING: A LOT
SUGGESTED CMS G CODE MODIFIER DAILY ACTIVITY: CL
TURNING FROM BACK TO SIDE WHILE IN FLAT BAD: A LOT
DRESSING REGULAR UPPER BODY CLOTHING: A LOT
TURNING FROM BACK TO SIDE WHILE IN FLAT BAD: A LOT
WALKING IN HOSPITAL ROOM: TOTAL
TOILETING: A LOT
MOVING TO AND FROM BED TO CHAIR: UNABLE
WALKING IN HOSPITAL ROOM: A LITTLE
TURNING FROM BACK TO SIDE WHILE IN FLAT BAD: UNABLE
MOBILITY SCORE: 7
DAILY ACTIVITIY SCORE: 12
MOVING FROM LYING ON BACK TO SITTING ON SIDE OF FLAT BED: UNABLE
MOVING TO AND FROM BED TO CHAIR: UNABLE
TOILETING: A LOT
STANDING UP FROM CHAIR USING ARMS: A LITTLE
DAILY ACTIVITIY SCORE: 9
EATING MEALS: A LITTLE
WALKING IN HOSPITAL ROOM: TOTAL
SUGGESTED CMS G CODE MODIFIER MOBILITY: CM
DRESSING REGULAR UPPER BODY CLOTHING: A LOT
HELP NEEDED FOR BATHING: A LOT
DRESSING REGULAR UPPER BODY CLOTHING: A LOT
DRESSING REGULAR LOWER BODY CLOTHING: A LOT
HELP NEEDED FOR BATHING: A LOT
DRESSING REGULAR UPPER BODY CLOTHING: A LOT
WALKING IN HOSPITAL ROOM: TOTAL
DAILY ACTIVITIY SCORE: 14
MOVING FROM LYING ON BACK TO SITTING ON SIDE OF FLAT BED: UNABLE
STANDING UP FROM CHAIR USING ARMS: TOTAL
STANDING UP FROM CHAIR USING ARMS: A LITTLE
MOVING FROM LYING ON BACK TO SITTING ON SIDE OF FLAT BED: A LOT
WALKING IN HOSPITAL ROOM: A LOT
MOVING FROM LYING ON BACK TO SITTING ON SIDE OF FLAT BED: A LITTLE
SUGGESTED CMS G CODE MODIFIER MOBILITY: CL
TOILETING: A LOT
EATING MEALS: A LOT
MOBILITY SCORE: 6
STANDING UP FROM CHAIR USING ARMS: TOTAL
SUGGESTED CMS G CODE MODIFIER DAILY ACTIVITY: CK
DAILY ACTIVITIY SCORE: 15
MOVING FROM LYING ON BACK TO SITTING ON SIDE OF FLAT BED: UNABLE
DRESSING REGULAR LOWER BODY CLOTHING: A LOT
SUGGESTED CMS G CODE MODIFIER MOBILITY: CM
SUGGESTED CMS G CODE MODIFIER DAILY ACTIVITY: CK
DRESSING REGULAR LOWER BODY CLOTHING: A LOT
HELP NEEDED FOR BATHING: TOTAL
MOBILITY SCORE: 7
MOBILITY SCORE: 6
DRESSING REGULAR UPPER BODY CLOTHING: A LITTLE
MOVING TO AND FROM BED TO CHAIR: A LITTLE
STANDING UP FROM CHAIR USING ARMS: TOTAL
DAILY ACTIVITIY SCORE: 15
MOVING TO AND FROM BED TO CHAIR: UNABLE
DRESSING REGULAR LOWER BODY CLOTHING: A LITTLE
EATING MEALS: A LOT
CLIMB 3 TO 5 STEPS WITH RAILING: TOTAL
PERSONAL GROOMING: A LITTLE
STANDING UP FROM CHAIR USING ARMS: A LITTLE
TURNING FROM BACK TO SIDE WHILE IN FLAT BAD: A LOT
MOVING FROM LYING ON BACK TO SITTING ON SIDE OF FLAT BED: UNABLE
MOBILITY SCORE: 14
HELP NEEDED FOR BATHING: A LOT
CLIMB 3 TO 5 STEPS WITH RAILING: TOTAL
EATING MEALS: A LITTLE
TURNING FROM BACK TO SIDE WHILE IN FLAT BAD: A LOT
TURNING FROM BACK TO SIDE WHILE IN FLAT BAD: UNABLE
WALKING IN HOSPITAL ROOM: TOTAL
PERSONAL GROOMING: A LITTLE
MOVING TO AND FROM BED TO CHAIR: UNABLE
CLIMB 3 TO 5 STEPS WITH RAILING: TOTAL
MOVING FROM LYING ON BACK TO SITTING ON SIDE OF FLAT BED: UNABLE
MOBILITY SCORE: 7
WALKING IN HOSPITAL ROOM: A LITTLE
CLIMB 3 TO 5 STEPS WITH RAILING: A LOT
SUGGESTED CMS G CODE MODIFIER MOBILITY: CN
CLIMB 3 TO 5 STEPS WITH RAILING: TOTAL
SUGGESTED CMS G CODE MODIFIER DAILY ACTIVITY: CK
SUGGESTED CMS G CODE MODIFIER MOBILITY: CM
CLIMB 3 TO 5 STEPS WITH RAILING: A LOT
SUGGESTED CMS G CODE MODIFIER MOBILITY: CN
EATING MEALS: A LITTLE
MOBILITY SCORE: 16
SUGGESTED CMS G CODE MODIFIER DAILY ACTIVITY: CL
TURNING FROM BACK TO SIDE WHILE IN FLAT BAD: A LOT
PERSONAL GROOMING: A LITTLE
WALKING IN HOSPITAL ROOM: TOTAL
TOILETING: TOTAL
SUGGESTED CMS G CODE MODIFIER MOBILITY: CK

## 2022-01-01 ASSESSMENT — PAIN DESCRIPTION - PAIN TYPE
TYPE: ACUTE PAIN
TYPE: SURGICAL PAIN
TYPE: ACUTE PAIN
TYPE: SURGICAL PAIN
TYPE: ACUTE PAIN
TYPE: ACUTE PAIN;SURGICAL PAIN
TYPE: ACUTE PAIN
TYPE: ACUTE PAIN;SURGICAL PAIN
TYPE: ACUTE PAIN
TYPE: SURGICAL PAIN
TYPE: ACUTE PAIN

## 2022-01-01 ASSESSMENT — LIFESTYLE VARIABLES
SUBSTANCE_ABUSE: 0
TOTAL SCORE: 0
SUBSTANCE_ABUSE: 0
EVER FELT BAD OR GUILTY ABOUT YOUR DRINKING: NO
HAVE PEOPLE ANNOYED YOU BY CRITICIZING YOUR DRINKING: NO
ON A TYPICAL DAY WHEN YOU DRINK ALCOHOL HOW MANY DRINKS DO YOU HAVE: 0
AVERAGE NUMBER OF DAYS PER WEEK YOU HAVE A DRINK CONTAINING ALCOHOL: 0
SUBSTANCE_ABUSE: 0
HAVE YOU EVER FELT YOU SHOULD CUT DOWN ON YOUR DRINKING: NO
DOES PATIENT WANT TO STOP DRINKING: NO
CONSUMPTION TOTAL: NEGATIVE
TOTAL SCORE: 0
SUBSTANCE_ABUSE: 0
HOW MANY TIMES IN THE PAST YEAR HAVE YOU HAD 5 OR MORE DRINKS IN A DAY: 0
EVER HAD A DRINK FIRST THING IN THE MORNING TO STEADY YOUR NERVES TO GET RID OF A HANGOVER: NO
SUBSTANCE_ABUSE: 0
ALCOHOL_USE: NO
TOTAL SCORE: 0

## 2022-01-01 ASSESSMENT — PULMONARY FUNCTION TESTS
FVC: 1.1
FVC: 1.1
FVC: 1

## 2022-01-01 ASSESSMENT — GAIT ASSESSMENTS
DISTANCE (FEET): 2
ASSISTIVE DEVICE: FRONT WHEEL WALKER
DISTANCE (FEET): 2
GAIT LEVEL OF ASSIST: UNABLE TO PARTICIPATE
DEVIATION: ANTALGIC;STEP TO
GAIT LEVEL OF ASSIST: UNABLE TO PARTICIPATE
GAIT LEVEL OF ASSIST: MINIMAL ASSIST
GAIT LEVEL OF ASSIST: UNABLE TO PARTICIPATE
ASSISTIVE DEVICE: FRONT WHEEL WALKER
DEVIATION: SHUFFLED GAIT;BRADYKINETIC
GAIT LEVEL OF ASSIST: MINIMAL ASSIST

## 2022-01-01 ASSESSMENT — PAIN SCALES - PAIN ASSESSMENT IN ADVANCED DEMENTIA (PAINAD)
BREATHING: NORMAL
BODYLANGUAGE: RELAXED
TOTALSCORE: 0
BODYLANGUAGE: RELAXED
FACIALEXPRESSION: SMILING OR INEXPRESSIVE
FACIALEXPRESSION: SMILING OR INEXPRESSIVE
CONSOLABILITY: NO NEED TO CONSOLE
CONSOLABILITY: NO NEED TO CONSOLE
BREATHING: NORMAL
TOTALSCORE: 0

## 2022-01-01 ASSESSMENT — PAIN SCALES - GENERAL
PAIN_LEVEL: 4
PAIN_LEVEL: 0

## 2022-01-01 ASSESSMENT — ACTIVITIES OF DAILY LIVING (ADL): TOILETING: INDEPENDENT

## 2022-01-01 ASSESSMENT — PAIN SCALES - WONG BAKER
WONGBAKER_NUMERICALRESPONSE: HURTS EVEN MORE
WONGBAKER_NUMERICALRESPONSE: HURTS EVEN MORE

## 2022-11-28 PROBLEM — Z95.2 S/P TAVR (TRANSCATHETER AORTIC VALVE REPLACEMENT): Status: ACTIVE | Noted: 2022-01-01

## 2022-11-28 PROBLEM — A41.9 SEPSIS (HCC): Status: ACTIVE | Noted: 2022-01-01

## 2022-11-28 PROBLEM — M79.604 RIGHT LEG PAIN: Status: ACTIVE | Noted: 2022-01-01

## 2022-11-28 PROBLEM — N17.9 AKI (ACUTE KIDNEY INJURY) (HCC): Status: ACTIVE | Noted: 2022-01-01

## 2022-11-28 NOTE — H&P
Hospital Medicine History & Physical Note    Date of Service  11/28/2022    Primary Care Physician  Judah Braga M.D.    Consultants  Nephrologist Dr. Hearn    Code Status  Full code per patient    Chief Complaint  Chief Complaint   Patient presents with    ALOC    Edema     Ble edema        History of Presenting Illness  82 y.o. male who presented 11/28/2022 with altered mental status.  Patient recently had TAVR on 8/2/2022 at [facility].  Patient is a poor historian so history was obtained via chart review.  Patient had decreased oral intake both eating and drinking for about a week.  He was having altered mental status as well as bilateral lower extremity edema.  He was brought to the hospital and found to have ALL.  Patient lives with his wife and his son.  He has never seen a nephrologist.    I discussed the plan of care with patient and nephrology and ERP.    Review of Systems  Review of Systems   Unable to perform ROS: Mental status change   Gastrointestinal:  Positive for abdominal pain.   Musculoskeletal:         Right leg and foot pain   all other systems reviewed and are negative    Past Medical History   has a past medical history of Chronic pain, Hyperlipidemia, Hypertension, and Pacemaker.    Surgical History   has a past surgical history that includes pacemaker insertion.    Family History  family history is not on file.  Patient cannot remember  Social History     Has never smoked  Drinks a beer daily  Denies any other drugs  Allergies  No Known Allergies    Medications  No current facility-administered medications on file prior to encounter.     Current Outpatient Medications on File Prior to Encounter   Medication Sig Dispense Refill    atorvastatin (LIPITOR) 40 MG Tab Take 1 Tab by mouth every evening. 30 Tab 0    levETIRAcetam (KEPPRA) 500 MG Tab Take 1 Tab by mouth 2 Times a Day. 60 Tab 0    ASPIRIN EC PO Take 81 mg by mouth every day.      lisinopril (PRINIVIL) 10 MG Tab Take 40 mg by  "mouth every day.      hydroCHLOROthiazide (HYDRODIURIL) 50 MG Tab Take 25 mg by mouth every day.      HYDROcodone-acetaminophen (NORCO) 5-325 MG Tab per tablet Take 1-2 Tabs by mouth every four hours as needed.      amLODIPine (NORVASC) 10 MG Tab Take 10 mg by mouth every day.      carvedilol (COREG) 12.5 MG Tab Take 12.5 mg by mouth 2 times a day, with meals.      cloNIDine (CATAPRES) 0.1 MG Tab Take 0.1 mg by mouth 2 times a day.         Physical Exam  Weight/BMI: Body mass index is 25.09 kg/m².  /56   Pulse 60   Temp 35.9 °C (96.7 °F) (Temporal)   Resp 15   Ht 1.727 m (5' 8\")   Wt 74.8 kg (165 lb)   SpO2 95%    Vitals:    11/28/22 1323 11/28/22 1334 11/28/22 1401 11/28/22 1516   BP:  112/53 111/53 105/56   Pulse: 60 60 60 60   Resp: 14 12 12 15   Temp:       TempSrc:       SpO2: (!) 85% 98% 99% 95%   Weight:       Height:        Oxygen Therapy:  Pulse Oximetry: 95 %, O2 (LPM): 1, O2 Delivery Device: Nasal Cannula  Physical Exam  Constitutional:       General: He is not in acute distress.     Appearance: He is well-developed. He is ill-appearing and toxic-appearing. He is not diaphoretic.   HENT:      Head: Normocephalic and atraumatic.      Right Ear: External ear normal.      Left Ear: External ear normal.      Mouth/Throat:      Pharynx: No oropharyngeal exudate or posterior oropharyngeal erythema.   Eyes:      Comments: Unable to fully assess   Cardiovascular:      Rate and Rhythm: Normal rate and regular rhythm.      Heart sounds:     No gallop.   Pulmonary:      Effort: No respiratory distress.      Breath sounds: No wheezing.   Abdominal:      General: There is no distension.      Tenderness: There is abdominal tenderness. There is guarding. There is no rebound.   Musculoskeletal:         General: Tenderness (Right groin site) present.   Skin:     General: Skin is warm.   Neurological:      Mental Status: He is alert. He is disoriented.      Motor: No weakness.      Comments: Poor historian but " answers some questions   Psychiatric:      Comments: Unable to assess       Laboratory:   Objective   Recent Results (from the past 24 hour(s))   CBC With Differential    Collection Time: 11/28/22  1:35 PM   Result Value Ref Range    WBC 13.9 (H) 4.8 - 10.8 K/uL    RBC 3.46 (L) 4.70 - 6.10 M/uL    Hemoglobin 11.8 (L) 14.0 - 18.0 g/dL    Hematocrit 34.6 (L) 42.0 - 52.0 %    .0 (H) 81.4 - 97.8 fL    MCH 34.1 (H) 27.0 - 33.0 pg    MCHC 34.1 33.7 - 35.3 g/dL    RDW 44.1 35.9 - 50.0 fL    Platelet Count 224 164 - 446 K/uL    MPV 10.9 9.0 - 12.9 fL    Neutrophils-Polys 84.40 (H) 44.00 - 72.00 %    Lymphocytes 8.60 (L) 22.00 - 41.00 %    Monocytes 5.90 0.00 - 13.40 %    Eosinophils 0.10 0.00 - 6.90 %    Basophils 0.20 0.00 - 1.80 %    Immature Granulocytes 0.80 0.00 - 0.90 %    Nucleated RBC 0.00 /100 WBC    Neutrophils (Absolute) 11.77 (H) 1.82 - 7.42 K/uL    Lymphs (Absolute) 1.20 1.00 - 4.80 K/uL    Monos (Absolute) 0.82 0.00 - 0.85 K/uL    Eos (Absolute) 0.01 0.00 - 0.51 K/uL    Baso (Absolute) 0.03 0.00 - 0.12 K/uL    Immature Granulocytes (abs) 0.11 0.00 - 0.11 K/uL    NRBC (Absolute) 0.00 K/uL   Comp Metabolic Panel    Collection Time: 11/28/22  1:35 PM   Result Value Ref Range    Sodium 137 135 - 145 mmol/L    Potassium 4.2 3.6 - 5.5 mmol/L    Chloride 98 96 - 112 mmol/L    Co2 20 20 - 33 mmol/L    Anion Gap 19.0 (H) 7.0 - 16.0    Glucose 116 (H) 65 - 99 mg/dL    Bun 122 (H) 8 - 22 mg/dL    Creatinine 4.67 (HH) 0.50 - 1.40 mg/dL    Calcium 8.6 8.5 - 10.5 mg/dL    AST(SGOT) 24 12 - 45 U/L    ALT(SGPT) 13 2 - 50 U/L    Alkaline Phosphatase 149 (H) 30 - 99 U/L    Total Bilirubin 1.1 0.1 - 1.5 mg/dL    Albumin 3.5 3.2 - 4.9 g/dL    Total Protein 6.9 6.0 - 8.2 g/dL    Globulin 3.4 1.9 - 3.5 g/dL    A-G Ratio 1.0 g/dL   Lipase    Collection Time: 11/28/22  1:35 PM   Result Value Ref Range    Lipase 31 11 - 82 U/L   ESTIMATED GFR    Collection Time: 11/28/22  1:35 PM   Result Value Ref Range    GFR (CKD-EPI) 12 (A)  >60 mL/min/1.73 m 2       (click the triangle to expand results)    Imaging:  CT-HEAD W/O   Final Result      1.  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   2.  Mild cerebral atrophy. Age-appropriate.   3.  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   4.  No acute findings are evident.         DX-CHEST-PORTABLE (1 VIEW)   Final Result      No evidence of acute cardiopulmonary process.        Assessment/Plan:  I anticipate this patient will require at least two midnights for appropriate medical management, necessitating inpatient admission because given ALL as well as sepsis which will require medical management and further work-up which will take greater than 48 hours.    * ALL (acute kidney injury) (HCC)- (present on admission)  Assessment & Plan  Recent kidney values appear normal  Nephrologist Dr. Hearn consulted  Urinalysis/ microalbumin/FeNa pending  Abdominal and renal ultrasound pending  IVF per nephro    Right leg pain- (present on admission)  Assessment & Plan  Given recent right groin access for TAVR, MARGARITA pending    Sepsis (HCC)- (present on admission)  Assessment & Plan  This is Sepsis Present on admission  SIRS criteria identified on my evaluation include: Leukocytosis, with WBC greater than 12,000  Source is unknown, possibly abd. US pending  Sepsis protocol initiated  Fluid resuscitation ordered per protocol  Crystalloid Fluid Administration: Fluid resuscitation ordered per standard protocol - 30 mL/kg per current or ideal body weight  IV antibiotics as appropriate for source of sepsis  Reassessment: I have reassessed the patient's hemodynamic status          S/P TAVR (transcatheter aortic valve replacement)- (present on admission)  Assessment & Plan  Pack 26mm S3 Ultra valve 8/2/2022 by Dr. Anoop Wolfe and Dr. Lilian Plata  At [facility]  Sri    Hyperlipidemia- (present on admission)  Assessment & Plan  Continue atorvastatin    Essential hypertension- (present on admission)  Assessment & Plan  Continue clonidine and carvedilol and amlodipine  Hold HCTZ and lisinopril for ALL      VTE prophylaxis:  sc heparin

## 2022-11-28 NOTE — ASSESSMENT & PLAN NOTE
- per chart review, patient has history of severe aortic stenosis and underwent TAVR with Dr. Wolfe on 8/2/2022.   - most recent echo showed well seated TAVR with peak gradient of 12 mmHg and mean gradient of 7.5 mmHg and trace perivalvular leak.

## 2022-11-28 NOTE — ED TRIAGE NOTES
.  Chief Complaint   Patient presents with    ALOC    Edema     Ble edema     Biba from home. EMS called by home PT. Pt lethargic confused.   On arrival lethargic difficult to get history. Pt alert to self place. Right groin healing incision recent arterial surgery at [facility]. FSBS 139

## 2022-11-28 NOTE — ED NOTES
Pt more alert. Able to answer questions A&O x 3. Pt voiding by urinal 400 ml clear urine dark in color.  Pt updated to poc. C/o right leg toe and heel pain. Previous ulcer to heel with bandage in place.

## 2022-11-28 NOTE — ASSESSMENT & PLAN NOTE
- This is Sepsis Present on admission  - SIRS criteria identified on my evaluation include: Leukocytosis, with WBC greater than 12,000  - Source is unknown, possibly abdominal etiology. US 11/29 showing US abdomen showing gallbladder sludge otherwise unremarkable, hepatic cysts, bilateral echogenic kidneys with mildly thinned cortex concerning for possible medical renal disease.  - Sepsis protocol initiated  - Fluid resuscitation ordered per protocol  - Crystalloid Fluid Administration: Fluid resuscitation ordered per standard protocol - 30 mL/kg per current or ideal body weight  - IV antibiotics as appropriate for source of sepsis- started on IV ceftriaxone, discontinued on 11/29  - Reassessment: I have reassessed the patient's hemodynamic status  - Blood cultures NGTD  - Urine cultures unremarkable  - Hemodynamically stable  - Uptrending WBC, WBC 14.4 today 11/29 from 13.9 on admission 11/28  - continue to monitor with daily CBC's  - No clear source of sepsis- Discontinued IV ceftriaxone and started on IV unasyn low dose (due to ALL) TID (11/29- ) pending further workup.   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

## 2022-11-28 NOTE — ASSESSMENT & PLAN NOTE
- at home, on lisinopril, hydrochlorithiazide, amlodipine, carvedilol.  - continue with home coreg 12.5 BID  - restarted on amlodipine 12/1 due to hypertension, continuing to hold HCTZ  - restarted lisinopril 20 on 12/2 due to continued hypertension

## 2022-11-28 NOTE — ED NOTES
Spoke with pt daughter Kendra. Pt living with wife and son. He has vascular problems and recent surgery on femoral artery. Daughter reports upcoming surgery for lower leg as well.     Pt to CT by evaristo

## 2022-11-28 NOTE — ASSESSMENT & PLAN NOTE
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 Medel who is partners with Dr. Quinones (who did patient's previous surgery). She says patient will be having pain for a long time but since this is not an emergency they will see him outpatient and Dr. Quinones will perform a re-anastomosis and other procedures as warranted. This procedure cannot currently be done as Dr. Quinones is currently on vacation and Dr. Medel doesn't have [facility] privileges as well as the fact that this type of surgery cannot even be done at [facility].   - PT/OT recommending SNF  - MRI foot attempted 12/2 however was incomplete as patient was unable to tolerate. Foot xray confirmed osteomyelitis to R calcaneus.  - 12/2: Per LPS, wound care orders for nursing was placed and he was provided with betadine dressing and offloading boot per LPS team.  - 12/3: Consulted LPS and vascular surgery due to concern for osteomyelitis of R heel- seen by Dr. Rdz who will take patient to OR in order to attempt revascularization to attempt to heal foot wound and avoid amputation. OR planned for 12/5 Monday.  - Consulted Dr. Turcios from infectious disease 12/3: believes osteomyelitis of heel will be difficult to cure and pt will need debridement, wound vac, etc however most likely poor outcome. Healing process also hindered due to impaired circulation evidence by MARGARITA. As there is no pus from wound, most likely ischemic ulcer. Linezolid discontinued 12/3, c/w just unasyn for now  - f/u blood cultures, wound cultures, NGTD  - CTA pending  - Vein mapping 12/4: Acute superficial thrombus in the greater saphenous  vein near saphenofemoral junction and in the proxmal thigh. Vein wall thickening in the thigh; small caliber vein throughout the thigh & calf. Vein is unsuitable for use as a bypass graft.  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

## 2022-11-28 NOTE — ASSESSMENT & PLAN NOTE
RESOLVED  - No noted history of chronic kidney disease, unclear creatinine baseline however recent kidney values appear normal- most recent labs from 8/3/2022 showing baseline Cr 1.0 and BUN 15.0.  - On admission, BUN/Cr ratio noted to be 122/4.67  - Patient is able to urinate, produced about 400cc dark urine in emergency room. No urinary symptoms such as dysuria, polyuria, retention.  - Urinalysis 11/28: +hyaline casts, +granular casts.  - Consulted nephrology- Per Dr. Hearn, currently unclear etiology of ALL however given hyaline casts on UA, volume depletion most likely. Obstructive uropathy was also considered given suprapubic tenderness on exam however renal/bladder US unremarkable for obstructions.   - Started on IV fluids at 125 cc/hr on 11/28, increased rate to 150 cc/hr on 11/29  - Most recent labs from 11/29 at 1500 showing improved renal function with BUN/Cr ratio of 85/1.63.  - Continue to monitor renal function with daily labs  - monitor urine output  - Kidney function improving as of 11/29 and patient nonoliguric, no need for dialysis. Nephrology signed off 11/29.  - Continue gentle IVF and trend Crt/BUN  - Improving- Cr trending down. Cr is 0.90 on 12/1 from 1.05 on 11/30 and 4.67 on admission 11/28.  - ALL resolved, Cr 0.78 on 12/2. D/c'ed renal diet and placed on regular diet to encourage patient PO intake  Now resolved.

## 2022-11-28 NOTE — PROGRESS NOTES
"I examined the patient 11/28/2022 3:38 PM  Vital Signs:/56   Pulse 60   Temp 35.9 °C (96.7 °F) (Temporal)   Resp 15   Ht 1.727 m (5' 8\")   Wt 74.8 kg (165 lb)   SpO2 95%   BMI 25.09 kg/m²   Cardiac examination significant for Regular rate and rhythm  Pulmonary examination significant for Clear lung fileds  Capillary refill is slowed  Peripheral Pulse is 1+   Skin is pale      "

## 2022-11-29 PROBLEM — I50.9 CONGESTIVE HEART FAILURE (CHF) (HCC): Status: ACTIVE | Noted: 2022-01-01

## 2022-11-29 PROBLEM — Z87.898 HISTORY OF SEIZURES: Status: ACTIVE | Noted: 2022-01-01

## 2022-11-29 PROBLEM — E87.29 INCREASED ANION GAP METABOLIC ACIDOSIS: Status: ACTIVE | Noted: 2022-01-01

## 2022-11-29 PROBLEM — D53.9 MACROCYTIC ANEMIA: Status: ACTIVE | Noted: 2022-01-01

## 2022-11-29 PROBLEM — G93.40 ACUTE ENCEPHALOPATHY: Status: ACTIVE | Noted: 2022-01-01

## 2022-11-29 NOTE — PROGRESS NOTES
"Message to Dr. Castanon via Voalte at 0724:    \"Hi, wanted to notify that pt is continuing to refuse labs for today. Charge nurse has also spoke to him\"    Response:    \"Got it. We will note. Thanks\"    "

## 2022-11-29 NOTE — ED PROVIDER NOTES
"CHIEF COMPLAINT  Chief Complaint   Patient presents with    ALOC    Edema     Ble edema       HPI  Gilmer Mae is a 82 y.o. male here for evaluation of confusion and altered mental status.  The patient provides very little history, other than his wife is having surgery, and he is unsure as to where he is at.  He has no fever chills, and no vomiting.  He has not been eating and drinking as much because of possible stress, and because he has had some lower extremity swelling.  Patient has no current chest pain or shortness of breath, he denies any fall.    ROS;  Please see HPI  O/W negative     PAST MEDICAL HISTORY   has a past medical history of Chronic pain, Hyperlipidemia, Hypertension, and Pacemaker.    SOCIAL HISTORY  Social History     Tobacco Use    Smoking status: Unknown    Smokeless tobacco: Not on file   Vaping Use    Vaping Use: Never used   Substance and Sexual Activity    Alcohol use: Not on file    Drug use: Not on file    Sexual activity: Not on file       SURGICAL HISTORY   has a past surgical history that includes pacemaker insertion.    CURRENT MEDICATIONS  Home Medications       Reviewed by Shanon Castillo (Pharmacy Tech) on 11/28/22 at 1527  Med List Status: Unable to Obtain     Medication Last Dose Status   amLODIPine (NORVASC) 10 MG Tab  Active   ASPIRIN EC PO  Active   atorvastatin (LIPITOR) 40 MG Tab  Active   carvedilol (COREG) 12.5 MG Tab  Active   cloNIDine (CATAPRES) 0.1 MG Tab  Active   hydroCHLOROthiazide (HYDRODIURIL) 50 MG Tab  Active   HYDROcodone-acetaminophen (NORCO) 5-325 MG Tab per tablet  Active   levETIRAcetam (KEPPRA) 500 MG Tab  Active   lisinopril (PRINIVIL) 10 MG Tab  Active                    ALLERGIES  No Known Allergies    REVIEW OF SYSTEMS  See HPI for further details. Review of systems as above, otherwise all other systems are negative.     PHYSICAL EXAM  VITAL SIGNS: /56   Pulse 60   Temp 35.9 °C (96.7 °F) (Temporal)   Resp 15   Ht 1.727 m (5' 8\")   Wt " 74.8 kg (165 lb)   SpO2 95%   BMI 25.09 kg/m²     Constitutional: Well developed, well nourished. No acute distress.  HEENT: Normocephalic, atraumatic. MMM  Neck: Supple, Full range of motion   Chest/Pulmonary:  No respiratory distress.  Equal expansion   Abdomen; soft, nontender, no guarding.  Back; nontender midline, no CVA tenderness.  Musculoskeletal: No deformity, no edema, neurovascular intact.   Neuro: Clear speech, appropriate, cooperative, cranial nerves II-XII grossly intact.  Alert and oriented x2, moves all extremities x4.  Psych: Anxious mood and affect    Results for orders placed or performed during the hospital encounter of 11/28/22   CBC With Differential   Result Value Ref Range    WBC 13.9 (H) 4.8 - 10.8 K/uL    RBC 3.46 (L) 4.70 - 6.10 M/uL    Hemoglobin 11.8 (L) 14.0 - 18.0 g/dL    Hematocrit 34.6 (L) 42.0 - 52.0 %    .0 (H) 81.4 - 97.8 fL    MCH 34.1 (H) 27.0 - 33.0 pg    MCHC 34.1 33.7 - 35.3 g/dL    RDW 44.1 35.9 - 50.0 fL    Platelet Count 224 164 - 446 K/uL    MPV 10.9 9.0 - 12.9 fL    Neutrophils-Polys 84.40 (H) 44.00 - 72.00 %    Lymphocytes 8.60 (L) 22.00 - 41.00 %    Monocytes 5.90 0.00 - 13.40 %    Eosinophils 0.10 0.00 - 6.90 %    Basophils 0.20 0.00 - 1.80 %    Immature Granulocytes 0.80 0.00 - 0.90 %    Nucleated RBC 0.00 /100 WBC    Neutrophils (Absolute) 11.77 (H) 1.82 - 7.42 K/uL    Lymphs (Absolute) 1.20 1.00 - 4.80 K/uL    Monos (Absolute) 0.82 0.00 - 0.85 K/uL    Eos (Absolute) 0.01 0.00 - 0.51 K/uL    Baso (Absolute) 0.03 0.00 - 0.12 K/uL    Immature Granulocytes (abs) 0.11 0.00 - 0.11 K/uL    NRBC (Absolute) 0.00 K/uL   Comp Metabolic Panel   Result Value Ref Range    Sodium 137 135 - 145 mmol/L    Potassium 4.2 3.6 - 5.5 mmol/L    Chloride 98 96 - 112 mmol/L    Co2 20 20 - 33 mmol/L    Anion Gap 19.0 (H) 7.0 - 16.0    Glucose 116 (H) 65 - 99 mg/dL    Bun 122 (H) 8 - 22 mg/dL    Creatinine 4.67 (HH) 0.50 - 1.40 mg/dL    Calcium 8.6 8.5 - 10.5 mg/dL    AST(SGOT) 24  12 - 45 U/L    ALT(SGPT) 13 2 - 50 U/L    Alkaline Phosphatase 149 (H) 30 - 99 U/L    Total Bilirubin 1.1 0.1 - 1.5 mg/dL    Albumin 3.5 3.2 - 4.9 g/dL    Total Protein 6.9 6.0 - 8.2 g/dL    Globulin 3.4 1.9 - 3.5 g/dL    A-G Ratio 1.0 g/dL   Lipase   Result Value Ref Range    Lipase 31 11 - 82 U/L   URINALYSIS,CULTURE IF INDICATED    Specimen: Urine, Clean Catch   Result Value Ref Range    Micro Urine Req Microscopic    ESTIMATED GFR   Result Value Ref Range    GFR (CKD-EPI) 12 (A) >60 mL/min/1.73 m 2   Prothrombin time (INR)   Result Value Ref Range    PT 13.9 12.0 - 14.6 sec    INR 1.08 0.87 - 1.13   PROTEIN/CREAT RATIO URINE   Result Value Ref Range    Total Protein, Urine 23.0 (H) 0.0 - 15.0 mg/dL    Creatinine, Random Urine 251.69 mg/dL    Protein Creatinine Ratio 91 (H) 15 - 68 mg/g   MICROALBUMIN CREAT RATIO URINE   Result Value Ref Range    Creatinine, Urine 250.73 mg/dL    Microalbumin, Urine Random 1.4 mg/dL    Micro Alb Creat Ratio 6 0 - 30 mg/g   PROLACTIN   Result Value Ref Range    Prolactin 18.30 (H) 2.10 - 17.70 ng/mL   PROCALCITONIN   Result Value Ref Range    Procalcitonin 0.55 (H) <0.25 ng/mL   proBrain Natriuretic Peptide, NT   Result Value Ref Range    NT-proBNP 502 (H) 0 - 125 pg/mL     CT-HEAD W/O   Final Result      1.  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   2.  Mild cerebral atrophy. Age-appropriate.   3.  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   4.  No acute findings are evident.         DX-CHEST-PORTABLE (1 VIEW)   Final Result      No evidence of acute cardiopulmonary process.      US-ABDOMEN COMPLETE SURVEY    (Results Pending)   US-EXTREMITY ARTERY LOWER UNILAT W/MARGARITA (COMBO) RIGHT    (Results Pending)           PROCEDURES     MEDICAL RECORD  I have reviewed patient's medical record and pertinent results are listed.    COURSE  & MEDICAL DECISION MAKING  I have reviewed any medical record information, laboratory studies and radiographic results as noted above.    4:00 PM  At this time, the patient will be admitted to the hospital service for further evaluation and treatment.  He does have elevated creatinine, which is new for him, and so Dr. Toussaint states he will call nephrology at some point.  Reeval, the patient is alert and oriented x4.  He is agreeable to coming to the hospital, for further work-up.    CRITICAL CARE  The very real possibility of a deterioration of this patient's condition required the highest level of my preparedness for sudden, emergent intervention.  I provided critical care services, which included medication orders, frequent reevaluations of the patient's condition and response to treatment, ordering and reviewing test results, and discussing the case with various consultants.  The critical care time associated with the care of the patient was 35 minutes. Review chart for interventions. This time is exclusive of any other billable procedures.         FINAL IMPRESSION  1. ALL (acute kidney injury) (HCC)        2.      Critical care time 35 minutes.         Electronically signed by: Wally Villareal D.O., 11/28/2022 4:45 PM    ED Provider Note

## 2022-11-29 NOTE — PROGRESS NOTES
Patient refused labs again. Explained and educated the importance of labs in order to create plan of care for him, continues to refuse. Patient is also alert and oriented X4.

## 2022-11-29 NOTE — PROGRESS NOTES
4 Eyes Skin Assessment Completed by LISA Ann and LISA Raygoza.    Head WDL  Ears Redness and Blanching  Nose WDL  Mouth WDL  Neck WDL  Breast/Chest WDL  Shoulder Blades WDL  Spine WDL  (R) Arm/Elbow/Hand WDL  (L) Arm/Elbow/Hand WDL  Abdomen WDL  Groin Incision  Scrotum/Coccyx/Buttocks Redness and Blanching  (R) Leg WDL  (L) Leg WDL  (R) Heel/Foot/Toe Redness, Ulcer(s), and Edema  (L) Heel/Foot/Toe Redness, Blanching, and Edema          Devices In Places Blood Pressure Cuff and Nasal Cannula      Interventions In Place Gray Ear Foams, Heel Mepilex, Pillows, and Pressure Redistribution Mattress    Possible Skin Injury Yes    Pictures Uploaded Into Epic Yes  Wound Consult Placed Yes  RN Wound Prevention Protocol Ordered Yes

## 2022-11-29 NOTE — CONSULTS
"Nephrology Consultation    Date of Service  11/28/2022    Referring Physician  Deshawn Toussaint M.D.    Consulting Physician  Topher Hearn M.D.    Reason for Consultation  ALL    History of Presenting Illness  82 y.o. male with a history of PVD, HTN who presented 11/28/2022 with encephalopathy.    The patient says he doesn't know why he is in the ER or how he got here. He complains of pain \"all over,\" but then specifies it's in his right foot. He had a surgery on his right leg \"two weeks ago, but they didn't finish.\" He is disoriented. He denies chest pain, SOB. He thinks he urinates ok. He cannot tell me if he has been eating or drinking well at home prior to admission.    On chart review, patient had TAVR in 8/2022 at [facility]. He then had some vascular surgery on his right leg recently and still has a healing incision. He was brought in for encephalopathy. He urinated 400 mL in the ER.       Review of Systems  Review of Systems   Constitutional:  Negative for fever.   Respiratory:  Negative for shortness of breath.    Cardiovascular:  Negative for chest pain.   Gastrointestinal:  Negative for abdominal pain.   Musculoskeletal:         Right foot pain   All other systems reviewed and are negative.      Due to the patient's encephalopathy, I am unable to obtain past medical, surgical, family, or social history.  The past histories documented below by other providers have been reviewed by me.   Past Medical History  Past Medical History:   Diagnosis Date    Chronic pain     Hyperlipidemia     Hypertension     Pacemaker        Surgical History  Past Surgical History:   Procedure Laterality Date    PACEMAKER INSERTION         Family History  No family history on file.    Social History  Social History     Socioeconomic History    Marital status:      Spouse name: Not on file    Number of children: Not on file    Years of education: Not on file    Highest education level: Not on file   Occupational History    Not " on file   Tobacco Use    Smoking status: Unknown    Smokeless tobacco: Not on file   Vaping Use    Vaping Use: Never used   Substance and Sexual Activity    Alcohol use: Not on file    Drug use: Not on file    Sexual activity: Not on file   Other Topics Concern    Not on file   Social History Narrative    Not on file     Social Determinants of Health     Financial Resource Strain: Not on file   Food Insecurity: Not on file   Transportation Needs: Not on file   Physical Activity: Not on file   Stress: Not on file   Social Connections: Not on file   Intimate Partner Violence: Not on file   Housing Stability: Not on file       Medications  Current Facility-Administered Medications   Medication Dose Route Frequency Provider Last Rate Last Admin    [Held by provider] lisinopril (PRINIVIL) tablet 40 mg  40 mg Oral DAILY Deshawn Toussaint M.D.        levETIRAcetam (KEPPRA) tablet 500 mg  500 mg Oral BID Deshawn Toussaint M.D.   500 mg at 11/28/22 1724    [Held by provider] hydroCHLOROthiazide (HYDRODIURIL) tablet 25 mg  25 mg Oral DAILY Deshawn Toussaint M.D.        atorvastatin (LIPITOR) tablet 40 mg  40 mg Oral Q EVENING Deshawn Toussaint M.D.   40 mg at 11/28/22 1724    aspirin EC (ECOTRIN) tablet 81 mg  81 mg Oral DAILY Deshawn Toussaint M.D.   81 mg at 11/28/22 1729    senna-docusate (PERICOLACE or SENOKOT S) 8.6-50 MG per tablet 2 Tablet  2 Tablet Oral BID Deshawn Toussaint M.D.   2 Tablet at 11/28/22 1724    And    polyethylene glycol/lytes (MIRALAX) PACKET 1 Packet  1 Packet Oral QDAY PRN Deshawn Toussaint M.D.        And    magnesium hydroxide (MILK OF MAGNESIA) suspension 30 mL  30 mL Oral QDAY PRN Deshawn Toussaint M.D.        And    bisacodyl (DULCOLAX) suppository 10 mg  10 mg Rectal QDAY PRN Deshawn Toussaint M.D.        lactated ringers infusion (BOLUS)  1,000 mL Intravenous Once PRN Deshawn Toussaint M.D.        lactated ringers infusion (BOLUS): BMI less than or equal to 30  30 mL/kg Intravenous Once PRN Deshawn Toussaint M.D.        [START ON 11/29/2022] heparin  injection 5,000 Units  5,000 Units Subcutaneous Q8HRS Deshawn Toussaint M.D.        lactated ringers infusion   Intravenous Continuous Deshawn Toussaint M.D. 125 mL/hr at 11/28/22 1729 New Bag at 11/28/22 1729    acetaminophen (Tylenol) tablet 650 mg  650 mg Oral Q6HRS PRN Deshawn Toussaint M.D.        Pharmacy Consult Request ...Pain Management Review 1 Each  1 Each Other PHARMACY TO DOSE Deshawn Toussaint M.D.        oxyCODONE immediate-release (ROXICODONE) tablet 5 mg  5 mg Oral Q3HRS PRN Deshawn Toussaint M.D.        Or    oxyCODONE immediate release (ROXICODONE) tablet 10 mg  10 mg Oral Q3HRS PRN Deshawn Toussaint M.D.        [START ON 11/29/2022] cefTRIAXone (Rocephin) 2,000 mg in  mL IVPB  2,000 mg Intravenous Q24HR Deshawn Toussaint M.D.        ondansetron (ZOFRAN) syringe/vial injection 4 mg  4 mg Intravenous Q4HRS PRN Deshawn Toussaint M.D.        ondansetron (ZOFRAN ODT) dispertab 4 mg  4 mg Oral Q4HRS PRN Deshawn Toussaint M.D.        HYDROmorphone (Dilaudid) injection 0.5 mg  0.5 mg Intravenous Q4HRS PRN Deshawn Toussaint M.D.        [START ON 11/29/2022] amLODIPine (NORVASC) tablet 5 mg  5 mg Oral DAILY Deshawn Toussaint M.D.        carvedilol (COREG) tablet 6.25 mg  6.25 mg Oral BID WITH MEALS Deshawn Toussaint M.D.   6.25 mg at 11/28/22 1724    cloNIDine (CATAPRES) tablet 0.1 mg  0.1 mg Oral BID PRN Deshawn Toussaint M.D.        gabapentin (NEURONTIN) capsule 100 mg  100 mg Oral BID Deshawn Toussaint M.D.   100 mg at 11/28/22 1724     Current Outpatient Medications   Medication Sig Dispense Refill    atorvastatin (LIPITOR) 40 MG Tab Take 1 Tab by mouth every evening. 30 Tab 0    levETIRAcetam (KEPPRA) 500 MG Tab Take 1 Tab by mouth 2 Times a Day. 60 Tab 0    ASPIRIN EC PO Take 81 mg by mouth every day.      lisinopril (PRINIVIL) 10 MG Tab Take 40 mg by mouth every day.      hydroCHLOROthiazide (HYDRODIURIL) 50 MG Tab Take 25 mg by mouth every day.      HYDROcodone-acetaminophen (NORCO) 5-325 MG Tab per tablet Take 1-2 Tabs by mouth every four hours as needed.      amLODIPine  (NORVASC) 10 MG Tab Take 10 mg by mouth every day.      carvedilol (COREG) 12.5 MG Tab Take 12.5 mg by mouth 2 times a day, with meals.      cloNIDine (CATAPRES) 0.1 MG Tab Take 0.1 mg by mouth 2 times a day.         Allergies  No Known Allergies    Physical Exam  Temp:  [35.9 °C (96.7 °F)] 35.9 °C (96.7 °F)  Pulse:  [60-68] 60  Resp:  [12-18] 17  BP: (105-128)/(53-71) 114/61  SpO2:  [85 %-99 %] 97 %    Physical Exam  Constitutional:       General: He is not in acute distress.     Appearance: He is well-developed. He is ill-appearing. He is not diaphoretic.   HENT:      Head: Normocephalic and atraumatic.      Mouth/Throat:      Mouth: Mucous membranes are moist.      Pharynx: Oropharynx is clear. No oropharyngeal exudate.   Eyes:      General: No scleral icterus.     Extraocular Movements: Extraocular movements intact.   Neck:      Trachea: No tracheal deviation.   Cardiovascular:      Rate and Rhythm: Normal rate.      Heart sounds: Normal heart sounds. No murmur heard.  Pulmonary:      Effort: Pulmonary effort is normal.      Breath sounds: Normal breath sounds. No stridor. No rales.   Abdominal:      General: There is no distension.      Palpations: Abdomen is soft.      Tenderness: There is abdominal tenderness (lower abdomen).   Musculoskeletal:         General: Normal range of motion.      Right lower leg: Edema (1+) present.      Left lower leg: Edema (1+) present.   Skin:     General: Skin is warm and dry.   Neurological:      General: No focal deficit present.      Mental Status: He is alert. He is disoriented.   Psychiatric:         Mood and Affect: Mood normal.         Behavior: Behavior normal.      Comments: Patient pleasantly disoriented       Fluids  Date 11/28/22 0700 - 11/29/22 0659   Shift 7624-0118 6977-2845 1034-2592 24 Hour Total   INTAKE   I.V.  138.3  138.3   Shift Total  138.3  138.3   OUTPUT   Shift Total       Weight (kg) 74.8 74.8 74.8 74.8       Laboratory  Labs reviewed, pertinent labs  below.  Recent Labs     11/28/22  1335   WBC 13.9*   RBC 3.46*   HEMOGLOBIN 11.8*   HEMATOCRIT 34.6*   .0*   MCH 34.1*   MCHC 34.1   RDW 44.1   PLATELETCT 224   MPV 10.9     Recent Labs     11/28/22  1335   SODIUM 137   POTASSIUM 4.2   CHLORIDE 98   CO2 20   GLUCOSE 116*   *   CREATININE 4.67*   CALCIUM 8.6     Recent Labs     11/28/22  1335   INR 1.08            URINALYSIS:  Lab Results   Component Value Date/Time    COLORURINE Yellow 11/28/2022 1526    CLARITY Clear 11/28/2022 1526    SPECGRAVITY >=1.030 11/28/2022 1526    PHURINE 5.5 11/28/2022 1526    KETONES Negative 11/28/2022 1526    PROTEINURIN Negative 11/28/2022 1526    BILIRUBINUR Small (A) 11/28/2022 1526    UROBILU 0.2 11/28/2022 1526    NITRITE Negative 11/28/2022 1526    LEUKESTERAS Negative 11/28/2022 1526    OCCULTBLOOD Small (A) 11/28/2022 1526     UPC  Lab Results   Component Value Date/Time    TOTPROTUR 23.0 (H) 11/28/2022 1526      Lab Results   Component Value Date/Time    CREATININEU 251.69 11/28/2022 1526       Imaging interpreted by radiologist. Imaging reports reviewed with pertinent findings below  CT-HEAD W/O   Final Result      1.  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   2.  Mild cerebral atrophy. Age-appropriate.   3.  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   4.  No acute findings are evident.         DX-CHEST-PORTABLE (1 VIEW)   Final Result      No evidence of acute cardiopulmonary process.      US-ABDOMEN COMPLETE SURVEY    (Results Pending)   US-EXTREMITY ARTERY LOWER UNILAT W/MARGARITA (COMBO) RIGHT    (Results Pending)         Assessment/Plan  82 y.o. 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    1. ALL, with no known history of CKD from baseline labs in 8/2022. Unclear etiology of ALL, but given hyaline casts  on UA, I suspect volume depletion. Obstructive uropathy is possible given his suprapubic tenderness. Check renal/bladder US. For now, I recommend IV fluids with LR at a rate of at least 100 ml/hr. No emergent need for dialysis. Avoid nephrotoxins, check labs daily.     2. Elevated anion gap metabolic acidosis. Mild. Order LR fluids as above. Check labs daily.     3. Azotemia, due to ALL. Patient with encephalopathy, but no emergent need for dialysis. Check labs daily and assess mental status daily.     4. PVD with recent right leg surgery. Obtain outside records. I recommend against iodine based contrast for now.     5. Macrocytic anemia, noted on admission. Mild. Unclear etiology. Check CBC daily.     6. Leukocytosis, noted on admission. Unclear etiology. Mild. Check CBC daily.     7. HTN, controlled with borderline low BP on admission. Hold home lisinopril, HCTZ, amlodipine. Continue carvedilol unless systolic BP is less than 100. Low salt diet.     Discussed with Dr. Norbert Hearn MD  Nephrology  [facility]

## 2022-11-29 NOTE — DIETARY
"Nutrition services: Day 1 of admit.  Gilmer Mae is a 82 y.o. male with admitting DX of ALL (acute kidney injury).  Consult received for Malnutrition Screening Tool score of 3 for unsure of unplanned weight loss and poor appetite.    Assessment:  Height: 172.7 cm (5' 7.99\")  Weight: 67 kg (147 lb 11.3 oz)  Body mass index is 22.46 kg/m²., BMI classification: Normal  Diet/Intake: Renal    Evaluation:   Pt admitted with ALL and altered mental status. Per H&P, pt had decreased oral intake for about a week.  Attempted to speak with pt, however, he asked this RD to go away. Unable to complete nutrition focused physical exam.  Per chart review, weight on 8/3/22 = 76.6 kg. This indicates 12.5% weight loss in just under 4 months which is severe.  Pt refused breakfast this morning. Will add Boost Glucose Control with meals to help encourage adequate intake.    Malnutrition Risk: At risk with 12.5% weight loss x 4 months per chart review, but unable to fully assess at this time.    Recommendations/Plan:  Boost Glucose control with meals.   Encourage intake of meals >50%.  Document intake of all meals and supplements as % taken in ADL's to provide interdisciplinary communication across all shifts.   Monitor weight.  Nutrition rep will continue to see patient for ongoing meal and snack preferences.     RD following    "

## 2022-11-29 NOTE — PROGRESS NOTES
Patient is alert and oriented x 4. Delayed response, complaint of pain on his right heel wound. Elevated and repositioned for comfort 2/10 pain. IV site patent with ongoing LR x 125, flushing well. Tiffanie care done, 4 eyes skin checked and admit profile done. 1 lpm via NC of oxygen sats at 96%. Bed in lowest position, bed locked, bed alarm. Call light and personal items within reached. Kept safe and monitored.

## 2022-11-29 NOTE — ED NOTES
Pt incontinent of urine. Linens changed. Pillows placed under heels call light and urinal at bedside.

## 2022-11-29 NOTE — PROGRESS NOTES
"Message to Dr. Shamir Stanford via Voalte:    \"Hi, pt refused labs again. I explained that providers spoke to him about it too and he told us no. Thank you\"    Response:    \"Ok we will document, thank you\"  "

## 2022-11-29 NOTE — DISCHARGE PLANNING
Case Management Discharge Planning    Admission Date: 11/28/2022  GMLOS: 3.5  ALOS: 1    6-Clicks ADL Score: 15  6-Clicks Mobility Score: 14  PT and/or OT Eval ordered: Yes  Post-acute Referrals Ordered: No  Post-acute Choice Obtained: No  Has referral(s) been sent to post-acute provider:  No      Anticipated Discharge Dispo:     DME Needed: No    Action(s) Taken: Updated Provider/Nurse on Discharge Plan    Escalations Completed: None    Medically Clear: No    Next Steps: Patient discussed during morning IDT rounds with team. Patient is not medically cleared for discharge at this time. PT/OT ordered by provider. CM to follow for dc planning needs. No needs at this time.     Barriers to Discharge: Medical clearance    Is the patient up for discharge tomorrow: No, 3-4 days

## 2022-11-29 NOTE — CARE PLAN
The patient is Stable - Low risk of patient condition declining or worsening    Shift Goals  Clinical Goals: safety, monitor labs and v/s, rest  Patient Goals: rest  Family Goals: ivelisse      Problem: Knowledge Deficit - Standard  Goal: Patient and family/care givers will demonstrate understanding of plan of care, disease process/condition, diagnostic tests and medications  Outcome: Progressing     Problem: Hemodynamics  Goal: Patient's hemodynamics, fluid balance and neurologic status will be stable or improve  Outcome: Progressing     Problem: Fluid Volume  Goal: Fluid volume balance will be maintained  Outcome: Progressing     Problem: Urinary - Renal Perfusion  Goal: Ability to achieve and maintain adequate renal perfusion and functioning will improve  Outcome: Progressing     Problem: Respiratory  Goal: Patient will achieve/maintain optimum respiratory ventilation and gas exchange  Outcome: Progressing     Problem: Pain - Standard  Goal: Alleviation of pain or a reduction in pain to the patient’s comfort goal  Outcome: Progressing     Problem: Skin Integrity  Goal: Skin integrity is maintained or improved  Outcome: Progressing       Progress made toward(s) clinical / shift goals:  Patient is alert and oriented x 4. Delayed response, complaint of pain on his right heel wound. Elevated and repositioned for comfort 2/10 pain. IV site patent with ongoing LR x 125, flushing well. Tiffanie care done, 4 eyes skin checked and admit profile done. 1 lpm via NC of oxygen sats at 96%. Bed in lowest position, bed locked, bed alarm. Call light and personal items within reached. Kept safe and monitored.     Patient is not progressing towards the following goals:

## 2022-11-29 NOTE — WOUND TEAM
"[facility]  Inpatient Services  Initial Wound and Skin Care Evaluation    Admission Date: 11/28/2022     Last order of IP CONSULT TO WOUND CARE was found on 11/28/2022 from Hospital Encounter on 11/28/2022     HPI, PMH, SH: Reviewed    Past Surgical History:   Procedure Laterality Date    PACEMAKER INSERTION       Social History     Tobacco Use    Smoking status: Unknown    Smokeless tobacco: Not on file   Substance Use Topics    Alcohol use: Not on file     Chief Complaint   Patient presents with    ALOC    Edema     Ble edema     Diagnosis: ALL (acute kidney injury) (HCC) [N17.9]    Unit where seen by Wound Team: [id number]/[id number]     WOUND CONSULT/FOLLOW UP RELATED TO:  Right heel      WOUND HISTORY:  Patient unable to give any history of wound, not answering any questions.   \"82 y.o. male who presented 11/28/2022 with altered mental status.  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  Patient is a poor historian so history was obtained via chart review.  Patient had decreased oral intake both eating and drinking for about a week.  He was having altered mental status as well as bilateral lower extremity edema.  He was brought to the hospital and found to have ALL.\"    WOUND ASSESSMENT/LDA  Wound 11/28/22 Pressure Injury Heel Right POA unstageable (Active)   Wound Image   11/29/22 0900   Site Assessment Brown;Yellow 11/29/22 0900   Periwound Assessment Callused;Dry 11/29/22 0900   Margins Attached edges;Defined edges 11/29/22 0900   Closure Secondary intention 11/29/22 0900   Drainage Amount None 11/29/22 0900   Drainage Description Serosanguineous 11/29/22 0800   Treatments Cleansed;Site care;Offloading;Tape change 11/29/22 0900   Wound Cleansing Approved Wound Cleanser 11/29/22 0900   Periwound Protectant Skin Protectant Wipes to Periwound 11/29/22 0900   Dressing Cleansing/Solutions Not Applicable 11/29/22 0900   Dressing Options Honey Colloid 11/29/22 0900   Dressing Changed New 11/29/22 0900 "   Dressing Status Clean;Dry;Intact 11/29/22 0900   Dressing Change/Treatment Frequency Every 48 hrs, and As Needed 11/29/22 0900   NEXT Dressing Change/Treatment Date 12/01/22 11/29/22 0900   NEXT Weekly Photo (Inpatient Only) 12/06/22 11/29/22 0900   WOUND NURSE ONLY - Pressure Injury Stage U 11/29/22 0900   Wound Length (cm) 3.5 cm 11/29/22 0900   Wound Width (cm) 1 cm 11/29/22 0900   Wound Surface Area (cm^2) 3.5 cm^2 11/29/22 0900   WOUND NURSE ONLY - Time Spent with Patient (mins) 45 11/29/22 0900   Number of days: 1        Vascular:    MARGARITA:   No results found.    Lab Values:    Lab Results   Component Value Date/Time    WBC 13.9 (H) 11/28/2022 01:35 PM    RBC 3.46 (L) 11/28/2022 01:35 PM    HEMOGLOBIN 11.8 (L) 11/28/2022 01:35 PM    HEMATOCRIT 34.6 (L) 11/28/2022 01:35 PM    HBA1C 5.2 09/10/2019 04:16 AM        Culture Results show:  No results found for this or any previous visit (from the past 720 hour(s)).    Pain Level/Medicated:  some whimpering noted, did not answer pain scale.        INTERVENTIONS BY WOUND TEAM:  Chart and images reviewed. Discussed with bedside RN. All areas of concern (based on picture review, LDA review and discussion with bedside RN) have been thoroughly assessed. Documentation of areas based on significant findings. This RN in to assess patient. Performed standard wound care which includes appropriate positioning, dressing removal and non-selective debridement. Pictures and measurements obtained weekly if/when required.  Preparation for Dressing removal: removed mepilex  Non-selectively Debrided with:  NS and gauze.  Sharp debridement: n/a  Tiffanie wound: Cleansed with NS, Prepped with no sting skin prep  Primary Dressing: honeycolloid  Secondary (Outer) Dressing: heel mepilex    Interdisciplinary consultation: Patient, Bedside RN, wound RN MD Phani Alanis updated    EVALUATION / RATIONALE FOR TREATMENT:  Most Recent Date:  11/29/22: Patient admitted with POA unstageable pressure  4.4*   RBC  3.59*  3.70*   HGB  10.5*  10.7*   HCT  32.2*  32.9*   MCV  89.7  88.9   MCH  29.2  28.9   MCHC  32.6  32.5   RDW  16.2*  15.9*   PLT  155  167   MPV  10.3  10.3       No results for input(s): POCGLU in the last 72 hours. CBC with Differential:    Lab Results   Component Value Date    WBC 4.4 09/05/2018    RBC 3.70 09/05/2018    HGB 10.7 09/05/2018    HCT 32.9 09/05/2018     09/05/2018    MCV 88.9 09/05/2018    MCH 28.9 09/05/2018    MCHC 32.5 09/05/2018    RDW 15.9 09/05/2018    NRBC 0.0 11/23/2016    BANDSPCT 2 09/19/2014    LYMPHOPCT 16.6 09/05/2018    MONOPCT 12.8 09/05/2018    BASOPCT 0.7 09/05/2018    MONOSABS 0.56 09/05/2018    LYMPHSABS 0.73 09/05/2018    EOSABS 0.23 09/05/2018    BASOSABS 0.03 09/05/2018     Hepatic Function Panel:    Lab Results   Component Value Date    ALKPHOS 106 09/02/2018    ALT 9 09/02/2018    AST 15 09/02/2018    PROT 8.7 09/02/2018    BILITOT 0.6 09/02/2018    BILIDIR <0.2 08/18/2017    IBILI see below 08/18/2017    LABALBU 3.9 09/02/2018     Last 3 Troponin:    Lab Results   Component Value Date    TROPONINI <0.01 04/11/2016    TROPONINI <0.01 09/17/2014     HgBA1c:    Lab Results   Component Value Date    LABA1C 6.4 07/31/2018     FLP:  No results found for: TRIG, HDL, LDLCALC, LDLDIRECT, LABVLDL  TSH:  No results found for: TSH    Imaging:   XR CHEST 1 VW   Final Result      Right-sided PICC line tip in the SVC/right atrial junction. No pneumothorax on the right or on the left. No acute cardiopulmonary process. Patient Instructions:      Medication List      START taking these medications    enoxaparin 60 MG/0.6ML injection  Commonly known as:  LOVENOX  Inject 0.5 mLs into the skin daily     famotidine 20 MG tablet  Commonly known as:  PEPCID  Take 1 tablet by mouth 2 times daily        CHANGE how you take these medications    doxazosin 8 MG tablet  Commonly known as:  CARDURA  Take 1 tablet by mouth daily.   What changed:  when to injury to the right heel. Non-viable tissue covering base of wound bed, so unable to assess depth, Honey colloid applied to cleanse and autolytically debride due to its high osmolarity. As well as help lower overall wound pH, while promoting a moisture-balanced environment conducive to wound healing. Mepilex to offload. Palpable pulses DP and PT, edema noted in foot and ankle. Recommend imaging to assure no bone involvement due to the high amounts of pain present in the heel area.        Goals: Steady decrease in wound area and depth weekly.    WOUND TEAM PLAN OF CARE ([X] for frequency of wound follow up,):   Nursing to follow dressing orders written for wound care. Contact wound team if area fails to progress, deteriorates or with any questions/concerns if something comes up before next scheduled follow up (See below as to whether wound is following and frequency of wound follow up)  Dressing changes by wound team:                   Follow up 3 times weekly:                NPWT change 3 times weekly:     Follow up 1-2 times weekly:   X once weekly.    Follow up Bi-Monthly:           Follow up Monthly (High Risk):                        Follow up as needed:     Other (explain):     NURSING PLAN OF CARE ORDERS (X):  Dressing changes: See Dressing Care orders: X  Skin care: See Skin Care orders:   RN Prevention Protocol: X  Rectal tube care: See Rectal Tube Care orders:   Other orders:    RSKIN:   CURRENTLY IN PLACE (X), APPLIED THIS VISIT (A), ORDERED (O):   Q shift Luis M:  X  Q shift pressure point assessments:  X    Surface/Positioning   Pressure redistribution mattress   X         Low Airloss          ICU Low Airloss   Bariatric MARY     Waffle cushion        Waffle Overlay  X        Reposition q 2 hours   X   TAPs Turning system     Z Renato Pillow     Offloading/Redistribution   Sacral Mepilex (Silicone dressing)     Heel Mepilex (Silicone dressing)     X    Heel float boots (Prevalon boot)             Float Heels  off Bed with Pillows    X       Respiratory   Silicone O2 tubing    X     Gray Foam Ear protectors     Cannula fixation Device (Tender )          High flow offloading Clip    Elastic head band offloading device      Anchorfast                                                         Trach with Optifoam split foam             Containment/Moisture Prevention   n/a  Rectal tube or BMS    Purwick/Condom Cath        Kimball Catheter    Barrier wipes           Barrier paste       Antifungal tx      Interdry        Mobilization     MAYA  Up to chair        Ambulate      PT/OT      Nutrition       Dietician        Diabetes Education      PO   X  TF     TPN     NPO   # days     Other        Anticipated discharge plans: TBD, will likely need ongoing assistance  LTACH:        SNF/Rehab:      X            Home Health Care:   X        Outpatient Wound Center:   X         Self/Family Care:        Other:                  Vac Discharge Needs:   Not Applicable Pt not on a wound vac:     X  Regular Vac while inpatient, alternative dressing at DC:        Regular Vac in use and continued at DC:            Reg. Vac w/ Skin Sub/Biologic in use. Will need to be changed 2x wkly:      Veraflo Vac while inpatient, ok to transition to Regular Vac on Discharge:           Veraflo Vac while inpatient, will need to remain on Veraflo Vac upon discharge:                            take this     furosemide 40 MG tablet  Commonly known as:  LASIX  Take 1 tablet by mouth daily. What changed:  · when to take this  · reasons to take this     hydrALAZINE 50 MG tablet  Commonly known as:  APRESOLINE  Take 1 tablet by mouth 3 times daily  What changed:  · medication strength  · how much to take     pioglitazone 45 MG tablet  Commonly known as:  ACTOS  Take 1 tablet by mouth daily. What changed:  when to take this        CONTINUE taking these medications    atorvastatin 10 MG tablet  Commonly known as:  LIPITOR     carbidopa-levodopa  MG per tablet  Commonly known as:  SINEMET     diltiazem 240 MG extended release capsule  Commonly known as:  CARDIZEM CD  Take 1 capsule by mouth nightly     ibuprofen 600 MG tablet  Commonly known as:  ADVIL;MOTRIN  Take 1 tablet by mouth every 8 hours as needed        STOP taking these medications    cephALEXin 500 MG capsule  Commonly known as:  KEFLEX     potassium chloride 20 MEQ extended release tablet  Commonly known as:  KLOR-CON M           Where to Get Your Medications      Information about where to get these medications is not yet available    Ask your nurse or doctor about these medications  · enoxaparin 60 MG/0.6ML injection  · famotidine 20 MG tablet  · hydrALAZINE 50 MG tablet           Note that less than 30 minutes was spent in preparing discharge papers, discussing discharge with patient, medication review, etc.    Signed:  Electronically signed by Kate Fonseac MD on 9/6/2018 at 10:59 AM    NOTE: This report was transcribed using voice recognition software. Every effort was made to ensure accuracy; however, inadvertent computerized transcription errors may be present.

## 2022-11-29 NOTE — PROGRESS NOTES
"[facility] Internal Medicine Daily Progress Note    Date of Service  11/29/2022    [facility] Team: [facility]  Purple Team   Attending: AKIRA Jeronimo M.d.  Senior Resident: Dr. Kina HUMPHREYS  Intern:  Dr. Hien HUMPHREYS  Contact Number: [phone]    Chief Complaint  Gilmer Mae is a 82 y.o. male admitted 11/28/2022 with encephalopathy and new finding of ALL.    Hospital Course    Gilmer Mae is a 82 year old with past medical history significant for hypertension, hyperlipidemia, sick sinus syndrome (s/p pacemaker placement), seizures (most recent episode in 2019, on keppra), severe aortic stenosis (s/p TAVR 8/2/2022 at [facility]) and peripheral artery disease (s/p vascular surgery on femoral artery in 2022) who presented to ED via EMS on 11/28/2022 for loss of consciousness and bilateral lower extremity edema. History limited due to patient’s altered mental status, A&Ox2 (self, place) so most history obtained from chart review and daughter Kendra. Patient lives with wife and son. Home physical therapy noticed patient was \"really confused\" and called EMS. For past week or so, patient was not eating or drinking, family suspects could be due to stress from new onset bilateral lower extremity edema that started a few days ago. Patient denies chest pain, shortness of breath or falling. Patient says he is having pain “all over” especially his right leg. Patient states he got surgery on his right leg “2 weeks ago but they never finished.”    On arrival to ED, patient was lethargic and confused.   Vitals/exam: /56, HR 60, afebrile (96.7/35.9), RR 15, 95% on 1L. Cardiac exam RRR, pulmonary exam wnl. Abdominal exam significant for diffuse abdominal tenderness and guarding. Musculoskeletal exam was pertinent for tenderness extending from right groin region down to foot, >3 second capillary refill and 1+ peripheral pulses. Right groin with healing incision from recent arterial surgery at [facility]. Wound on heel of right foot.     ED labs: " leukocytosis at 13.9, anemia 11.8/34.6, platelets 224. Na 137, K 4.2, Cl 98, CO2 20, AG 19, glucose 116, , Cr 4.67, Ca 8.6, AST 24, ALT 13, Alk phos 149, total bili 1.1, albumin 3.5, lipase 31, GFR 12. PT 13.9, INR 1.08. Prolactin 18.30 H,  H. Procal 0.55 H. Lactic acid 1.2. Troponin 33. UA with hyaline casts and granular casts. 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 No acute processes. Chest xray showed no acute cardiopulm process. US abdomen showing gallbladder sludge otherwise unremarkable, hepatic cysts, bilateral echogenic kidneys with mildly thinned cortex concerning for possible medical renal disease.    Patient was seen by nephrology who does not recommend dialysis at this time. Patient does not have known history of chronic kidney disease from baseline labs in 8/2022. Unclear etiology of the kidney injury however given hyaline casts on UA, likely secondary to volume depletion. Started on IV  cc/hr. Admitted for encephalopathy and new finding of ALL.    Interval Problem Update    Admitted overnight. This morning, patient seen at bedside sleeping with 1L nasal cannula. Patient initially refused morning labs but later in the day agreed with help of daughter Anjana at bedside. Patient remains confused, A&Ox2 (not oriented to place, situation). Continued right lower extremity pain. Screamed out in pain when R knee was moved. Provided work excuse note to daughter. Urine output was ~950cc in past 24 hours since admission. BUN/Cr showing improvement since volume resuscitation started. Encouraged PO fluids however patient uncooperative- continue IV fluids for ALL, elevated anion gap metabolic acidosis (resolving). Nutrition consulted for unplanned weight loss and poor appetite over past few days- patient refused  to participate, refused breakfast. Dietitian recommending boost glucose control with meals for adequate intake.     I have discussed this patient's plan of care and discharge plan at IDT rounds today with Case Management, Nursing, Nursing leadership, and other members of the IDT team.    Consultants/Specialty  Nephrology    Code Status  Full Code    Disposition  Patient is not medically cleared for discharge.   Anticipate discharge to D  I have placed the appropriate orders for post-discharge needs.    Review of Systems  *Limited due to patient's altered mental status*  Review of Systems   Constitutional:  Negative for fever.   Respiratory:  Negative for shortness of breath.    Cardiovascular:  Negative for chest pain.   Gastrointestinal:  Negative for abdominal pain.   Musculoskeletal:  Positive for falls, joint pain and myalgias.        Right foot pain   Neurological:  Positive for focal weakness (right lower extremity from hip to toes), loss of consciousness and weakness. Negative for tremors, sensory change and speech change.   Psychiatric/Behavioral:  Positive for hallucinations.    All other systems reviewed and are negative.     Physical Exam  Temp:  [36.2 °C (97.2 °F)-36.4 °C (97.6 °F)] 36.4 °C (97.6 °F)  Pulse:  [60-69] 69  Resp:  [18-25] 18  BP: (115-152)/(57-75) 152/75  SpO2:  [96 %-98 %] 96 %    Physical Exam  Constitutional:       General: He is not in acute distress.     Appearance: He is well-developed. He is ill-appearing and toxic-appearing. He is not diaphoretic.   HENT:      Head: Normocephalic and atraumatic.      Right Ear: External ear normal.      Left Ear: External ear normal.      Mouth/Throat:      Pharynx: No oropharyngeal exudate or posterior oropharyngeal erythema.   Eyes:      Comments: Unable to fully assess   Cardiovascular:      Rate and Rhythm: Normal rate and regular rhythm.      Heart sounds:     No gallop.   Pulmonary:      Effort: No respiratory distress.      Breath sounds:  No wheezing.   Abdominal:      General: There is no distension.      Tenderness: There is abdominal tenderness (diffuse). There is guarding. There is no rebound.   Musculoskeletal:         General: Tenderness (Right groin site, right knee, right foot) present.      Right lower leg: Edema (1+) present.      Left lower leg: Edema (1+) present.   Skin:     General: Skin is warm.      Capillary Refill: Capillary refill takes more than 3 seconds.      Coloration: Skin is pale.   Neurological:      Mental Status: He is alert. He is disoriented.      Motor: No weakness.      Comments: Poor historian but answers some questions   Psychiatric:      Comments: Unable to assess       Fluids    Intake/Output Summary (Last 24 hours) at 11/29/2022 1803  Last data filed at 11/29/2022 0900  Gross per 24 hour   Intake --   Output 1350 ml   Net -1350 ml       Laboratory  Recent Labs     11/28/22  1335 11/29/22  1138   WBC 13.9* 14.4*   RBC 3.46* 3.63*   HEMOGLOBIN 11.8* 12.3*   HEMATOCRIT 34.6* 36.2*   .0* 99.7*   MCH 34.1* 33.9*   MCHC 34.1 34.0   RDW 44.1 43.8   PLATELETCT 224 275   MPV 10.9 10.8     Recent Labs     11/28/22  1335 11/29/22  1138 11/29/22  1449   SODIUM 137 144 143   POTASSIUM 4.2 3.9 3.6   CHLORIDE 98 105 102   CO2 20 24 24   GLUCOSE 116* 110* 99   * 89* 85*   CREATININE 4.67* 1.94* 1.63*   CALCIUM 8.6 9.1 9.0     Recent Labs     11/28/22  1335   INR 1.08               Imaging  US-ABDOMEN COMPLETE SURVEY   Final Result         1.  Gallbladder sludge, otherwise unremarkable gallbladder   2.  Hepatic cysts   3.  Bilateral echogenic kidneys with mildly thinned cortex, appearance favors medical renal disease      CT-HEAD W/O   Final Result      1.  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   2.  Mild cerebral atrophy. Age-appropriate.   3.  Encephalomalacic changes in the left cerebellar hemisphere inferiorly consistent with old  infarction, best seen on MRI.   4.  No acute findings are evident.         DX-CHEST-PORTABLE (1 VIEW)   Final Result      No evidence of acute cardiopulmonary process.      MR-FOOT-W/O RIGHT    (Results Pending)   US-EXTREMITY VENOUS LOWER UNILAT RIGHT    (Results Pending)   US-EXTREMITY ARTERY LOWER UNILAT RIGHT    (Results Pending)        Assessment/Plan  Problem Representation:    * Acute encephalopathy  Assessment & Plan  - Per family and chart review, patient is at baseline alert and oriented x4.  - Presented to emergency room on 11/28/2022 with confusion, noted to be alert and oriented x2 (self, place only)  - Altered mental status initially thought to be in setting of azotemia from acute kidney injury however continuing to have encephalopathy despite improving azotemia and improving kidney function. Will continue work up and monitor with daily labs and mental status checks.   - Per nephrology, currently no emergent need for dialysis. Nephrology signs off 11/29.    Right leg pain- (present on admission)  Assessment & Plan  - Given recent right groin access for TAVR, MARGARITA ordered-   - Pain controlled with tylenol 650mg q6 prn, IV dilaudid 0.5mg q4 prn, oxycodone 5-10mg q3 prn  - can consider lidocaine patch, diclofenac gel as needed  - pending MR of R foot w/o  - pending ultrasound of R knee  - PT/OT    Sepsis (HCC)- (present on admission)  Assessment & Plan  - This is Sepsis Present on admission  - SIRS criteria identified on my evaluation include: Leukocytosis, with WBC greater than 12,000  - Source is unknown, possibly abdominal etiology. US 11/29 showing US abdomen showing gallbladder sludge otherwise unremarkable, hepatic cysts, bilateral echogenic kidneys with mildly thinned cortex concerning for possible medical renal disease.  - Sepsis protocol initiated  - Fluid resuscitation ordered per protocol  - Crystalloid Fluid Administration: Fluid resuscitation ordered per standard protocol - 30 mL/kg per  current or ideal body weight  - IV antibiotics as appropriate for source of sepsis- started on IV ceftriaxone, discontinued on 11/29  - Reassessment: I have reassessed the patient's hemodynamic status  - Blood cultures NGTD  - Urine cultures unremarkable  - Hemodynamically stable  - Uptrending WBC, WBC 14.4 today 11/29 from 13.9 on admission 11/28  - continue to monitor with daily CBC's  - No clear source of sepsis- Discontinued IV ceftriaxone and started on IV unasyn low dose (due to ALL) TID (11/29- ) pending further workup.   - MRSA nares 11/29 negative  - Chest xray 11/28 not showing any evidence of acute cardiopulmonary process    ALL (acute kidney injury) (HCC)- (present on admission)  Assessment & Plan  - No noted history of chronic kidney disease, unclear creatinine baseline however recent kidney values appear normal- most recent labs from 8/3/2022 showing baseline Cr 1.0 and BUN 15.0.  - On admission, BUN/Cr ratio noted to be 122/4.67  - Patient is able to urinate, produced about 400cc dark urine in emergency room. No urinary symptoms such as dysuria, polyuria, retention.  - Urinalysis 11/28: +hyaline casts, +granular casts.  - Consulted nephrology- Per Dr. Hearn, currently unclear etiology of ALL however given hyaline casts on UA, volume depletion most likely. Obstructive uropathy was also considered given suprapubic tenderness on exam however renal/bladder US unremarkable for obstructions.   - Started on IV fluids at 125 cc/hr on 11/28, increased rate to 150 cc/hr on 11/29  - Most recent labs from 11/29 at 1500 showing improved renal function with BUN/Cr ratio of 85/1.63.  - Continue to monitor renal function with daily labs  - monitor urine output  - Kidney function improving as of 11/29 and patient nonoliguric, no need for dialysis. Nephrology signs off 11/29.    Increased anion gap metabolic acidosis  Assessment & Plan  - Baseline anion gap is 7.0 per most recent labs from 8/2022  - Anion gap on  admission noted to be 19.0, improved to 17.0 after IV LR at 150 cc/hr  - Increased anion gap metabolic acidosis likely secondary to uremia from kidney damage  - Continue hydration with IV fluids and encourage PO intake  - Continue to monitor with daily labs    Congestive heart failure (CHF) ([facility])  Assessment & Plan  - per chart review, patient has history of congestive heart failure  - c/w home aspirin 81 everyday, coreg 6.25mg BID, atorvastatin 80mg qD  - holding home lisinopril, amlodipine, HCTZ due to ALL    History of seizures  Assessment & Plan  - per patient, last episode of seizure was in 2019  - c/w home keppra 500mg BID    Macrocytic anemia  Assessment & Plan  - No known history of anemia, not on iron supplementation  - Most recent labs from 8/2022 showing baseline hemoglobin of 14.1 and MCV of 97.7  - pending anemia workup- iron panel, reticulocytes, vitamin B12, folate  - continue to monitor with daily CBC's  - Transfuse if hemoglobin <7.0    History of transcatheter aortic valve replacement (TAVR)- (present on admission)  Assessment & Plan  - per chart review, patient has history of severe aortic stenosis and underwent TAVR with Dr. Wolfe on 8/2/2022.   - most recent echo showed well seated TAVR with peak gradient of 12 mmHg and mean gradient of 7.5 mmHg and trace perivalvular leak.     Hyperlipidemia- (present on admission)  Assessment & Plan  - no lipid panel on file  - Continue atorvastatin    Essential hypertension- (present on admission)  Assessment & Plan  - at home, on lisinopril, hydrochlorithiazide, amlodipine, carvedilol.  - holding HCTZ, lisinopril, amlodipine for ALL  - continue with home coreg 6.25 BID  - can consider resuming amlodipine then lisinopril if hypertension worsens       VTE prophylaxis: heparin ppx    I have performed a physical exam and reviewed and updated ROS and Plan today (11/29/2022). In review of yesterday's note (11/28/2022), there are no changes except as documented  above.

## 2022-11-30 NOTE — THERAPY
Physical Therapy   Initial Evaluation     Patient Name: Gilmer Mae  Age:  82 y.o., Sex:  male  Medical Record #: [id number]  Today's Date: 11/30/2022          Assessment  Patient is 82 y.o. male admitted w/ AMS and decreased oral intake for one week, w/ LE edema.  Hx of TAVR in August.  Also recent RLE proximal vascular sx, w/ healing incision.  Found to be septic w/ ALL.  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  He reports living in a single story house w/ his wife and son, where he was mobile w/ a fww.  Uncertain of assist level, if any, his family needs to provide.  He is rec'd alert, in bed, pleasant.  He does not open his eyes t/o eval.  Min assist to sit eob w/ use of side rail.  He is able to maintain his sitting balance w/o assist.  He is able to stand w/o assist.  He ambulates a very short distance, two feet, w/ a fww and min assist.  Limited by c/o RLE pain.  PT will follow and address impairments noted below.  Plan    Recommend Physical Therapy 3 times per week until therapy goals are met for the following treatments:  Bed Mobility, Gait Training, Stair Training, and Therapeutic Activities    DC Equipment Recommendations: Unable to determine at this time  Discharge Recommendations: Recommend post-acute placement for additional physical therapy services prior to discharge home         Objective       11/30/22 0814   Prior Living Situation   Housing / Facility 1 Story House   Steps Into Home 3   Steps In Home 0   Rail Right Rail (Steps into Home)   Equipment Owned Front-Wheel Walker   Lives with - Patient's Self Care Capacity Spouse;Adult Children   Prior Level of Functional Mobility   Bed Mobility Unable To Determine At This Time   Transfer Status Unable To Determine At This Time   Ambulation Unable To Determine At This Time   Assistive Devices Used Front-Wheel Walker   Stairs Unable To Determine At This Time   Cognition    Level of Consciousness Alert   Strength Lower Body    Comments LLE grossly wnl, RLE NT due to pain, not able to formally assess as pt unable to follow   Balance Assessment   Sitting Balance (Static) Fair   Sitting Balance (Dynamic) Fair -   Standing Balance (Static) Fair -   Standing Balance (Dynamic) Fair -   Weight Shift Sitting Fair   Weight Shift Standing Fair   Comments w/ fww   Gait Analysis   Gait Level Of Assist Minimal Assist   Assistive Device Front Wheel Walker   Distance (Feet) 2   Deviation Antalgic;Step To   Bed Mobility    Supine to Sit Minimal Assist   Sit to Supine Minimal Assist   Functional Mobility   Sit to Stand Standby Assist   Bed, Chair, Wheelchair Transfer Unable to Participate   Short Term Goals    Short Term Goal # 1 Pt to move supine to/from eob w/ spv in 6 visits to improve fxl indep   Short Term Goal # 2 Pt to move sit to/from stand w/ spv in 6 visits to improve fxl indep   Short Term Goal # 3 Pt to ambulate 150 ft w/ fww and spv in 6 visits to improve fxl indep   Short Term Goal # 4 Pt to move up/down 3 steps w/ spv in 6 visits to access his home (if pt to d/c home)   Problem List    Problems Pain;Impaired Bed Mobility;Impaired Transfers;Impaired Ambulation;Decreased Activity Tolerance;Safety Awareness Deficits / Cognition   Anticipated Discharge Equipment and Recommendations   DC Equipment Recommendations Unable to determine at this time   Discharge Recommendations Recommend post-acute placement for additional physical therapy services prior to discharge home

## 2022-11-30 NOTE — PROGRESS NOTES
"[facility] Internal Medicine Daily Progress Note    Date of Service  11/30/2022    [facility] Team: [facility]  Purple Team   Attending: AKIRA Jeronimo M.d.  Senior Resident: Dr. Kina HUMPHREYS  Intern:  Dr. Hien HUMPHREYS  Contact Number: [phone]    Chief Complaint  Gilmer Mae is a 82 y.o. male admitted 11/28/2022 with encephalopathy and new finding of ALL.    Hospital Course    Gilmer Mae is a 82 year old with past medical history significant for hypertension, hyperlipidemia, sick sinus syndrome (s/p pacemaker placement), seizures (most recent episode in 2019, on keppra), severe aortic stenosis (s/p TAVR 8/2/2022 at [facility]) and peripheral artery disease (s/p vascular surgery on femoral artery in 2022) who presented to ED via EMS on 11/28/2022 for loss of consciousness and bilateral lower extremity edema. History limited due to patient’s altered mental status, A&Ox2 (self, place) so most history obtained from chart review and daughter Kendra. Patient lives with wife and son. Home physical therapy noticed patient was \"really confused\" and called EMS. For past week or so, patient was not eating or drinking, family suspects could be due to stress from new onset bilateral lower extremity edema that started a few days ago. Patient denies chest pain, shortness of breath or falling. Patient says he is having pain “all over” especially his right leg. Patient states he got surgery on his right leg “2 weeks ago but they never finished.”    On arrival to ED, patient was lethargic and confused.   Vitals/exam: /56, HR 60, afebrile (96.7/35.9), RR 15, 95% on 1L. Cardiac exam RRR, pulmonary exam wnl. Abdominal exam significant for diffuse abdominal tenderness and guarding. Musculoskeletal exam was pertinent for tenderness extending from right groin region down to foot, >3 second capillary refill and 1+ peripheral pulses. Right groin with healing incision from recent arterial surgery at [facility]. Wound on heel of right foot.     ED labs: " leukocytosis at 13.9, anemia 11.8/34.6, platelets 224. Na 137, K 4.2, Cl 98, CO2 20, AG 19, glucose 116, , Cr 4.67, Ca 8.6, AST 24, ALT 13, Alk phos 149, total bili 1.1, albumin 3.5, lipase 31, GFR 12. PT 13.9, INR 1.08. Prolactin 18.30 H,  H. Procal 0.55 H. Lactic acid 1.2. Troponin 33. UA with hyaline casts and granular casts. 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 No acute processes. Chest xray showed no acute cardiopulm process. US abdomen showing gallbladder sludge otherwise unremarkable, hepatic cysts, bilateral echogenic kidneys with mildly thinned cortex concerning for possible medical renal disease.    Patient was seen by nephrology who does not recommend dialysis at this time. Patient does not have known history of chronic kidney disease from baseline labs in 8/2022. Unclear etiology of the kidney injury however given hyaline casts on UA, likely secondary to volume depletion. Started on IV  cc/hr. Admitted for encephalopathy and new finding of ALL.    Interval Problem Update    -No acute events overnight  -PT and OT evaluated patient, recommended post acute placement after d/c from hospital  -Dispo planning: Request for SNFs placed and sent  -ALL improving with trend of creatinine from 1.63 -> 1.05  -Continuing antibiotics  -Concern for right foot/leg pain - pending MRI foot    I have discussed this patient's plan of care and discharge plan at IDT rounds today with Case Management, Nursing, Nursing leadership, and other members of the IDT team.    Consultants/Specialty  Nephrology    Code Status  Full Code    Disposition  Patient is not medically cleared for discharge.   Anticipate discharge to TBD  I have placed the appropriate orders for post-discharge needs.    Review of Systems  *Limited due to  patient's altered mental status*  Review of Systems   Constitutional:  Negative for fever.   Respiratory:  Negative for shortness of breath.    Cardiovascular:  Negative for chest pain.   Gastrointestinal:  Negative for abdominal pain.   Musculoskeletal:  Positive for falls, joint pain and myalgias.        Right foot pain   Neurological:  Positive for focal weakness (right lower extremity from hip to toes), loss of consciousness and weakness. Negative for tremors, sensory change and speech change.   Psychiatric/Behavioral:  Positive for hallucinations.    All other systems reviewed and are negative.     Physical Exam  Temp:  [36.1 °C (96.9 °F)-36.6 °C (97.9 °F)] 36.6 °C (97.8 °F)  Pulse:  [64-77] 67  Resp:  [15-18] 17  BP: (139-169)/(68-81) 165/79  SpO2:  [91 %-97 %] 91 %    Physical Exam  Exam conducted with a chaperone present (daughter at bedside).   Constitutional:       General: He is not in acute distress.     Appearance: He is well-developed. He is ill-appearing and toxic-appearing. He is not diaphoretic.   HENT:      Head: Normocephalic and atraumatic.      Right Ear: External ear normal.      Left Ear: External ear normal.      Nose: Nose normal.      Mouth/Throat:      Mouth: Mucous membranes are dry.      Pharynx: No oropharyngeal exudate or posterior oropharyngeal erythema.   Eyes:      General:         Right eye: No discharge.         Left eye: No discharge.      Extraocular Movements: Extraocular movements intact.      Comments: Unable to fully assess   Cardiovascular:      Rate and Rhythm: Normal rate and regular rhythm.      Heart sounds:     No gallop.   Pulmonary:      Effort: No respiratory distress.      Breath sounds: No wheezing.   Abdominal:      General: There is no distension.      Tenderness: There is abdominal tenderness (diffuse). There is guarding. There is no rebound.   Musculoskeletal:         General: Tenderness (Right groin site, right knee, right foot) present.      Cervical back:  Normal range of motion and neck supple.      Right lower leg: Edema (1+) present.      Left lower leg: Edema (1+) present.   Skin:     General: Skin is warm.      Capillary Refill: Capillary refill takes more than 3 seconds.      Coloration: Skin is pale.   Neurological:      Mental Status: He is alert. He is disoriented.      Motor: No weakness.      Comments: Poor historian but answers some questions   Psychiatric:      Comments: Unable to assess due to mental acuity       Fluids    Intake/Output Summary (Last 24 hours) at 11/30/2022 2053  Last data filed at 11/30/2022 0421  Gross per 24 hour   Intake --   Output 900 ml   Net -900 ml       Laboratory  Recent Labs     11/29/22  1138 11/29/22  1759 11/30/22  0916   WBC 14.4* 12.6* 13.0*   RBC 3.63* 3.46* 3.17*   HEMOGLOBIN 12.3* 11.9* 10.9*   HEMATOCRIT 36.2* 34.6* 31.9*   MCV 99.7* 100.0* 100.6*   MCH 33.9* 34.4* 34.4*   MCHC 34.0 34.4 34.2   RDW 43.8 44.4 45.2   PLATELETCT 275 255 238   MPV 10.8 10.8 10.3     Recent Labs     11/29/22  1138 11/29/22  1449 11/30/22  0916   SODIUM 144 143 147*   POTASSIUM 3.9 3.6 4.2   CHLORIDE 105 102 109   CO2 24 24 27   GLUCOSE 110* 99 113*   BUN 89* 85* 51*   CREATININE 1.94* 1.63* 1.05   CALCIUM 9.1 9.0 9.1     Recent Labs     11/28/22  1335   INR 1.08               Imaging  US-EXTREMITY ARTERY LOWER UNILAT RIGHT   Final Result      US-EXTREMITY VENOUS LOWER UNILAT RIGHT   Final Result      US-ABDOMEN COMPLETE SURVEY   Final Result         1.  Gallbladder sludge, otherwise unremarkable gallbladder   2.  Hepatic cysts   3.  Bilateral echogenic kidneys with mildly thinned cortex, appearance favors medical renal disease      CT-HEAD W/O   Final Result      1.  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   2.  Mild cerebral atrophy. Age-appropriate.   3.  Encephalomalacic changes in the left cerebellar hemisphere inferiorly consistent with old infarction,  best seen on MRI.   4.  No acute findings are evident.         DX-CHEST-PORTABLE (1 VIEW)   Final Result      No evidence of acute cardiopulmonary process.      MR-FOOT-W/O RIGHT    (Results Pending)        Assessment/Plan  Problem Representation:    * Acute encephalopathy- (present on admission)  Assessment & Plan  - Per family and chart review, patient is at baseline alert and oriented x4.  - Presented to emergency room on 11/28/2022 with confusion, noted to be alert and oriented x2 (self, place only)  - Altered mental status initially thought to be in setting of azotemia from acute kidney injury however continuing to have encephalopathy despite improving azotemia and improving kidney function  - Per nephrology, currently no emergent need for dialysis. Nephrology signed off 11/29.  -Continue to monitor and trend labs. Work with PT/OT.     Right leg pain- (present on admission)  Assessment & Plan  - Given recent right groin access for TAVR, MARGARITA ordered-   - Pain controlled with tylenol 650mg q6 prn, IV dilaudid 0.5mg q4 prn, oxycodone 5-10mg q3 prn  - can consider lidocaine patch, diclofenac gel as needed  - U/S showed: Occlusion of RIGHT femoral artery with distal reconstitution, Occlusion of RIGHT posterior tibial artery,  Two vessel runoff to the ankle with monophasic flow  -Consulting Vascular Surgery 12/1  - PT/OT    Congestive heart failure (CHF) (HCC)- (present on admission)  Assessment & Plan  - per chart review, patient has history of congestive heart failure  - c/w home aspirin 81 everyday, coreg 6.25mg BID, atorvastatin 80mg qD  - holding home lisinopril, amlodipine, HCTZ due to ALL. Restart meds as appropriate    History of seizures- (present on admission)  Assessment & Plan  - per patient, last episode of seizure was in 2019  - c/w home keppra 500mg BID    Macrocytic anemia- (present on admission)  Assessment & Plan  - No known history of anemia, not on iron supplementation  - Most recent labs from  8/2022 showing baseline hemoglobin of 14.1 and MCV of 97.7  - pending anemia workup- iron panel, reticulocytes, vitamin B12, folate  - continue to monitor with daily CBC's  - Transfuse if hemoglobin <7.0. Type and screen ordered 11/30    Increased anion gap metabolic acidosis- (present on admission)  Assessment & Plan  Resolved  - Baseline anion gap is 7.0 per most recent labs from 8/2022  - Anion gap on admission noted to be 19.0, improved to 17.0 after IV LR at 150 cc/hr  - Increased anion gap metabolic acidosis likely secondary to uremia from kidney damage  - Continue hydration with IV fluids and encourage PO intake  - Continue to monitor with daily labs    Sepsis (HCC)- (present on admission)  Assessment & Plan  - This is Sepsis Present on admission  - SIRS criteria identified on my evaluation include: Leukocytosis, with WBC greater than 12,000  - Source is unknown, possibly abdominal etiology. US 11/29 showing US abdomen showing gallbladder sludge otherwise unremarkable, hepatic cysts, bilateral echogenic kidneys with mildly thinned cortex concerning for possible medical renal disease.  - Sepsis protocol initiated  - Fluid resuscitation ordered per protocol  - Crystalloid Fluid Administration: Fluid resuscitation ordered per standard protocol - 30 mL/kg per current or ideal body weight  - IV antibiotics as appropriate for source of sepsis- started on IV ceftriaxone, discontinued on 11/29  - Reassessment: I have reassessed the patient's hemodynamic status  - Blood cultures NGTD  - Urine cultures unremarkable  - Hemodynamically stable  - Uptrending WBC, WBC 14.4 today 11/29 from 13.9 on admission 11/28  - continue to monitor with daily CBC's  - No clear source of sepsis- Discontinued IV ceftriaxone and started on IV unasyn low dose (due to ALL) TID (11/29- ) pending further workup.   - MRSA nares 11/29 negative  - Chest xray 11/28 not showing any evidence of acute cardiopulmonary process    -Continuing Unasyn +  gentle IVF    History of transcatheter aortic valve replacement (TAVR)- (present on admission)  Assessment & Plan  - per chart review, patient has history of severe aortic stenosis and underwent TAVR with Dr. Wolfe on 8/2/2022.   - most recent echo showed well seated TAVR with peak gradient of 12 mmHg and mean gradient of 7.5 mmHg and trace perivalvular leak.     ALL (acute kidney injury) (HCC)- (present on admission)  Assessment & Plan  Improving  - No noted history of chronic kidney disease, unclear creatinine baseline however recent kidney values appear normal- most recent labs from 8/3/2022 showing baseline Cr 1.0 and BUN 15.0.  - On admission, BUN/Cr ratio noted to be 122/4.67  - Patient is able to urinate, produced about 400cc dark urine in emergency room. No urinary symptoms such as dysuria, polyuria, retention.  - Urinalysis 11/28: +hyaline casts, +granular casts.  - Consulted nephrology- Per Dr. Hearn, currently unclear etiology of ALL however given hyaline casts on UA, volume depletion most likely. Obstructive uropathy was also considered given suprapubic tenderness on exam however renal/bladder US unremarkable for obstructions.   - Started on IV fluids at 125 cc/hr on 11/28, increased rate to 150 cc/hr on 11/29  - Most recent labs from 11/29 at 1500 showing improved renal function with BUN/Cr ratio of 85/1.63.  - Continue to monitor renal function with daily labs  - monitor urine output  - Kidney function improving as of 11/29 and patient nonoliguric, no need for dialysis. Nephrology signed off 11/29.  -Continue gentle IVF and trend Crt/BUN    Hyperlipidemia- (present on admission)  Assessment & Plan  - no lipid panel on file  - Continue atorvastatin    Essential hypertension- (present on admission)  Assessment & Plan  - at home, on lisinopril, hydrochlorithiazide, amlodipine, carvedilol.  - holding HCTZ, lisinopril, amlodipine for ALL  - continue with home coreg 6.25 BID  - can consider resuming  amlodipine then lisinopril if hypertension worsens       VTE prophylaxis: heparin ppx    I have performed a physical exam and reviewed and updated ROS and Plan today (11/30/2022). In review of yesterday's note (11/29/2022), there are no changes except as documented above.

## 2022-11-30 NOTE — CARE PLAN
The patient is Stable - Low risk of patient condition declining or worsening    Shift Goals  Clinical Goals: Safety, pain mgt, monitor labs, cooperate with care  Patient Goals: Rest  Family Goals: ivelisse      Problem: Knowledge Deficit - Standard  Goal: Patient and family/care givers will demonstrate understanding of plan of care, disease process/condition, diagnostic tests and medications  Outcome: Progressing     Problem: Hemodynamics  Goal: Patient's hemodynamics, fluid balance and neurologic status will be stable or improve  Outcome: Progressing     Problem: Fluid Volume  Goal: Fluid volume balance will be maintained  Outcome: Progressing     Problem: Pain - Standard  Goal: Alleviation of pain or a reduction in pain to the patient’s comfort goal  Outcome: Progressing     Problem: Skin Integrity  Goal: Skin integrity is maintained or improved  Outcome: Progressing       Progress made toward(s) clinical / shift goals:  Patient is alert and oriented x 4. Delayed response, complaint of pain on his right heel wound. Elevated and repositioned for comfort. IV site patent with ongoing LR x 150 flushing well. Tiffanie care done, 1 lpm via NC of oxygen sats at 96%.  Uses Urinal, Refused Dinner. Irritable and flat affect. Bed in lowest position, bed locked, bed alarm. Call light and personal items within reached. Kept safe and monitored.    Patient is not progressing towards the following goals:

## 2022-11-30 NOTE — HEART FAILURE PROGRAM
Discussed with Dr. Castanon patient is not in acute heart failure on this admission. HF f/u appointment and discharge checklist not indicated.

## 2022-11-30 NOTE — THERAPY
"Occupational Therapy   Initial Evaluation     Patient Name: Gilmer Mae  Age:  82 y.o., Sex:  male  Medical Record #: [id number]  Today's Date: 11/30/2022     Precautions  Precautions: (P) Fall Risk    Assessment    Patient is 82 y.o. male admitted w/ AMS and decreased oral intake for one week, w/ LE edema.  Hx of TAVR in August.  Also recent RLE proximal vascular sx, w/ healing incision.  Found to be septic w/ ALL. Pt states normally independent with functional mobility and ADLs living in a SLH with spouse who recently had surgery and children have to assist her. Pt required Cherry for bed mobility and transfers, maxA for lower body ADLs. Pt would benefit from continued skilled therapy while admitted as well as recommend post-acute placement.      Plan    Recommend Occupational Therapy 3 times per week until therapy goals are met for the following treatments:  Adaptive Equipment, Neuro Re-Education / Balance, Self Care/Activities of Daily Living, Therapeutic Activities, and Therapeutic Exercises.    DC Equipment Recommendations: (P) Unable to determine at this time  Discharge Recommendations: (P) Recommend post-acute placement for additional occupational therapy services prior to discharge home     Subjective    \"I usually do just fine at home\"     Objective       11/30/22 0818   Prior Living Situation   Prior Services None   Housing / Facility 1 Story House   Steps Into Home 3   Steps In Home 0   Rail Right Rail (Steps into Home)   Bathroom Set up Walk In Shower;Shower Chair   Equipment Owned Front-Wheel Walker   Lives with - Patient's Self Care Capacity Spouse;Adult Children   Prior Level of ADL Function   Self Feeding Independent   Grooming / Hygiene Independent   Bathing Independent   Dressing Independent   Toileting Independent   Precautions   Precautions Fall Risk   Pain 0 - 10 Group   Therapist Pain Assessment Post Activity Pain Same as Prior to Activity;Nurse Notified   Cognition    Cognition / Consciousness X "   Level of Consciousness Alert   Safety Awareness Impaired;Impulsive   Attention Impaired   Initiation Impaired   Active ROM Upper Body   Active ROM Upper Body  WDL   Dominant Hand Right   Strength Upper Body   Upper Body Strength  WDL   Balance Assessment   Sitting Balance (Static) Fair   Sitting Balance (Dynamic) Fair -   Standing Balance (Static) Fair -   Standing Balance (Dynamic) Fair -   Weight Shift Sitting Fair   Weight Shift Standing Fair   Comments w/ FWW   Bed Mobility    Supine to Sit Minimal Assist   Sit to Supine Minimal Assist   ADL Assessment   Grooming Minimal Assist;Seated   Upper Body Dressing Minimal Assist   Lower Body Dressing Maximal Assist   How much help from another person does the patient currently need...   Putting on and taking off regular lower body clothing? 2   Bathing (including washing, rinsing, and drying)? 2   Toileting, which includes using a toilet, bedpan, or urinal? 2   Putting on and taking off regular upper body clothing? 3   Taking care of personal grooming such as brushing teeth? 3   Eating meals? 3   6 Clicks Daily Activity Score 15   Functional Mobility   Sit to Stand Contact Guard Assist   Bed, Chair, Wheelchair Transfer Contact Guard Assist   Transfer Method Stand Step   Mobility bed mobility, steps at EOB, returned to supine   Comments w/ FWW   Activity Tolerance   Sitting Edge of Bed 5 min   Standing 3 min   Short Term Goals   Short Term Goal # 1 Pt will complete ADL transfers with supervision   Short Term Goal # 2 Pt will complete LB dressing with supervision   Short Term Goal # 3 Pt will complete toileting with supervision   Education Group   Education Provided Role of Occupational Therapist   Role of Occupational Therapist Patient Response Patient;Acceptance;Explanation   Problem List   Problem List Decreased Active Daily Living Skills;Decreased Homemaking Skills;Decreased Functional Mobility;Decreased Activity Tolerance;Impaired Postural Control /  Balance;Impaired Cognitive Function   Anticipated Discharge Equipment and Recommendations   DC Equipment Recommendations Unable to determine at this time   Discharge Recommendations Recommend post-acute placement for additional occupational therapy services prior to discharge home   Interdisciplinary Plan of Care Collaboration   IDT Collaboration with  Nursing   Patient Position at End of Therapy In Bed;Bed Alarm On;Call Light within Reach;Tray Table within Reach;Phone within Reach   Collaboration Comments RN updated

## 2022-11-30 NOTE — PROGRESS NOTES
Patient is alert and oriented x 4. Delayed response, complaint of pain on his right heel wound. Elevated and repositioned for comfort. IV site patent with ongoing LR x 150 flushing well. Tiffanie care done, 1 lpm via NC of oxygen sats at 96%.  Uses Urinal, Refused Dinner. Irritable and flat affect. Bed in lowest position, bed locked, bed alarm. Call light and personal items within reached. Kept safe and monitored.

## 2022-11-30 NOTE — DOCUMENTATION QUERY
[facility]                                                                       Query Response Note      PATIENT:               ZOE SARMIENTO  ACCT #:                  [id number]  MRN:                     [id number]  :                      1940  ADMIT DATE:       2022 12:40 PM  DISCH DATE:          RESPONDING  PROVIDER #:        [id number]           QUERY TEXT:    Based on your medical judgment of the clinical indicators outlined above, please clarify if you are treating this patient for a known or suspected:    NOTE:  If an appropriate response is not listed below, please respond with a new note.    Thank you.    The patient's Clinical Indicators include:   Hospitalist Note:  Increased anion gap metabolic acidosis likely secondary to uremia from kidney damage.   Hospitalist Note: Acute encephalopathy.     Risk factor: Uremia    Treatment: IV fluids hydration, monitor labs    Thank you,  Oksana Storey  Clinical   Connect via [facility]  Options provided:   -- Metabolic encephalopathy   -- Other encephalopathy, Please specify   -- Other explanation, Please specify   -- Unable to determine      Query created by: Oksana Storey on 2022 9:53 AM    RESPONSE TEXT:    Metabolic encephalopathy          Electronically signed by:  QUIANA BRAUN MD 2022 1:11 PM

## 2022-11-30 NOTE — CARE PLAN
The patient is Watcher - Medium risk of patient condition declining or worsening    Shift Goals  Clinical Goals: Patient Safety, Patient Labs  Patient Goals: Patient comfort, Patient rest  Family Goals: ivelisse    Progress made toward(s) clinical / shift goals:    Problem: Fluid Volume  Goal: Fluid volume balance will be maintained  Outcome: Progressing     Problem: Urinary - Renal Perfusion  Goal: Ability to achieve and maintain adequate renal perfusion and functioning will improve  Outcome: Progressing       Problem: Physical Regulation  Goal: Diagnostic test results will improve  Outcome: Progressing          Patient is not progressing towards the following goals:

## 2022-11-30 NOTE — DISCHARGE PLANNING
Case Management Discharge Planning    Admission Date: 11/28/2022  GMLOS: 3.1  ALOS: 2    6-Clicks ADL Score: 15  6-Clicks Mobility Score: 14  PT and/or OT Eval ordered: Yes  Post-acute Referrals Ordered: Yes  Post-acute Choice Obtained: Yes  Has referral(s) been sent to post-acute provider:  Yes      Anticipated Discharge Dispo: Discharge Disposition: D/T to SNF with Medicare cert in anticipation of skilled care (03)    DME Needed: No    Action(s) Taken: Updated Provider/Nurse on Discharge Plan, Choice obtained, and Referral(s) sent    Escalations Completed: None    Medically Clear: No    Next Steps: Patient discussed during morning IDT rounds with team. Patient is not medically cleared for discharge at this time. CM spoke with patient's daughter, Anjana, today regarding dc planning. PT/OT have recommended SNF, went over choice with daughter and patient's spouse, first choice to [facility] for rehab and then 2nd choice would be [facility] SNF. Verbal obtained, DPA sent referrals. Patient's baseline is RA but has been on 1lpm NC since hospitalized. RN attempting to do weaning trials. Daughter was requesting list of correction list, CM printed off list and left at patient's bedside for other daughter to . CM to follow for dc planning needs. No other needs at this time.     Barriers to Discharge: Medical clearance and Pending Placement    Is the patient up for discharge tomorrow: No

## 2022-11-30 NOTE — ASSESSMENT & PLAN NOTE
- Per family and chart review, patient is at baseline alert and oriented x4.  - Presented to emergency room on 11/28/2022 with confusion, noted to be alert and oriented x2 (self, place only)  - Altered mental status initially thought to be in setting of azotemia from acute kidney injury however continuing to have encephalopathy despite improving azotemia and improving kidney function  - Per nephrology, currently no emergent need for dialysis. Nephrology signed off 11/29.  - Continue to monitor and trend labs. Work with PT/OT inpatient, they recommend SNF post discharge.  Significantly improved.

## 2022-11-30 NOTE — DISCHARGE PLANNING
[facility],3) [facility]  Referral sent per Choice form at [phone]

## 2022-11-30 NOTE — PROGRESS NOTES
Pt daughterKendra, attempted to bring pt wallet for MRI safecard. Pt unable to find MRI card. Wallet is put inside bedside drawer, pt is aware along with Kendra parks.

## 2022-11-30 NOTE — PROGRESS NOTES
Called ultrasound for patient since patient has pending ultrasound to be completed. Ultrasound stated they will look to see if he is on list and tend to patient if he is.

## 2022-11-30 NOTE — ASSESSMENT & PLAN NOTE
Resolved  - Baseline anion gap is 7.0 per most recent labs from 8/2022  - Anion gap on admission noted to be 19.0, improved to 17.0 after IV LR at 150 cc/hr  - Increased anion gap metabolic acidosis likely secondary to uremia from kidney damage  - Continue hydration with IV fluids and encourage PO intake  - Continue to monitor with daily labs

## 2022-11-30 NOTE — ASSESSMENT & PLAN NOTE
- No known history of anemia, not on iron supplementation  - Most recent labs from 8/2022 showing baseline hemoglobin of 14.1 and MCV of 97.7  - continue to monitor with daily CBC's  - Transfuse if hemoglobin <7.0. Type and screen ordered 11/30

## 2022-11-30 NOTE — PROGRESS NOTES
Nephrology Daily Progress Note    Date of Service  11/29/2022    Chief Complaint  82 y.o. 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    Interval Problem Update  11/29 -urine output 950 mL recorded in the last 24 hours.  Patient remains confused, does not know where he is, but complains of ongoing right foot pain.  Denies chest pain, shortness of breath.    Review of Systems  Review of Systems   Constitutional:  Negative for fever.   Respiratory:  Negative for shortness of breath.    Cardiovascular:  Negative for chest pain.   Gastrointestinal:  Negative for abdominal pain.   All other systems reviewed and are negative.     Physical Exam  Temp:  [36.2 °C (97.2 °F)-36.4 °C (97.6 °F)] 36.4 °C (97.6 °F)  Pulse:  [60-69] 69  Resp:  [17-25] 18  BP: (114-152)/(57-75) 152/75  SpO2:  [96 %-98 %] 96 %    Physical Exam  Constitutional:       General: He is not in acute distress.     Appearance: He is well-developed. He is not diaphoretic.   HENT:      Head: Normocephalic and atraumatic.      Mouth/Throat:      Mouth: Mucous membranes are moist.      Pharynx: Oropharynx is clear. No oropharyngeal exudate.   Eyes:      General: No scleral icterus.     Extraocular Movements: Extraocular movements intact.   Neck:      Trachea: No tracheal deviation.   Cardiovascular:      Rate and Rhythm: Normal rate.      Heart sounds: Normal heart sounds. No murmur heard.  Pulmonary:      Effort: Pulmonary effort is normal.      Breath sounds: Normal breath sounds. No stridor. No rales.   Abdominal:      General: There is no distension.      Palpations: Abdomen is soft.      Tenderness: There is no abdominal tenderness.   Musculoskeletal:         General: Normal range of motion.      Right lower leg: No edema.      Left lower leg: No edema.   Skin:     General: Skin is warm and dry.   Neurological:      General: No focal deficit present.      Mental Status: He is alert. He is disoriented.    Psychiatric:         Mood and Affect: Mood normal.         Behavior: Behavior normal.       Fluids    Intake/Output Summary (Last 24 hours) at 11/29/2022 1734  Last data filed at 11/29/2022 0900  Gross per 24 hour   Intake --   Output 1350 ml   Net -1350 ml       Laboratory  Labs reviewed, pertinent labs below.  Recent Labs     11/28/22  1335 11/29/22  1138   WBC 13.9* 14.4*   RBC 3.46* 3.63*   HEMOGLOBIN 11.8* 12.3*   HEMATOCRIT 34.6* 36.2*   .0* 99.7*   MCH 34.1* 33.9*   MCHC 34.1 34.0   RDW 44.1 43.8   PLATELETCT 224 275   MPV 10.9 10.8     Recent Labs     11/28/22  1335 11/29/22  1138 11/29/22  1449   SODIUM 137 144 143   POTASSIUM 4.2 3.9 3.6   CHLORIDE 98 105 102   CO2 20 24 24   GLUCOSE 116* 110* 99   * 89* 85*   CREATININE 4.67* 1.94* 1.63*   CALCIUM 8.6 9.1 9.0     Recent Labs     11/28/22  1335   INR 1.08           URINALYSIS:  Lab Results   Component Value Date/Time    COLORURINE Yellow 11/28/2022 1526    CLARITY Clear 11/28/2022 1526    SPECGRAVITY >=1.030 11/28/2022 1526    PHURINE 5.5 11/28/2022 1526    KETONES Negative 11/28/2022 1526    PROTEINURIN Negative 11/28/2022 1526    BILIRUBINUR Small (A) 11/28/2022 1526    UROBILU 0.2 11/28/2022 1526    NITRITE Negative 11/28/2022 1526    LEUKESTERAS Negative 11/28/2022 1526    OCCULTBLOOD Small (A) 11/28/2022 1526     UPC  Lab Results   Component Value Date/Time    TOTPROTUR 23.0 (H) 11/28/2022 1526      Lab Results   Component Value Date/Time    CREATININEU 251.69 11/28/2022 1526         Imaging interpreted by radiologist. Imaging reports reviewed with pertinent findings below  US-ABDOMEN COMPLETE SURVEY   Final Result         1.  Gallbladder sludge, otherwise unremarkable gallbladder   2.  Hepatic cysts   3.  Bilateral echogenic kidneys with mildly thinned cortex, appearance favors medical renal disease      CT-HEAD W/O   Final Result      1.  Old lacunar size infarct centered in the deep white matter along the right corona radiata  concordant with MRI findings.   2.  Mild cerebral atrophy. Age-appropriate.   3.  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   4.  No acute findings are evident.         DX-CHEST-PORTABLE (1 VIEW)   Final Result      No evidence of acute cardiopulmonary process.      MR-FOOT-W/O RIGHT    (Results Pending)   US-EXTREMITY VENOUS LOWER UNILAT RIGHT    (Results Pending)   US-EXTREMITY ARTERY LOWER UNILAT RIGHT    (Results Pending)         Current Facility-Administered Medications   Medication Dose Route Frequency Provider Last Rate Last Admin    heparin injection 5,000 Units  5,000 Units Subcutaneous Q12HRS AKIRA Jeronimo M.D.        ampicillin/sulbactam (UNASYN) 3 g in  mL IVPB  3 g Intravenous Q24HRS Roxana Stanford M.D.   Stopped at 11/29/22 1218    levETIRAcetam (KEPPRA) tablet 500 mg  500 mg Oral BID Deshawn Toussaint M.D.   500 mg at 11/29/22 0439    atorvastatin (LIPITOR) tablet 40 mg  40 mg Oral Q EVENING Deshawn Toussaint M.D.   40 mg at 11/28/22 1724    aspirin EC (ECOTRIN) tablet 81 mg  81 mg Oral DAILY Deshawn Toussaint M.D.   81 mg at 11/29/22 0439    senna-docusate (PERICOLACE or SENOKOT S) 8.6-50 MG per tablet 2 Tablet  2 Tablet Oral BID Deshawn Toussaint M.D.   2 Tablet at 11/29/22 0440    And    polyethylene glycol/lytes (MIRALAX) PACKET 1 Packet  1 Packet Oral QDAY PRN Deshawn Toussaint M.D.        And    magnesium hydroxide (MILK OF MAGNESIA) suspension 30 mL  30 mL Oral QDAY PRN Deshawn Toussaint M.D.        And    bisacodyl (DULCOLAX) suppository 10 mg  10 mg Rectal QDAY PRN Deshawn Toussaint M.D.        lactated ringers infusion (BOLUS)  1,000 mL Intravenous Once PRN Deshawn Toussaint M.D.        lactated ringers infusion (BOLUS): BMI less than or equal to 30  30 mL/kg Intravenous Once PRN Deshawn Toussaint M.D.        lactated ringers infusion   Intravenous Continuous Roxana Stanford M.D. 150 mL/hr at 11/29/22 0931 Rate Change at 11/29/22 0931    acetaminophen  (Tylenol) tablet 650 mg  650 mg Oral Q6HRS PRN Deshawn Toussaint M.D.        Pharmacy Consult Request ...Pain Management Review 1 Each  1 Each Other PHARMACY TO DOSE Deshawn Toussaint M.D.        oxyCODONE immediate-release (ROXICODONE) tablet 5 mg  5 mg Oral Q3HRS PRN Deshawn Toussaint M.D.        Or    oxyCODONE immediate release (ROXICODONE) tablet 10 mg  10 mg Oral Q3HRS PRN Deshawn Toussaint M.D.        ondansetron (ZOFRAN) syringe/vial injection 4 mg  4 mg Intravenous Q4HRS PRN Deshawn Toussaint M.D.        ondansetron (ZOFRAN ODT) dispertab 4 mg  4 mg Oral Q4HRS PRN Deshawn Toussaint M.D.        HYDROmorphone (Dilaudid) injection 0.5 mg  0.5 mg Intravenous Q4HRS PRN Deshawn Toussaint M.D.        [Held by provider] amLODIPine (NORVASC) tablet 5 mg  5 mg Oral DAILY Deshawn Toussaint M.D.        carvedilol (COREG) tablet 6.25 mg  6.25 mg Oral BID WITH MEALS Deshawn Toussaint M.D.   6.25 mg at 11/29/22 0825    cloNIDine (CATAPRES) tablet 0.1 mg  0.1 mg Oral BID PRN Deshawn Toussaint M.D.        gabapentin (NEURONTIN) capsule 100 mg  100 mg Oral BID Deshawn Toussaint M.D.   100 mg at 11/29/22 0439         Assessment/Plan  82 y.o. 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     1. ALL, with no known history of CKD from baseline labs in 8/2022.  Nonoliguric, much improved with volume resuscitation, suggesting prerenal ALL.  If patient is not able to reliably drink fluids, recommend continue IV fluids.  Avoid nephrotoxins.  Check labs daily.     2. Elevated anion gap metabolic acidosis.  Resolved.  Only continue IV fluids if patient is unable to reliably drink fluids on his own.     3. Azotemia, improving.  I do not believe his ongoing encephalopathy is due to uremia as his kidney function is improving.  Check labs daily.     4. PVD with recent right leg surgery.  With improving kidney function, okay for iodine-based contrast imaging if needed.     5. Macrocytic anemia, mildly persistent.  Unclear etiology.  Check CBC daily.      6. Leukocytosis, persistent.  Unclear etiology.  Check CBC daily.     7. HTN, much improved.  If hypertension worsens, would resume amlodipine, then lisinopril.      As kidney function has improved and patient is nonoliguric, nephrology will sign off.  Please call back with further questions or concerns.    Topher Hearn MD  Nephrology  [facility]

## 2022-11-30 NOTE — ASSESSMENT & PLAN NOTE
- per chart review, patient has history of congestive heart failure  - c/w home aspirin 81 everyday, coreg 12.5mg BID, atorvastatin 80mg qD, lisinopril 20, amlodipine 5  - Continue to hold HCTZ.

## 2022-12-01 NOTE — PROGRESS NOTES
Patient is alert and oriented x 3.Confused at times. Delayed response, complaint of pain on his right heel wound. Elevated and repositioned for comfort. IV site patent with ongoing 1/2 NS x 83 flushing well. Room air sats at 91%.  Uses Urinal, Refused Dinner. Irritable but better compared previous nights, flat affect. Bed in lowest position, bed locked, bed alarm. Call light and personal items within reached. Kept safe and monitored. Patient assisted to the toilet prior to bedtime. Waiting for SNF placement.

## 2022-12-01 NOTE — CARE PLAN
The patient is Stable - Low risk of patient condition declining or worsening    Shift Goals  Clinical Goals: Patient Safety, Care compliance, Pain management  Patient Goals: Pain management, Patient comfort  Family Goals: ivelisse    Progress made toward(s) clinical / shift goals:    Problem: Hemodynamics  Goal: Patient's hemodynamics, fluid balance and neurologic status will be stable or improve  Outcome: Progressing     Problem: Knowledge Deficit - Standard  Goal: Patient and family/care givers will demonstrate understanding of plan of care, disease process/condition, diagnostic tests and medications  Outcome: Progressing     Problem: Pain - Standard  Goal: Alleviation of pain or a reduction in pain to the patient’s comfort goal  Outcome: Progressing       Patient is not progressing towards the following goals:

## 2022-12-01 NOTE — DISCHARGE PLANNING
Case Management Discharge Planning    Admission Date: 11/28/2022  GMLOS: 4.3  ALOS: 3    6-Clicks ADL Score: 15  6-Clicks Mobility Score: 14  PT and/or OT Eval ordered: Yes  Post-acute Referrals Ordered: Yes  Post-acute Choice Obtained: Yes  Has referral(s) been sent to post-acute provider:  Yes      Anticipated Discharge Dispo: Discharge Disposition: D/T to SNF with Medicare cert in anticipation of skilled care (03)    DME Needed: No    Action(s) Taken: Patient Conference and Family Conference,  Lsw advised by RN pt and family at bedside asking questions about d/c plan.    Lsw brought in the scanned completed choice form from media/epic tab.    Lsw discussed the updates in notes left yesterday.    Lsw answered several posed questions from pt and family: gson, and dtr Kendra.    Pt appeared to be falling asleep. Lsw left room after the group had asked every question they had.    Escalations Completed: None    Medically Clear: No    Next Steps: f/u w/ providers and medical team to determine medical clearance    Barriers to Discharge: Medical clearance and Pending Placement    Is the patient up for discharge tomorrow: No    '

## 2022-12-01 NOTE — CARE PLAN
The patient is Stable - Low risk of patient condition declining or worsening    Shift Goals  Clinical Goals: Pain mgt, safety, increase mobility, placement  Patient Goals: pain mgt, sleeps well  Family Goals: ivelisse      Problem: Knowledge Deficit - Standard  Goal: Patient and family/care givers will demonstrate understanding of plan of care, disease process/condition, diagnostic tests and medications  Outcome: Progressing     Problem: Pain - Standard  Goal: Alleviation of pain or a reduction in pain to the patient’s comfort goal  Outcome: Progressing     Problem: Skin Integrity  Goal: Skin integrity is maintained or improved  Outcome: Progressing       Progress made toward(s) clinical / shift goals:  Patient is alert and oriented x 3.Confused at times. Delayed response, complaint of pain on his right heel wound. Elevated and repositioned for comfort. IV site patent with ongoing 1/2 NS x 83 flushing well. Room air sats at 91%.  Uses Urinal, Refused Dinner. Irritable but better compared previous nights, flat affect. Bed in lowest position, bed locked, bed alarm. Call light and personal items within reached. Kept safe and monitored. Patient assisted to the toilet prior to bedtime. Waiting for SNF placement.     Patient is not progressing towards the following goals:

## 2022-12-02 NOTE — HOSPITAL COURSE
83 y/o with PMH HTN, HLD, sick sinus syndrome (s/p pacemaker), seizures (most recent episode in 2019, on keppra), severe aortic stenosis (s/p TAVR 8/2/2022 at [facility]) and PAD (s/p vascular surgery on femoral artery in 2022) who presented to ED via EMS on 11/28/2022 for LOC, b/l LE edema and pain.  History limited due to patient’s altered mental status, A&Ox2 (self, place) so most history obtained from chart review and daughter Kendra. Patient lives with wife and son. Patient says he is having pain “all over” especially his right leg and says he got surgery on his right leg “2 weeks ago but they never finished.” On arrival to ED, patient was lethargic and confused. RLE especially R foot exquisitely tender, nonhealing ulcer with some erythema and eschar present on bottom of R heel. Ultrasound of leg showed diffuse atherosclerotic plaque throughout LE, absent flow from proximal to distal femoral artery, reconstitution monophasic flow via collaterals at distal femoral artery, absent flow in distal portion of tibial artery. Xray of R foot showed potentially periosteal changes at inferior aspect of calcaneus as well as significant vascular calcification; concern for underlying osteomyelitis secondary to ischemic ulcer. Empirically placed on IV linezolid and IV unasyn however discontinued linezolid as blood cx, wound cx, MRSA and staph aureus PCR negative as well as lack of purulence from wound. Seen by LPS and vascular surgery- scheduled for OR on 12/5 with Dr. Rdz to attempt RLE revascularization in order to avoid amputation.

## 2022-12-02 NOTE — PROGRESS NOTES
"[facility] Internal Medicine Daily Progress Note    Date of Service  12/2/2022    [facility] Team: [facility]  Purple Team   Attending: AKIRA Jeronimo M.d.  Senior Resident: Dr. Mya HUMPHREYS  Intern:  Dr. Hien HUMPHREYS  Contact Number: [phone]    Chief Complaint  Gilmer Mae is a 82 y.o. male admitted 11/28/2022 with encephalopathy and RLE edema/pain with ulceration to R heel concerning for osteomyelitis.    Hospital Course  Gilmer Mae is a 82 year old with past medical history significant for hypertension, hyperlipidemia, sick sinus syndrome (s/p pacemaker placement), seizures (most recent episode in 2019, on keppra), severe aortic stenosis (s/p TAVR 8/2/2022 at [facility]) and peripheral artery disease (s/p vascular surgery on femoral artery in 2022) who presented to ED via EMS on 11/28/2022 for loss of consciousness and bilateral lower extremity edema. History limited due to patient’s altered mental status, A&Ox2 (self, place) so most history obtained from chart review and daughter Kendra. Patient lives with wife and son. Home physical therapy noticed patient was \"really confused\" and called EMS. For past week or so, patient was not eating or drinking, family suspects could be due to stress from new onset bilateral lower extremity edema that started a few days ago. Patient denies chest pain, shortness of breath or falling. Patient says he is having pain “all over” especially his right leg. Patient states he got surgery on his right leg “2 weeks ago but they never finished.”     On arrival to ED, patient was lethargic and confused.   Vitals/exam: /56, HR 60, afebrile (96.7/35.9), RR 15, 95% on 1L. Cardiac exam RRR, pulmonary exam wnl. Abdominal exam significant for diffuse abdominal tenderness and guarding. Musculoskeletal exam was pertinent for tenderness extending from right groin region down to foot, >3 second capillary refill and 1+ peripheral pulses. Right groin with healing incision from recent arterial surgery at [facility]. " Wound on heel of right foot. ED labs: leukocytosis at 13.9, anemia 11.8/34.6, platelets 224. Na 137, K 4.2, Cl 98, CO2 20, AG 19, glucose 116, , Cr 4.67, Ca 8.6, AST 24, ALT 13, Alk phos 149, total bili 1.1, albumin 3.5, lipase 31, GFR 12. PT 13.9, INR 1.08. Prolactin 18.30 H,  H. Procal 0.55 H. Lactic acid 1.2. Troponin 33. UA with hyaline casts and granular casts. 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 No acute processes. Chest xray showed no acute cardiopulm process. US abdomen showing gallbladder sludge otherwise unremarkable, hepatic cysts, bilateral echogenic kidneys with mildly thinned cortex concerning for possible medical renal disease.Patient was seen by nephrology who does not recommend dialysis at this time. Patient does not have known history of chronic kidney disease from baseline labs in 8/2022. Unclear etiology of the kidney injury however given hyaline casts on UA, likely secondary to volume depletion. Started on IV  cc/hr. Admitted for encephalopathy and new finding of ALL which has both improved throughout hospital course. R heel ulcer with c/f osteomyelitis- pending consults (LPS/ortho, vascular, ID) regarding possible surgical intervention and antibiotic regimen going forward.    Interval Problem Update    No acute events overnight. Patient seen at bedside this morning continuing to have R foot pain. MRI ordered due to previous R duplex showing occlusion of right posterior tibial artery concerning for potential osteomyelitis however was initially delayed due to patient's pacemaker. Pacemaker was found to be compatible with MRI however patient was unable to tolerate exam so there was motion degraded imaging and exam was not able to be completed as patient aborted. Foot xray done in place of  MRI confirmed osteomyelitis to R calcaneus. Consulted [facility] who spoke with orthopedic Dr. Turner regarding possible surgical intervention- \since he has occluded posterior tibial artery on the heel, nothing will heal. Suggested reaching out to Dr. Medel tomorrow to confirm that she is ok with [facility] reaching out to Dr. Rdz from [facility] vascular team to see patient. For the time being, wound care orders for nursing was placed and he was provided with betadine dressing and offloading boot per LPS team. Consulted Dr. Turcios from ID for recommendations regarding patient's abx regimen- he will see patient tomorrow morning 12/3. Seen by dietitian who recommends Boost as well as liberalizing diet to help improve appetite. Spoke with daughter Kendra over the phone and updated her on medical plan. Switched 1/2 NS to D5W at 85 cc/hr as hyponatremia is improved to 142 this afternoon.    I have discussed this patient's plan of care and discharge plan at IDT rounds today with Case Management, Nursing, Nursing leadership, and other members of the IDT team.    Consultants/Specialty  Nephrology  Physical therapy  Occupational therapy  Nutrition  [facility]  Orthopedic surgery  Infectious disease    Code Status  Full Code    Disposition  Patient is not medically cleared for discharge.   Anticipate discharge to to skilled nursing facility.  I have placed the appropriate orders for post-discharge needs.    Review of Systems  Review of Systems   Constitutional:  Negative for fever.   Respiratory:  Negative for shortness of breath.    Cardiovascular:  Negative for chest pain.   Gastrointestinal:  Negative for abdominal pain.   Musculoskeletal:  Positive for falls, joint pain and myalgias.        Right foot pain   Neurological:  Positive for focal weakness (right lower extremity from hip to toes), loss of consciousness and weakness. Negative for tremors, sensory change and speech change.   Psychiatric/Behavioral:  Positive for  hallucinations.    All other systems reviewed and are negative.     Physical Exam  Temp:  [36.2 °C (97.2 °F)-36.7 °C (98 °F)] 36.2 °C (97.2 °F)  Pulse:  [64-85] 67  Resp:  [16-18] 16  BP: (131-175)/(60-86) 169/86  SpO2:  [93 %-97 %] 94 %    Physical Exam  Exam conducted with a chaperone present (daughter and son at bedside).   Constitutional:       General: He is not in acute distress.     Appearance: He is well-developed. He is ill-appearing and toxic-appearing. He is not diaphoretic.   HENT:      Head: Normocephalic and atraumatic.      Right Ear: External ear normal.      Left Ear: External ear normal.      Nose: Nose normal.      Mouth/Throat:      Mouth: Mucous membranes are dry.      Pharynx: No oropharyngeal exudate or posterior oropharyngeal erythema.   Eyes:      General:         Right eye: No discharge.         Left eye: No discharge.      Extraocular Movements: Extraocular movements intact.      Comments: Unable to fully assess   Cardiovascular:      Rate and Rhythm: Normal rate and regular rhythm.      Heart sounds:     No gallop.   Pulmonary:      Effort: No respiratory distress.      Breath sounds: No wheezing.   Abdominal:      General: There is no distension.      Tenderness: There is abdominal tenderness (diffuse). There is guarding. There is no rebound.   Musculoskeletal:         General: Tenderness (Right groin site, right knee, right foot) present.      Cervical back: Normal range of motion and neck supple.      Right lower leg: Edema (1+) present.      Left lower leg: Edema (1+) present.   Skin:     General: Skin is warm.      Capillary Refill: Capillary refill takes more than 3 seconds.      Coloration: Skin is pale.   Neurological:      Mental Status: He is alert. He is disoriented.      Motor: No weakness.      Comments: Poor historian but answers some questions   Psychiatric:      Comments: Unable to assess due to mental acuity       Fluids    Intake/Output Summary (Last 24 hours) at  12/2/2022 1924  Last data filed at 12/2/2022 0801  Gross per 24 hour   Intake --   Output 1100 ml   Net -1100 ml       Laboratory  Recent Labs     11/30/22  0916 12/01/22  1218 12/02/22  0350   WBC 13.0* 10.6 8.0   RBC 3.17* 3.13* 2.69*   HEMOGLOBIN 10.9* 10.9* 9.2*   HEMATOCRIT 31.9* 31.8* 27.1*   .6* 101.6* 100.7*   MCH 34.4* 34.8* 34.2*   MCHC 34.2 34.3 33.9   RDW 45.2 46.8 45.8   PLATELETCT 238 225 168   MPV 10.3 10.1 10.1     Recent Labs     12/02/22  0350 12/02/22  1024 12/02/22  1516   SODIUM 148* 146* 142   POTASSIUM 3.8 3.8 3.7   CHLORIDE 112 110 107   CO2 26 26 22   GLUCOSE 119* 107* 107*   BUN 24* 21 18   CREATININE 0.91 0.89 0.78   CALCIUM 8.5 8.8 8.9                   Imaging  DX-FOOT-COMPLETE 3+ RIGHT   Final Result      Likely acute osteomyelitis at the inferior calcaneus. MRI can be obtained as indicated.      MR-FOOT-W/O RIGHT   Final Result      1.  Very limited study due to extensive patient motion artifact and patient comfort preventing multiple sequences.      2.  No gross bony destructive change.      3.  Soft tissue edema.      US-EXTREMITY ARTERY LOWER UNILAT RIGHT   Final Result      US-EXTREMITY VENOUS LOWER UNILAT RIGHT   Final Result      US-ABDOMEN COMPLETE SURVEY   Final Result         1.  Gallbladder sludge, otherwise unremarkable gallbladder   2.  Hepatic cysts   3.  Bilateral echogenic kidneys with mildly thinned cortex, appearance favors medical renal disease      CT-HEAD W/O   Final Result      1.  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   2.  Mild cerebral atrophy. Age-appropriate.   3.  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   4.  No acute findings are evident.         DX-CHEST-PORTABLE (1 VIEW)   Final Result      No evidence of acute cardiopulmonary process.           Assessment/Plan  Problem Representation:    *  Right leg pain- (present on admission)  Assessment & Plan  - Given recent right groin access for TAVR, MARGARITA ordered-   - Pain controlled with tylenol 650mg q6 prn, IV dilaudid 0.5mg q4 prn, oxycodone 5-10mg q3 prn  - can consider lidocaine patch, diclofenac gel as needed  - U/S showed: Occlusion of RIGHT femoral artery with distal reconstitution, Occlusion of RIGHT posterior tibial artery,  Two vessel runoff to the ankle with monophasic flow  -12/1: Spoke with Dr. Medel who is partners with Dr. Quinones (who did patient's previous surgery). She says patient will be having pain for a long time but since this is not an emergency they will see him outpatient and Dr. Quinones will perform a re-anastomosis and other procedures as warranted. This procedure cannot currently be done as Dr. Quinones is currently on vacation and Dr. Medel doesn't have [facility] privileges as well as the fact that this type of surgery cannot even be done at [facility].   - PT/OT recommending SNF  - MRI foot attempted 12/2 however was incomplete as patient was unable to tolerate. Foot xray confirmed osteomyelitis to R calcaneus.  - 12/2: Consulted [facility] who spoke with orthopedic Dr. Turner regarding possible surgical intervention- \since he has occluded posterior tibial artery on the heel, nothing will heal. Suggested reaching out to Dr. Medel tomorrow to confirm that she is ok with [facility] reaching out to Dr. Rdz from [facility] vascular team to see patient. For the time being, wound care orders for nursing was placed and he was provided with betadine dressing and offloading boot per LPS team.  - 12/2: Given confirmed finding of osteomyelitis, consulted Dr. Turcios from ID for recommendations regarding patient's abx regimen- he will see patient tomorrow morning 12/3. For the time being will remain on unasyn.    Acute encephalopathy- (present on admission)  Assessment & Plan  - Per family and chart review, patient is at baseline alert and oriented x4.  -  Presented to emergency room on 11/28/2022 with confusion, noted to be alert and oriented x2 (self, place only)  - Altered mental status initially thought to be in setting of azotemia from acute kidney injury however continuing to have encephalopathy despite improving azotemia and improving kidney function  - Per nephrology, currently no emergent need for dialysis. Nephrology signed off 11/29.  - Continue to monitor and trend labs. Work with PT/OT inpatient, they recommend SNF post discharge.    Sepsis (HCC)- (present on admission)  Assessment & Plan  - This is Sepsis Present on admission  - SIRS criteria identified on my evaluation include: Leukocytosis, with WBC greater than 12,000  - Source is unknown, possibly abdominal etiology. US 11/29 showing US abdomen showing gallbladder sludge otherwise unremarkable, hepatic cysts, bilateral echogenic kidneys with mildly thinned cortex concerning for possible medical renal disease.  - Sepsis protocol initiated  - Fluid resuscitation ordered per protocol  - Crystalloid Fluid Administration: Fluid resuscitation ordered per standard protocol - 30 mL/kg per current or ideal body weight  - IV antibiotics as appropriate for source of sepsis- started on IV ceftriaxone, discontinued on 11/29  - Reassessment: I have reassessed the patient's hemodynamic status  - Blood cultures NGTD  - Urine cultures unremarkable  - Hemodynamically stable  - Uptrending WBC, WBC 14.4 today 11/29 from 13.9 on admission 11/28  - continue to monitor with daily CBC's  - No clear source of sepsis- Discontinued IV ceftriaxone and started on IV unasyn low dose (due to ALL) TID (11/29- ) pending further workup.   - MRSA nares 11/29 negative  - Chest xray 11/28 not showing any evidence of acute cardiopulmonary process  - confirmed osteomyelitis to R calcaneus on 12/2 with foot xray  - Continuing Unasyn + gentle IVF    ALL (acute kidney injury) (HCC)- (present on admission)  Assessment & Plan  Improving  - No  noted history of chronic kidney disease, unclear creatinine baseline however recent kidney values appear normal- most recent labs from 8/3/2022 showing baseline Cr 1.0 and BUN 15.0.  - On admission, BUN/Cr ratio noted to be 122/4.67  - Patient is able to urinate, produced about 400cc dark urine in emergency room. No urinary symptoms such as dysuria, polyuria, retention.  - Urinalysis 11/28: +hyaline casts, +granular casts.  - Consulted nephrology- Per Dr. Hearn, currently unclear etiology of ALL however given hyaline casts on UA, volume depletion most likely. Obstructive uropathy was also considered given suprapubic tenderness on exam however renal/bladder US unremarkable for obstructions.   - Started on IV fluids at 125 cc/hr on 11/28, increased rate to 150 cc/hr on 11/29  - Most recent labs from 11/29 at 1500 showing improved renal function with BUN/Cr ratio of 85/1.63.  - Continue to monitor renal function with daily labs  - monitor urine output  - Kidney function improving as of 11/29 and patient nonoliguric, no need for dialysis. Nephrology signed off 11/29.  - Continue gentle IVF and trend Crt/BUN  - Improving- Cr trending down. Cr is 0.90 on 12/1 from 1.05 on 11/30 and 4.67 on admission 11/28.  - ALL resolved, Cr 0.78 on 12/2. D/c'ed renal diet and placed on regular diet to encourage patient PO intake    Increased anion gap metabolic acidosis- (present on admission)  Assessment & Plan  Resolved  - Baseline anion gap is 7.0 per most recent labs from 8/2022  - Anion gap on admission noted to be 19.0, improved to 17.0 after IV LR at 150 cc/hr  - Increased anion gap metabolic acidosis likely secondary to uremia from kidney damage  - Continue hydration with IV fluids and encourage PO intake  - Continue to monitor with daily labs    Congestive heart failure (CHF) (HCC)- (present on admission)  Assessment & Plan  - per chart review, patient has history of congestive heart failure  - c/w home aspirin 81 everyday,  coreg 6.25mg BID, atorvastatin 80mg qD  - holding home lisinopril, amlodipine, HCTZ due to ALL. Restart meds as appropriate    History of seizures- (present on admission)  Assessment & Plan  - per patient, last episode of seizure was in 2019  - c/w home keppra 500mg BID    Macrocytic anemia- (present on admission)  Assessment & Plan  - No known history of anemia, not on iron supplementation  - Most recent labs from 8/2022 showing baseline hemoglobin of 14.1 and MCV of 97.7  - pending anemia workup- iron panel, reticulocytes, vitamin B12, folate  - continue to monitor with daily CBC's  - Transfuse if hemoglobin <7.0. Type and screen ordered 11/30    History of transcatheter aortic valve replacement (TAVR)- (present on admission)  Assessment & Plan  - per chart review, patient has history of severe aortic stenosis and underwent TAVR with Dr. Wolfe on 8/2/2022.   - most recent echo showed well seated TAVR with peak gradient of 12 mmHg and mean gradient of 7.5 mmHg and trace perivalvular leak.     Hyperlipidemia- (present on admission)  Assessment & Plan  - no lipid panel on file  - Continue atorvastatin    Essential hypertension- (present on admission)  Assessment & Plan  - at home, on lisinopril, hydrochlorithiazide, amlodipine, carvedilol.  - continue with home coreg 6.25 BID  - restarted on amlodipine 12/1 due to hypertension, continuing to hold HCTZ  - restarted lisinopril 20 on 12/2 due to continued hypertension       VTE prophylaxis: heparin ppx    I have performed a physical exam and reviewed and updated ROS and Plan today (12/2/2022). In review of yesterday's note (12/1/2022), there are no changes except as documented above.

## 2022-12-02 NOTE — PROGRESS NOTES
Patient was transported to the MRI dept for a Foot MRI. Pacemaker implanted, RN available to monitor. Patient was too uncomfortable throughout the entire exam, motion degraded imaging,. Non-responsive to instruction. Exam incomplete as patient aborted. Patient transported back up to floor.

## 2022-12-02 NOTE — PROGRESS NOTES
Received report and assumed care of patient at change of shift. Patient is A&Ox3, disoriented to time, delayed responses, on RA, and reports no pain at this time. Patient assessment completed, bed in lowest position, and call light and personal belongings are within reach. Patient expressed no further needs at this time.

## 2022-12-02 NOTE — DIETARY
Nutrition Services Brief Update:    Day 4 of admit.  Gilmer Mae is a 82 y.o. male with admitting DX of ALL (acute kidney injury).    Current Diet: Renal. Providing Boost Glucose Control with meals. Recorded PO intake has been minimal. Attempted visit twice with pt sleeping and did not respond to name. Discussed with MD. Creatinine is improving and MD agrees with liberalizing diet to help improve appetite. If PO intake does not improve, may want to consider an appetite stimulant.    Problem: Nutritional:  Goal: Achieve adequate nutritional intake  Description: Patient will consume >50% of meals  Outcome: Not met.    RD following

## 2022-12-02 NOTE — PROGRESS NOTES
Pt down to inpatient MRI dept for MRI of right foot.  Pt's Medtronic PPM set to MRI safe mode, DOO 85, and pt verbalized setting was comfortable to continue with scan.  Pt monitored with BP, EKG, and SP02.  Part way during the scan, pt moving and stated his foot hurt too much to continue with MRI.  This RN offered to call primary RN and try to get medication to help make pt more comfortable, pt refused.  Pt's Medtronic PPM reset to pre-MRI mode.  Pt's primary RN, Chi, notified pt  unable to complete scan.

## 2022-12-02 NOTE — PROGRESS NOTES
"[facility] Medicine Daily Progress Note    Date of Service  12/1/2022    [facility] Team: [facility]  Purple Team   Attending: AKIRA Jeronimo M.d.  Senior Resident: Dr. Kina HUMPHREYS  Intern:  Dr. Hien HUMPHREYS  Contact Number: [phone]    Chief Complaint  Gilmer Mae is a 82 y.o. male admitted 11/28/2022 with encephalopathy and RLE edema/pain as well as new finding of ALL.    Hospital Course  Gilmer Mae is a 82 year old with past medical history significant for hypertension, hyperlipidemia, sick sinus syndrome (s/p pacemaker placement), seizures (most recent episode in 2019, on keppra), severe aortic stenosis (s/p TAVR 8/2/2022 at [facility]) and peripheral artery disease (s/p vascular surgery on femoral artery in 2022) who presented to ED via EMS on 11/28/2022 for loss of consciousness and bilateral lower extremity edema. History limited due to patient’s altered mental status, A&Ox2 (self, place) so most history obtained from chart review and daughter Kendra. Patient lives with wife and son. Home physical therapy noticed patient was \"really confused\" and called EMS. For past week or so, patient was not eating or drinking, family suspects could be due to stress from new onset bilateral lower extremity edema that started a few days ago. Patient denies chest pain, shortness of breath or falling. Patient says he is having pain “all over” especially his right leg. Patient states he got surgery on his right leg “2 weeks ago but they never finished.”     On arrival to ED, patient was lethargic and confused.   Vitals/exam: /56, HR 60, afebrile (96.7/35.9), RR 15, 95% on 1L. Cardiac exam RRR, pulmonary exam wnl. Abdominal exam significant for diffuse abdominal tenderness and guarding. Musculoskeletal exam was pertinent for tenderness extending from right groin region down to foot, >3 second capillary refill and 1+ peripheral pulses. Right groin with healing incision from recent arterial surgery at [facility]. Wound on heel of right " foot.      ED labs: leukocytosis at 13.9, anemia 11.8/34.6, platelets 224. Na 137, K 4.2, Cl 98, CO2 20, AG 19, glucose 116, , Cr 4.67, Ca 8.6, AST 24, ALT 13, Alk phos 149, total bili 1.1, albumin 3.5, lipase 31, GFR 12. PT 13.9, INR 1.08. Prolactin 18.30 H,  H. Procal 0.55 H. Lactic acid 1.2. Troponin 33. UA with hyaline casts and granular casts. 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 No acute processes. Chest xray showed no acute cardiopulm process. US abdomen showing gallbladder sludge otherwise unremarkable, hepatic cysts, bilateral echogenic kidneys with mildly thinned cortex concerning for possible medical renal disease.     Patient was seen by nephrology who does not recommend dialysis at this time. Patient does not have known history of chronic kidney disease from baseline labs in 8/2022. Unclear etiology of the kidney injury however given hyaline casts on UA, likely secondary to volume depletion. Started on IV  cc/hr. Admitted for encephalopathy and new finding of ALL.    Interval Problem Update    No acute events overnight. Patient seen at bedside this morning. Continues to have flat affect and complaining of pain in his right foot/calf. Cr improving, 0.90 this morning from 1.05 yesterday and 1.63 day before. Per PT/OT recs, pending SNF placement. Continuing unasyn and1/2 NS at 83 cc/hr. Ordered condom cath as children at bedside reports patient is not drinking water due to concern for having to go to bathroom too often. Restarted on home amlodipine due to high blood pressures. MRI foot ordered yesterday 11/30 but still pending as of 12/1. Nurse and medical team called MRI multiple times to make sure it gets done but was not able to reach them. Spoke with Dr. Medel who is partners with Dr. Quinones  (who did patient's previous surgery). She says patient will be having pain for a long time but since this is not an emergency they will see him outpatient and Dr. Quinones will perform a re-anastomosis and other procedures as warranted. This procedure cannot currently be done as Dr. Quinones is currently on vacation and Dr. Medel doesn't have [facility] privileges as well as the fact that this type of surgery cannot even be done at [facility].     I have discussed this patient's plan of care and discharge plan at IDT rounds today with Case Management, Nursing, Nursing leadership, and other members of the IDT team.    Consultants/Specialty  Nephrology    Code Status  Full Code    Disposition  Patient is not medically cleared for discharge.   Anticipate discharge to to skilled nursing facility.  I have placed the appropriate orders for post-discharge needs.    Review of Systems  Review of Systems   Constitutional:  Negative for fever.   Respiratory:  Negative for shortness of breath.    Cardiovascular:  Negative for chest pain.   Gastrointestinal:  Negative for abdominal pain.   Musculoskeletal:  Positive for falls, joint pain and myalgias.        Right foot pain   Neurological:  Positive for focal weakness (right lower extremity from hip to toes), loss of consciousness and weakness. Negative for tremors, sensory change and speech change.   Psychiatric/Behavioral:  Positive for hallucinations.    All other systems reviewed and are negative.     Physical Exam  Temp:  [36.2 °C (97.1 °F)-36.5 °C (97.7 °F)] 36.4 °C (97.6 °F)  Pulse:  [66-74] 66  Resp:  [16-18] 18  BP: (160-172)/(63-80) 163/80  SpO2:  [93 %-95 %] 94 %    Physical Exam  Exam conducted with a chaperone present (daughter and son at bedside).   Constitutional:       General: He is not in acute distress.     Appearance: He is well-developed. He is ill-appearing and toxic-appearing. He is not diaphoretic.   HENT:      Head: Normocephalic and atraumatic.      Right Ear:  External ear normal.      Left Ear: External ear normal.      Nose: Nose normal.      Mouth/Throat:      Mouth: Mucous membranes are dry.      Pharynx: No oropharyngeal exudate or posterior oropharyngeal erythema.   Eyes:      General:         Right eye: No discharge.         Left eye: No discharge.      Extraocular Movements: Extraocular movements intact.      Comments: Unable to fully assess   Cardiovascular:      Rate and Rhythm: Normal rate and regular rhythm.      Heart sounds:     No gallop.   Pulmonary:      Effort: No respiratory distress.      Breath sounds: No wheezing.   Abdominal:      General: There is no distension.      Tenderness: There is abdominal tenderness (diffuse). There is guarding. There is no rebound.   Musculoskeletal:         General: Tenderness (Right groin site, right knee, right foot) present.      Cervical back: Normal range of motion and neck supple.      Right lower leg: Edema (1+) present.      Left lower leg: Edema (1+) present.   Skin:     General: Skin is warm.      Capillary Refill: Capillary refill takes more than 3 seconds.      Coloration: Skin is pale.   Neurological:      Mental Status: He is alert. He is disoriented.      Motor: No weakness.      Comments: Poor historian but answers some questions   Psychiatric:      Comments: Unable to assess due to mental acuity       Fluids    Intake/Output Summary (Last 24 hours) at 12/1/2022 2011  Last data filed at 12/1/2022 1005  Gross per 24 hour   Intake --   Output 800 ml   Net -800 ml       Laboratory  Recent Labs     11/29/22  1759 11/30/22  0916 12/01/22  1218   WBC 12.6* 13.0* 10.6   RBC 3.46* 3.17* 3.13*   HEMOGLOBIN 11.9* 10.9* 10.9*   HEMATOCRIT 34.6* 31.9* 31.8*   .0* 100.6* 101.6*   MCH 34.4* 34.4* 34.8*   MCHC 34.4 34.2 34.3   RDW 44.4 45.2 46.8   PLATELETCT 255 238 225   MPV 10.8 10.3 10.1     Recent Labs     11/29/22  1449 11/30/22  0916 12/01/22  1218   SODIUM 143 147* 146*   POTASSIUM 3.6 4.2 3.8   CHLORIDE  102 109 109   CO2 24 27 25   GLUCOSE 99 113* 120*   BUN 85* 51* 31*   CREATININE 1.63* 1.05 0.90   CALCIUM 9.0 9.1 9.0                   Imaging  US-EXTREMITY ARTERY LOWER UNILAT RIGHT   Final Result      US-EXTREMITY VENOUS LOWER UNILAT RIGHT   Final Result      US-ABDOMEN COMPLETE SURVEY   Final Result         1.  Gallbladder sludge, otherwise unremarkable gallbladder   2.  Hepatic cysts   3.  Bilateral echogenic kidneys with mildly thinned cortex, appearance favors medical renal disease      CT-HEAD W/O   Final Result      1.  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   2.  Mild cerebral atrophy. Age-appropriate.   3.  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   4.  No acute findings are evident.         DX-CHEST-PORTABLE (1 VIEW)   Final Result      No evidence of acute cardiopulmonary process.      MR-FOOT-W/O RIGHT    (Results Pending)        Assessment/Plan  Problem Representation:    * Acute encephalopathy- (present on admission)  Assessment & Plan  - Per family and chart review, patient is at baseline alert and oriented x4.  - Presented to emergency room on 11/28/2022 with confusion, noted to be alert and oriented x2 (self, place only)  - Altered mental status initially thought to be in setting of azotemia from acute kidney injury however continuing to have encephalopathy despite improving azotemia and improving kidney function  - Per nephrology, currently no emergent need for dialysis. Nephrology signed off 11/29.  -Continue to monitor and trend labs. Work with PT/OT inpatient, they recommend SNF post discharge.    Right leg pain- (present on admission)  Assessment & Plan  - Given recent right groin access for TAVR, MARGARITA ordered-   - Pain controlled with tylenol 650mg q6 prn, IV dilaudid 0.5mg q4 prn, oxycodone 5-10mg q3 prn  - can consider lidocaine  patch, diclofenac gel as needed  - U/S showed: Occlusion of RIGHT femoral artery with distal reconstitution, Occlusion of RIGHT posterior tibial artery,  Two vessel runoff to the ankle with monophasic flow  - pending MRI foot due to concern for osteomyelitis (ordered 11/30)  -12/1: Spoke with Dr. Medel who is partners with Dr. Quinones (who did patient's previous surgery). She says patient will be having pain for a long time but since this is not an emergency they will see him outpatient and Dr. Quinones will perform a re-anastomosis and other procedures as warranted. This procedure cannot currently be done as Dr. Quinones is currently on vacation and Dr. Medel doesn't have [facility] privileges as well as the fact that this type of surgery cannot even be done at [facility].   - PT/OT recommending SNF    Sepsis (HCC)- (present on admission)  Assessment & Plan  - This is Sepsis Present on admission  - SIRS criteria identified on my evaluation include: Leukocytosis, with WBC greater than 12,000  - Source is unknown, possibly abdominal etiology. US 11/29 showing US abdomen showing gallbladder sludge otherwise unremarkable, hepatic cysts, bilateral echogenic kidneys with mildly thinned cortex concerning for possible medical renal disease.  - Sepsis protocol initiated  - Fluid resuscitation ordered per protocol  - Crystalloid Fluid Administration: Fluid resuscitation ordered per standard protocol - 30 mL/kg per current or ideal body weight  - IV antibiotics as appropriate for source of sepsis- started on IV ceftriaxone, discontinued on 11/29  - Reassessment: I have reassessed the patient's hemodynamic status  - Blood cultures NGTD  - Urine cultures unremarkable  - Hemodynamically stable  - Uptrending WBC, WBC 14.4 today 11/29 from 13.9 on admission 11/28  - continue to monitor with daily CBC's  - No clear source of sepsis- Discontinued IV ceftriaxone and started on IV unasyn low dose (due to ALL) TID (11/29- ) pending further  workup.   - MRSA nares 11/29 negative  - Chest xray 11/28 not showing any evidence of acute cardiopulmonary process    -Continuing Unasyn + gentle IVF    ALL (acute kidney injury) (HCC)- (present on admission)  Assessment & Plan  Improving  - No noted history of chronic kidney disease, unclear creatinine baseline however recent kidney values appear normal- most recent labs from 8/3/2022 showing baseline Cr 1.0 and BUN 15.0.  - On admission, BUN/Cr ratio noted to be 122/4.67  - Patient is able to urinate, produced about 400cc dark urine in emergency room. No urinary symptoms such as dysuria, polyuria, retention.  - Urinalysis 11/28: +hyaline casts, +granular casts.  - Consulted nephrology- Per Dr. Hearn, currently unclear etiology of ALL however given hyaline casts on UA, volume depletion most likely. Obstructive uropathy was also considered given suprapubic tenderness on exam however renal/bladder US unremarkable for obstructions.   - Started on IV fluids at 125 cc/hr on 11/28, increased rate to 150 cc/hr on 11/29  - Most recent labs from 11/29 at 1500 showing improved renal function with BUN/Cr ratio of 85/1.63.  - Continue to monitor renal function with daily labs  - monitor urine output  - Kidney function improving as of 11/29 and patient nonoliguric, no need for dialysis. Nephrology signed off 11/29.  - Continue gentle IVF and trend Crt/BUN  - Improving- Cr trending down. Cr is 0.90 on 12/1 from 1.05 on 11/30 and 4.67 on admission 11/28.    Increased anion gap metabolic acidosis- (present on admission)  Assessment & Plan  Resolved  - Baseline anion gap is 7.0 per most recent labs from 8/2022  - Anion gap on admission noted to be 19.0, improved to 17.0 after IV LR at 150 cc/hr  - Increased anion gap metabolic acidosis likely secondary to uremia from kidney damage  - Continue hydration with IV fluids and encourage PO intake  - Continue to monitor with daily labs    Congestive heart failure (CHF) (HCC)- (present on  admission)  Assessment & Plan  - per chart review, patient has history of congestive heart failure  - c/w home aspirin 81 everyday, coreg 6.25mg BID, atorvastatin 80mg qD  - holding home lisinopril, amlodipine, HCTZ due to ALL. Restart meds as appropriate    History of seizures- (present on admission)  Assessment & Plan  - per patient, last episode of seizure was in 2019  - c/w home keppra 500mg BID    Macrocytic anemia- (present on admission)  Assessment & Plan  - No known history of anemia, not on iron supplementation  - Most recent labs from 8/2022 showing baseline hemoglobin of 14.1 and MCV of 97.7  - pending anemia workup- iron panel, reticulocytes, vitamin B12, folate  - continue to monitor with daily CBC's  - Transfuse if hemoglobin <7.0. Type and screen ordered 11/30    History of transcatheter aortic valve replacement (TAVR)- (present on admission)  Assessment & Plan  - per chart review, patient has history of severe aortic stenosis and underwent TAVR with Dr. Wolfe on 8/2/2022.   - most recent echo showed well seated TAVR with peak gradient of 12 mmHg and mean gradient of 7.5 mmHg and trace perivalvular leak.     Hyperlipidemia- (present on admission)  Assessment & Plan  - no lipid panel on file  - Continue atorvastatin    Essential hypertension- (present on admission)  Assessment & Plan  - at home, on lisinopril, hydrochlorithiazide, amlodipine, carvedilol.  - holding HCTZ, lisinopril for ALL  - continue with home coreg 6.25 BID  - restarted on amlodipine 12/1 due to hypertension, continuing to hold lisinopril, HCTZ       VTE prophylaxis: heparin ppx    I have performed a physical exam and reviewed and updated ROS and Plan today (12/1/2022). In review of yesterday's note (11/30/2022), there are no changes except as documented above.

## 2022-12-02 NOTE — CARE PLAN
The patient is Stable - Low risk of patient condition declining or worsening    Shift Goals  Clinical Goals: abx therapy, pain management  Patient Goals: comfort  Family Goals: n/a    Progress made toward(s) clinical / shift goals:    Problem: Knowledge Deficit - Standard  Goal: Patient and family/care givers will demonstrate understanding of plan of care, disease process/condition, diagnostic tests and medications  Outcome: Progressing     Problem: Hemodynamics  Goal: Patient's hemodynamics, fluid balance and neurologic status will be stable or improve  Outcome: Progressing     Problem: Fluid Volume  Goal: Fluid volume balance will be maintained  Outcome: Progressing     Problem: Urinary - Renal Perfusion  Goal: Ability to achieve and maintain adequate renal perfusion and functioning will improve  Outcome: Progressing       Patient is not progressing towards the following goals:

## 2022-12-02 NOTE — CARE PLAN
The patient is Stable - Low risk of patient condition declining or worsening    Shift Goals  Clinical Goals: pain management  Patient Goals: Comfort  Family Goals: Pain mgt    Progress made toward(s) clinical / shift goals:  Patient medicated for pain per MAR. Patient sleeping. Bed is locked and in the lowest position. Call light and personal belongings within reach.       Problem: Knowledge Deficit - Standard  Goal: Patient and family/care givers will demonstrate understanding of plan of care, disease process/condition, diagnostic tests and medications  Outcome: Progressing     Problem: Hemodynamics  Goal: Patient's hemodynamics, fluid balance and neurologic status will be stable or improve  Outcome: Progressing     Problem: Fluid Volume  Goal: Fluid volume balance will be maintained  Outcome: Progressing     Problem: Urinary - Renal Perfusion  Goal: Ability to achieve and maintain adequate renal perfusion and functioning will improve  Outcome: Progressing     Problem: Respiratory  Goal: Patient will achieve/maintain optimum respiratory ventilation and gas exchange  Outcome: Progressing     Problem: Mechanical Ventilation  Goal: Safe management of artificial airway and ventilation  Outcome: Progressing  Goal: Successful weaning off mechanical ventilator, spontaneously maintains adequate gas exchange  Outcome: Progressing  Goal: Patient will be able to express needs and understand communication  Outcome: Progressing     Problem: Physical Regulation  Goal: Diagnostic test results will improve  Outcome: Progressing  Goal: Signs and symptoms of infection will decrease  Outcome: Progressing     Problem: Pain - Standard  Goal: Alleviation of pain or a reduction in pain to the patient’s comfort goal  Outcome: Progressing     Problem: Skin Integrity  Goal: Skin integrity is maintained or improved  Outcome: Progressing       Patient is not progressing towards the following goals:

## 2022-12-02 NOTE — DISCHARGE PLANNING
[facility]  Plan or Request: DPA left vm for [facility] asking about pending referral.    Per epic, Sveta declined / no d/c plan

## 2022-12-02 NOTE — THERAPY
Missed Therapy     Patient Name: Gimler Mae  Age:  82 y.o., Sex:  male  Medical Record #: [id number]  Today's Date: 12/2/2022    Attempted to see patient for OT tx session, pt off floor for MRI of right foot. Will hold and follow up as able/appropriate.    Gilmer Laws OTD, OTR/L

## 2022-12-02 NOTE — CARE PLAN
The patient is Stable - Low risk of patient condition declining or worsening    Shift Goals  Clinical Goals:   Patient Goals:   Family Goals: ivelisse  Progress made toward(s) clinical / shift goals:  Patient resting     Patient is not progressing towards the following goals:

## 2022-12-03 PROBLEM — M86.9 OSTEOMYELITIS OF RIGHT LOWER EXTREMITY (HCC): Status: ACTIVE | Noted: 2022-01-01

## 2022-12-03 NOTE — CONSULTS
LIMB PRESERVATION SERVICE CONSULT      REFERRED BY: Dr Roxana Stanford    DATE OF CONSULTATION: 12/2/2022    REASON FOR CONSULT: Right heel wound     HISTORY OF PRESENT ILLNESS: Gilmer Mae is a 82 y.o.  with a past medical history that includes chronic pain, hyperlipidemia, hypertension, pacemaker.  Admitted 11/28/2022 for ALL (acute kidney injury) (HCC) [N17.9].     LPS has been consulted for right heel wound.  Patient is a poor historian and unable to provide any history on current right heel wound.  Patient admitted 11/28/2022 with altered mental status.  Patient had T[facility]R on 8/2/2022 at [facility].  Patient was having decreased oral intake for approximately 1 week with lower extremity edema brought into the ED and found to have ALL.  Upon admission it was noticed the patient has eschar to the plantar surface of the right heel.  Patient denies fevers, chills, nausea, vomiting.       IV antibiotics were started on this admission.  Infectious diseases has been consulted.  Xray completed and is positive for osteomyelitis to right calcaneus.  Ortho has not consulted yet.   Patient sees Dr. Quinones who is currently out of town.  Dr. Medel was consulted on patient's case.      Smoking:   has no history on file for tobacco use.    Alcohol:   has no history on file for alcohol use.    Drug:   has no history on file for drug use.      PAST MEDICAL HISTORY:   Past Medical History:   Diagnosis Date    Chronic pain     Hyperlipidemia     Hypertension     Pacemaker         PAST SURGICAL HISTORY:   Past Surgical History:   Procedure Laterality Date    PACEMAKER INSERTION         MEDICATIONS:   Current Facility-Administered Medications   Medication Dose    lisinopril (PRINIVIL) tablet 20 mg  20 mg    dextrose 5% infusion      linezolid (ZYVOX) tablet 600 mg  600 mg    amLODIPine (NORVASC) tablet 5 mg  5 mg    ampicillin/sulbactam (UNASYN) 3 g in  mL IVPB  3 g    heparin injection 5,000 Units  5,000 Units    levETIRAcetam  (KEPPRA) tablet 500 mg  500 mg    atorvastatin (LIPITOR) tablet 40 mg  40 mg    aspirin EC (ECOTRIN) tablet 81 mg  81 mg    senna-docusate (PERICOLACE or SENOKOT S) 8.6-50 MG per tablet 2 Tablet  2 Tablet    And    polyethylene glycol/lytes (MIRALAX) PACKET 1 Packet  1 Packet    And    magnesium hydroxide (MILK OF MAGNESIA) suspension 30 mL  30 mL    And    bisacodyl (DULCOLAX) suppository 10 mg  10 mg    lactated ringers infusion (BOLUS)  1,000 mL    lactated ringers infusion (BOLUS): BMI less than or equal to 30  30 mL/kg    acetaminophen (Tylenol) tablet 650 mg  650 mg    Pharmacy Consult Request ...Pain Management Review 1 Each  1 Each    oxyCODONE immediate-release (ROXICODONE) tablet 5 mg  5 mg    Or    oxyCODONE immediate release (ROXICODONE) tablet 10 mg  10 mg    ondansetron (ZOFRAN) syringe/vial injection 4 mg  4 mg    ondansetron (ZOFRAN ODT) dispertab 4 mg  4 mg    HYDROmorphone (Dilaudid) injection 0.5 mg  0.5 mg    carvedilol (COREG) tablet 6.25 mg  6.25 mg    cloNIDine (CATAPRES) tablet 0.1 mg  0.1 mg    gabapentin (NEURONTIN) capsule 100 mg  100 mg       ALLERGIES:  No Known Allergies     FAMILY HISTORY: No family history on file.      REVIEW OF SYSTEMS:   Constitutional: Negative for chills, fever   Respiratory: Negative for cough and shortness of breath.    Cardiovascular:Negative for chest pain, and claudication.   Gastrointestinal: Negative for constipation, diarrhea, nausea and vomiting.   Lower extremities: positive for mild redness  Neurological: Negative for numbness to feet and lower legs  All other systems reviewed and are negative     RESULTS:     Recent Labs     11/30/22  0916 12/01/22  1218 12/02/22  0350   WBC 13.0* 10.6 8.0   RBC 3.17* 3.13* 2.69*   HEMOGLOBIN 10.9* 10.9* 9.2*   HEMATOCRIT 31.9* 31.8* 27.1*   .6* 101.6* 100.7*   MCH 34.4* 34.8* 34.2*   MCHC 34.2 34.3 33.9   RDW 45.2 46.8 45.8   PLATELETCT 238 225 168   MPV 10.3 10.1 10.1     Recent Labs     12/02/22  0350  12/02/22  1024 12/02/22  1516   SODIUM 148* 146* 142   POTASSIUM 3.8 3.8 3.7   CHLORIDE 112 110 107   CO2 26 26 22   GLUCOSE 119* 107* 107*   BUN 24* 21 18         ESR:     Results from last 7 days   Lab Units 12/02/22  1024   SED RATE WESTERGREN 1526 mm/hour 91*       CRP:       Results from last 7 days   Lab Units 12/02/22  1024   C REACTIVE PROTEIN 4596 mg/dL 5.05*         COVID-19: Not completed this admission     Imaging:  DX-FOOT-COMPLETE 3+ RIGHT   Final Result      Likely acute osteomyelitis at the inferior calcaneus. MRI can be obtained as indicated.      MR-FOOT-W/O RIGHT   Final Result      1.  Very limited study due to extensive patient motion artifact and patient comfort preventing multiple sequences.      2.  No gross bony destructive change.      3.  Soft tissue edema.      US-EXTREMITY ARTERY LOWER UNILAT RIGHT   Final Result      US-EXTREMITY VENOUS LOWER UNILAT RIGHT   Final Result      US-ABDOMEN COMPLETE SURVEY   Final Result         1.  Gallbladder sludge, otherwise unremarkable gallbladder   2.  Hepatic cysts   3.  Bilateral echogenic kidneys with mildly thinned cortex, appearance favors medical renal disease      CT-HEAD W/O   Final Result      1.  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   2.  Mild cerebral atrophy. Age-appropriate.   3.  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   4.  No acute findings are evident.         DX-CHEST-PORTABLE (1 VIEW)   Final Result      No evidence of acute cardiopulmonary process.          Arterial studies: 11/30/2022  History:         Pain throughout the right lower extremity. No prior exams.      Limitations:                    RIGHT   Waveform        Peak Systolic Velocity (cm/s)                   Prox    Prox-Mid  Mid    Mid-Dist  Distal   Biphasic                          51                        "CFA         Bi, non-        164                                        PFA   reversed         Monophasic      61       0        0       0        52      SFA         Monophasic      28                                 17      POP         Monophasic      49                                 21      AT         Monophasic      22                                 0       PT         Monophasic      31                                 10      MAHAMED            FINDINGS   Right-   Diffuse atherosclerotic plaque seen throughout the lower extremity.   Absent flow observed from the proximal to distal femoral artery.   Reconstitution of monophasic flow via collateral visualized at the distal    femoral artery.   Absent flow at the distal posterior tibial artery.   Two vessel runoff to the ankle with monophasic flow.     A1c:  Lab Results   Component Value Date/Time    HBA1C 5.2 09/10/2019 04:16 AM            Microbiology:  Results       Procedure Component Value Units Date/Time    S. Aureus By PCR, Nasal Complete [[id number]] Collected: 11/29/22 1120    Order Status: Completed Specimen: Respirate from Respiratory Updated: 11/29/22 1540     Staph aureus by PCR Negative     MRSA by PCR Negative    Narrative:      Collected By: [id number] JAMAL MEDEROS    Blood Culture [[id number]] Collected: 11/28/22 1359    Order Status: Completed Specimen: Blood from Peripheral Updated: 11/29/22 8419     Significant Indicator NEG     Source BLD     Site PERIPHERAL     Culture Result No Growth  Note: Blood cultures are incubated for 5 days and  are monitored continuously.Positive blood cultures  are called to the RN and reported as soon as  they are identified.      Narrative:      2 of 2 blood culture x2  Sites order. Per Hospital Policy:  Only change Specimen Src: to \"Line\" if specified by physician  order.  No site indicated    Blood Culture [[id number]] Collected: 11/28/22 1335    Order Status: Completed Specimen: Blood from Peripheral Updated: 11/29/22 " "0759     Significant Indicator NEG     Source BLD     Site PERIPHERAL     Culture Result No Growth  Note: Blood cultures are incubated for 5 days and  are monitored continuously.Positive blood cultures  are called to the RN and reported as soon as  they are identified.      Narrative:      1 of 2 for Blood Culture x 2 sites order. Per Hospital  Policy: Only change Specimen Src: to \"Line\" if specified by  physician order.  Left Forearm/Arm    URINALYSIS,CULTURE IF INDICATED [[id number]]  (Abnormal) Collected: 11/28/22 1526    Order Status: Completed Specimen: Urine, Clean Catch Updated: 11/28/22 7138     Color Yellow     Character Clear     Specific Gravity >=1.030     Ph 5.5     Glucose Negative mg/dL      Ketones Negative mg/dL      Protein Negative mg/dL      Bilirubin Small     Urobilinogen, Urine 0.2     Nitrite Negative     Leukocyte Esterase Negative     Occult Blood Small     Micro Urine Req Microscopic    Narrative:      Indication for culture:->Patient WITHOUT an indwelling Kimball  catheter in place with new onset of Dysuria, Frequency,  Urgency, and/or Suprapubic pain             PHYSICAL EXAMINATION:     VITAL SIGNS: BP (!) 169/86   Pulse 67   Temp 36.2 °C (97.2 °F) (Temporal)   Resp 16   Ht 1.727 m (5' 7.99\")   Wt 67 kg (147 lb 11.3 oz)   SpO2 94%   BMI 22.46 kg/m²       General Appearance:  Well developed, thin, in no acute distress      Lower Extremity Assessment:    Edema:   Minimal    ROM dorsi/plantarflexion:   Dorsiflexion limited none    Structural /mechanical changes:  None      Visual Inspection: Feet with maceration, ulcers, fissures.  Pedal pulses: decreased bilaterally    Pulses:  Nonpalpable DP/PT  With doppler sluggish tones noted to right DP with absent tones to right PT      Wound Assessment:    Wound(s)   location: Right plantar heel  Full thickness  Wound characteristics: Dark eschar  Erythema: Minimal  Drainage: None  Callus: Periwound  Odor: None  Measures: 3 x 1.8 by " MAYA      Wound care: Paint with Betadine, allowed to dry.  Leave open to air  Offload with Prevalon boot or PRAFO    Wound photo:           ASSESSMENT AND PLAN:   82 y.o. admitted for ALL (acute kidney injury) ([facility]) [N17.9]. Presents with right heel wound ulcer.    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  Patient would require revascularization if possible prior to any surgical interventions.  -Betadine dressing ordered  -Offloading boot ordered          Wound care:   -Wound care orders placed for nursing by LPS   -Right heel: Cleanse with NS, pat dry.  Apply Betadine to eschar, allowed to dry.  Keep open to air  Offload heel with Prevalon boot or PRAFO    Imaging/Labs:  -COVID-19: not completed this admission.     Vascular status:   - Nonpalpable DP/PT  With doppler sluggish tones noted to right DP with absent tones to right PT  -Right duplex completed.  Shows absent flow from proximal to distal femoral artery with reconstitution of monophasic flow at distal femoral artery.  Absent flow with distal posterior tibial artery, two-vessel runoff at the ankle with monophasic flow.  -Dr. Medel has been consulted at this time    Surgery:   - consulted Ortho Dr. Turner   -  no plans for surgery at this time    Antibiotics:   -currently on antibiotics managed by hospitalist   - recommend ID consult         Weight Bearing Status:   -Right foot: Weight bearing as tolerated    Offloading:   -PRAFO boot ordered  -Prevalon boots ordered  -shoe size: 11    PT/OT:   -PT/OT involved 12/2    -   Lab Results   Component Value Date/Time    HBA1C 5.2 09/10/2019 04:16 AM           Discharge Plan:  - TBD      D/W: pt, RN, , Dr Roxana Stanford     Please note that this dictation was created using voice recognition software. I have  worked with technical experts from [employer] to optimize the interface.  I have  made every reasonable attempt to correct obvious errors, but there may be errors of grammar and possibly content that I did not discover before finalizing the note.    Please contact LPS through [employer].

## 2022-12-03 NOTE — PROGRESS NOTES
VASCULAR SURGERY SERVICE                        Progress Note  _________________________________    Notified of right foot wound with possible osteo and known right SFA occlusion  Patient warrants right lower extremity revascularization to attempt to heal the foot wound, otherwise will be facing amputation    Ordered CTA and vein mapping to plan surgery. Further recs pending the results of those studies    Hemant Rdz MD  [facility]   Vo[employer]te preferred or call my office [phone]  __________________________________________________  Patient:Gilmer Mae   MRN:[id number]

## 2022-12-03 NOTE — PROGRESS NOTES
Rec'd report from day shift RN. Assumed pt care. Assessment care completed. AA&OX2, reoriented to date and situation. Delayed responses noted, pt seems to stop talking or not continue answering questions. Q2 hours turns enforced. Foot drop boot in use to right foot. Condom cath is on. Bed in lowest position, bed locked, bed alarm on for safety, treaded sock to left foot, RN and CNA numbers provided, call light within reach.

## 2022-12-03 NOTE — PROGRESS NOTES
Assumed pt care at 0700 . Pt is A&Ox 2, disoriented to time and situation . Pt rates pain 7/10 medicated per mar.  Pt's belongings are nearby, bed is in the lowest position and locked.

## 2022-12-03 NOTE — PROGRESS NOTES
"[facility] Internal Medicine Daily Progress Note    Date of Service  12/3/2022    [facility] Team: [facility]  Purple Team   Attending: AKIRA Jeronimo M.d.  Senior Resident: Dr. Kina HUMPHREYS  Intern:  Dr. Hien HUMPHREYS  Contact Number: [phone]    Chief Complaint  Gilmer Mae is a 82 y.o. male admitted 11/28/2022 with encephalopathy and RLE edema/pain with ulceration and osteomyelitis to [facility].    Hospital Course  Gilmer Mae is a 82 year old with past medical history significant for hypertension, hyperlipidemia, sick sinus syndrome (s/p pacemaker placement), seizures (most recent episode in 2019, on keppra), severe aortic stenosis (s/p TAVR 8/2/2022 at [facility]) and peripheral artery disease (s/p vascular surgery on femoral artery in 2022) who presented to ED via EMS on 11/28/2022 for loss of consciousness and bilateral lower extremity edema. History limited due to patient’s altered mental status, A&Ox2 (self, place) so most history obtained from chart review and daughter Kendra. Patient lives with wife and son. Home physical therapy noticed patient was \"really confused\" and called EMS. For past week or so, patient was not eating or drinking, family suspects could be due to stress from new onset bilateral lower extremity edema that started a few days ago. Patient denies chest pain, shortness of breath or falling. Patient says he is having pain “all over” especially his right leg. Patient states he got surgery on his right leg “2 weeks ago but they never finished.”     On arrival to ED, patient was lethargic and confused.   Vitals/exam: /56, HR 60, afebrile (96.7/35.9), RR 15, 95% on 1L. Cardiac exam RRR, pulmonary exam wnl. Abdominal exam significant for diffuse abdominal tenderness and guarding. Musculoskeletal exam was pertinent for tenderness extending from right groin region down to foot, >3 second capillary refill and 1+ peripheral pulses. Right groin with healing incision from recent arterial surgery at [facility]. Wound on heel " of right foot. ED labs: leukocytosis at 13.9, anemia 11.8/34.6, platelets 224. Na 137, K 4.2, Cl 98, CO2 20, AG 19, glucose 116, , Cr 4.67, Ca 8.6, AST 24, ALT 13, Alk phos 149, total bili 1.1, albumin 3.5, lipase 31, GFR 12. PT 13.9, INR 1.08. Prolactin 18.30 H,  H. Procal 0.55 H. Lactic acid 1.2. Troponin 33. UA with hyaline casts and granular casts. 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 No acute processes. Chest xray showed no acute cardiopulm process. US abdomen showing gallbladder sludge otherwise unremarkable, hepatic cysts, bilateral echogenic kidneys with mildly thinned cortex concerning for possible medical renal disease.Patient was seen by nephrology who does not recommend dialysis at this time. Patient does not have known history of chronic kidney disease from baseline labs in 8/2022. Unclear etiology of the kidney injury however given hyaline casts on UA, likely secondary to volume depletion. Started on IV  cc/hr. Admitted for encephalopathy and new finding of ALL which has both improved throughout hospital course. R heel ulcer with c/f osteomyelitis- pending consults (LPS/ortho, vascular, ID) regarding possible surgical intervention and antibiotic regimen going forward.    Interval Problem Update    No acute events overnight. Patient seen at bedside this morning continuing to have R foot pain that he rates as 7/10 however tolerable with pain meds. Unclear mental status as patient is uncooperative however was more talkative today compared to prior days. Hyponatremia improved, Na 147 this morning; stopped IVF. Continuing on empiric IV linezolid and IV unasyn. ESR 85, CRP 5. Blood cx NGTD. Seen by Dr. Turcios from ID who believes osteomyelitis of heel will be difficult to cure and pt will need  debridement, wound vac, etc however most likely poor outcome. Healing process also hindered due to impaired circulation evidence by MARGARITA. As there is no pus from wound, most likely ischemic ulcer. Linezolid discontinued per Dr. Karolina pearson, c/w unasyn for now. Spoke with LPS Faith ROMAN and Dr. Rdz from vascular surgery; plan to take patient to OR on 12/5 to attempt revascularization in order to attempt to heal foot wound and salvage the limb from amputation. Called daughter huber but wasn't able to reach her, updated wife who picked up instead. Will attempt to reach daughter again.    I have discussed this patient's plan of care and discharge plan at IDT rounds today with Case Management, Nursing, Nursing leadership, and other members of the IDT team.    Consultants/Specialty  Nephrology  Physical therapy  Occupational therapy  Nutrition  [facility]  Orthopedic surgery  Infectious disease  Vascular surgery    Code Status  Full Code    Disposition  Patient is not medically cleared for discharge.   Anticipate discharge to to skilled nursing facility.  I have placed the appropriate orders for post-discharge needs.    Review of Systems  Review of Systems   Constitutional:  Negative for fever.        Limited due to patient not cooperating   Respiratory:  Negative for shortness of breath.    Cardiovascular:  Negative for chest pain.   Gastrointestinal:  Negative for abdominal pain.   Musculoskeletal:  Positive for falls, joint pain and myalgias.        Right foot pain   Neurological:  Positive for focal weakness (right lower extremity from hip to toes) and weakness. Negative for tremors, sensory change, speech change and loss of consciousness.   Psychiatric/Behavioral:  Negative for hallucinations.    All other systems reviewed and are negative.     Physical Exam  Temp:  [36.4 °C (97.6 °F)-37.4 °C (99.3 °F)] 37 °C (98.6 °F)  Pulse:  [66-68] 66  Resp:  [16-18] 18  BP: (124-182)/(70-89) 154/78  SpO2:  [97 %-98  %] 98 %    Physical Exam  Vitals and nursing note reviewed. Exam conducted with a chaperone present.   Constitutional:       General: He is not in acute distress.     Appearance: He is well-developed. He is ill-appearing and toxic-appearing. He is not diaphoretic.   HENT:      Head: Normocephalic and atraumatic.      Right Ear: External ear normal.      Left Ear: External ear normal.      Nose: Nose normal.      Mouth/Throat:      Mouth: Mucous membranes are dry.      Pharynx: No oropharyngeal exudate or posterior oropharyngeal erythema.   Eyes:      General:         Right eye: No discharge.         Left eye: No discharge.      Extraocular Movements: Extraocular movements intact.      Comments: Unable to fully assess   Cardiovascular:      Rate and Rhythm: Normal rate and regular rhythm.      Heart sounds:     No gallop.   Pulmonary:      Effort: No respiratory distress.      Breath sounds: No wheezing.   Abdominal:      General: There is no distension.      Tenderness: There is abdominal tenderness (diffuse). There is guarding. There is no rebound.   Musculoskeletal:         General: Tenderness (Right groin site, right knee, right foot) present.      Cervical back: Normal range of motion and neck supple.      Right lower leg: Edema (1+) present.      Left lower leg: Edema (1+) present.   Skin:     General: Skin is warm.      Capillary Refill: Capillary refill takes more than 3 seconds.      Coloration: Skin is pale.      Findings: Lesion (full thickness wound to R plantar heel with dark eschar. No erythema or drainage.) present.   Neurological:      Mental Status: He is alert. He is disoriented.      Motor: No weakness.      Comments: Poor historian but answers some questions   Psychiatric:      Comments: Unable to assess due to mental acuity       Fluids    Intake/Output Summary (Last 24 hours) at 12/3/2022 1620  Last data filed at 12/3/2022 1326  Gross per 24 hour   Intake --   Output 300 ml   Net -300 ml        Laboratory  Recent Labs     12/01/22  1218 12/02/22  0350 12/03/22  0907   WBC 10.6 8.0 11.2*   RBC 3.13* 2.69* 2.99*   HEMOGLOBIN 10.9* 9.2* 10.1*   HEMATOCRIT 31.8* 27.1* 30.3*   .6* 100.7* 101.3*   MCH 34.8* 34.2* 33.8*   MCHC 34.3 33.9 33.3*   RDW 46.8 45.8 44.5   PLATELETCT 225 168 166   MPV 10.1 10.1 10.3     Recent Labs     12/02/22  1024 12/02/22  1516 12/03/22  0907   SODIUM 146* 142 147*   POTASSIUM 3.8 3.7 3.5*   CHLORIDE 110 107 111   CO2 26 22 24   GLUCOSE 107* 107* 98   BUN 21 18 22   CREATININE 0.89 0.78 0.91   CALCIUM 8.8 8.9 8.6                   Imaging  US-VEIN MAPPING LOWER EXTREMITY UNILAT RIGHT         DX-FOOT-COMPLETE 3+ RIGHT   Final Result      Likely acute osteomyelitis at the inferior calcaneus. MRI can be obtained as indicated.      MR-FOOT-W/O RIGHT   Final Result      1.  Very limited study due to extensive patient motion artifact and patient comfort preventing multiple sequences.      2.  No gross bony destructive change.      3.  Soft tissue edema.      US-EXTREMITY ARTERY LOWER UNILAT RIGHT   Final Result      US-EXTREMITY VENOUS LOWER UNILAT RIGHT   Final Result      US-ABDOMEN COMPLETE SURVEY   Final Result         1.  Gallbladder sludge, otherwise unremarkable gallbladder   2.  Hepatic cysts   3.  Bilateral echogenic kidneys with mildly thinned cortex, appearance favors medical renal disease      CT-HEAD W/O   Final Result      1.  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   2.  Mild cerebral atrophy. Age-appropriate.   3.  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   4.  No acute findings are evident.         DX-CHEST-PORTABLE (1 VIEW)   Final Result      No evidence of acute cardiopulmonary process.      CT-CTA AORTA-RO WITH & W/O-POST PROCESS    (Results Pending)   IR-US GUIDED PIV    (Results Pending)         Assessment/Plan  Problem Representation:    * Osteomyelitis of right lower extremity (HCC)- (present on admission)  Assessment & Plan  - Given recent right groin access for TAVR, MARGARITA ordered-   - Pain controlled with tylenol 650mg q6 prn, IV dilaudid 0.5mg q4 prn, oxycodone 5-10mg q3 prn  - can consider lidocaine patch, diclofenac gel as needed  - U/S showed: Occlusion of RIGHT femoral artery with distal reconstitution, Occlusion of RIGHT posterior tibial artery,  Two vessel runoff to the ankle with monophasic flow  -12/1: Spoke with Dr. Medel who is partners with Dr. Quinones (who did patient's previous surgery). She says patient will be having pain for a long time but since this is not an emergency they will see him outpatient and Dr. Quinones will perform a re-anastomosis and other procedures as warranted. This procedure cannot currently be done as Dr. Quinones is currently on vacation and Dr. Medel doesn't have [facility] privileges as well as the fact that this type of surgery cannot even be done at [facility].   - PT/OT recommending SNF  - MRI foot attempted 12/2 however was incomplete as patient was unable to tolerate. Foot xray confirmed osteomyelitis to R calcaneus.  - 12/2: Per LPS, wound care orders for nursing was placed and he was provided with betadine dressing and offloading boot per LPS team.  - 12/3: Consulted LPS and vascular surgery due to concern for osteomyelitis of R heel- seen by Dr. Rdz who will take patient to OR in order to attempt revascularization to attempt to heal foot wound and avoid amputation. OR planned for 12/5 Monday.  - Pending CTA and vein mapping 12/3 per vascular surgery  - Consulted Dr. Turcios from infectious disease 12/3: believes osteomyelitis of heel will be difficult to cure and pt will need debridement, wound vac, etc however most likely poor outcome. Healing process also hindered due to impaired circulation evidence by MARGARITA. As there is no pus from wound, most likely  ischemic ulcer. Linezolid discontinued 12/3, c/w just unasyn for now  - f/u blood cultures, wound cultures.    Acute encephalopathy- (present on admission)  Assessment & Plan  - Per family and chart review, patient is at baseline alert and oriented x4.  - Presented to emergency room on 11/28/2022 with confusion, noted to be alert and oriented x2 (self, place only)  - Altered mental status initially thought to be in setting of azotemia from acute kidney injury however continuing to have encephalopathy despite improving azotemia and improving kidney function  - Per nephrology, currently no emergent need for dialysis. Nephrology signed off 11/29.  - Continue to monitor and trend labs. Work with PT/OT inpatient, they recommend SNF post discharge.    Sepsis (HCC)- (present on admission)  Assessment & Plan  - This is Sepsis Present on admission  - SIRS criteria identified on my evaluation include: Leukocytosis, with WBC greater than 12,000  - Source is unknown, possibly abdominal etiology. US 11/29 showing US abdomen showing gallbladder sludge otherwise unremarkable, hepatic cysts, bilateral echogenic kidneys with mildly thinned cortex concerning for possible medical renal disease.  - Sepsis protocol initiated  - Fluid resuscitation ordered per protocol  - Crystalloid Fluid Administration: Fluid resuscitation ordered per standard protocol - 30 mL/kg per current or ideal body weight  - IV antibiotics as appropriate for source of sepsis- started on IV ceftriaxone, discontinued on 11/29  - Reassessment: I have reassessed the patient's hemodynamic status  - Blood cultures NGTD  - Urine cultures unremarkable  - Hemodynamically stable  - Uptrending WBC, WBC 14.4 today 11/29 from 13.9 on admission 11/28  - continue to monitor with daily CBC's  - No clear source of sepsis- Discontinued IV ceftriaxone and started on IV unasyn low dose (due to ALL) TID (11/29- ) pending further workup.   - MRSA nares 11/29 negative  - Chest xray  11/28 not showing any evidence of acute cardiopulmonary process  - confirmed osteomyelitis to R calcaneus on 12/2 with foot xray  - Continuing Unasyn + gentle IVF    ALL (acute kidney injury) (HCC)- (present on admission)  Assessment & Plan  Improving  - No noted history of chronic kidney disease, unclear creatinine baseline however recent kidney values appear normal- most recent labs from 8/3/2022 showing baseline Cr 1.0 and BUN 15.0.  - On admission, BUN/Cr ratio noted to be 122/4.67  - Patient is able to urinate, produced about 400cc dark urine in emergency room. No urinary symptoms such as dysuria, polyuria, retention.  - Urinalysis 11/28: +hyaline casts, +granular casts.  - Consulted nephrology- Per Dr. Hearn, currently unclear etiology of ALL however given hyaline casts on UA, volume depletion most likely. Obstructive uropathy was also considered given suprapubic tenderness on exam however renal/bladder US unremarkable for obstructions.   - Started on IV fluids at 125 cc/hr on 11/28, increased rate to 150 cc/hr on 11/29  - Most recent labs from 11/29 at 1500 showing improved renal function with BUN/Cr ratio of 85/1.63.  - Continue to monitor renal function with daily labs  - monitor urine output  - Kidney function improving as of 11/29 and patient nonoliguric, no need for dialysis. Nephrology signed off 11/29.  - Continue gentle IVF and trend Crt/BUN  - Improving- Cr trending down. Cr is 0.90 on 12/1 from 1.05 on 11/30 and 4.67 on admission 11/28.  - ALL resolved, Cr 0.78 on 12/2. D/c'ed renal diet and placed on regular diet to encourage patient PO intake    Increased anion gap metabolic acidosis- (present on admission)  Assessment & Plan  Resolved  - Baseline anion gap is 7.0 per most recent labs from 8/2022  - Anion gap on admission noted to be 19.0, improved to 17.0 after IV LR at 150 cc/hr  - Increased anion gap metabolic acidosis likely secondary to uremia from kidney damage  - Continue hydration with IV  fluids and encourage PO intake  - Continue to monitor with daily labs    Congestive heart failure (CHF) (HCC)- (present on admission)  Assessment & Plan  - per chart review, patient has history of congestive heart failure  - c/w home aspirin 81 everyday, coreg 6.25mg BID, atorvastatin 80mg qD  - holding home lisinopril, amlodipine, HCTZ due to ALL. Restart meds as appropriate    History of seizures- (present on admission)  Assessment & Plan  - per patient, last episode of seizure was in 2019  - c/w home keppra 500mg BID    Macrocytic anemia- (present on admission)  Assessment & Plan  - No known history of anemia, not on iron supplementation  - Most recent labs from 8/2022 showing baseline hemoglobin of 14.1 and MCV of 97.7  - pending anemia workup- iron panel, reticulocytes, vitamin B12, folate  - continue to monitor with daily CBC's  - Transfuse if hemoglobin <7.0. Type and screen ordered 11/30    History of transcatheter aortic valve replacement (TAVR)- (present on admission)  Assessment & Plan  - per chart review, patient has history of severe aortic stenosis and underwent TAVR with Dr. Wolfe on 8/2/2022.   - most recent echo showed well seated TAVR with peak gradient of 12 mmHg and mean gradient of 7.5 mmHg and trace perivalvular leak.     Hyperlipidemia- (present on admission)  Assessment & Plan  - no lipid panel on file  - Continue atorvastatin    Essential hypertension- (present on admission)  Assessment & Plan  - at home, on lisinopril, hydrochlorithiazide, amlodipine, carvedilol.  - continue with home coreg 6.25 BID  - restarted on amlodipine 12/1 due to hypertension, continuing to hold HCTZ  - restarted lisinopril 20 on 12/2 due to continued hypertension       VTE prophylaxis: heparin ppx (BID)    I have performed a physical exam and reviewed and updated ROS and Plan today (12/3/2022). In review of yesterday's note (12/2/2022), there are no changes except as documented above.

## 2022-12-03 NOTE — THERAPY
Physical Therapy   Daily Treatment     Patient Name: Gilmer Mae  Age:  82 y.o., Sex:  male  Medical Record #: [id number]  Today's Date: 12/2/2022     Precautions  Precautions: Fall Risk    Assessment    Pt greeted and agreed to therapy. Pt performed bed mobility w/ SPV from flat HOB. Pt able to STS and transfers to/from chair w/ Edgardo w/ FWW, however returning to bed pt pushed walker away and used physical assist from therapist. During session pt was visiting by multiple family members. Pt continues to be limited by decreased activity tolerance and decreased balance, will continue to follow to address functional mobility needs.     Plan    Continue current treatment plan.    DC Equipment Recommendations: Unable to determine at this time  Discharge Recommendations: Recommend post-acute placement for additional physical therapy services prior to discharge home       12/02/22 1458   Balance   Sitting Balance (Static) Fair   Sitting Balance (Dynamic) Fair -   Standing Balance (Static) Fair -   Standing Balance (Dynamic) Fair -   Weight Shift Sitting Fair   Weight Shift Standing Fair   Skilled Intervention Verbal Cuing;Postural Facilitation;Sequencing   Comments w/ FWW / physical assist   Gait Analysis   Gait Level Of Assist Minimal Assist   Assistive Device Front Wheel Walker  (physical assist back to bed)   Distance (Feet) 2   # of Times Distance was Traveled 1   Deviation Shuffled Gait;Bradykinetic   Skilled Intervention Verbal Cuing;Sequencing;Postural Facilitation   Comments chair > bed, pt pushed walker aside and used physical assist as support   Bed Mobility    Supine to Sit Supervised   Sit to Supine Supervised   Scooting Supervised   Skilled Intervention Verbal Cuing   Comments HOB flat   Functional Mobility   Sit to Stand Contact Guard Assist   Bed, Chair, Wheelchair Transfer Minimal Assist   Transfer Method Stand Step   Mobility bed mobility, chair<>bed transfer   Skilled Intervention Verbal Cuing;Sequencing    Short Term Goals    Short Term Goal # 1 Pt to move supine to/from eob w/ spv in 6 visits to improve fxl indep   Goal Outcome # 1 goal not met   Short Term Goal # 2 Pt to move sit to/from stand w/ spv in 6 visits to improve fxl indep   Goal Outcome # 2 Goal not met   Short Term Goal # 3 Pt to ambulate 150 ft w/ fww and spv in 6 visits to improve fxl indep   Goal Outcome # 3 Goal not met   Short Term Goal # 4 Pt to move up/down 3 steps w/ spv in 6 visits to access his home (if pt to d/c home)   Goal Outcome # 4 Goal not met   Supervising Physical Therapist (PTA Treatments Only)   Supervising Physical Therapist Lora Syed

## 2022-12-03 NOTE — CARE PLAN
The patient is Stable - Low risk of patient condition declining or worsening    Shift Goals  Clinical Goals: maintain skin integrity  Patient Goals: sleep      Progress made toward(s) clinical / shift goals:  Medicated per MAR for pain to right foot.       Problem: Knowledge Deficit - Standard  Goal: Patient and family/care givers will demonstrate understanding of plan of care, disease process/condition, diagnostic tests and medications  Outcome: Progressing     Problem: Pain - Standard  Goal: Alleviation of pain or a reduction in pain to the patient’s comfort goal  Outcome: Progressing       Patient is not progressing towards the following goals:

## 2022-12-03 NOTE — CONSULTS
DATE OF SERVICE:  12/03/2022     INFECTIOUS DISEASE CONSULTATION     Seen at the request of Dr. Rogelio Jeronimo.     IDENTIFICATION:  An 82-year-old male with severe peripheral vascular disease,   seen for evaluation of his left heel ulcer.     HISTORY OF PRESENT ILLNESS:  This is a patient with a history of   hyperlipidemia, hypertension and poor vascular disease.  He apparently   recently underwent  bypass surgery to his right  leg.   The patient on admission here was in acute renal   failure and was noted to have an ulcer on the bottom of his right heel.   Evaluation of the right heel showed x-ray with potentially periosteal changes   at the inferior aspect of the calcaneus as well as significant vascular   calcification.  An MRI was done, which was not ideal because of patient   movement, but did not show any obvious osteomyelitis.  The patient was   empirically placed on IV linezolid and IV Unasyn and continues on this. He was   seen by limb preservation service and it was discussed that the major problem   appeared to be vascular insufficiency.  He remains somewhat confused and is   unable to provide much history.     PAST MEDICAL HISTORY:  Includes hyperlipidemia, hypertension and pacemaker.     PAST SURGICAL HISTORY:  Includes pacer implant and recent vascular procedure   to left leg.     MEDICATIONS:  Here in the hospital include linezolid and IV Unasyn.     ALLERGIES:  He has no known antibiotic allergies.     SOCIAL HISTORY:  He has local family, this involves in his care.     PHYSICAL EXAMINATION:  The patient's exam was limited to the right foot.  I   cannot palpate his dorsalis pedis and posterior tibial. His distal foot is   somewhat cool.  There is a lot of desquamating skin.  There is some tenderness   and there is a thinning of his calf muscles. His heel has approximately 2x2   cm eschar overlying the bottom of the heel.  It is very tender and there is no   palpable bone.    ULTRASOUND: Evaluation  of his circulatory system has shown that he has   diffuse atherosclerotic plaque throughout the lower extremity, absent flow   observed from the proximal to distal femoral artery, reconstitution monophasic   flow via collaterals visualized at the distal femoral artery, absent flow in   the distal portion of the tibial artery.     LABORATORY DATA:  His other labs show negative PCR for both Staph aureus and   MRSA. Blood cultures are negative.  He has a sed rate of 85 and a CRP of 5.    X-rays as noted in history of present illness. Cultures are negative.  I   reviewed his outside records and there were no cultures available.     ASSESSMENT:  This is a patient with severe peripheral vascular disease with   minimal circulation to his right foot with changes on exam consistent with   ischemic foot.  He has a painful heel ulcer, which may or may not represent   underlying osteomyelitis, but at this point without any improvement in the   circulation, this heel osteomyelitis will not be curable. Osteomyelitis of   heels are very difficult infection to cure and it would have to be combined   with debridement, wound VAC, etc., but still most likely poor outcome.  He is   presently on broad-spectrum antibiotics, but with a negative MRSA and Staph   aureus PCR, in fact that there is no purulent drainage from the wound, I think   it is mostly dealing with ischemic ulcer.     RECOMMENDATIONS:  My recommendations therefore:  1.  Discontinue linezolid.  2.  We can continue Unasyn for now, but unless there is some improvement in   circulation, I think it is probably futile to continue antibiotics to try to   resolve this osteomyelitis.  I will continue to follow with you and we await   the input of vascular to see if there is any further interventions, which   could be helpful.     Thank you for allowing me to consult on this patient.        ______________________________  MD AKUA SENIOR/DWIGHT/GIUSEPPE    DD:  12/03/2022  10:03  DT:  12/03/2022 10:37    Job#:  [id number]

## 2022-12-04 NOTE — PROGRESS NOTES
Assumed pt care at 0700 . Pt is A&Ox 2, disoriented to time and situation . Pt rates pain rates pain 7/10, medicated per MAR.  Pt's belongings are nearby, bed is in the lowest position and locked.

## 2022-12-04 NOTE — PROGRESS NOTES
[facility] Internal Medicine Daily Progress Note    Date of Service  12/4/2022    [facility] Team: [facility] IM Purple Team   Attending: AKIRA Jeronimo M.d.  Senior Resident: Dr. Kina HUMPHREYS  Intern:  Dr. Hien HUMPHREYS  Contact Number: [phone]    Chief Complaint  Gilmer Mae is a 82 y.o. male admitted 11/28/2022 with encephalopathy and RLE edema/pain with ulceration and osteomyelitis to R heel.    Hospital Course  83 y/o with PMH HTN, HLD, sick sinus syndrome (s/p pacemaker), seizures (most recent episode in 2019, on keppra), severe aortic stenosis (s/p TAVR 8/2/2022 at [facility]) and PAD (s/p vascular surgery on femoral artery in 2022) who presented to ED via EMS on 11/28/2022 for LOC, b/l LE edema and pain.  History limited due to patient’s altered mental status, A&Ox2 (self, place) so most history obtained from chart review and daughter Kendra. Patient lives with wife and son. Patient says he is having pain “all over” especially his right leg and says he got surgery on his right leg “2 weeks ago but they never finished.” On arrival to ED, patient was lethargic and confused. RLE especially R foot exquisitely tender, nonhealing ulcer with some erythema and eschar present on bottom of R heel. Ultrasound of leg showed diffuse atherosclerotic plaque throughout LE, absent flow from proximal to distal femoral artery, reconstitution monophasic flow via collaterals at distal femoral artery, absent flow in distal portion of tibial artery. Xray of R foot showed potentially periosteal changes at inferior aspect of calcaneus as well as significant vascular calcification; concern for underlying osteomyelitis secondary to ischemic ulcer. Empirically placed on IV linezolid and IV unasyn however discontinued linezolid as blood cx, wound cx, MRSA and staph aureus PCR negative as well as lack of purulence from wound. Seen by LPS and vascular surgery- scheduled for OR on 12/5 with Dr. Rdz to attempt RLE revascularization in order to avoid  amputation.    Interval Problem Update    No acute events overnight. Patient does not respond to any questions asked. Refused PIV placement this morning however was later agreeable so labs obtained, CTA completed per vascular surgery. Started back on D5W 50 cc/hras Na is 147 this morning. C/w IV unasyn for now, ID following. NPO after midnight, last dose of heparin given at noon today in preparation for OR tomorrow with vascular surgery. Daughter Kendra updated. Pending CTA. Vein mapping showing thrombus throughout the right greater saphenous vein extending from calf to near saphenofemoral junction. Vein considered unsuitable for use as a bypass graft. Increased coreg to 12.5 to optimize blood pressure.     I have discussed this patient's plan of care and discharge plan at IDT rounds today with Case Management, Nursing, Nursing leadership, and other members of the IDT team.    Consultants/Specialty    Nephrology  Physical therapy  Occupational therapy  Nutrition  [facility]  Orthopedic surgery  Infectious disease  Vascular surgery    Code Status  Full Code    Disposition  Patient is not medically cleared for discharge.   Anticipate discharge to to skilled nursing facility.  I have placed the appropriate orders for post-discharge needs.    Review of Systems  Review of Systems   Constitutional:  Negative for fever.        Limited due to patient not cooperating   Respiratory:  Negative for shortness of breath.    Cardiovascular:  Negative for chest pain.   Gastrointestinal:  Negative for abdominal pain.   Musculoskeletal:  Positive for falls, joint pain and myalgias.        Right foot pain   Neurological:  Positive for focal weakness (right lower extremity from hip to toes) and weakness. Negative for tremors, sensory change, speech change and loss of consciousness.   Psychiatric/Behavioral:  Negative for hallucinations.    All other systems reviewed and are negative.     Physical Exam  Temp:  [36.3 °C (97.3 °F)-37.1 °C (98.8  °F)] 36.9 °C (98.4 °F)  Pulse:  [76-91] 91  Resp:  [16-18] 16  BP: (149-172)/(73-89) 163/89  SpO2:  [92 %-96 %] 96 %    Physical Exam  Vitals and nursing note reviewed. Exam conducted with a chaperone present.   Constitutional:       General: He is not in acute distress.     Appearance: He is well-developed. He is ill-appearing and toxic-appearing. He is not diaphoretic.   HENT:      Head: Normocephalic and atraumatic.      Right Ear: External ear normal.      Left Ear: External ear normal.      Nose: Nose normal.      Mouth/Throat:      Mouth: Mucous membranes are dry.      Pharynx: No oropharyngeal exudate or posterior oropharyngeal erythema.   Eyes:      General:         Right eye: No discharge.         Left eye: No discharge.      Extraocular Movements: Extraocular movements intact.      Comments: Unable to fully assess   Cardiovascular:      Rate and Rhythm: Normal rate and regular rhythm.      Heart sounds:     No gallop.   Pulmonary:      Effort: No respiratory distress.      Breath sounds: No wheezing.   Abdominal:      General: There is no distension.      Tenderness: There is no abdominal tenderness. There is no guarding or rebound.   Musculoskeletal:         General: Tenderness (Right groin site, right knee, right foot (worst R heel)) present.      Cervical back: Normal range of motion and neck supple.      Right lower leg: Edema (1+) present.      Left lower leg: Edema (1+) present.   Skin:     General: Skin is warm.      Capillary Refill: Capillary refill takes more than 3 seconds.      Coloration: Skin is pale.      Findings: Lesion (full thickness wound to R plantar heel with dark eschar. No erythema or drainage.) present.   Neurological:      Mental Status: He is alert. He is disoriented.      Motor: No weakness.      Comments: Poor historian but answers some questions   Psychiatric:      Comments: Unable to assess due to mental acuity       Fluids    Intake/Output Summary (Last 24 hours) at  12/4/2022 1551  Last data filed at 12/4/2022 0821  Gross per 24 hour   Intake --   Output 875 ml   Net -875 ml       Laboratory  Recent Labs     12/02/22  0350 12/03/22  0907   WBC 8.0 11.2*   RBC 2.69* 2.99*   HEMOGLOBIN 9.2* 10.1*   HEMATOCRIT 27.1* 30.3*   .7* 101.3*   MCH 34.2* 33.8*   MCHC 33.9 33.3*   RDW 45.8 44.5   PLATELETCT 168 166   MPV 10.1 10.3     Recent Labs     12/02/22  1024 12/02/22  1516 12/03/22  0907   SODIUM 146* 142 147*   POTASSIUM 3.8 3.7 3.5*   CHLORIDE 110 107 111   CO2 26 22 24   GLUCOSE 107* 107* 98   BUN 21 18 22   CREATININE 0.89 0.78 0.91   CALCIUM 8.8 8.9 8.6                   Imaging  US-VEIN MAPPING LOWER EXTREMITY UNILAT RIGHT   Final Result      DX-FOOT-COMPLETE 3+ RIGHT   Final Result      Likely acute osteomyelitis at the inferior calcaneus. MRI can be obtained as indicated.      MR-FOOT-W/O RIGHT   Final Result      1.  Very limited study due to extensive patient motion artifact and patient comfort preventing multiple sequences.      2.  No gross bony destructive change.      3.  Soft tissue edema.      US-EXTREMITY ARTERY LOWER UNILAT RIGHT   Final Result      US-EXTREMITY VENOUS LOWER UNILAT RIGHT   Final Result      US-ABDOMEN COMPLETE SURVEY   Final Result         1.  Gallbladder sludge, otherwise unremarkable gallbladder   2.  Hepatic cysts   3.  Bilateral echogenic kidneys with mildly thinned cortex, appearance favors medical renal disease      CT-HEAD W/O   Final Result      1.  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   2.  Mild cerebral atrophy. Age-appropriate.   3.  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   4.  No acute findings are evident.         DX-CHEST-PORTABLE (1 VIEW)   Final Result      No evidence of acute cardiopulmonary process.      CT-CTA AORTA-RO WITH & W/O-POST PROCESS    (Results  Pending)   IR-US GUIDED PIV    (Results Pending)        Assessment/Plan  Problem Representation:    * Osteomyelitis of right lower extremity (HCC)- (present on admission)  Assessment & Plan  - Given recent right groin access for TAVR, MARGARITA ordered-   - Pain controlled with tylenol 650mg q6 prn, IV dilaudid 0.5mg q4 prn, oxycodone 5-10mg q3 prn  - can consider lidocaine patch, diclofenac gel as needed  - U/S showed: Occlusion of RIGHT femoral artery with distal reconstitution, Occlusion of RIGHT posterior tibial artery,  Two vessel runoff to the ankle with monophasic flow  -12/1: Spoke with Dr. Medel who is partners with Dr. Quinones (who did patient's previous surgery). She says patient will be having pain for a long time but since this is not an emergency they will see him outpatient and Dr. Quinones will perform a re-anastomosis and other procedures as warranted. This procedure cannot currently be done as Dr. Quinones is currently on vacation and Dr. Medel doesn't have [facility] privileges as well as the fact that this type of surgery cannot even be done at [facility].   - PT/OT recommending SNF  - MRI foot attempted 12/2 however was incomplete as patient was unable to tolerate. Foot xray confirmed osteomyelitis to R calcaneus.  - 12/2: Per LPS, wound care orders for nursing was placed and he was provided with betadine dressing and offloading boot per LPS team.  - 12/3: Consulted LPS and vascular surgery due to concern for osteomyelitis of R heel- seen by Dr. Rdz who will take patient to OR in order to attempt revascularization to attempt to heal foot wound and avoid amputation. OR planned for 12/5 Monday.  - Consulted Dr. Turcios from infectious disease 12/3: believes osteomyelitis of heel will be difficult to cure and pt will need debridement, wound vac, etc however most likely poor outcome. Healing process also hindered due to impaired circulation evidence by MARGARITA. As there is no pus from wound, most likely ischemic  ulcer. Linezolid discontinued 12/3, c/w just unasyn for now  - f/u blood cultures, wound cultures, NGTD  - CTA pending  - Vein mapping 12/4: Acute superficial thrombus in the greater saphenous vein near saphenofemoral junction and in the proxmal thigh. Vein wall thickening in the thigh; small caliber vein throughout the thigh & calf. Vein is unsuitable for use as a bypass graft.  - NPO after midnight, holding heparin in anticipation of OR tomorrow with Dr. Rdz on 12/5    Acute encephalopathy- (present on admission)  Assessment & Plan  - Per family and chart review, patient is at baseline alert and oriented x4.  - Presented to emergency room on 11/28/2022 with confusion, noted to be alert and oriented x2 (self, place only)  - Altered mental status initially thought to be in setting of azotemia from acute kidney injury however continuing to have encephalopathy despite improving azotemia and improving kidney function  - Per nephrology, currently no emergent need for dialysis. Nephrology signed off 11/29.  - Continue to monitor and trend labs. Work with PT/OT inpatient, they recommend SNF post discharge.    Sepsis (HCC)- (present on admission)  Assessment & Plan  - This is Sepsis Present on admission  - SIRS criteria identified on my evaluation include: Leukocytosis, with WBC greater than 12,000  - Source is unknown, possibly abdominal etiology. US 11/29 showing US abdomen showing gallbladder sludge otherwise unremarkable, hepatic cysts, bilateral echogenic kidneys with mildly thinned cortex concerning for possible medical renal disease.  - Sepsis protocol initiated  - Fluid resuscitation ordered per protocol  - Crystalloid Fluid Administration: Fluid resuscitation ordered per standard protocol - 30 mL/kg per current or ideal body weight  - IV antibiotics as appropriate for source of sepsis- started on IV ceftriaxone, discontinued on 11/29  - Reassessment: I have reassessed the patient's hemodynamic status  - Blood  cultures NGTD  - Urine cultures unremarkable  - Hemodynamically stable  - Uptrending WBC, WBC 14.4 today 11/29 from 13.9 on admission 11/28  - continue to monitor with daily CBC's  - No clear source of sepsis- Discontinued IV ceftriaxone and started on IV unasyn low dose (due to ALL) TID (11/29- ) pending further workup.   - MRSA nares 11/29 negative  - Chest xray 11/28 not showing any evidence of acute cardiopulmonary process  - confirmed osteomyelitis to R calcaneus on 12/2 with foot xray  - Continuing Unasyn + gentle IVF    ALL (acute kidney injury) (HCC)- (present on admission)  Assessment & Plan  RESOLVED  - No noted history of chronic kidney disease, unclear creatinine baseline however recent kidney values appear normal- most recent labs from 8/3/2022 showing baseline Cr 1.0 and BUN 15.0.  - On admission, BUN/Cr ratio noted to be 122/4.67  - Patient is able to urinate, produced about 400cc dark urine in emergency room. No urinary symptoms such as dysuria, polyuria, retention.  - Urinalysis 11/28: +hyaline casts, +granular casts.  - Consulted nephrology- Per Dr. Hearn, currently unclear etiology of ALL however given hyaline casts on UA, volume depletion most likely. Obstructive uropathy was also considered given suprapubic tenderness on exam however renal/bladder US unremarkable for obstructions.   - Started on IV fluids at 125 cc/hr on 11/28, increased rate to 150 cc/hr on 11/29  - Most recent labs from 11/29 at 1500 showing improved renal function with BUN/Cr ratio of 85/1.63.  - Continue to monitor renal function with daily labs  - monitor urine output  - Kidney function improving as of 11/29 and patient nonoliguric, no need for dialysis. Nephrology signed off 11/29.  - Continue gentle IVF and trend Crt/BUN  - Improving- Cr trending down. Cr is 0.90 on 12/1 from 1.05 on 11/30 and 4.67 on admission 11/28.  - ALL resolved, Cr 0.78 on 12/2. D/c'ed renal diet and placed on regular diet to encourage patient PO  intake    Increased anion gap metabolic acidosis- (present on admission)  Assessment & Plan  Resolved  - Baseline anion gap is 7.0 per most recent labs from 8/2022  - Anion gap on admission noted to be 19.0, improved to 17.0 after IV LR at 150 cc/hr  - Increased anion gap metabolic acidosis likely secondary to uremia from kidney damage  - Continue hydration with IV fluids and encourage PO intake  - Continue to monitor with daily labs    Essential hypertension- (present on admission)  Assessment & Plan  - at home, on lisinopril, hydrochlorithiazide, amlodipine, carvedilol.  - continue with home coreg 12.5 BID  - restarted on amlodipine 12/1 due to hypertension, continuing to hold HCTZ  - restarted lisinopril 20 on 12/2 due to continued hypertension    Congestive heart failure (CHF) (HCC)- (present on admission)  Assessment & Plan  - per chart review, patient has history of congestive heart failure  - c/w home aspirin 81 everyday, coreg 12.5mg BID, atorvastatin 80mg qD, lisinopril 20, amlodipine 5  - Continue to hold HCTZ.    History of seizures- (present on admission)  Assessment & Plan  - per patient, last episode of seizure was in 2019  - c/w home keppra 500mg BID    Macrocytic anemia- (present on admission)  Assessment & Plan  - No known history of anemia, not on iron supplementation  - Most recent labs from 8/2022 showing baseline hemoglobin of 14.1 and MCV of 97.7  - pending anemia workup- iron panel, reticulocytes, vitamin B12, folate  - continue to monitor with daily CBC's  - Transfuse if hemoglobin <7.0. Type and screen ordered 11/30    History of transcatheter aortic valve replacement (TAVR)- (present on admission)  Assessment & Plan  - per chart review, patient has history of severe aortic stenosis and underwent TAVR with Dr. Wolfe on 8/2/2022.   - most recent echo showed well seated TAVR with peak gradient of 12 mmHg and mean gradient of 7.5 mmHg and trace perivalvular leak.     Hyperlipidemia- (present on  admission)  Assessment & Plan  - no lipid panel on file  - Continue atorvastatin       VTE prophylaxis: heparin ppx (being held due to anticipated surgery tomorrow 12/5)    I have performed a physical exam and reviewed and updated ROS and Plan today (12/4/2022). In review of yesterday's note (12/3/2022), there are no changes except as documented above.

## 2022-12-04 NOTE — CARE PLAN
The patient is Stable - Low risk of patient condition declining or worsening    Shift Goals  Clinical Goals: skin integrity  Patient Goals: comfort  Family Goals: n/a    Progress made toward(s) clinical / shift goals:    Problem: Knowledge Deficit - Standard  Goal: Patient and family/care givers will demonstrate understanding of plan of care, disease process/condition, diagnostic tests and medications  Outcome: Progressing     Problem: Hemodynamics  Goal: Patient's hemodynamics, fluid balance and neurologic status will be stable or improve  Outcome: Progressing     Problem: Fluid Volume  Goal: Fluid volume balance will be maintained  Outcome: Progressing       Patient is not progressing towards the following goals:

## 2022-12-04 NOTE — CARE PLAN
The patient is Watcher - Medium risk of patient condition declining or worsening    Shift Goals  Clinical Goals: maintain skin integrity  Patient Goals: comfort      Progress made toward(s) clinical / shift goals:  Medicated per MAR for pain, pt reported pain relief from PO Oxycodone.       Problem: Pain - Standard  Goal: Alleviation of pain or a reduction in pain to the patient’s comfort goal  Outcome: Progressing     Problem: Fall Risk  Goal: Patient will remain free from falls  Outcome: Progressing       Patient is not progressing towards the following goals:

## 2022-12-05 PROBLEM — R73.9 HYPERGLYCEMIA: Status: ACTIVE | Noted: 2022-01-01

## 2022-12-05 PROBLEM — I51.81 STRESS-INDUCED CARDIOMYOPATHY: Status: ACTIVE | Noted: 2022-01-01

## 2022-12-05 PROBLEM — I73.9 PERIPHERAL VASCULAR DISEASE (HCC): Status: ACTIVE | Noted: 2022-01-01

## 2022-12-05 PROBLEM — R57.9 SHOCK (HCC): Status: ACTIVE | Noted: 2022-01-01

## 2022-12-05 PROBLEM — I46.9 CARDIAC ARREST (HCC): Status: ACTIVE | Noted: 2022-01-01

## 2022-12-05 PROBLEM — J96.00 ACUTE RESPIRATORY FAILURE (HCC): Status: ACTIVE | Noted: 2022-01-01

## 2022-12-05 NOTE — ANESTHESIA PROCEDURE NOTES
Arterial Line  Performed by: Joel Paredes M.D.  Authorized by: Joel Paredes M.D.     Start Time:  12/5/2022 10:07 AM  End Time:  12/5/2022 10:11 AM  Localization: ultrasound guidance  Image captured, interpreted and electronically stored.      Catheter Size:  20 G  Laterality:  Right  Site:  Radial artery

## 2022-12-05 NOTE — PROGRESS NOTES
Pt in pre op, Per Dr Rdz he was able to get in contact with daughter regarding sx.  Pt in pre op, alert & oriented, verbalizes plan of surgery, agrees to proceed.

## 2022-12-05 NOTE — CONSULTS
Reason for Consult:  Asked by Dr AKIRA Jeronimo M.D. to see this patient with ventricular fibrillation arrest and ventricular tachycardia arrest  Patient's PCP: Judah Braga M.D.    CC:   Chief Complaint   Patient presents with    ALOC    Edema     Ble edema       HPI: This is an 82-year-old gentleman with a history of coronary artery disease reported occlusion of his audiogram and 30% lesion of his left main prior to TAVR done this year for severe attic stenosis he has peripheral arterial disease and had a right endarterectomy but comes in with symptoms of peripheral arterial disease and was found to have high-grade lesion of his left leg was planned for bypass today shortly after induction he developed wide-complex tachycardia requiring CPR was transferred immediately to the ICU where he had another ventricular fibrillation arrest had immediate CPR and ROSC.  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  The medical history is obtained from the chart from the patient as he is on a ventilator    Medications / Drug list prior to admission:  No current facility-administered medications on file prior to encounter.     Current Outpatient Medications on File Prior to Encounter   Medication Sig Dispense Refill    acetaminophen (TYLENOL) 325 MG Tab Take 650 mg by mouth every four hours as needed.      atorvastatin (LIPITOR) 80 MG tablet Take 80 mg by mouth every day.      cephALEXin (KEFLEX) 500 MG Cap Take 500 mg by mouth 4 times a day. 7 day course started 10/24/2022      clopidogrel (PLAVIX) 75 MG Tab Take 75 mg by mouth every day.      gabapentin (NEURONTIN) 100 MG Cap Take 100 mg by mouth 3 times a day.      carvedilol (COREG) 6.25 MG Tab Take 6.25 mg by mouth 2 times a day.      levETIRAcetam (KEPPRA) 500 MG Tab Take 1 Tab by mouth 2 Times a Day. 60  Tab 0    aspirin EC (ECOTRIN) 81 MG Tablet Delayed Response Take 81 mg by mouth every day.      lisinopril (PRINIVIL) 10 MG Tab Take 40 mg by mouth every day.      hydroCHLOROthiazide (HYDRODIURIL) 25 MG Tab Take 25 mg by mouth every day.      HYDROcodone-acetaminophen (NORCO) 5-325 MG Tab per tablet Take 1-2 Tabs by mouth every four hours as needed.      amLODIPine (NORVASC) 5 MG Tab Take 10 mg by mouth 2 times a day.      cloNIDine (CATAPRES) 0.1 MG Tab Take 0.1 mg by mouth in the morning, at noon, and at bedtime.         Current list of administered Medications:    Current Facility-Administered Medications:     norepinephrine (Levophed) 8 mg in 250 mL NS infusion (premix), 0-1 mcg/kg/min (Ideal), Intravenous, Continuous, Sudheer Conner M.D.    calcium CHLORIDE injection, , , ED ONCE PRN, Chato Mcneil M.D., 1 g at 12/05/22 1102    carvedilol (COREG) tablet 12.5 mg, 12.5 mg, Oral, BID WITH MEALS, AKIRA Jeronimo M.D., 12.5 mg at 12/04/22 1842    dextrose 5% infusion, , Intravenous, Continuous, AKIRA Jeronimo M.D., Last Rate: 50 mL/hr at 12/04/22 1156, New Bag at 12/04/22 1156    lisinopril (PRINIVIL) tablet 20 mg, 20 mg, Oral, Q DAY, Deepika Roque M.D., 20 mg at 12/05/22 0508    amLODIPine (NORVASC) tablet 5 mg, 5 mg, Oral, DAILY, AKIRA Jeronimo M.D., 5 mg at 12/05/22 0508    ampicillin/sulbactam (UNASYN) 3 g in  mL IVPB, 3 g, Intravenous, Q6HRS, AKIRA Jeronimo M.D., Stopped at 12/05/22 0546    [Held by provider] heparin injection 5,000 Units, 5,000 Units, Subcutaneous, Q12HRS, AKIRA Jeronimo M.D., 5,000 Units at 12/04/22 0456    levETIRAcetam (KEPPRA) tablet 500 mg, 500 mg, Oral, BID, Deshawn Toussaint M.D., 500 mg at 12/05/22 0509    atorvastatin (LIPITOR) tablet 40 mg, 40 mg, Oral, Q EVENING, Deshawn Toussaint M.D., 40 mg at 12/04/22 1842    aspirin EC (ECOTRIN) tablet 81 mg, 81 mg, Oral, DAILY, Deshawn Toussaint M.D., 81 mg at 12/05/22 0508    senna-docusate (PERICOLACE or SENOKOT S) 8.6-50  MG per tablet 2 Tablet, 2 Tablet, Oral, BID, 2 Tablet at 12/04/22 1842 **AND** polyethylene glycol/lytes (MIRALAX) PACKET 1 Packet, 1 Packet, Oral, QDAY PRN **AND** magnesium hydroxide (MILK OF MAGNESIA) suspension 30 mL, 30 mL, Oral, QDAY PRN **AND** bisacodyl (DULCOLAX) suppository 10 mg, 10 mg, Rectal, QDAY PRN, Deshawn Toussaint M.D.    lactated ringers infusion (BOLUS), 1,000 mL, Intravenous, Once PRN, Deshawn Toussaint M.D.    lactated ringers infusion (BOLUS): BMI less than or equal to 30, 30 mL/kg, Intravenous, Once PRN, Deshawn Toussaint M.D.    acetaminophen (Tylenol) tablet 650 mg, 650 mg, Oral, Q6HRS PRN, Deshawn Toussaint M.D.    Notify provider if pain remains uncontrolled, , , CONTINUOUS **AND** Use the Numeric Rating Scale (NRS), Duenas-Baker Faces (WBF), or FLACC on regular floors and Critical-Care Pain Observation Tool (CPOT) on ICUs/Trauma to assess pain, , , CONTINUOUS **AND** Pulse Ox, , , CONTINUOUS **AND** Pharmacy Consult Request ...Pain Management Review 1 Each, 1 Each, Other, PHARMACY TO DOSE **AND** If patient difficult to arouse and/or has respiratory depression (respiratory rate of 10 or less), stop any opiates that are currently infusing and call a Rapid Response., , , CONTINUOUS, Deshawn Toussaint M.D.    oxyCODONE immediate-release (ROXICODONE) tablet 5 mg, 5 mg, Oral, Q3HRS PRN, 5 mg at 12/01/22 2023 **OR** oxyCODONE immediate release (ROXICODONE) tablet 10 mg, 10 mg, Oral, Q3HRS PRN, 10 mg at 12/05/22 0601 **OR** [DISCONTINUED] morphine 4 MG/ML injection 4 mg, 4 mg, Intravenous, Q3HRS PRN, Deshawn oTussaint M.D.    ondansetron (ZOFRAN) syringe/vial injection 4 mg, 4 mg, Intravenous, Q4HRS PRN, Deshawn Toussaint M.D.    ondansetron (ZOFRAN ODT) dispertab 4 mg, 4 mg, Oral, Q4HRS PRN, Deshawn Toussaint M.D.    HYDROmorphone (Dilaudid) injection 0.5 mg, 0.5 mg, Intravenous, Q4HRS PRN, Deshawn Toussaint M.D., 0.5 mg at 12/03/22 0022    cloNIDine (CATAPRES) tablet 0.1 mg, 0.1 mg, Oral, BID PRN, Deshawn Toussaint M.D., 0.1 mg at 12/03/22 1751     gabapentin (NEURONTIN) capsule 100 mg, 100 mg, Oral, BID, Deshawn Toussaint M.D., 100 mg at 12/05/22 0509    Facility-Administered Medications Ordered in Other Encounters:     propofol (DIPRIVAN) injection, , Intravenous, PRN, Joel Paredes M.D., 50 mg at 12/05/22 1002    fentaNYL (SUBLIMAZE) injection, , Intravenous, PRN, Joel Paredes M.D., 150 mcg at 12/05/22 1002    ceFAZolin (ANCEF) injection, , Intravenous, PRN, Joel Paredes M.D., 2 g at 12/05/22 1002    rocuronium (ZEMURON) injection, , Intravenous, PRN, Joel Paredes M.D., 50 mg at 12/05/22 1002    lidocaine PF (XYLOCAINE-MPF) 2 % injection PF, , Intravenous, PRN, Joel Paredes M.D., 100 mg at 12/05/22 1002    EPINEPHrine (ANAPHYLAXIS DOSE) IM, , Intravenous, PRN, Joel Paredes M.D., 0.5 mg at 12/05/22 1023    Past Medical History:   Diagnosis Date    Chronic pain     Hyperlipidemia     Hypertension     Pacemaker        Past Surgical History:   Procedure Laterality Date    PACEMAKER INSERTION     TAVR  Right femoral endarterectomy surgical patch      Family history is not obtainable from patient he is intubated    Social History     Tobacco Use    Smoking status: Unknown   Vaping Use    Vaping Use: Never used       ALLERGIES:  No Known Allergies    Review of systems:  A complete review of symptoms was not obtained the patient was intubated  Physical exam:  Patient Vitals for the past 24 hrs:   BP Temp Temp src Pulse Resp SpO2   12/05/22 1102 (!) 222/129 -- -- -- -- --   12/05/22 1101 (!) 228/128 -- -- -- -- --   12/05/22 0932 (!) 145/67 35.9 °C (96.6 °F) Temporal 76 16 93 %   12/05/22 0737 114/73 36.6 °C (97.9 °F) Temporal 89 16 92 %   12/05/22 0435 122/78 36.4 °C (97.6 °F) Temporal 89 17 92 %   12/04/22 2000 (!) 146/79 37.2 °C (98.9 °F) Temporal 83 17 95 %   12/04/22 1610 (!) 175/98 36.8 °C (98.3 °F) Temporal 89 16 93 %     General: Appears gravely ill  EYES: no jaundice  HEENT: ET tube  Neck:  No JVD.   CVS: Tachycardic regular  rhythm  Resp: Vent sounds  Abdomen: Soft, ND,  Skin: Grossly nothing acute no obvious rashes right groin has surgical incision no hematoma  Neurological: Comatose on ventilator  Extremities: Minimal edema.  Cyanosis of both feet in the setting code      Data:  Laboratory studies personally reviewed by me:  No results found for this or any previous visit (from the past 24 hour(s)).    Imaging:  CT-CTA AORTA-RO WITH & W/O-POST PROCESS   Final Result      1.  Extensive atherosclerotic vascular calcification involving the aorta and originating arteries. There is ectasia of the ascending thoracic aorta measured at 3.4 x 3.4 cm in diameter.      2.  Extensive atherosclerotic plaque involving the right common, superficial femoral and profunda arteries. There is occlusion of the right superficial femoral artery at the level of the proximal thigh. Popliteal artery is also occluded and there is    extensive atherosclerotic plaque involving the posterior tibial, anterior tibial and peroneal arteries. No definite continuous runoff vessel identified.      3.  Extensive atherosclerotic plaque involving the left common, profunda and superficial femoral and popliteal arteries with multiple areas of high-grade stenosis of the superficial femoral and popliteal arteries. There is also extensive atherosclerotic    change of the posterior tibial, anterior tibial and peroneal arteries without a definite contiguous runoff vessel seen.      4.  High-grade stenosis involving the origin of the superior mesenteric artery.      5.  Occluded origin of the inferior mesenteric artery.      6.  Atherosclerotic plaque involving the right renal artery origin and the proximal right renal artery resulting in 50% diameter narrowing.      7.  Less than 50% diameter narrowing of the left renal artery origin.      8.  Ill-defined groundglass infiltrates within the right upper lobe possibly representing infectious process.      9.  Multiple hepatic cysts.       3D angiographic/MIP images of the vasculature confirm the vascular findings as described above.      US-VEIN MAPPING LOWER EXTREMITY UNILAT RIGHT   Final Result      DX-FOOT-COMPLETE 3+ RIGHT   Final Result      Likely acute osteomyelitis at the inferior calcaneus. MRI can be obtained as indicated.      MR-FOOT-W/O RIGHT   Final Result      1.  Very limited study due to extensive patient motion artifact and patient comfort preventing multiple sequences.      2.  No gross bony destructive change.      3.  Soft tissue edema.      US-EXTREMITY ARTERY LOWER UNILAT RIGHT   Final Result      US-EXTREMITY VENOUS LOWER UNILAT RIGHT   Final Result      US-ABDOMEN COMPLETE SURVEY   Final Result         1.  Gallbladder sludge, otherwise unremarkable gallbladder   2.  Hepatic cysts   3.  Bilateral echogenic kidneys with mildly thinned cortex, appearance favors medical renal disease      CT-HEAD W/O   Final Result      1.  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   2.  Mild cerebral atrophy. Age-appropriate.   3.  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   4.  No acute findings are evident.         DX-CHEST-PORTABLE (1 VIEW)   Final Result      No evidence of acute cardiopulmonary process.      DX-PORTABLE FLUORO > 1 HOUR    (Results Pending)   CL-LEFT HEART CATHETERIZATION WITH POSSIBLE INTERVENTION    (Results Pending)           EKG tracings personally reviewed by me sinus rhythm with left bundle branch block which is new compared to admission EKG    Cardiac angiogram reviewed stable coronary disease limited to OM and moderate disease of the left main but adequate area based on IVUS TAVR crossed and classic Takotsubo ventriculogram obtained    Prior records from Dr. Wolfe    ECHOCARDIOGRAM: Severe AS with mean gradient 48mmHg, TOMY 0.8cm2    ANGIOGRAM: reviewed by me  and in agreement with impression LM 30%, occlusion of OM    CTA: calcifications as expected, both femoral adequate size right with less twist, 26mm valve sized    IMPRESSION:  81 yo gentleman with known conditions of CAD, HTN, s/p PPM with severe aortic stenosis accompanied by CHF    PLAN:  I recommend proceeding with TAVR; patient is appropriate for this given age, medical comorbidities and patient preference.      All pertinent features of laboratory and imaging reviewed including primary images where applicable      Principal Problem:    Osteomyelitis of right lower extremity (HCC) POA: Yes  Active Problems:    Essential hypertension POA: Yes    Hyperlipidemia POA: Yes    ALL (acute kidney injury) (HCC) POA: Yes    History of transcatheter aortic valve replacement (TAVR) POA: Yes    Sepsis ([facility]) POA: Yes    Increased anion gap metabolic acidosis POA: Yes    Acute encephalopathy POA: Yes    Macrocytic anemia POA: Yes    History of seizures POA: Yes    Congestive heart failure (CHF) ([facility]) POA: Yes    Cardiac arrest ([facility]) POA: Yes  Resolved Problems:    * No resolved hospital problems. *      Assessment / Plan:  VT arrest  Emergent cath shows TakoTsubo cardiomyopathy    Shock  Pressors with afterload addition may have some Kel physiology with hyperdynamic base and obstructive features      I personally discussed his case with  Dr AKIRA Jeronimo M.D. Chato Conti, Jose Baca and Hemant Rdz      It is my pleasure to participate in the care of Mr. Mae.  Please do not hesitate to contact me with questions or concerns.    Clement Ji MD PhD [facility]  Cardiologist [facility]    12/5/2022    Please note that this dictation was created using voice recognition software. There may be errors I did not discover before finalizing the note.      Gilmer Mae is critically ill and he is at high risk for clinical deterioration, worsening vital organ dysfunction, and death  without the above critical care interventions.  Critical care 45 minutes due to time shock and ventricular fibrillation arrest  No time overlap. Procedures were not included in this time. This time was spent at the bedside evaluating the patient's chart, their labs,   imaging, physical exam and discussion with ICU team interventional team and vascular team as well as the bedside nurse,  respiratory therapy,  pharmacy and  formulating an assessment and plan.

## 2022-12-05 NOTE — PROGRESS NOTES
Internal Medicine Progress Note     Dr. Phani Hogan    For the time I went to bedside this morning, the patient had been taken for the OR.  Vital signs were stable overnight and the patient seemed more alert and oriented this morning per staff report. He has been admitted for recurrent right heel wound in the setting of severe PAD, taken for femoral-popliteal bypass this AM after being evaluated by LPS. Team was notified during rounds that the patient did have a cardiac arrest while in the OR, subsequently transferred to the ICU. Discussed the case with the ICU team.

## 2022-12-05 NOTE — PROGRESS NOTES
VASCULAR SURGERY SERVICE                        Progress Note  _________________________________    CTA reviewed  Patient needs a right femoral-popliteal bypass to heal his heel wound  Will try to schedule this for tomorrow, time TBD based on schedule availability  NPO except meds after midnight    [facility]   Voalte preferred or call my office [phone]  __________________________________________________  Patient:Gilmer Mae   MRN:[id number]

## 2022-12-05 NOTE — DIETARY
Nutrition Services Brief Update:    Day 7 of admit.  Gilmer Mae is a 82 y.o. male with admitting DX of ALL (acute kidney injury) (HCC) [N17.9]  Cardiac arrest (HCC) [I46.9]    Current Diet: Regular.    Problem: Nutritional:  Goal: Achieve adequate nutritional intake  Description: Patient will consume >50% of meals  Outcome: Not progressing. Pt coded while in OR and now intubated. Pt had not been eating well prior to code. Consider nutrition support if within GOC/POC per MD.

## 2022-12-05 NOTE — ASSESSMENT & PLAN NOTE
Admitted with encephalopathy felt secondary to sepsis/dehydration - improving today  Avoid sedatives  Maintain sleep/wake cycle, reorient

## 2022-12-05 NOTE — ANESTHESIA PREPROCEDURE EVALUATION
Case: [id number] Date/Time: 12/05/22 0930    Procedures:       CREATION, BYPASS, ARTERIAL, FEMORAL TO POPLITEAL, USING GRAFT (Right)      ANGIOGRAM (Right)    Location: [place] [address] / SURGERY [facility]    Surgeons: Hemant Rdz M.D.          Relevant Problems   NEURO   (positive) History of seizures   (positive) TIA (transient ischemic attack)      CARDIAC   (positive) Cardiac arrest (HCC)   (positive) Congestive heart failure (CHF) (HCC)   (positive) Essential hypertension         (positive) ALL (acute kidney injury) (HCC)      Other   (positive) Acute encephalopathy   (positive) History of transcatheter aortic valve replacement (TAVR)   (positive) Increased anion gap metabolic acidosis   (positive) Osteomyelitis of right lower extremity (HCC)   (positive) Sepsis (HCC)       Physical Exam    Airway   Mallampati: II  TM distance: >3 FB  Neck ROM: full       Cardiovascular - normal exam  Rhythm: regular  Rate: normal  (-) murmur     Dental - normal exam           Pulmonary - normal exam  Breath sounds clear to auscultation     Abdominal    Neurological - normal exam             EF 40%    Anesthesia Plan    ASA 4   ASA physical status 4 criteria: sepsis    Plan - general       Airway plan will be ETT    (A-line)                Informed Consent:

## 2022-12-05 NOTE — PROGRESS NOTES
VASCULAR SURGERY SERVICE                        Progress Note  _________________________________    Patient arrested multiple additional times  Going to cath lab emergently  Family updated    Hemant Rdz MD  [facility]   Voalte preferred or call my office [phone]  __________________________________________________  Patient:Gilmer Gutierrezdavid   MRN:[id number]

## 2022-12-05 NOTE — PROGRESS NOTES
VASCULAR SURGERY SERVICE                        Progress Note  _________________________________    Patient coded intraoperatively about 5-10 minutes after induction and prior to prepping or starting surgery  Required 1 minute of chest compressions and 1 dose of epi and then maintained a perfusing rhythm.  Surgery cancelled for today as it is not emergent.  Patient will go to ICU intubated for resuscitation and further workup. Dr. Conner notified.    Hemant Rdz MD  [facility]   Voalte preferred or call my office [phone]  __________________________________________________  Patient:Gilmer Mae   MRN:[id number]

## 2022-12-05 NOTE — PROGRESS NOTES
Patient received from OR to bedside at 1055; patient noted to be intubated on epi gtt as ordered.  Patient noted to lose arterial line at bedside, flushed with blood return. Pulse checked and noted to be in Vtach without pulse.  CPR/Code Blue started at 1057 at bedside.  See Code Sheet for details.     Code Stemi called by Dr. Mcneil/Dr. Conner at bedside for code at 1100; patient transported via cardiac monitor, vent/RT and staff to cath lab.  Family updated by Charge LISA Araujo.

## 2022-12-05 NOTE — ANESTHESIA PROCEDURE NOTES
Central Venous Line  Performed by: Joel Paredes M.D.  Authorized by: Joel Paredes M.D.     Start Time:  12/5/2022 10:35 AM  End Time:  12/5/2022 10:42 AM  Patient Location:  OR  Indication: central venous access        provider hand hygiene performed prior to central venous catheter insertion, all 5 sterile barriers used (gloves, gown, cap, mask, large sterile drape) during central venous catheter insertion and skin prep agent completely dried prior to procedure    Patient Position:  Reverse Trendelenburg  Site:  Internal jugular  Number of Lumens:  Triple lumen  target vein identified and needle advanced into vein and blood aspirated    Ultrasound-Guided: ultrasound-guided  Image captured, interpreted and electronically stored.  Sterile Gel and Probe Cover Used for Ultrasound?: Yes    Intravenous Verification: verified by ultrasound, venous blood return and chest x-ray pending    all ports aspirated, all ports flushed easily, guidewire was removed intact, biopatch was applied and line was sutured in place

## 2022-12-05 NOTE — ASSESSMENT & PLAN NOTE
ID consulted, continue Unasyn x10 days  Planned revascularization right lower extremity in a few weeks after cardiac recovery  Vascular surgery and wound care consulted  As needed analgesics for pain

## 2022-12-05 NOTE — PROCEDURES
CODE Blue Note    82-year-old male presented from OR after aborting vascular surgery case complicated by wide complex tachycardia pulseless arrest 10 minutes after induction.  Patient with 1 minute CPR and 1 mg x 1 epinephrine and ROSC obtained in the OR.  Report received from anesthesia at the bedside in Psychiatric.  Patient arrived in Psychiatric for further care on the ventilator with epinephrine infusion ongoing and developed ventricular fibrillation.  High-quality CPR was initiated and patient subsequently defibrillated x1 obtaining ROSC.  Patient had already been intubated and had CVC and arterial lines in place from the operating room.  See RN epic CODE BLUE notes for dose and exact timing of medications and defibrillation.  Blood pressure initially after ROSC high and epinephrine titrated down and norepinephrine initiated.  Transient further hypotension and 1 dose Tobin-Synephrine 300 mcg given with good result and norepinephrine dose titrated up.  EKG with left bundle/STEMI.  EKG reviewed with cardiology and code STEMI called and patient emergently transported to Cath Lab.  I followed the patient to Cath Lab and checked him out to the invasive cardiologist.  Blood gas with alkalemia and excellent oxygenation, minute ventilation reduced on the ventilator.  CXR with acceptable position of ET tube, no pneumothorax and some signs of atelectasis.    Case reviewed with vascular surgery, anesthesia, cardiology, CODE BLUE team including pharmacy and charge nurse.

## 2022-12-05 NOTE — CONSULTS
Critical Care Consultation    Date of consult: 12/5/2022    Referring Physician  AKIRA Jeronimo M.D.    Reason for Consultation  Cardiac arrest    History of Presenting Illness  82 y.o. male w/PMHx TAVR, hypertension, prior pacemaker, PVD who presented from OR today post brief cardiac arrest after induction.  He presented 11/28 with ALL and sepsis/osteomyelitis right lower extremity and was seen by ID and nephrology.  Vascular surgery had the patient in the OR for planned right femoropopliteal bypass for right SFA occlusion complicated by right foot ischemia and osteomyelitis.  Approximately 10 minutes after low-dose propofol and fentanyl for induction patient developed wide-complex rhythm and lost pulse while A-line being placed.  He received 1 minute of high-quality CPR and 1 mg epinephrine IV to obtain ROSC.  CVC and A-line were placed in the OR and he was transported to [facility].  Hypotension was responding epinephrine and epinephrine drip was started in OR.  Shortly after arrival CODE BLUE was called again for ventricular fibrillation.  He again received high-quality CPR and was defibrillated fairly quickly back into sinus rhythm.  Epinephrine drip was titrated.  Norepinephrine drip was added to it and while we are waiting 1 dose of Tobin-Synephrine 300 mcg administered which also helped with blood pressure.  Blood gases revealed excellent oxygenation and story alkalosis and minute ventilation was reduced.  EKG revealed left bundle/abnormal EKG consistent with possible STEMI.  Code STEMI was called and he was taken to the cardiac Cath Lab where I reviewed the case with the interventionalists.  Subsequent angiogram did not reveal lesion that required intervention and LV gram suggested Takotsubo syndrome.  The cause transferred back to [facility] on epinephrine and norepinephrine infusions.  Follow-up ABG and Cath Lab showed excellent oxygenation and normal ventilation.  Patient started to spontaneously move all  extremities but not follow commands.  Patient started having runs of sustained ventricular tachycardia and he is received amiodarone bolus and started on amiodarone infusion.    Code Status  Full Code    Review of Systems  Review of Systems   Unable to perform ROS: Acuity of condition     Past Medical History   has a past medical history of Chronic pain, Hyperlipidemia, Hypertension, and Pacemaker.    Surgical History   has a past surgical history that includes pacemaker insertion.  H/o TAVR    Family History  family history is not on file.    Social History       Medications  Home Medications       Reviewed by Shanon Foss (Pharmacy Tech) on 11/29/22 at 1532  Med List Status: Complete     Medication Last Dose Status   acetaminophen (TYLENOL) 325 MG Tab UNK Active   amLODIPine (NORVASC) 5 MG Tab UNK Active   aspirin EC (ECOTRIN) 81 MG Tablet Delayed Response UNK Active   atorvastatin (LIPITOR) 80 MG tablet UNK Active   carvedilol (COREG) 6.25 MG Tab UNK Active   cephALEXin (KEFLEX) 500 MG Cap UNK Active   cloNIDine (CATAPRES) 0.1 MG Tab UNK Active   clopidogrel (PLAVIX) 75 MG Tab UNK Active   gabapentin (NEURONTIN) 100 MG Cap UNK Active   hydroCHLOROthiazide (HYDRODIURIL) 25 MG Tab UNK Active   HYDROcodone-acetaminophen (NORCO) 5-325 MG Tab per tablet UNK Active   levETIRAcetam (KEPPRA) 500 MG Tab UNK Active   lisinopril (PRINIVIL) 10 MG Tab UNK Active                  Current Facility-Administered Medications   Medication Dose Route Frequency Provider Last Rate Last Admin    norepinephrine (Levophed) 8 mg in 250 mL NS infusion (premix)  0-1 mcg/kg/min (Ideal) Intravenous Continuous Sudheer Conner M.D. 30.8 mL/hr at 12/05/22 1432 0.24 mcg/kg/min at 12/05/22 1432    PHENYLEPHRINE HCL-NACL 1-0.9 MG/10ML-% IV SOSY             EPINEPHrine (Adrenalin) infusion 4 mg/250 mL (premix)  0-0.5 mcg/kg/min (Ideal) Intravenous Continuous Sudheer Conner M.D.   Stopped at 12/05/22 1300    phenylephrine (MARITZA-SYNEPHRINE)  100 mcg/mL inj (IV Push Syringe) 100-300 mcg  100-300 mcg Intravenous Q5 MIN PRN Clement Ji M.D.        PHENYLEPHRINE HCL 10 MG/ML INJ SOLN (WRAPPER)        Held at 12/05/22 1151    famotidine (PEPCID) tablet 20 mg  20 mg Enteral Tube Q12HRS Sudheer Conner M.D.        Or    famotidine (PEPCID) injection 20 mg  20 mg Intravenous Q12HRS Sudheer Conner M.D.        lidocaine (XYLOCAINE) 1 % injection 2 mL  2 mL Tracheal Tube Q30 MIN PRN Sudheer Conner M.D.        PHENYLEPHRINE HCL 10 MG/ML INJ SOLN (WRAPPER)        Held at 12/05/22 1152    Respiratory Therapy Consult   Nebulization Continuous RT Chato Mcneil M.D.        MD Alert...ICU Electrolyte Replacement per Pharmacy   Other PHARMACY TO DOSE Chato Mcneil M.D.        dexmedetomidine (PRECEDEX) 400 mcg/100mL NS premix infusion  0-1.5 mcg/kg/hr (Ideal) Intravenous Continuous Chato Mcneil M.D. 3.4 mL/hr at 12/05/22 1502 0.2 mcg/kg/hr at 12/05/22 1502    fentaNYL (SUBLIMAZE) injection 25 mcg  25 mcg Intravenous Q HOUR PRN Chato Mcneil M.D.        Or    fentaNYL (SUBLIMAZE) injection 50 mcg  50 mcg Intravenous Q HOUR PRN Cahto Mcneil M.D.        Or    fentaNYL (SUBLIMAZE) injection 100 mcg  100 mcg Intravenous Q HOUR PRN Chato Mcneil M.D.        fentaNYL (SUBLIMAZE) 50 mcg/mL in 50mL   Intravenous Continuous Chato Mcneil M.D. 1 mL/hr at 12/05/22 1356 50 mcg/hr at 12/05/22 1356    ipratropium-albuterol (DUONEB) nebulizer solution  3 mL Nebulization Q2HRS PRN (RT) Chato Mcneil M.D.        amiodarone (Nexterone) 360 mg/200 mL infusion  0.5-1 mg/min Intravenous Continuous Clement Ji M.D. 33 mL/hr at 12/05/22 1358 1 mg/min at 12/05/22 1358    levETIRAcetam (Keppra) injection 500 mg  500 mg Intravenous Q12HRS Chato Mcneil M.D.        magnesium sulfate IVPB premix 2 g  2 g Intravenous Once Chato Mcneil M.D.   Stopped at 12/05/22 1628    K+ Scale: Goal of 4.5  1 Each Intravenous Q6HRS  Chato Mcneil M.D.        insulin regular (HumuLIN R,NovoLIN R) injection  2-9 Units Subcutaneous Q6HRS Chato Mcneil M.D.        And    dextrose 10 % BOLUS 25 g  25 g Intravenous Q15 MIN PRN Chato Mcneil M.D.        dextrose 5% infusion   Intravenous Continuous AKIRA Jeronimo M.D. 50 mL/hr at 12/04/22 1156 New Bag at 12/04/22 1156    ampicillin/sulbactam (UNASYN) 3 g in  mL IVPB  3 g Intravenous Q6HRS AKIRA Jeronimo M.D.   Stopped at 12/05/22 1328    [Held by provider] heparin injection 5,000 Units  5,000 Units Subcutaneous Q12HRS AKIRA Jeronimo M.D.   5,000 Units at 12/04/22 0456    atorvastatin (LIPITOR) tablet 40 mg  40 mg Oral Q EVENING Deshawn Toussaint M.D.   40 mg at 12/04/22 1842    aspirin EC (ECOTRIN) tablet 81 mg  81 mg Oral DAILY Deshawn Toussaint M.D.   81 mg at 12/05/22 0508    senna-docusate (PERICOLACE or SENOKOT S) 8.6-50 MG per tablet 2 Tablet  2 Tablet Oral BID Deshawn Toussaint M.D.   2 Tablet at 12/04/22 1842    And    polyethylene glycol/lytes (MIRALAX) PACKET 1 Packet  1 Packet Oral QDAY PRN Deshawn Toussaint M.D.        And    magnesium hydroxide (MILK OF MAGNESIA) suspension 30 mL  30 mL Oral QDAY PRN Deshawn Toussaint M.D.        And    bisacodyl (DULCOLAX) suppository 10 mg  10 mg Rectal QDAY PRN Deshawn Toussaint M.D.        lactated ringers infusion (BOLUS): BMI less than or equal to 30  30 mL/kg Intravenous Once PRN Deshawn Toussaint M.D.        acetaminophen (Tylenol) tablet 650 mg  650 mg Oral Q6HRS PRN Deshawn Toussaint M.D.        Pharmacy Consult Request ...Pain Management Review 1 Each  1 Each Other PHARMACY TO DOSE Deshawn Toussaint M.D.        ondansetron (ZOFRAN) syringe/vial injection 4 mg  4 mg Intravenous Q4HRS PRN Deshawn Toussaint M.D.        ondansetron (ZOFRAN ODT) dispertab 4 mg  4 mg Oral Q4HRS PRN Deshawn Toussaint M.D.        gabapentin (NEURONTIN) capsule 100 mg  100 mg Oral BID Deshawn Toussaint M.D.   100 mg at 12/05/22 0509       Allergies  No Known Allergies    Vital Signs last 24 hours  Temp:   [35.9 °C (96.6 °F)-37.2 °C (98.9 °F)] 35.9 °C (96.6 °F)  Pulse:  [] 111  Resp:  [14-68] 25  BP: ()/() 98/74  SpO2:  [90 %-100 %] 100 %    Physical Exam  Physical Exam  Vitals reviewed.   Constitutional:       Appearance: He is normal weight. He is ill-appearing. He is not diaphoretic.      Interventions: He is sedated, intubated and restrained.   HENT:      Head: Normocephalic and atraumatic.      Mouth/Throat:      Mouth: Mucous membranes are moist.   Eyes:      General: No scleral icterus.     Pupils: Pupils are equal, round, and reactive to light.   Neck:      Vascular: No JVD.      Comments: R IJ CVC  Cardiovascular:      Rate and Rhythm: Tachycardia present. Rhythm irregular. FrequentExtrasystoles are present.     Pulses: Normal pulses.      Heart sounds:     No gallop.      Comments: ST with variable heart rate, alternating with ventricular rhythm that looks like V. tach more so than aberrancy  Pulmonary:      Effort: No accessory muscle usage. He is intubated.      Breath sounds: Decreased breath sounds present. No wheezing, rhonchi or rales.   Abdominal:      General: Abdomen is flat. Bowel sounds are normal. There is no distension.      Palpations: Abdomen is soft. There is no mass.      Tenderness: There is no guarding.   Genitourinary:     Comments: Kimball  Musculoskeletal:      Cervical back: Neck supple.      Comments: Radial arterial line   Skin:     General: Skin is cool and dry.      Capillary Refill: Capillary refill takes 2 to 3 seconds.      Coloration: Skin is not cyanotic.      Nails: There is no clubbing.      Comments: Right foot cooler with decreased capillary refill versus left and diminished pulses   Neurological:      Comments: Poorly responsive on ventilator but starting to spontaneously move all extremities and withdraws to tactile stimuli, brainstem reflexes present but sluggish, not following commands, not opening eyes spontaneously as of yet   Psychiatric:       Comments: MAYA       Fluids    Intake/Output Summary (Last 24 hours) at 2022 1503  Last data filed at 2022 1435  Gross per 24 hour   Intake 877.2 ml   Output 451 ml   Net 426.2 ml       Laboratory  Recent Results (from the past 48 hour(s))   EKG    Collection Time: 22 11:05 AM   Result Value Ref Range    Report       [facility]    Test Date:  2022  Pt Name:    ZOE SARMIENTO                 Department: 161  MRN:        [id number]                      Room:       [facility]  Gender:     Male                         Technician: IRINA  :        1940                   Requested By:ABI DOVE  Order #:    [id number]                    Reading MD:    Measurements  Intervals                                Axis  Rate:       119                          P:          267  HI:         116                          QRS:        -73  QRSD:       161                          T:          109  QT:         380  QTc:        535    Interpretive Statements  Sinus tachycardia with irregular rate  Left bundle branch block  Compared to ECG 2022 07:44:41  Left bundle-branch block now present  Sinus rhythm no longer present  Myocardial infarct finding no longer present     POCT arterial blood gas device results    Collection Time: 22 11:10 AM   Result Value Ref Range    Ph 7.643 (H) 7.400 - 7.500    Pco2 39.5 (H) 26.0 - 37.0 mmHg    Po2 440 (H) 64 - 87 mmHg    Tco2 44 (HH) 20 - 33 mmol/L    S02 100 (H) 93 - 99 %    Hco3 42.8 (H) 17.0 - 25.0 mmol/L    BE 20 (H) -4 - 3 mmol/L    Body Temp 37.0 C degrees    O2 Therapy 100 %    iPF Ratio 440     Ph Temp Luan 7.643 (H) 7.400 - 7.500    Pco2 Temp Co 39.5 (H) 26.0 - 37.0 mmHg    Po2 Temp Cor 440 (H) 64 - 87 mmHg    Specimen Arterial    POCT lactate device results    Collection Time: 22 11:10 AM   Result Value Ref Range    iStat Lactate 2.9 (H) 0.5 - 2.0 mmol/L   POCT arterial blood gas device results    Collection Time: 22 11:12 AM   Result Value  Ref Range    Ph 7.632 (H) 7.400 - 7.500    Pco2 40.4 (H) 26.0 - 37.0 mmHg    Po2 458 (H) 64 - 87 mmHg    Tco2 44 (HH) 20 - 33 mmol/L    S02 100 (H) 93 - 99 %    Hco3 42.7 (H) 17.0 - 25.0 mmol/L    BE 20 (H) -4 - 3 mmol/L    Body Temp 96.6 F degrees    O2 Therapy 100 %    iPF Ratio 458     Ph Temp Luan 7.650 (H) 7.400 - 7.500    Pco2 Temp Co 38.4 (H) 26.0 - 37.0 mmHg    Po2 Temp Cor 451 (H) 64 - 87 mmHg    Specimen Arterial    POCT sodium device results    Collection Time: 12/05/22 11:12 AM   Result Value Ref Range    Istat Sodium 155 (H) 135 - 145 mmol/L   POCT potassium device results    Collection Time: 12/05/22 11:12 AM   Result Value Ref Range    Istat Potassium 2.5 (LL) 3.6 - 5.5 mmol/L   POCT ionized CA device results    Collection Time: 12/05/22 11:12 AM   Result Value Ref Range    Istat Ionized Calcium 1.89 (H) 1.10 - 1.30 mmol/L   POCT arterial blood gas device results    Collection Time: 12/05/22 12:02 PM   Result Value Ref Range    Ph 7.422 7.400 - 7.500    Pco2 41.5 (H) 26.0 - 37.0 mmHg    Po2 476 (H) 64 - 87 mmHg    Tco2 28 20 - 33 mmol/L    S02 100 (H) 93 - 99 %    Hco3 27.0 (H) 17.0 - 25.0 mmol/L    BE 2 -4 - 3 mmol/L    Body Temp see below degrees    O2 Therapy 50 %    iPF Ratio 952     Specimen Arterial    POCT lactate device results    Collection Time: 12/05/22 12:02 PM   Result Value Ref Range    iStat Lactate 1.6 0.5 - 2.0 mmol/L   POCT arterial blood gas device results    Collection Time: 12/05/22  1:19 PM   Result Value Ref Range    Ph 7.494 7.400 - 7.500    Pco2 30.7 26.0 - 37.0 mmHg    Po2 479 (H) 64 - 87 mmHg    Tco2 25 20 - 33 mmol/L    S02 100 (H) 93 - 99 %    Hco3 23.6 17.0 - 25.0 mmol/L    BE 1 -4 - 3 mmol/L    Body Temp 35.5 C degrees    O2 Therapy 100 %    iPF Ratio 479     Ph Temp Luan 7.517 (H) 7.400 - 7.500    Pco2 Temp Co 28.7 26.0 - 37.0 mmHg    Po2 Temp Cor 469 (H) 64 - 87 mmHg    Specimen Arterial     DelSys Vent     End Tidal Carbon Dioxide 25 mmhg    Tidal Volume 420 mL     Peep End Expiratory Pressure 8 cmh20    Set Rate 18     Mode APV-CMV    MAGNESIUM    Collection Time: 12/05/22  1:40 PM   Result Value Ref Range    Magnesium 1.7 1.5 - 2.5 mg/dL   Basic Metabolic Panel    Collection Time: 12/05/22  1:40 PM   Result Value Ref Range    Sodium 146 (H) 135 - 145 mmol/L    Potassium 3.3 (L) 3.6 - 5.5 mmol/L    Chloride 111 96 - 112 mmol/L    Co2 20 20 - 33 mmol/L    Glucose 188 (H) 65 - 99 mg/dL    Bun 26 (H) 8 - 22 mg/dL    Creatinine 1.04 0.50 - 1.40 mg/dL    Calcium 8.9 8.5 - 10.5 mg/dL    Anion Gap 15.0 7.0 - 16.0   ESTIMATED GFR    Collection Time: 12/05/22  1:40 PM   Result Value Ref Range    GFR (CKD-EPI) 72 >60 mL/min/1.73 m 2       Imaging  DX-ABDOMEN FOR TUBE PLACEMENT   Final Result      NG tube tip projects over the GE junction. It can be advanced 8-10 cm      DX-CHEST-PORTABLE (1 VIEW)   Final Result      1.  Right central catheter tip is in the mid SVC. No pneumothorax.   2.  NGT tip is at the GE junction. It can be advanced 8 cm.   3.  Stable other findings.      DX-CHEST-PORTABLE (1 VIEW)   Final Result      1.  Well-positioned lines and tubes. No pneumothorax.   2.  Ill-defined left basilar opacity, chronic atelectasis/scarring or sequela of recent pneumonia.      CT-CTA AORTA-RO WITH & W/O-POST PROCESS   Final Result      1.  Extensive atherosclerotic vascular calcification involving the aorta and originating arteries. There is ectasia of the ascending thoracic aorta measured at 3.4 x 3.4 cm in diameter.      2.  Extensive atherosclerotic plaque involving the right common, superficial femoral and profunda arteries. There is occlusion of the right superficial femoral artery at the level of the proximal thigh. Popliteal artery is also occluded and there is    extensive atherosclerotic plaque involving the posterior tibial, anterior tibial and peroneal arteries. No definite continuous runoff vessel identified.      3.  Extensive atherosclerotic plaque involving the left  common, profunda and superficial femoral and popliteal arteries with multiple areas of high-grade stenosis of the superficial femoral and popliteal arteries. There is also extensive atherosclerotic    change of the posterior tibial, anterior tibial and peroneal arteries without a definite contiguous runoff vessel seen.      4.  High-grade stenosis involving the origin of the superior mesenteric artery.      5.  Occluded origin of the inferior mesenteric artery.      6.  Atherosclerotic plaque involving the right renal artery origin and the proximal right renal artery resulting in 50% diameter narrowing.      7.  Less than 50% diameter narrowing of the left renal artery origin.      8.  Ill-defined groundglass infiltrates within the right upper lobe possibly representing infectious process.      9.  Multiple hepatic cysts.      3D angiographic/MIP images of the vasculature confirm the vascular findings as described above.      US-VEIN MAPPING LOWER EXTREMITY UNILAT RIGHT   Final Result      DX-FOOT-COMPLETE 3+ RIGHT   Final Result      Likely acute osteomyelitis at the inferior calcaneus. MRI can be obtained as indicated.      MR-FOOT-W/O RIGHT   Final Result      1.  Very limited study due to extensive patient motion artifact and patient comfort preventing multiple sequences.      2.  No gross bony destructive change.      3.  Soft tissue edema.      US-EXTREMITY ARTERY LOWER UNILAT RIGHT   Final Result      US-EXTREMITY VENOUS LOWER UNILAT RIGHT   Final Result      US-ABDOMEN COMPLETE SURVEY   Final Result         1.  Gallbladder sludge, otherwise unremarkable gallbladder   2.  Hepatic cysts   3.  Bilateral echogenic kidneys with mildly thinned cortex, appearance favors medical renal disease      CT-HEAD W/O   Final Result      1.  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   2.  Mild cerebral atrophy. Age-appropriate.   3.   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   4.  No acute findings are evident.         DX-CHEST-PORTABLE (1 VIEW)   Final Result      No evidence of acute cardiopulmonary process.      CL-LEFT HEART CATHETERIZATION WITH POSSIBLE INTERVENTION    (Results Pending)   DX-ABDOMEN FOR TUBE PLACEMENT    (Results Pending)   EC-ECHOCARDIOGRAM COMPLETE W/O CONT    (Results Pending)       Assessment/Plan  * Cardiac arrest (HCC)- (present on admission)  Assessment & Plan  S/p  1 minute wide-complex tachycardia with loss of pulse in the OR responded to epinephrine 1 mg with ROSC  S/p V. fib arrest with 6-7 minutes CPR in CIC status post defibrillation x1  Recurrent wide-complex rhythm with soft BP  New LBBB on EKG  Status post cardiac cath without obvious occlusive process, no intervention  Query Takotsubo syndrome by heart cath per interventionalists  Actively titrating norepinephrine and epinephrine for shock  Optimize electrolytes  Loaded with amiodarone and infusion ongoing  Optimize acid-base balance-last blood gas with no acidosis  Cardiology following    Stress-induced cardiomyopathy  Assessment & Plan  12/5 cardiac catheterization without significant obstructive CAD-no intervention  LV imaging studies suggest stress cardiomyopathy  Echo to evaluate for LVOT pending   Cardiology following    Shock (HCC)  Assessment & Plan  Likely cardiogenic post cardiac arrest with a component of vasa plegia  New LBBB and STEMI ruled out with negative heart catheterization 12/5  Takotsubo suspected by LV gram per invasive cardiologist  Echocardiogram pending  Actively titrating norepinephrine and epinephrine for hypotension, goal BP 90 and MAP 65  Neurology following    Acute respiratory failure (HCC)  Assessment & Plan  Debated electively for vascular surgery 12/5  Status post brief cardiac arrest in OR and again in CIC  RT protocol/ventilator bundle  No SBT's  due to hemodynamic instability  Caution with SATs although neuro assessment important  Excellent oxygenation ventilation by ABGs  Sedation with fentanyl and dexmedetomidine as needed    Peripheral vascular disease (HCC)  Assessment & Plan  Significant vascular disease noted on CTA imaging studies, see report includes intra-abdominal and lower extremity arteries  Vascular surgery following  Right SFA occlusion  Right femoropopliteal bypass postponed due to cardiac arrest  Query need for anticoagulation-defer to vascular surgery    Congestive heart failure (CHF) (HCC)- (present on admission)  Assessment & Plan  History of  History of hypertension  History of TAVR  Forced diuresis as clinically prudent    History of transcatheter aortic valve replacement (TAVR)- (present on admission)  Assessment & Plan  History of TAVR  Cardiogram reviewed    ALL (acute kidney injury) ([facility])- (present on admission)  Assessment & Plan  Followed by nephrology and on admission and history of CKD  Avoid time and hydration  Status post CTA 12/4-contrast  Now status post CODE BLUE 12/5  Monitor for recurrence of ALL  Avoid nephrotoxins as able  Serial BMP  Monitor UO-Kimball placed  Follow-up with nephrology as needed    Acute encephalopathy- (present on admission)  Assessment & Plan  Admitted with encephalopathy felt secondary to sepsis/dehydration  CT scan head revealed old lacunar infarct but no new acute process  Improved versus admission    Status post brief cardiac arrest in the operating room and CIC on 12/5  Patient starting to spontaneously move, purposeful?  Monitoring- euthymia protocols if necessary?    Osteomyelitis of right lower extremity (HCC)- (present on admission)  Assessment & Plan  ID evaluating, MRSA studies negative linezolid discontinued  MRI foot reviewed, osteomyelitis suggested  Planned revascularization right lower extremity on hold post cardiac arrest  Unasyn    Hyperglycemia  Assessment & Plan  Last glucose 188  trending up  Currently on epinephrine and norepinephrine infusions  Every 6 hour fingersticks glucose  Medium sliding scale  Hypoglycemia protocols  Monitor for need for continuous infusion protocols    History of seizures- (present on admission)  Assessment & Plan  Continue Keppra  Seizure precautions  Optimize electrolytes    Macrocytic anemia- (present on admission)  Assessment & Plan  Serial CBC  Transfuse per protocols    Hyperlipidemia- (present on admission)  Assessment & Plan  Resume statin as clinically prudent    Essential hypertension- (present on admission)  Assessment & Plan  Currently hypotensive, resume home regimen as clinically appropriate      Discussed patient condition and risk of morbidity and/or mortality with Family, RN, RT, Pharmacy, Code status disscussed, cardiology and vascular surgery, and anesthesia and invasive cardiologist .    The patient remains critically ill.  Critical care time = 125 minutes in directly providing and coordinating critical care and extensive data review.  No time overlap and excludes procedures.

## 2022-12-05 NOTE — PROGRESS NOTES
Received patient resting in bed.  PIV to right wrist infusing D5 @ 50 ml/hr due to elevated sodium.  Left forearm PIV patent with dressing intact.  Bed alarm on. Bed in low position, wheels locked, side rails up x3 and call light within reach. Bed alarm on.

## 2022-12-05 NOTE — ADDENDUM NOTE
Addendum  created 12/05/22 1518 by Joel Paredes M.D.    Clinical Note Signed, Delete clinical note, Intraprocedure Blocks edited, LDA created via procedure documentation, LDA deleted via procedure documentation, Order Canceled from Note

## 2022-12-05 NOTE — ASSESSMENT & PLAN NOTE
Significant vascular disease, Right SFA occlusion  Vascular surgery consulted with planned right femoropopliteal bypass 12/5 --> on hold for now  Continue antiplatelets, statin  Monitor perfusion

## 2022-12-05 NOTE — PROCEDURES
Cardiac Catheterization Laboratory Procedure Note    DATE: 12/5/2022    : Jose Baca MD, MPH    PROCEDURES PERFORMED:  Left heart catheterization  Coronary angiography  Right femoral artery angiography  Intravascular ultrasound of the left main coronary artery  Moderate conscious sedation    INDICATIONS:  Cardiac arrest    CONSENT:  The complete alternatives, risks, and benefits of the procedure were explained to the patient. Signed informed consent was obtained and placed in the chart prior to the procedure.    MEDICATIONS:  Lidocaine  Fentanyl  Midazolam  Nitroglycerin  Verapamil  Heparin    MODERATE CONSCIOUS SEDATION:  I personally supervised the administration of moderate conscious sedation by the nursing staff for 35 minutes.  Start time: 11:32 AM  End time: 12:07 PM    CONTRAST: Omnipaque 110 cc    RADIATION DOSE (Air Kerma): 178 mGy    FLUOROSCOPY TIME: 8.2 minutes    ACCESS:  6-[place]ican Glidesheath in the right common femoral artery    ESTIMATED BLOOD LOSS: <20 cc    COMPLICATIONS: None    PROCEDURE IN DETAIL:  The patient was brought to the cardiac catheterization laboratory in the fasting state. The skin over the right groin was prepped and draped in the usual sterile fashion. Lidocaine infiltration was used to anesthetize the tissue over the right common femoral artery.  using the micropuncture technique, and ultrasound guidance, a 6-[place] Glidesheath was inserted in the right common femoral artery.  Right common femoral angiography was performed before placing the 6 [place] sheath to confirm adequate access point above the bifurcation and below the inferior epigastric artery.  A 6 [place] JR4 diagnostic catheter was then advanced over a standard J-wire into the left ventricular cavity where it was gently aspirated, flushed, and then used to perform the left ventriculography and then withdrawn across the aortic valve with sequential pressures measured. This catheter was then used to engage the  ostium of the right coronary artery and cineangiograms were obtained in multiple projections for complete evaluation of the right coronary system. This catheter was then exchanged over J-wire to a 6 Marshallese JL 4 diagnostic catheter which was used to engage the ostium of the left coronary artery and cineangiograms were obtained in multiple projections for complete evaluation of the left coronary system. Following completion of coronary angiography, the JR4 catheter was exchanged over J-wire to a 6 Marshallese EBU 3.5 guide catheter which was used to perform intravascular ultrasound of the left main coronary artery over a run-through guidewire.  Heparin was administered before performing the IVUS.  Then, all wires, catheters, and sheaths were removed.  A Perclose was successfully deployed in the right common femoral arteriotomy site achieving hemostasis in the Cath Lab.    HEMODYNAMICS:  Aortic pressure: 98 /63 mmHg  LVEDP: 16 mmHg  No significant aortic gradient on pullback    CORONARY ANGIOGRAPHY:  The left main coronary artery : Large in caliber vessel that trifurcates to large in caliber LAD, large in caliber ramus intermedius and very small in caliber /diminutive left circumflex coronary artery.  There is at least 30% stenosis in the proximal left main which was interrogated further using IVUS.  IVUS showed ostial left main MLA of 18.5mm2 and proximal MLA 10-11mm2 hence not suggestive of hemodynamically significant stenosis.  There is no angiographic or IVUS evidence of plaque rupture in the left main.  The left anterior descending coronary artery : Large in caliber vessel with diffuse nonobstructive CAD and a large D1 branch.  The left circumflex coronary artery : Small in caliber/diminutive vessel and most of the circumflex territory is covered by a large right PLB and a large ramus intermedius coronary artery.  Ramus intermedius coronary artery: Large in caliber vessel with diffuse nonobstructive CAD  The right  coronary artery  : Large in caliber vessel with mild 30-40% stenosis throughout the proximal and midportion of the vessel.  Dominant vessel with large PDA and PLB systems.    Left ventriculography estimated left ventricular ejection fraction 30% with cineangiographic signs of Takotsubo cardiomyopathy with no evidence of noticeable LV apical thrombus.    IMPRESSION:  1.  Nonobstructive coronary artery disease with mild proximal left main stenosis, MLA 10-11 mm².  2. Reduced estimated left ventricular ejection fraction 30% with cineangiographic signs of Takotsubo cardiomyopathy with no evidence of noticeable LV apical thrombus.  3.  Mildly elevated resting LVEDP at 60 mmHg with no significant transaortic gradient on pullback    RECOMMENDATIONS:  Ongoing primary ASCVD preventive measures  Stress-induced cardiomyopathy management with goal-directed medical therapy  Ongoing care per ICU and cardiology consultant teams    NOTIFICATION:  The patient's daughter was called and notified of the results.    Referring Cardiologist - Dr. Ji - , and intensivist were notified.    Jose Baca MD, MPH [facility]  Interventional Cardiologist  [facility]   of Clinical Internal Medicine - [facility]

## 2022-12-05 NOTE — ASSESSMENT & PLAN NOTE
Slowly worsening  Daily CBC with conservative transfusion strategy (hemoglobin goal greater than 7)

## 2022-12-05 NOTE — CONSULTS
Vascular Surgery          New Patient Consultation    Patient:Gilmer Mae  MRN:[id number]    Date: 12/5/2022    Referring Provider: AKIRA Jeronimo M.d.    Consulting Physician: Hemant Rdz MD  _____________________________________________________    Reason for consultation:  Peripheral arterial occlusive disease and nonhealing wound of the right foot    HPI:  This is a 82 y.o. male with a nonhealing wound of the right foot.  Work-up reveals diffuse arterial disease however notable for SFA occlusion and poor distal flow.  And ABIs not available.  When I came to see the patient he was alert and conversant in no distress.  He has no specific complaints at this time apart from pain in the right lower leg and foot due to the arterial insufficiency.  Of note the patient had a right femoral endarterectomy at [facility] about 2 to 3 weeks ago and the incision is healing well.  The surgeon at that hospital knew that the patient might need a runoff procedure at some point however wanted to see how he fared after the inflow procedure prior to doing a runoff procedure.  Given the worsening condition of the foot since that time, I would recommend proceeding with the runoff procedure.    Past Medical History:   Diagnosis Date    Chronic pain     Hyperlipidemia     Hypertension     Pacemaker        Past Surgical History:   Procedure Laterality Date    PACEMAKER INSERTION         Current Facility-Administered Medications   Medication Dose Route Frequency Provider Last Rate Last Admin    carvedilol (COREG) tablet 12.5 mg  12.5 mg Oral BID WITH MEALS AKIRA Jeronimo M.D.   12.5 mg at 12/04/22 1842    dextrose 5% infusion   Intravenous Continuous AKIRA Jeronimo M.D. 50 mL/hr at 12/04/22 1156 New Bag at 12/04/22 1156    lisinopril (PRINIVIL) tablet 20 mg  20 mg Oral Q DAY Deepika Roque M.D.   20 mg at 12/05/22 0508    amLODIPine (NORVASC) tablet 5 mg  5 mg Oral DAILY AKIRA Jeronimo M.D.   5  mg at 12/05/22 0508    ampicillin/sulbactam (UNASYN) 3 g in  mL IVPB  3 g Intravenous Q6HRS AKIRA Jeronimo M.D.   Stopped at 12/05/22 0546    [Held by provider] heparin injection 5,000 Units  5,000 Units Subcutaneous Q12HRS AKIRA Jeronimo M.D.   5,000 Units at 12/04/22 0456    levETIRAcetam (KEPPRA) tablet 500 mg  500 mg Oral BID Deshawn Toussaint M.D.   500 mg at 12/05/22 0509    atorvastatin (LIPITOR) tablet 40 mg  40 mg Oral Q EVENING Deshawn Toussaint M.D.   40 mg at 12/04/22 1842    aspirin EC (ECOTRIN) tablet 81 mg  81 mg Oral DAILY Deshawn Toussaint M.D.   81 mg at 12/05/22 0508    senna-docusate (PERICOLACE or SENOKOT S) 8.6-50 MG per tablet 2 Tablet  2 Tablet Oral BID Deshawn Toussaint M.D.   2 Tablet at 12/04/22 1842    And    polyethylene glycol/lytes (MIRALAX) PACKET 1 Packet  1 Packet Oral QDAY PRN Deshawn Toussaint M.D.        And    magnesium hydroxide (MILK OF MAGNESIA) suspension 30 mL  30 mL Oral QDAY PRN Deshawn Toussaint M.D.        And    bisacodyl (DULCOLAX) suppository 10 mg  10 mg Rectal QDAY PRN Deshawn Toussaint M.D.        lactated ringers infusion (BOLUS)  1,000 mL Intravenous Once PRN Deshawn Toussaint M.D.        lactated ringers infusion (BOLUS): BMI less than or equal to 30  30 mL/kg Intravenous Once PRN Deshawn Toussaint M.D.        acetaminophen (Tylenol) tablet 650 mg  650 mg Oral Q6HRS PRN Deshawn Toussaint M.D.        Pharmacy Consult Request ...Pain Management Review 1 Each  1 Each Other PHARMACY TO DOSE Deshawn Toussaint M.D.        oxyCODONE immediate-release (ROXICODONE) tablet 5 mg  5 mg Oral Q3HRS PRN Deshawn Toussaint M.D.   5 mg at 12/01/22 2023    Or    oxyCODONE immediate release (ROXICODONE) tablet 10 mg  10 mg Oral Q3HRS PRN Deshawn Toussaint M.D.   10 mg at 12/05/22 0601    ondansetron (ZOFRAN) syringe/vial injection 4 mg  4 mg Intravenous Q4HRS PRN Deshawn Toussaint M.D.        ondansetron (ZOFRAN ODT) dispertab 4 mg  4 mg Oral Q4HRS PRN Deshawn Toussaint M.D.        HYDROmorphone (Dilaudid) injection 0.5 mg  0.5 mg Intravenous Q4HRS PRN  Deshawn Toussaint M.D.   0.5 mg at 12/03/22 0022    cloNIDine (CATAPRES) tablet 0.1 mg  0.1 mg Oral BID PRN Deshawn Toussaint M.D.   0.1 mg at 12/03/22 1754    gabapentin (NEURONTIN) capsule 100 mg  100 mg Oral BID Deshawn Toussaint M.D.   100 mg at 12/05/22 0509       Social History     Socioeconomic History    Marital status:      Spouse name: Not on file    Number of children: Not on file    Years of education: Not on file    Highest education level: Not on file   Occupational History    Not on file   Tobacco Use    Smoking status: Unknown    Smokeless tobacco: Not on file   Vaping Use    Vaping Use: Never used   Substance and Sexual Activity    Alcohol use: Not on file    Drug use: Not on file    Sexual activity: Not on file   Other Topics Concern    Not on file   Social History Narrative    Not on file     Social Determinants of Health     Financial Resource Strain: Not on file   Food Insecurity: Not on file   Transportation Needs: Not on file   Physical Activity: Not on file   Stress: Not on file   Social Connections: Not on file   Intimate Partner Violence: Not on file   Housing Stability: Not on file       No family history on file.    Allergies:  Patient has no known allergies.    Review of Systems:    Constitutional: Negative for fever or chills  HENT:   Negative for hearing loss or tinnitus    Eyes:    Negative for blurred vision or loss of vision  Respiratory:  Negative for cough or hemoptysis  Cardiac:  Negative for chest pain or palpitations  Vascular:  Positive for claudication, Positive for rest pain  Gastrointestinal: Negative for vomiting or abdominal pain     Negative for hematochezia or melena   Genitourinary: Negative for dysuria or hematuria   Musculoskeletal: Negative for myalgias or acute joint pain  Skin:   Negative for itching or rash  Neurological:  Negative for dizziness or headaches     Negative for speech disturbance     Negative for extremity weakness or paresthesias  Endo/Heme:  Negative for  "easy bruising or bleeding    Physical Exam:  /73   Pulse 89   Temp 36.6 °C (97.9 °F) (Temporal)   Resp 16   Ht 1.727 m (5' 7.99\")   Wt 68.1 kg (150 lb 2.1 oz)   SpO2 92%   BMI 22.83 kg/m²     Constitutional: Alert, oriented, no acute distress  HEENT:  Normocephalic and atraumatic, EOMI  Neck:   Supple, no JVD,   Cardiovascular: Regular rate and rhythm,   Pulmonary:  Good air entry bilaterally,    Abdominal:  Soft, non-tender, non-distended     Aortic impulse not widened  Musculoskeletal: No tenderness, no deformity  Neurological:  CN II-XII grossly intact, no focal deficits  Skin:   Skin is warm and dry. No rash noted.  Vascular exam:    RUE: warm, cap refill<3 seconds, no edema, no tissue loss,   LUE: warm, cap refill<3 seconds, no edema, no tissue loss,   RLE: warm, cap refill<3 seconds, no edema, ischemic discoloration of the first and second toe of the right foot and also a heel ulceration of indeterminate depth.  LLE: warm, cap refill<3 seconds, no edema, no tissue loss,       Labs:  Recent Labs     12/03/22  0907   WBC 11.2*   RBC 2.99*   HEMOGLOBIN 10.1*   HEMATOCRIT 30.3*   .3*   MCH 33.8*   MCHC 33.3*   RDW 44.5   PLATELETCT 166   MPV 10.3     Recent Labs     12/02/22  1024 12/02/22  1516 12/03/22  0907   SODIUM 146* 142 147*   POTASSIUM 3.8 3.7 3.5*   CHLORIDE 110 107 111   CO2 26 22 24   GLUCOSE 107* 107* 98   BUN 21 18 22   CREATININE 0.89 0.78 0.91   CALCIUM 8.8 8.9 8.6         Recent Labs     12/02/22  1516 12/03/22  0907   ASTSGOT 33 30   ALTSGPT 18 18   TBILIRUBIN 0.9 0.9   ALKPHOSPHAT 122* 109*   GLOBULIN 2.9 2.7         Radiology:  Arterial duplex of the right lower extremity shows SFA occlusion and poor tibial flow.  MARGARITA not available      Assessment/Plan:   -Peripheral arterial occlusive disease with right lower extremity arterial insufficiency and nonhealing wound of the right foot    Patient warrants additional revascularization of the right foot for limb salvage.  Patient " will need a right femoral to popliteal artery bypass.  I had a detailed discussion with the patient regarding our findings and recommendations.  We discussed the steps of the operation and the expected recovery and we also discussed the risks.  Questions were answered and he agreed to proceed.    Hemant Rdz MD  [facility]   [employer] preferred or call my office [phone]  __________________________________________________________________  Patient:Gilmer Mae   MRN:[id number]   CSN:[id number]

## 2022-12-05 NOTE — ANESTHESIA POSTPROCEDURE EVALUATION
Patient: Gilmer Mae    Procedure Summary     Date: 12/05/22 Room / Location: [place] [address] / SURGERY [facility]    Anesthesia Start: 0957 Anesthesia Stop: 1142    Procedures:       CREATION, BYPASS, ARTERIAL, FEMORAL TO POPLITEAL, USING GRAFT (Right)      ANGIOGRAM (Right) Diagnosis:     Surgeons: Hemant Rdz M.D. Responsible Provider: Joel Paredes M.D.    Anesthesia Type: general ASA Status: 4          Final Anesthesia Type: general  Last vitals  BP   Blood Pressure : 124/74    Temp   35.9 °C (96.6 °F)    Pulse   (!) 114   Resp   16    SpO2   97 %      Anesthesia Post Evaluation    Patient location during evaluation: PACU  Patient participation: complete - patient cannot participate  Pain score: 0    Airway patency: patent  Anesthetic complications: no  Cardiovascular status: hemodynamically stable  Respiratory status: acceptable  Hydration status: euvolemic  Comments: Patient taken to cath lab for presumed STEMI    PONV: none  patient was unable to participate        No notable events documented.     Nurse Pain Score: 3 (NPRS)

## 2022-12-05 NOTE — ASSESSMENT & PLAN NOTE
Cardiac arrest with anesthesia induction 12/5 (WCT, Vfib s/ defibrillation) S/p cath showing non-obs CAD, EF 30%/LVDP 60 c/w Takotsubo CM  Cardiology consulted  Continue amiodarone oral, begin Coreg today  Optimize electrolytes, continuous telemetry monitoring

## 2022-12-05 NOTE — CARE PLAN
The patient is Stable - Low risk of patient condition declining or worsening    Shift Goals  Clinical Goals: pain/rest  Patient Goals: pain management  Family Goals: n/a    Progress made toward(s) clinical / shift goals:  progressing  Right groin incision free s/s infection.  Instructed on purpose of IV antibiotic.    Patient is not progressing towards the following goals:

## 2022-12-05 NOTE — THERAPY
Missed Therapy     Patient Name: Gilmer Mae  Age:  82 y.o., Sex:  male  Medical Record #: [id number]  Today's Date: 12/5/2022    Attempted to see for OT tx. Pt currently out of room at procedure. Will try again later as able.

## 2022-12-05 NOTE — ASSESSMENT & PLAN NOTE
Improving  Continue insulin sliding scale with goal glucose between 120 and 180  Diabetic diet when cleared by SLP

## 2022-12-05 NOTE — ASSESSMENT & PLAN NOTE
Intubated 12/5 - 12/7  Strongly encourage incentive spirometry, mobilization  RT/O2 protocol  Titrate oxygen therapy to goal SPO2 greater than 92%

## 2022-12-05 NOTE — ASSESSMENT & PLAN NOTE
Acute on CKD - worsened postcardiac arrest secondary to ATN, nonoliguric --> improving again today  Nephrology consulted and signed off  Avoid nephrotoxins  Monitor creatinine, urine output, electrolytes  Small fluid bolus today given n.p.o. status and mild dehydration with hypernatremia

## 2022-12-05 NOTE — ASSESSMENT & PLAN NOTE
History of, 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%)  Begin Coreg, ACE inhibitor  Cardiology consulted  Hold diuresis for now

## 2022-12-05 NOTE — DISCHARGE PLANNING
Code blue called. No family at bedside. LSW made phone call to pt's wife and daughter. Update provided. Daughter and spouse are on their way to the hospital and will be here in approximately 30 minutes. Pt's wife reported she can be reached at [phone] for any updates until they arrive.

## 2022-12-05 NOTE — ASSESSMENT & PLAN NOTE
12/5 cardiac catheterization without significant obstructive CAD-no intervention  LV imaging studies suggest stress cardiomyopathy  Echo to evaluate for LVOT pending   Cardiology following

## 2022-12-05 NOTE — ANESTHESIA PROCEDURE NOTES
Airway    Date/Time: 12/5/2022 10:03 AM  Performed by: Joel Paredes M.D.  Authorized by: Joel Paredes M.D.     Location:  OR  Urgency:  Elective  Indications for Airway Management:  Anesthesia      Spontaneous Ventilation: absent    Sedation Level:  Deep  Preoxygenated: Yes    Patient Position:  Sniffing  Final Airway Type:  Endotracheal airway  Final Endotracheal Airway:  ETT  Cuffed: Yes    Technique Used for Successful ETT Placement:  Direct laryngoscopy    Insertion Site:  Oral  Blade Type:  Laurie  Laryngoscope Blade/Videolaryngoscope Blade Size:  3  ETT Size (mm):  7.5  Measured from:  Teeth  ETT to Teeth (cm):  23  Placement Verified by: auscultation and capnometry    Cormack-Lehane Classification:  Grade I - full view of glottis  Number of Attempts at Approach:  1

## 2022-12-05 NOTE — ASSESSMENT & PLAN NOTE
Post cardiac arrest, cardiogenic in nature - improved  S/p norepinephrine drip (discontinued 12/7)

## 2022-12-05 NOTE — PROGRESS NOTES
Patient with complex medical history scheduled to undergo fem-pop bypass.  About 10 minutes after gentle induction with fentanyl and 50mg propofol, patient developed a wide complex tachycardia with loss of pulse during a-line insertion.  Chest compressions initiated, 1 mg epi.  Pulse returned and preparations for transport to ICU were underway.  R IJ central line inserted.  Loss of pulse, chest compressions, 1 mg epi with pulse return in about 1 minute.  Transport to ICU.  Stable transport with code shortly after arrival.  Handoff to critical care team.    __________      Late note addenda to better reflect timing of events of the day.       10:02 Induction.  10:03 Intubation.  10:14 A-line (documented at 10:11)  Loss of pulse.  Patient noted to be in wide complex tachycardia, no palpable pulse.  Code called, chest compressions started, epi 1 mg.  10:17 ROSC, hypertension due to epi.    10:25 progressive hypotension, 0.5 mg epi, minimal response.  Chest compressions, 0.5 mg epi, with ROSC after a couple of minutes.  Central line inserted, waiting in OR for ICU bed.    On transport monitor about 10:45.  Uneventful transport to ICU, but code shortly after arrival.  ROSC.

## 2022-12-06 PROBLEM — E87.6 HYPOKALEMIA: Status: ACTIVE | Noted: 2022-01-01

## 2022-12-06 NOTE — PROGRESS NOTES
4 Eyes Skin Assessment Completed by LISA Arriaga and LISA Araujo.    Head WDL  Ears WDL  Nose WDL  Mouth Discoloration  Neck WDL  Breast/Chest WDL  Shoulder Blades WDL  Spine WDL  (R) Arm/Elbow/Hand WDL  (L) Arm/Elbow/Hand WDL  Abdomen WDL  Groin Bruising and Incision  Scrotum/Coccyx/Buttocks Redness and Blanching  (R) Leg Swelling  (L) Leg Swelling  (R) Heel/Foot/Toe Non-Blanching, Discoloration, Ulcer(s), and Scab  (L) Heel/Foot/Toe Redness, Blanching, and Boggy          Devices In Places ECG, Blood Pressure Cuff, Pulse Ox, Kimball, Arterial Line, SCD's, ET Tube, and Central Line      Interventions In Place Pillows, Q2 Turns, Barrier Cream, Heels Loaded W/Pillows, and Pressure Redistribution Mattress    Possible Skin Injury Yes    Pictures Uploaded Into Epic Yes  Wound Consult Placed Yes  RN Wound Prevention Protocol Ordered Yes

## 2022-12-06 NOTE — CARE PLAN
The patient is Unstable - High likelihood or risk of patient condition declining or worsening    Shift Goals  Clinical Goals: hemodynamic stability  Patient Goals: ivelisse  Family Goals: get better; be stable    Progress made toward(s) clinical / shift goals:        Problem: Hemodynamics  Goal: Patient's hemodynamics, fluid balance and neurologic status will be stable or improve  Outcome: Progressing     Problem: Pain - Standard  Goal: Alleviation of pain or a reduction in pain to the patient’s comfort goal  Outcome: Progressing     Problem: Safety - Medical Restraint  Goal: Remains free of injury from restraints (Restraint for Interference with Medical Device)  Outcome: Progressing       Patient is not progressing towards the following goals:

## 2022-12-06 NOTE — HOSPITAL COURSE
"\"82 y.o. male w/PMHx TAVR, hypertension, prior pacemaker, PVD who presented from OR today post brief cardiac arrest after induction.  He presented 11/28 with ALL and sepsis/osteomyelitis right lower extremity and was seen by ID and nephrology.  Vascular surgery had the patient in the OR for planned right femoropopliteal bypass for right SFA occlusion complicated by right foot ischemia and osteomyelitis.  Approximately 10 minutes after low-dose propofol and fentanyl for induction patient developed wide-complex rhythm and lost pulse while A-line being placed.  He received 1 minute of high-quality CPR and 1 mg epinephrine IV to obtain ROSC.  CVC and A-line were placed in the OR and he was transported to [facility].  Hypotension was responding epinephrine and epinephrine drip was started in OR.  Shortly after arrival CODE BLUE was called again for ventricular fibrillation.  He again received high-quality CPR and was defibrillated fairly quickly back into sinus rhythm.  Epinephrine drip was titrated.  Norepinephrine drip was added to it and while we are waiting 1 dose of Tobin-Synephrine 300 mcg administered which also helped with blood pressure.  Blood gases revealed excellent oxygenation and story alkalosis and minute ventilation was reduced.  EKG revealed left bundle/abnormal EKG consistent with possible STEMI.  Code STEMI was called and he was taken to the cardiac Cath Lab where I reviewed the case with the interventionalists.  Subsequent angiogram did not reveal lesion that required intervention and LV gram suggested Takotsubo syndrome.  The cause transferred back to [facility] on epinephrine and norepinephrine infusions.  Follow-up ABG and Cath Lab showed excellent oxygenation and normal ventilation.  Patient started to spontaneously move all extremities but not follow commands.  Patient started having runs of sustained ventricular tachycardia and he is received amiodarone bolus and started on amiodarone infusion.\" - Dr. Mcneil " 12/5 12/7 - extubated, weaned off levophed, remains on amio gtt

## 2022-12-06 NOTE — PROGRESS NOTES
"Critical Care Progress Note    Date of admission  11/28/2022    Chief Complaint  82 y.o. male admitted 11/28/2022 with cardiac arrest    Hospital Course  \"82 y.o. male w/PMHx TAVR, hypertension, prior pacemaker, PVD who presented from OR today post brief cardiac arrest after induction.  He presented 11/28 with ALL and sepsis/osteomyelitis right lower extremity and was seen by ID and nephrology.  Vascular surgery had the patient in the OR for planned right femoropopliteal bypass for right SFA occlusion complicated by right foot ischemia and osteomyelitis.  Approximately 10 minutes after low-dose propofol and fentanyl for induction patient developed wide-complex rhythm and lost pulse while A-line being placed.  He received 1 minute of high-quality CPR and 1 mg epinephrine IV to obtain ROSC.  CVC and A-line were placed in the OR and he was transported to [facility].  Hypotension was responding epinephrine and epinephrine drip was started in OR.  Shortly after arrival CODE BLUE was called again for ventricular fibrillation.  He again received high-quality CPR and was defibrillated fairly quickly back into sinus rhythm.  Epinephrine drip was titrated.  Norepinephrine drip was added to it and while we are waiting 1 dose of Tobin-Synephrine 300 mcg administered which also helped with blood pressure.  Blood gases revealed excellent oxygenation and story alkalosis and minute ventilation was reduced.  EKG revealed left bundle/abnormal EKG consistent with possible STEMI.  Code STEMI was called and he was taken to the cardiac Cath Lab where I reviewed the case with the interventionalists.  Subsequent angiogram did not reveal lesion that required intervention and LV gram suggested Takotsubo syndrome.  The cause transferred back to [facility] on epinephrine and norepinephrine infusions.  Follow-up ABG and Cath Lab showed excellent oxygenation and normal ventilation.  Patient started to spontaneously move all extremities but not follow commands.  " "Patient started having runs of sustained ventricular tachycardia and he is received amiodarone bolus and started on amiodarone infusion.\" - Dr. Mcneil 12/5    Interval Problem Update  Reviewed last 24 hour events:   - off epi and duc gtts, remains on NE gtt @ 0.1   - resp alkalosis   - Tm 38.1, elevated WBC   - awakens and follows well, c/o chest wall pain   - NSR, SBP    - Hgb 8.8   - Na remains at 146   - low K   - mild worsening ALL, good UOP   - NPO, last BM unsure   - EF 30% on echo   - VD # 2, advance ETT, SBT x 30 min with some apnea   - unasyn day 8/10 for possible osteo RLE   - wound consult for RLE, vascular surgery    Review of Systems  Review of Systems   Unable to perform ROS: Intubated      Vital Signs for last 24 hours   Temp:  [35.5 °C (95.9 °F)-38.1 °C (100.6 °F)] 36.1 °C (97 °F)  Pulse:  [] 63  Resp:  [13-68] 14  BP: ()/() 96/59  SpO2:  [90 %-100 %] 100 %    Hemodynamic parameters for last 24 hours  CO:  [2.1] 2.1    Respiratory Information for the last 24 hours  Vent Mode: APVCMV  Rate (breaths/min): 14  Vt Target (mL): 420  PEEP/CPAP: 8  MAP: 11  Control VTE (exp VT): 420    Physical Exam   Physical Exam  Vitals and nursing note reviewed.   Constitutional:       General: He is sleeping.      Appearance: He is well-developed and normal weight. He is ill-appearing.      Interventions: He is sedated, intubated and restrained.   HENT:      Head: Normocephalic and atraumatic.      Nose: Nose normal. No congestion.      Comments: Nasogastric tube in place     Mouth/Throat:      Mouth: Mucous membranes are moist.      Pharynx: Oropharynx is clear. No oropharyngeal exudate.      Comments: Endotracheal tube in place  Eyes:      General: No scleral icterus.     Conjunctiva/sclera: Conjunctivae normal.      Pupils: Pupils are equal, round, and reactive to light.   Neck:      Vascular: No JVD.      Comments: Right IJ central venous catheter without surrounding " hematoma  Cardiovascular:      Rate and Rhythm: Normal rate and regular rhythm. FrequentExtrasystoles are present.     Chest Wall: PMI is displaced.      Pulses: Decreased pulses.      Heart sounds: Normal heart sounds. No murmur heard.     Comments: Poor circulation in lower extremity, palpable left pacemaker in chest, remains hypotensive requiring vasopressor support  Pulmonary:      Effort: Pulmonary effort is normal. No tachypnea. He is intubated.      Breath sounds: Examination of the left-lower field reveals rhonchi. Rhonchi present. No wheezing.      Comments: Tolerating spontaneous breathing trial with some apneic episodes but otherwise normal tidal volume and pressures, minimal secretions  Abdominal:      General: Bowel sounds are normal. There is no distension.      Palpations: Abdomen is soft.      Tenderness: There is no abdominal tenderness. There is no guarding or rebound.   Genitourinary:     Comments: Kimball catheter in place  Musculoskeletal:         General: No tenderness.      Cervical back: Neck supple. No tenderness.      Right lower leg: No edema.      Left lower leg: No edema.      Comments: Right lower extremity with poor perfusion and wounds   Skin:     General: Skin is warm and dry.      Capillary Refill: Capillary refill takes 2 to 3 seconds.      Coloration: Skin is not pale.   Neurological:      General: No focal deficit present.      Mental Status: He is easily aroused.      Cranial Nerves: No cranial nerve deficit.      Sensory: No sensory deficit.   Psychiatric:         Behavior: Behavior is cooperative.      Comments: Unable to assess given current clinical condition       Medications  Current Facility-Administered Medications   Medication Dose Route Frequency Provider Last Rate Last Admin    norepinephrine (Levophed) 8 mg in 250 mL NS infusion (premix)  0-1 mcg/kg/min (Ideal) Intravenous Continuous Sudheer Conner M.D. 30.8 mL/hr at 12/06/22 0651 0.24 mcg/kg/min at 12/06/22 0651     EPINEPHrine (Adrenalin) infusion 4 mg/250 mL (premix)  0-0.5 mcg/kg/min (Ideal) Intravenous Continuous Sudheer Conner M.D.   Stopped at 12/05/22 1300    famotidine (PEPCID) tablet 20 mg  20 mg Enteral Tube Q12HRS Sudheer Conner M.D.   20 mg at 12/06/22 0616    Or    famotidine (PEPCID) injection 20 mg  20 mg Intravenous Q12HRS Sudheer Conner M.D.   20 mg at 12/05/22 1757    lidocaine (XYLOCAINE) 1 % injection 2 mL  2 mL Tracheal Tube Q30 MIN PRN Sudheer Conner M.D.        Respiratory Therapy Consult   Nebulization Continuous RT Chato Mcneil M.D.        MD Alert...ICU Electrolyte Replacement per Pharmacy   Other PHARMACY TO DOSE Chato Mcneil M.D.        dexmedetomidine (PRECEDEX) 400 mcg/100mL NS premix infusion  0-1.5 mcg/kg/hr (Ideal) Intravenous Continuous Chato Mcneil M.D. 3.4 mL/hr at 12/06/22 0200 0.2 mcg/kg/hr at 12/06/22 0200    fentaNYL (SUBLIMAZE) injection 25 mcg  25 mcg Intravenous Q HOUR PRN Chato Mcneil M.D.        Or    fentaNYL (SUBLIMAZE) injection 50 mcg  50 mcg Intravenous Q HOUR PRN Chato Mcneil M.D.   50 mcg at 12/05/22 2022    Or    fentaNYL (SUBLIMAZE) injection 100 mcg  100 mcg Intravenous Q HOUR PRN Chato Mcneil M.D.   100 mcg at 12/06/22 0606    fentaNYL (SUBLIMAZE) 50 mcg/mL in 50mL   Intravenous Continuous Chato Mcneil M.D. 2 mL/hr at 12/06/22 0004 100 mcg/hr at 12/06/22 0004    ipratropium-albuterol (DUONEB) nebulizer solution  3 mL Nebulization Q2HRS PRN (RT) Chato Mcneil M.D.        amiodarone (Nexterone) 360 mg/200 mL infusion  0.5-1 mg/min Intravenous Continuous Clement Ji M.D. 17 mL/hr at 12/06/22 0605 0.5 mg/min at 12/06/22 0605    levETIRAcetam (Keppra) injection 500 mg  500 mg Intravenous Q12HRS Chato Mcneil M.D.   500 mg at 12/06/22 0617    K+ Scale: Goal of 4.5  1 Each Intravenous Q6HRS Chato Mcneil M.D.   1 Each at 12/06/22 0000    insulin regular (HumuLIN R,NovoLIN R) injection  2-9 Units  Subcutaneous Q6HRS Chato Mcneil M.D.   2 Units at 12/05/22 1757    And    dextrose 10 % BOLUS 25 g  25 g Intravenous Q15 MIN PRN Chato Mcneil M.D.        acetaminophen (Tylenol) tablet 650 mg  650 mg Enteral Tube Q6HRS PRN Chato Mcneil M.D.   650 mg at 12/05/22 2255    atorvastatin (LIPITOR) tablet 40 mg  40 mg Enteral Tube Q EVENING Chato Mcneil M.D.        ondansetron (ZOFRAN ODT) dispertab 4 mg  4 mg Enteral Tube Q4HRS PRN Chato Mcneil M.D.        senna-docusate (PERICOLACE or SENOKOT S) 8.6-50 MG per tablet 2 Tablet  2 Tablet Enteral Tube BID Chato Mcneil M.D.   2 Tablet at 12/06/22 0615    And    polyethylene glycol/lytes (MIRALAX) PACKET 1 Packet  1 Packet Enteral Tube QDAY PRN Chato Mcneil M.D.        And    magnesium hydroxide (MILK OF MAGNESIA) suspension 30 mL  30 mL Enteral Tube QDAY PRN Chato Mcneil M.D.        And    bisacodyl (DULCOLAX) suppository 10 mg  10 mg Rectal QDAY PRN Chato Mcneil M.D.        gabapentin (NEURONTIN) capsule 100 mg  100 mg Enteral Tube BID Chato Mcneil M.D.   100 mg at 12/06/22 0615    aspirin (ASA) chewable tab 81 mg  81 mg Enteral Tube DAILY Chato Mcneil M.D.   81 mg at 12/06/22 0615    dextrose 5% infusion   Intravenous Continuous AKIRA Jeronimo M.D. 50 mL/hr at 12/04/22 1156 New Bag at 12/04/22 1156    ampicillin/sulbactam (UNASYN) 3 g in  mL IVPB  3 g Intravenous Q6HRS AKIRA Jeronimo M.D. 200 mL/hr at 12/06/22 0630 Restarted at 12/06/22 0630    [Held by provider] heparin injection 5,000 Units  5,000 Units Subcutaneous Q12HRS AKIRA Jeronimo M.D.   5,000 Units at 12/04/22 0456    lactated ringers infusion (BOLUS): BMI less than or equal to 30  30 mL/kg Intravenous Once PRN Deshawn Toussaint M.D.        Pharmacy Consult Request ...Pain Management Review 1 Each  1 Each Other PHARMACY TO DOSE Deshawn Toussaint M.D.        ondansetron (ZOFRAN) syringe/vial injection 4 mg  4 mg Intravenous Q4HRS PRN Deshawn  SHANON Toussaint           Fluids    Intake/Output Summary (Last 24 hours) at 12/6/2022 0653  Last data filed at 12/6/2022 0651  Gross per 24 hour   Intake 4056.42 ml   Output 801 ml   Net 3255.42 ml       Laboratory  Recent Labs     12/05/22  1112 12/05/22  1202 12/05/22  1319 12/06/22  0243   ISTATAPH 7.632* 7.422 7.494 7.575*   ISTATAPCO2 40.4* 41.5* 30.7 25.0*   ISTATAPO2 458* 476* 479* 177*   ISTATATCO2 44* 28 25 24   PVTORVF7WND 100* 100* 100* 100*   ISTATARTHCO3 42.7* 27.0* 23.6 23.2   ISTATARTBE 20* 2 1 1   ISTATTEMP 96.6 F see below 35.5 C 36.4 C   ISTATFIO2 100 50 100 40   ISTATSPEC Arterial Arterial Arterial Arterial   ISTATAPHTC 7.650*  --  7.517* 7.585*   [id number] 451*  --  469* 174*         Recent Labs     12/03/22  0907 12/05/22  1340 12/05/22  1800 12/06/22  0039   SODIUM 147* 146*  --   --    POTASSIUM 3.5* 3.3* 3.6 3.1*   CHLORIDE 111 111  --   --    CO2 24 20  --   --    BUN 22 26*  --   --    CREATININE 0.91 1.04  --   --    MAGNESIUM 1.9 1.7  --   --    PHOSPHORUS 3.1  --   --   --    CALCIUM 8.6 8.9  --   --      Recent Labs     12/03/22  0907 12/05/22  1340   ALTSGPT 18  --    ASTSGOT 30  --    ALKPHOSPHAT 109*  --    TBILIRUBIN 0.9  --    GLUCOSE 98 188*     Recent Labs     12/03/22  0907 12/06/22  0620   WBC 11.2* 11.0*   NEUTSPOLYS 78.30* 67.30   LYMPHOCYTES 11.50* 20.30*   MONOCYTES 7.90 10.80   EOSINOPHILS 0.40 0.10   BASOPHILS 0.40 0.30   ASTSGOT 30  --    ALTSGPT 18  --    ALKPHOSPHAT 109*  --    TBILIRUBIN 0.9  --      Recent Labs     12/03/22  0907 12/06/22  0620   RBC 2.99* 2.59*   HEMOGLOBIN 10.1* 8.8*   HEMATOCRIT 30.3* 26.4*   PLATELETCT 166 179       Imaging  X-Ray:  I have personally reviewed the images and compared with prior images. and My impression is: Unchanged minimal left lower lobe infiltrate with aortic valve seen, endotracheal tube is high and needs to be advanced, gastric tube/right IJ central venous catheter in good position, left-sided pacer seen  EKG:  I have  personally reviewed the images and compared with prior images.  Echo:   Reviewed  MRI:   Reviewed    Assessment/Plan  * Cardiac arrest (HCC)- (present on admission)  Assessment & Plan  Cardiac arrest with anesthesia induction 12/5 (WCT, Vfib s/ defibrillation) S/p cath showing non-obs CAD, EF 30%/LVDP 60 c/w Takotsubo CM  Continue to titrate norepinephrine drip to maintain mean arterial pressure greater than 65  Cardiology consulted  Discontinue antiarrhythmics  Optimize electrolytes, continuous telemetry monitoring    Peripheral vascular disease ([facility])  Assessment & Plan  Significant vascular disease, Right SFA occlusion  Vascular surgery consulted with planned right femoropopliteal bypass 12/5 --> on hold until cleared by cardiology  Continue antiplatelets, statin  Monitor perfusion    Shock ([facility])  Assessment & Plan  Post cardiac arrest, cardiogenic in nature  Continue to titrate norepinephrine drip to maintain mean arterial pressure greater than 65  Monitor urine output, vital signs closely for adequacy of resuscitation  Hold on fluid resuscitation for now    Acute respiratory failure ([facility])  Assessment & Plan  Intubated 12/5  Continue full mechanical ventilatory support titrating FiO2 to goal SPO2 greater than 92%  RT/O2 protocol  Limit sedation  ABCDEF bundle with hopeful extubation in the next 12 to 24 hours unless planned OR    Congestive heart failure (CHF) ([facility])- (present on admission)  Assessment & Plan  History of, 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%)  Eventual heart failure medications when blood pressure stabilized  Hold off on diuresis given no evidence of volume overload thus far    History of seizures- (present on admission)  Assessment & Plan  Continue Keppra  Seizure precautions    Acute encephalopathy- (present on admission)  Assessment & Plan  Admitted with encephalopathy felt secondary to sepsis/dehydration - improving  Avoid sedation with close neurologic monitoring  Maintain sleep/wake  cycle, reorient    Osteomyelitis of right lower extremity (HCC)- (present on admission)  Assessment & Plan  ID consulted, continue Unasyn x10 days  Planned revascularization right lower extremity on hold until cardiology clearance  Vascular surgery and wound care consulted    History of transcatheter aortic valve replacement (TAVR)- (present on admission)  Assessment & Plan  History of TAVR  Strict blood pressure and fluid management    ALL (acute kidney injury) (HCC)- (present on admission)  Assessment & Plan  Acute on CKD - worsening postcardiac arrest secondary to ATN, nonoliguric  Nephrology consulted  Avoid nephrotoxins  Monitor creatinine, urine output, electrolytes closely    Hyperlipidemia- (present on admission)  Assessment & Plan  Resume statin    Essential hypertension- (present on admission)  Assessment & Plan  Remains hypotensive  Hold scheduled antihypertensives and monitor    Hypokalemia  Assessment & Plan  Replete and monitor closely    Hyperglycemia  Assessment & Plan  Continue insulin sliding scale with goal glucose between 120 and 180  Begin diabetic enteral nutrition monitoring glucose closely    Macrocytic anemia- (present on admission)  Assessment & Plan  Unchanged  Daily CBC with conservative transfusion strategy       VTE:  Heparin  Ulcer: H2 Antagonist  Lines: Central Line  Ongoing indication addressed, Arterial Line  Ongoing indication addressed, and Kimball Catheter  Ongoing indication addressed    I have performed a physical exam and reviewed and updated ROS and Plan today (12/6/2022). In review of yesterday's note (12/5/2022), there are no changes except as documented above.     Patient is critically ill today requiring active management of his mechanical ventilatory support as well as titration of vasopressor. High risk of deterioration and worsening vital organ dysfunction and death without the above critical care interventions.    Discussed patient condition and risk of morbidity and/or  mortality with RN, RT, Pharmacy, [employer] [facility] resident, Charge nurse / hot rounds, Patient, and cardiology and vascular surgery. Critical care time = 56 minutes in directly providing and coordinating critical care and extensive data review.  No time overlap and excludes procedures.    Please note that this dictation was created using voice recognition software. I have made every reasonable attempt to correct obvious errors, but there may be errors of grammar and possibly content that I did not discover before finalizing the note.

## 2022-12-06 NOTE — CARE PLAN
The patient is Watcher - Medium risk of patient condition declining or worsening    Shift Goals  Clinical Goals: hemodynamic stability; wean off ventilator, SAT/SBT; get extubated  Patient Goals: pain free  Family Goals: get better and be stable; extubate when ready    Progress made toward(s) clinical / shift goals:    Problem: Knowledge Deficit - Standard  Goal: Patient and family/care givers will demonstrate understanding of plan of care, disease process/condition, diagnostic tests and medications  Outcome: Progressing     Problem: Hemodynamics  Goal: Patient's hemodynamics, fluid balance and neurologic status will be stable or improve  Outcome: Progressing     Problem: Fluid Volume  Goal: Fluid volume balance will be maintained  Outcome: Progressing     Problem: Urinary - Renal Perfusion  Goal: Ability to achieve and maintain adequate renal perfusion and functioning will improve  Outcome: Progressing     Problem: Respiratory  Goal: Patient will achieve/maintain optimum respiratory ventilation and gas exchange  Outcome: Progressing     Problem: Mechanical Ventilation  Goal: Safe management of artificial airway and ventilation  Outcome: Progressing  Goal: Successful weaning off mechanical ventilator, spontaneously maintains adequate gas exchange  Outcome: Progressing  Goal: Patient will be able to express needs and understand communication  Outcome: Progressing     Problem: Physical Regulation  Goal: Diagnostic test results will improve  Outcome: Progressing  Goal: Signs and symptoms of infection will decrease  Outcome: Progressing     Problem: Pain - Standard  Goal: Alleviation of pain or a reduction in pain to the patient’s comfort goal  Outcome: Progressing     Problem: Skin Integrity  Goal: Skin integrity is maintained or improved  Outcome: Progressing     Problem: Fall Risk  Goal: Patient will remain free from falls  Outcome: Progressing     Problem: Safety - Medical Restraint  Goal: Remains free of injury  from restraints (Restraint for Interference with Medical Device)  Outcome: Progressing  Goal: Free from restraint(s) (Restraint for Interference with Medical Device)  Outcome: Progressing       Patient is not progressing towards the following goals:

## 2022-12-06 NOTE — CARE PLAN
Problem: Ventilation  Goal: Ability to achieve and maintain unassisted ventilation or tolerate decreased levels of ventilator support  Description: Target End Date:  4 days     Document on Vent flowsheet    1.  Support and monitor invasive and noninvasive mechanical ventilation  2.  Monitor ventilator weaning response  3.  Perform ventilator associated pneumonia prevention interventions  4.  Manage ventilation therapy by monitoring diagnostic test results  Outcome: Progressing       Ventilator Daily Summary    Vent Day # 2    ETT 7.5 @ 23    Ventilator settings changed this shift:no    APVcmv 16 420 8 40%    Weaning trials:no    Respiratory Procedures:no    Plan: Continue current ventilator settings and wean mechanical ventilation as tolerated per physician orders.

## 2022-12-06 NOTE — WOUND TEAM
"[facility]  Inpatient Services  Wound and Skin Care Evaluation    Admission Date: 11/28/2022     Last order of IP CONSULT TO WOUND CARE was found on 11/28/2022 from Hospital Encounter on 11/28/2022     HPI, PMH, SH: Reviewed    Past Surgical History:   Procedure Laterality Date    PACEMAKER INSERTION       Social History     Tobacco Use    Smoking status: Unknown    Smokeless tobacco: Not on file   Substance Use Topics    Alcohol use: Not on file     Chief Complaint   Patient presents with    ALOC    Edema     Ble edema     Diagnosis: ALL (acute kidney injury) ([facility]) [N17.9]    Unit where seen by Wound Team: [id number]/00     WOUND CONSULT/FOLLOW UP RELATED TO:  Right heel & Toes     WOUND HISTORY:  Patient unable to give any history of wound, not answering any questions.   \"82 y.o. male who presented 11/28/2022 with altered mental status.  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  Patient is a poor historian so history was obtained via chart review.  Patient had decreased oral intake both eating and drinking for about a week.  He was having altered mental status as well as bilateral lower extremity edema.  He was brought to the hospital and found to have ALL.\"    WOUND ASSESSMENT/LDA     Wound 11/28/22 Pressure Injury Heel Right POA unstageable (Active)   Wound Image    12/06/22 1400   Site Assessment Brown;Black    Periwound Assessment Clean;Dry;Intact    Margins Attached edges;Defined edges    Closure Open to air    Drainage Amount None    Drainage Description Serosanguineous    Treatments Cleansed;Site care;Offloading    Wound Cleansing Not Applicable    Periwound Protectant Not Applicable    Dressing Cleansing/Solutions 3% Betadine    Dressing Options Other (Comments)    Dressing Changed Changed    Dressing Status Clean;Dry    Dressing Change/Treatment Frequency Every Shift, and As Needed    NEXT Dressing Change/Treatment Date 12/06/22    NEXT Weekly Photo (Inpatient Only) 12/13/22  "   WOUND NURSE ONLY - Pressure Injury Stage U    Wound Length (cm) 3.5 cm    Wound Width (cm) 4 cm    Wound Surface Area (cm^2) 14 cm^2    Shape Oval    Wound Odor None    Exposed Structures MAYA    WOUND NURSE ONLY - Time Spent with Patient (mins) 60        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, Hallux;Toe, 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 (Active)   Wound Image     12/06/22 1400   Site Assessment Purple;Black;Yellow    Periwound Assessment Clean;Dry;Intact    Margins Attached edges;Defined edges    Closure Open to air    Drainage Amount None    Treatments Cleansed;Site care;Offloading    Wound Cleansing Not Applicable    Periwound Protectant Not Applicable    Dressing Cleansing/Solutions 3% Betadine    Dressing Options Other (Comments)    Dressing Change/Treatment Frequency Every Shift, and As Needed    NEXT Dressing Change/Treatment Date 12/06/22    NEXT Weekly Photo (Inpatient Only) 12/13/22    Non-staged Wound Description Full thickness    Wound Length (cm) 3 cm    Wound Width (cm) 2 cm    Wound Surface Area (cm^2) 6 cm^2    Shape Oval and circular    Wound Odor None    Exposed Structures MAYA                    Vascular:    MARGARITA:   No results found.    Lab Values:    Lab Results   Component Value Date/Time    WBC 11.0 (H) 12/06/2022 06:20 AM    RBC 2.59 (L) 12/06/2022 06:20 AM    HEMOGLOBIN 8.8 (L) 12/06/2022 06:20 AM    HEMATOCRIT 26.4 (L) 12/06/2022 06:20 AM    CREACTPROT 4.47 (H) 12/03/2022 09:07 AM    SEDRATEWES 122 (H) 12/03/2022 09:07 AM    HBA1C 5.2 09/10/2019 04:16 AM      Culture Results show:  No results found for this or any previous visit (from the past 720 hour(s)).    Pain Level/Medicated:  Pt intubated but shaking head during assessment     INTERVENTIONS BY WOUND TEAM:  Chart and images reviewed. Discussed with bedside RN. All areas of concern (based on picture review, LDA review and discussion with bedside RN) have been thoroughly assessed. Documentation of areas based on significant findings. This RN in to assess patient.  Performed standard wound care which includes appropriate positioning, dressing removal and non-selective debridement. Pictures and measurements obtained weekly if/when required.  Preparation for Dressing removal: NA SHIKHA  Non-selectively Debrided with:  NS and gauze.  Sharp debridement: n/a  Tiffanie wound: Cleansed with NS, Prepped with NA  Primary Dressing: Betadine  Secondary (Outer) Dressing: heel mepilex    Sacrum assessed and is slightly moist and at risk for breakdown. Barrier paste applied.    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 Betadine applied to keep eschar intact and prevent infection until re-vascularization can be completed.     11/29/22: Patient admitted with POA unstageable pressure injury to the right heel. Non-viable tissue covering base of wound bed, so unable to assess depth, Honey colloid applied to cleanse and autolytically debride due to its high osmolarity. As well as help lower overall wound pH, while promoting a moisture-balanced environment conducive to wound healing. Mepilex to offload. Palpable pulses DP and PT, edema noted in foot and ankle. Recommend imaging to assure no bone involvement due to the high amounts of pain present in the heel area.        Goals: Steady decrease in wound area and depth weekly.    WOUND TEAM PLAN OF CARE ([X] for frequency of wound follow up,):   Nursing to follow dressing orders written for wound care. Contact wound team if area fails to progress, deteriorates or with any questions/concerns if something comes up before next scheduled follow up (See below as to whether wound is following and frequency of wound follow up)  Dressing changes by wound team:                   Follow up 3 times weekly:                NPWT change 3  times weekly:     Follow up 1-2 times weekly:   X once weekly.    Follow up Bi-Monthly:           Follow up Monthly (High Risk):                        Follow up as needed:     Other (explain):     NURSING PLAN OF CARE ORDERS (X):  Dressing changes: See Dressing Care orders: X  Skin care: See Skin Care orders:   RN Prevention Protocol: X  Rectal tube care: See Rectal Tube Care orders:   Other orders:    RSKIN:   CURRENTLY IN PLACE (X), APPLIED THIS VISIT (A), ORDERED (O):   Q shift Luis M:  X  Q shift pressure point assessments:  X    Surface/Positioning   Pressure redistribution mattress            Low Airloss          ICU Low Airloss X  Bariatric MARY     Waffle cushion        Waffle Overlay  X        Reposition q 2 hours   X   TAPs Turning system     Z Renato Pillow     Offloading/Redistribution   Sacral Mepilex (Silicone dressing)     Heel Mepilex (Silicone dressing)     X    Heel float boots (Prevalon boot)     A        Float Heels off Bed with Pillows    X       Respiratory   Silicone O2 tubing         Gray Foam Ear protectors     Cannula fixation Device (Tender )          High flow offloading Clip    Elastic head band offloading device      Anchorfast      X                                                   Trach with Optifoam split foam             Containment/Moisture Prevention     Rectal tube or BMS    Purwick/Condom Cath        Kimball Catheter  X  Barrier wipes           Barrier paste     X  Antifungal tx      Interdry        Mobilization     MAYA  Up to chair        Ambulate      PT/OT      Nutrition       Dietician        Diabetes Education      PO     TF     TPN     NPO   # days     Other        Anticipated discharge plans: TBD, will likely need ongoing assistance  LTACH:        SNF/Rehab:      X            Home Health Care:   X        Outpatient Wound Center:   X         Self/Family Care:        Other:                  Vac Discharge Needs:   Not Applicable Pt not on a wound vac:     X  Regular Vac  while inpatient, alternative dressing at DC:        Regular Vac in use and continued at DC:            Reg. Vac w/ Skin Sub/Biologic in use. Will need to be changed 2x wkly:      Veraflo Vac while inpatient, ok to transition to Regular Vac on Discharge:           Veraflo Vac while inpatient, will need to remain on Veraflo Vac upon discharge:

## 2022-12-06 NOTE — ADDENDUM NOTE
Addendum  created 12/05/22 2017 by Joel Paredes M.D.    Flowsheet accepted, Intraprocedure Event deleted, Intraprocedure Event edited, Intraprocedure Meds edited, Pend clinical note

## 2022-12-06 NOTE — PROGRESS NOTES
Patient returned from cath lab, accessed by ANIKET perry -- site clean dry and intact.  Amio bolus and gtt started as per cardio request.     Patient maintained in B/L wrist restraints to prevent pulling life saving devices and tubes at this time.  Family present upon placement of restraints and understand its necessity. See MAR for continuous medication details.     RT place bite block to prevent patient biting and clamping down on ET tube.  Bonita hugger in place as OK by Dr. Mcneil for temp 35.5c, improving at this time.

## 2022-12-06 NOTE — CARE PLAN
Ventilator Daily Summary    Vent Day #1    Ventilator settings changed this shift:18 420 +8 40%    Weaning trials:no    Respiratory Procedures:intubated    Plan: Continue current ventilator settings and wean mechanical ventilation as tolerated per physician orders.     Problem: Ventilation  Goal: Ability to achieve and maintain unassisted ventilation or tolerate decreased levels of ventilator support  Description: Target End Date:  4 days     Document on Vent flowsheet    1.  Support and monitor invasive and noninvasive mechanical ventilation  2.  Monitor ventilator weaning response  3.  Perform ventilator associated pneumonia prevention interventions  4.  Manage ventilation therapy by monitoring diagnostic test results  Outcome: Progressing

## 2022-12-06 NOTE — PROGRESS NOTES
EKG completed; endorsed to Dr. Gonda of results.  Verbal order to increase amio gtt to 1mg/min as per Dr. Gonda per EKG results.  See MAR for details.

## 2022-12-06 NOTE — PROGRESS NOTES
Patient noted to have BBB with increased HR after off amio gtt and levo gtt.  Dr Ji informed and at bedside.  OK to restart amio gtt and levo gtt restarted for MAP <60.

## 2022-12-07 PROBLEM — R47.9 DIFFICULTY SPEAKING: Status: ACTIVE | Noted: 2022-01-01

## 2022-12-07 NOTE — ASSESSMENT & PLAN NOTE
Noted by nursing staff after extubation 12/7 at 0930am, initially able to state first name and concerns about needing to urinate, but subsequently unable to speak.   - associated with some mild left sided facial droop and weakness to left eye lid, sensation intact, following commands, moving all 4 extremities  - will obtain STAT CT head without contrast, followup on results

## 2022-12-07 NOTE — PROGRESS NOTES
Patient extubated at 0939 to 3L NC by Bree RT. Chest rise and lung sounds equal bilaterally (diminished).

## 2022-12-07 NOTE — DIETARY
Nutrition Services: Update   Day 9 of admit.  Gilmer Mae is a 82 y.o. male with admitting DX of ALL (acute kidney injury) ([facility]) [N17.9]; Cardiac arrest (HCC) [I46.9].    Pt was extubated this morning.   Pt is currently NPO.    Per ADLs, pt has not consumed any PO nutrition since admission on 11/28. Additionally, pt not eating for ~1 week PTA.  Pt presented with severe 12.5% weight loss in <4 months per initial RD consult.     ?POC...

## 2022-12-07 NOTE — CARE PLAN
Problem: Ventilation  Goal: Ability to achieve and maintain unassisted ventilation or tolerate decreased levels of ventilator support  Description: Target End Date:  4 days     Document on Vent flowsheet    1.  Support and monitor invasive and noninvasive mechanical ventilation  2.  Monitor ventilator weaning response  3.  Perform ventilator associated pneumonia prevention interventions  4.  Manage ventilation therapy by monitoring diagnostic test results  Outcome: Progressing          Ventilator Daily Summary    Vent Day # 2  7.5@ 25  ASV: 100, +8, 30%   APVCMV: 14, 420, +8, 30%    Ventilator settings changed this shift: None    Weaning trials: Pt was placed on spont for most of the day to assist in increasing CO2. Pt tolerated spont for ~3 hours total without going apneic. PT was unable to be extubated today due to inability to achieve appropriate NIF.    Respiratory Procedures: None    Plan: Continue current ventilator settings and wean mechanical ventilation as tolerated per physician orders. Spont as tolerated, extubate when parameters are appropriate.

## 2022-12-07 NOTE — PROGRESS NOTES
"Critical Care Progress Note    Date of admission  11/28/2022    Chief Complaint  82 y.o. male admitted 11/28/2022 with cardiac arrest    Hospital Course  \"82 y.o. male w/PMHx TAVR, hypertension, prior pacemaker, PVD who presented from OR today post brief cardiac arrest after induction.  He presented 11/28 with ALL and sepsis/osteomyelitis right lower extremity and was seen by ID and nephrology.  Vascular surgery had the patient in the OR for planned right femoropopliteal bypass for right SFA occlusion complicated by right foot ischemia and osteomyelitis.  Approximately 10 minutes after low-dose propofol and fentanyl for induction patient developed wide-complex rhythm and lost pulse while A-line being placed.  He received 1 minute of high-quality CPR and 1 mg epinephrine IV to obtain ROSC.  CVC and A-line were placed in the OR and he was transported to [facility].  Hypotension was responding epinephrine and epinephrine drip was started in OR.  Shortly after arrival CODE BLUE was called again for ventricular fibrillation.  He again received high-quality CPR and was defibrillated fairly quickly back into sinus rhythm.  Epinephrine drip was titrated.  Norepinephrine drip was added to it and while we are waiting 1 dose of Tobin-Synephrine 300 mcg administered which also helped with blood pressure.  Blood gases revealed excellent oxygenation and story alkalosis and minute ventilation was reduced.  EKG revealed left bundle/abnormal EKG consistent with possible STEMI.  Code STEMI was called and he was taken to the cardiac Cath Lab where I reviewed the case with the interventionalists.  Subsequent angiogram did not reveal lesion that required intervention and LV gram suggested Takotsubo syndrome.  The cause transferred back to [facility] on epinephrine and norepinephrine infusions.  Follow-up ABG and Cath Lab showed excellent oxygenation and normal ventilation.  Patient started to spontaneously move all extremities but not follow commands.  " "Patient started having runs of sustained ventricular tachycardia and he is received amiodarone bolus and started on amiodarone infusion.\" - Dr. Mcneil 12/5    Interval Problem Update  Reviewed last 24 hour events:   - remains on NE gtt @ 0.06   - resp alkalosis continues   - unable to extubated yesterday given apnea events   - AF, improved WBC   - accelerated junctional, SBP    - awakens and follows   - amio gtt @ 1   - precedex gtt   - Hgb down to 7.8   - Na unchanged at 146   - low K   - improving ALL, ok UOP   - chito Tfs, last BM 11/29?   - wound care for RLE wounds   - VD # 3, ASV 90%/8/30% - NIF -26, FVC 1.1, RSBI 45   - day 9/10 unasyn    Review of Systems  Review of Systems   Unable to perform ROS: Intubated      Vital Signs for last 24 hours   Temp:  [36.2 °C (97.2 °F)-36.9 °C (98.4 °F)] 36.6 °C (97.8 °F)  Pulse:  [] 60  Resp:  [7-56] 17  BP: ()/(50-81) 120/59  SpO2:  [87 %-100 %] 100 %    Respiratory Information for the last 24 hours  Vent Mode: Spont  PEEP/CPAP: 8  P Support: 5  MAP: 9.3  Length of Weaning Trial (Hours): 7    Physical Exam   Physical Exam  Vitals and nursing note reviewed.   Constitutional:       General: He is awake.      Appearance: He is well-developed and normal weight. He is not toxic-appearing.      Interventions: He is sedated, intubated and restrained.   HENT:      Head: Normocephalic and atraumatic.      Nose: Nose normal. No congestion.      Comments: Nasogastric tube in place     Mouth/Throat:      Mouth: Mucous membranes are moist.      Comments: Endotracheal tube in place  Eyes:      General: No scleral icterus.     Conjunctiva/sclera: Conjunctivae normal.      Pupils: Pupils are equal, round, and reactive to light.   Neck:      Vascular: No JVD.      Comments: Right IJ central venous catheter without surrounding hematoma  Cardiovascular:      Rate and Rhythm: Normal rate and regular rhythm. FrequentExtrasystoles are present.     Chest Wall: PMI is " displaced.      Pulses: Decreased pulses.      Heart sounds: Normal heart sounds. No murmur heard.     Comments: Poor circulation in lower extremity, palpable left pacemaker in chest, remains hypotensive requiring norepinephrine support  Pulmonary:      Effort: Pulmonary effort is normal. Bradypnea present. No tachypnea. He is intubated.      Breath sounds: Examination of the left-lower field reveals rhonchi. Rhonchi present. No wheezing.      Comments: Low respiratory rate but adequate tidal volumes, breathing comfortably on spontaneous breathing trial  Abdominal:      General: Bowel sounds are normal. There is no distension.      Palpations: Abdomen is soft.      Tenderness: There is no abdominal tenderness. There is no guarding or rebound.   Genitourinary:     Comments: Kimball catheter in place  Musculoskeletal:         General: No tenderness.      Cervical back: Neck supple. No tenderness.      Right lower leg: No edema.      Left lower leg: No edema.      Comments: Right lower extremity with poor perfusion and wounds, cool to the touch, first 3 digits with wounds   Skin:     General: Skin is warm and dry.      Capillary Refill: Capillary refill takes 2 to 3 seconds.      Coloration: Skin is not pale.   Neurological:      General: No focal deficit present.      Mental Status: He is alert.      Cranial Nerves: No cranial nerve deficit.      Sensory: No sensory deficit.   Psychiatric:         Behavior: Behavior is cooperative.      Comments: Unable to assess given current clinical condition       Medications  Current Facility-Administered Medications   Medication Dose Route Frequency Provider Last Rate Last Admin    amiodarone (Nexterone) 360 mg/200 mL infusion  0.5-1 mg/min Intravenous Continuous Jeremy M Gonda, M.D. 33 mL/hr at 12/07/22 0401 1 mg/min at 12/07/22 0401    famotidine (PEPCID) tablet 20 mg  20 mg Enteral Tube DAILY Jeremy M Gonda, M.D.   20 mg at 12/07/22 0600    Or    famotidine (PEPCID) injection  20 mg  20 mg Intravenous DAILY Jeremy M Gonda, M.D.        norepinephrine (Levophed) 8 mg in 250 mL NS infusion (premix)  0-1 mcg/kg/min (Ideal) Intravenous Continuous Sudheer Conner M.D. 10.3 mL/hr at 12/07/22 0631 0.08 mcg/kg/min at 12/07/22 0631    lidocaine (XYLOCAINE) 1 % injection 2 mL  2 mL Tracheal Tube Q30 MIN PRN Sudheer Conner M.D.        Respiratory Therapy Consult   Nebulization Continuous RT Chato Mcneil M.D.        MD Alert...ICU Electrolyte Replacement per Pharmacy   Other PHARMACY TO DOSE Chato Mcneil M.D.        dexmedetomidine (PRECEDEX) 400 mcg/100mL NS premix infusion  0-1.5 mcg/kg/hr (Ideal) Intravenous Continuous Chato Mcneil M.D. 10.3 mL/hr at 12/07/22 0425 0.6 mcg/kg/hr at 12/07/22 0425    fentaNYL (SUBLIMAZE) injection 25 mcg  25 mcg Intravenous Q HOUR PRN Chato Mcneil M.D.        Or    fentaNYL (SUBLIMAZE) injection 50 mcg  50 mcg Intravenous Q HOUR PRN Chato Mcneil M.D.   50 mcg at 12/05/22 2022    Or    fentaNYL (SUBLIMAZE) injection 100 mcg  100 mcg Intravenous Q HOUR PRN Chato Mcneil M.D.   100 mcg at 12/07/22 0504    ipratropium-albuterol (DUONEB) nebulizer solution  3 mL Nebulization Q2HRS PRN (RT) Chato Mcneil M.D.        levETIRAcetam (Keppra) injection 500 mg  500 mg Intravenous Q12HRS Chato Mcneil M.D.   500 mg at 12/07/22 0600    K+ Scale: Goal of 4.5  1 Each Intravenous Q6HRS Chato Mcneil M.D.   1 Each at 12/07/22 0000    insulin regular (HumuLIN R,NovoLIN R) injection  2-9 Units Subcutaneous Q6HRS Chato Mcneil M.D.   2 Units at 12/05/22 1757    And    dextrose 10 % BOLUS 25 g  25 g Intravenous Q15 MIN PRN Chato Mcneil M.D.        acetaminophen (Tylenol) tablet 650 mg  650 mg Enteral Tube Q6HRS PRN Chato Mcenil M.D.   650 mg at 12/05/22 2255    atorvastatin (LIPITOR) tablet 40 mg  40 mg Enteral Tube Q EVENING Chato Mcneil M.D.   40 mg at 12/06/22 1751    ondansetron (ZOFRAN ODT) dispertab 4 mg   4 mg Enteral Tube Q4HRS PRN Chato Mcneil M.D.        senna-docusate (PERICOLACE or SENOKOT S) 8.6-50 MG per tablet 2 Tablet  2 Tablet Enteral Tube BID Chato Mcneil M.D.   2 Tablet at 12/07/22 0600    And    polyethylene glycol/lytes (MIRALAX) PACKET 1 Packet  1 Packet Enteral Tube QDAY PRN Chato Mcneil M.D.   1 Packet at 12/07/22 0601    And    magnesium hydroxide (MILK OF MAGNESIA) suspension 30 mL  30 mL Enteral Tube QDAY PRN Chato Mcneil M.D.        And    bisacodyl (DULCOLAX) suppository 10 mg  10 mg Rectal QDAY PRN Chato Mcneil M.D.        gabapentin (NEURONTIN) capsule 100 mg  100 mg Enteral Tube BID Chato Mcneil M.D.   100 mg at 12/07/22 0600    aspirin (ASA) chewable tab 81 mg  81 mg Enteral Tube DAILY Chato Mcneil M.D.   81 mg at 12/07/22 0600    ampicillin/sulbactam (UNASYN) 3 g in  mL IVPB  3 g Intravenous Q6HRS AKIRA Jeronimo M.D. 200 mL/hr at 12/07/22 0559 3 g at 12/07/22 0559    heparin injection 5,000 Units  5,000 Units Subcutaneous Q12HRS AKIRA Jeronimo M.D.   5,000 Units at 12/07/22 0559    lactated ringers infusion (BOLUS): BMI less than or equal to 30  30 mL/kg Intravenous Once PRN Deshawn Toussaint M.D.        Pharmacy Consult Request ...Pain Management Review 1 Each  1 Each Other PHARMACY TO DOSE Deshawn Toussaint M.D.        ondansetron (ZOFRAN) syringe/vial injection 4 mg  4 mg Intravenous Q4HRS PRN Deshawn Toussaint M.D.           Fluids    Intake/Output Summary (Last 24 hours) at 12/7/2022 0650  Last data filed at 12/7/2022 0632  Gross per 24 hour   Intake 1726.7 ml   Output 545 ml   Net 1181.7 ml         Laboratory  Recent Labs     12/06/22  0243 12/06/22  1701 12/07/22  0311   ISTATAPH 7.575* 7.415 7.519*   ISTATAPCO2 25.0* 34.6 26.0   ISTATAPO2 177* 117* 127*   ISTATATCO2 24 23 22   YPWTNVD8EEP 100* 99 99   ISTATARTHCO3 23.2 22.2 21.2   ISTATARTBE 1 -2 -2   ISTATTEMP 36.4 C 98.0 F 97.1 F   ISTATFIO2 40 30 30   ISTATSPEC Arterial Arterial Arterial    ISTATAPHTC 7.585* 7.420 7.532*   BECWDKJP7MR 174* 115* 122*           Recent Labs     12/05/22  1340 12/05/22  1800 12/06/22  0620 12/06/22  1130 12/06/22  1750 12/06/22  2358 12/07/22  0552   SODIUM 146*  --  146*  --   --   --   --    POTASSIUM 3.3*   < > 3.9 3.8 3.6 3.5*  --    CHLORIDE 111  --  113*  --   --   --   --    CO2 20  --  21  --   --   --   --    BUN 26*  --  31*  --   --   --   --    CREATININE 1.04  --  1.30  --   --   --  1.20   MAGNESIUM 1.7  --  2.1  --   --   --  2.0   PHOSPHORUS  --   --  3.5  --   --   --  2.8   CALCIUM 8.9  --  8.2*  --   --   --   --     < > = values in this interval not displayed.       Recent Labs     12/05/22  1340 12/06/22  0620   GLUCOSE 188* 157*       Recent Labs     12/06/22  0620 12/07/22  0552   WBC 11.0* 9.3   NEUTSPOLYS 67.30 70.50   LYMPHOCYTES 20.30* 20.20*   MONOCYTES 10.80 7.60   EOSINOPHILS 0.10 0.20   BASOPHILS 0.30 0.20       Recent Labs     12/06/22  0620 12/07/22  0552   RBC 2.59* 2.28*   HEMOGLOBIN 8.8* 7.8*   HEMATOCRIT 26.4* 23.8*   PLATELETCT 179 168         Imaging  X-Ray:  I have personally reviewed the images and compared with prior images. and My impression is: Improving left lower lobe infiltrate with prosthetic aortic valve seen, endotracheal tube/gastric tube/right IJ central venous catheter and left-sided pacemaker in good position    Assessment/Plan  * Cardiac arrest (HCC)- (present on admission)  Assessment & Plan  Cardiac arrest with anesthesia induction 12/5 (WCT, Vfib s/ defibrillation) S/p cath showing non-obs CAD, EF 30%/LVDP 60 c/w Takotsubo CM  Continue to titrate norepinephrine drip to maintain mean arterial pressure greater than 60  Cardiology consulted  Continue amiodarone gtt  Optimize electrolytes, continuous telemetry monitoring    Peripheral vascular disease (HCC)  Assessment & Plan  Significant vascular disease, Right SFA occlusion  Vascular surgery consulted with planned right femoropopliteal bypass 12/5 --> on hold for  now  Continue antiplatelets, statin  Monitor perfusion    Shock ([facility])  Assessment & Plan  Post cardiac arrest, cardiogenic in nature - improving  Continue to titrate norepinephrine drip to maintain mean arterial pressure greater than 60  Monitor urine output, vital signs closely for adequacy of resuscitation  Gentle IVF bolus today if unable to hydrate    Acute respiratory failure ([facility])  Assessment & Plan  Intubated 12/5  Continue full mechanical ventilatory support titrating FiO2 to goal SPO2 greater than 92%, no change to MV today  RT/O2 protocol  Limit sedatives  ABCDEF bundle with hopeful extubation today    Congestive heart failure (CHF) ([facility])- (present on admission)  Assessment & Plan  History of, 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%)  Eventual heart failure medications when blood pressure stabilized  Hold diuresis for now    History of seizures- (present on admission)  Assessment & Plan  Continue Keppra  Seizure precautions    Acute encephalopathy- (present on admission)  Assessment & Plan  Admitted with encephalopathy felt secondary to sepsis/dehydration - improving daily  Avoid sedation with close neurologic monitoring  Maintain sleep/wake cycle, reorient    Osteomyelitis of right lower extremity ([facility])- (present on admission)  Assessment & Plan  ID consulted, continue Unasyn x10 days  Planned revascularization right lower extremity in a few week after cardiac recovery  Vascular surgery and wound care consulted    History of transcatheter aortic valve replacement (TAVR)- (present on admission)  Assessment & Plan  History of TAVR  Strict blood pressure and fluid management    ALL (acute kidney injury) ([facility])- (present on admission)  Assessment & Plan  Acute on CKD - worsened postcardiac arrest secondary to ATN, nonoliguric --> improving  Nephrology consulted  Avoid nephrotoxins  Monitor creatinine, urine output, electrolytes closely    Hyperlipidemia- (present on admission)  Assessment &  Plan  Statin    Essential hypertension- (present on admission)  Assessment & Plan  Remains hypotensive  Hold scheduled antihypertensives and monitor    Hypokalemia  Assessment & Plan  Replete again today and monitor closely    Hyperglycemia  Assessment & Plan  Continue insulin sliding scale with goal glucose between 120 and 180  Begin diabetic enteral nutrition monitoring glucose closely today    Macrocytic anemia- (present on admission)  Assessment & Plan  Slightly worse today  Daily CBC with conservative transfusion strategy       VTE:  Heparin  Ulcer: H2 Antagonist  Lines: Central Line  Ongoing indication addressed, Arterial Line  Ongoing indication addressed, and Kimball Catheter  Ongoing indication addressed    I have performed a physical exam and reviewed and updated ROS and Plan today (12/7/2022). In review of yesterday's note (12/6/2022), there are no changes except as documented above.     Patient remains critically ill today requiring active management of his mechanical ventilatory support as well as titration of vasopressor. High risk of deterioration and worsening vital organ dysfunction and death without the above critical care interventions.    Discussed patient condition and risk of morbidity and/or mortality with RN, RT, Pharmacy, [employer] [facility] resident, Charge nurse / hot rounds, Patient, and cardiology and vascular surgery. Critical care time = 42 minutes in directly providing and coordinating critical care and extensive data review.  No time overlap and excludes procedures.    Please note that this dictation was created using voice recognition software. I have made every reasonable attempt to correct obvious errors, but there may be errors of grammar and possibly content that I did not discover before finalizing the note.

## 2022-12-07 NOTE — ASSESSMENT & PLAN NOTE
Intubated 12/5, extubated 12/7/22 around 0930.  - currently on room air  - RT, O2 protocol  - PT/OT  - dysphagia screening, initiate diet if appropriate

## 2022-12-07 NOTE — ASSESSMENT & PLAN NOTE
Recent cath noted takotsubo cardiomyopathy  Echo 12/5: left ventricular ejection fraction is visually estimated to be 30%. Wall motion preserved at the base with dyskinetic apex  - consider HF med optimization when appropriate and blood pressure allows.   - Cardiology following in setting of recent cardiac arrest, will plan for repeat echo in the next few days

## 2022-12-07 NOTE — CARE PLAN
Problem: Ventilation  Goal: Ability to achieve and maintain unassisted ventilation or tolerate decreased levels of ventilator support  Description: Target End Date:  4 days     Document on Vent flowsheet    1.  Support and monitor invasive and noninvasive mechanical ventilation  2.  Monitor ventilator weaning response  3.  Perform ventilator associated pneumonia prevention interventions  4.  Manage ventilation therapy by monitoring diagnostic test results  Outcome: Progressing       Ventilator Daily Summary    Vent Day # 3    ETT 7.5 @ 25    Ventilator settings changed this shift:no     8 30%    Weaning trials:no    Respiratory Procedures:no    Plan: Continue current ventilator settings and wean mechanical ventilation as tolerated per physician orders.

## 2022-12-07 NOTE — PROGRESS NOTES
"Cardiology Follow Up Progress Note    Date of Service  12/6/2022    Attending Physician  Jeremy M Gonda, M.D.    Chief Complaint   Chief Complaint   Patient presents with    ALOC    Edema     Ble edema       HPI  Gilmer Mae is a 82 y.o. male admitted 11/28/2022 with per Dr. Ji, \"with a history of coronary artery disease reported occlusion of his audiogram and 30% lesion of his left main prior to TAVR done this year for severe attic stenosis he has peripheral arterial disease and had a right endarterectomy but comes in with symptoms of peripheral arterial disease and was found to have high-grade lesion of his left leg was planned for bypass today shortly after induction he developed wide-complex tachycardia requiring CPR was transferred immediately to the ICU where he had another ventricular fibrillation arrest had immediate CPR and ROSC.  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  The medical history is obtained from the chart from the patient as he is on a ventilator.\"    Interim Events  12/6/2022: No overnight cardiac events. Non-obstructive CAD on [facility]. Tele monitoring personally interpreted by me shows SR. Intubated; sedation vacation with SBT during rounds. Labs reviewed  Review of Systems  Review of Systems   Unable to perform ROS: Intubated     Vital signs in last 24 hours  Temp:  [36.1 °C (97 °F)-38.1 °C (100.6 °F)] 36.9 °C (98.4 °F)  Pulse:  [] 90  Resp:  [7-40] 11  BP: ()/(55-74) 103/65  SpO2:  [87 %-100 %] 100 %    Physical Exam  Physical Exam  Constitutional:       Appearance: He is ill-appearing.   Cardiovascular:      Rate and Rhythm: Normal rate and regular rhythm.   Pulmonary:      Comments: intubated  Musculoskeletal:      Right lower leg: No edema.      Left lower leg: No edema.   Skin:     General: Skin is dry.       Lab " Review  Lab Results   Component Value Date/Time    WBC 11.0 (H) 12/06/2022 06:20 AM    RBC 2.59 (L) 12/06/2022 06:20 AM    HEMOGLOBIN 8.8 (L) 12/06/2022 06:20 AM    HEMATOCRIT 26.4 (L) 12/06/2022 06:20 AM    .9 (H) 12/06/2022 06:20 AM    MCH 34.0 (H) 12/06/2022 06:20 AM    MCHC 33.3 (L) 12/06/2022 06:20 AM    MPV 11.0 12/06/2022 06:20 AM      Lab Results   Component Value Date/Time    SODIUM 146 (H) 12/06/2022 06:20 AM    POTASSIUM 3.8 12/06/2022 11:30 AM    CHLORIDE 113 (H) 12/06/2022 06:20 AM    CO2 21 12/06/2022 06:20 AM    GLUCOSE 157 (H) 12/06/2022 06:20 AM    BUN 31 (H) 12/06/2022 06:20 AM    CREATININE 1.30 12/06/2022 06:20 AM      Lab Results   Component Value Date/Time    ASTSGOT 30 12/03/2022 09:07 AM    ALTSGPT 18 12/03/2022 09:07 AM     Lab Results   Component Value Date/Time    CHOLSTRLTOT 117 09/11/2019 03:17 AM    LDL 52 09/11/2019 03:17 AM    HDL 48 09/11/2019 03:17 AM    TRIGLYCERIDE 83 09/11/2019 03:17 AM    TROPONINT 33 (H) 11/29/2022 11:38 AM       No results for input(s): NTPROBNP in the last 72 hours.    Cardiac Imaging and Procedures Review  EKG:  My personal interpretation of the EKG dated 11/5/2022 is SR with LBBB    Echocardiogram (12/5/2022):  Moderately reduced left ventricular systolic function.   The left ventricular ejection fraction is visually estimated to be 30%.   Wall motion preserved at the base with dyskinetic apex.  Contrast swirling at the apex with no left ventricular apical thrombus.  No evidence of valvular abnormality based on Doppler evaluation.   Normal inferior vena cava size without inspiratory collapse.    Cardiac Catheterization (12/5/2022):  HEMODYNAMICS:  Aortic pressure: 98 /63 mmHg  LVEDP: 16 mmHg  No significant aortic gradient on pullback     CORONARY ANGIOGRAPHY:  The left main coronary artery : Large in caliber vessel that trifurcates to large in caliber LAD, large in caliber ramus intermedius and very small in caliber /diminutive left circumflex  coronary artery.  There is at least 30% stenosis in the proximal left main which was interrogated further using IVUS.  IVUS showed ostial left main MLA of 18.5mm2 and proximal MLA 10-11mm2 hence not suggestive of hemodynamically significant stenosis.  There is no angiographic or IVUS evidence of plaque rupture in the left main.  The left anterior descending coronary artery : Large in caliber vessel with diffuse nonobstructive CAD and a large D1 branch.  The left circumflex coronary artery : Small in caliber/diminutive vessel and most of the circumflex territory is covered by a large right PLB and a large ramus intermedius coronary artery.  Ramus intermedius coronary artery: Large in caliber vessel with diffuse nonobstructive CAD  The right coronary artery  : Large in caliber vessel with mild 30-40% stenosis throughout the proximal and midportion of the vessel.  Dominant vessel with large PDA and PLB systems.     Left ventriculography estimated left ventricular ejection fraction 30% with cineangiographic signs of Takotsubo cardiomyopathy with no evidence of noticeable LV apical thrombus.     IMPRESSION:  1.  Nonobstructive coronary artery disease with mild proximal left main stenosis, MLA 10-11 mm².  2. Reduced estimated left ventricular ejection fraction 30% with cineangiographic signs of Takotsubo cardiomyopathy with no evidence of noticeable LV apical thrombus.  3.  Mildly elevated resting LVEDP at 60 mmHg with no significant transaortic gradient on pullback    Imaging  Chest X-Ray (12/6/2022): Hazy interstitial left lung base infiltrate, stable since prior study. Atherosclerosis     Assessment/Plan  VT arrest; nonobstructive CAD; Takotsubo cardiomyopathy EF 30%; cardiogenic shock; dyslipidemia; sepsis; osteomyelitis of right lower extremity  -Remains intubated on pressor support  -Amnio drip  -Nonischemic cardiomyopathy-GDMT once stabilized  -Continue aspirin and statin as tolerated  -EP consult for possible ICD  once stabilized    Thank you for allowing me to participate in the care of this patient.  I will continue to follow this patient    Please contact me with any questions.    Please see Dr. Ji's attestation for further details and MDM.     I personally spent a total of 14 minutes which includes face-to-face time and non-face-to-face time spent on preparing to see the patient, reviewing hospital notes and tests, obtaining history from the patient, performing a medically appropriate exam, counseling and educating the patient, ordering medications/tests/procedures/referrals as clinically indicated, and documenting information in the electronic medical record.    ALFREDO Kapoor.   [facility]  [phone]

## 2022-12-07 NOTE — PROGRESS NOTES
[facility] Progress Note      Admit Date: 11/28/2022    Resident(s): Jennifer Falcon D.O.   Attending:  TATE THOMPSON/ Dr. Gonda    Patient ID:    Name:  Gilmer Mae     YOB: 1940  Age:  82 y.o.  male   MRN:  [id number]    Hospital Course (carried forward and updated):  82 y.o. male w/PMHx TAVR, hypertension, prior pacemaker, PVD who presented from OR today post brief cardiac arrest after induction.  He presented 11/28 with ALL and sepsis/osteomyelitis right lower extremity and was seen by ID and nephrology.  Vascular surgery had the patient in the OR for planned right femoropopliteal bypass for right SFA occlusion complicated by right foot ischemia and osteomyelitis.  Approximately 10 minutes after low-dose propofol and fentanyl for induction patient developed wide-complex rhythm and lost pulse while A-line being placed.  He received 1 minute of high-quality CPR and 1 mg epinephrine IV to obtain ROSC.  CVC and A-line were placed in the OR and he was transported to [facility].  Hypotension was responding epinephrine and epinephrine drip was started in OR.  Shortly after arrival CODE BLUE was called again for ventricular fibrillation.  He again received high-quality CPR and was defibrillated fairly quickly back into sinus rhythm.  Epinephrine drip was titrated.  Norepinephrine drip was added to it and while we are waiting 1 dose of Tobin-Synephrine 300 mcg administered which also helped with blood pressure.  Blood gases revealed excellent oxygenation and story alkalosis and minute ventilation was reduced.  EKG revealed left bundle/abnormal EKG consistent with possible STEMI.  Code STEMI was called and he was taken to the cardiac Cath Lab where I reviewed the case with the interventionalists.  Subsequent angiogram did not reveal lesion that required intervention and LV gram suggested Takotsubo syndrome.  The cause transferred back to [facility] on epinephrine and norepinephrine infusions.  Follow-up ABG and Cath Lab showed  "excellent oxygenation and normal ventilation.  Patient started to spontaneously move all extremities but not follow commands.  Patient started having runs of sustained ventricular tachycardia and he is received amiodarone bolus and started on amiodarone infusion.\" - Dr. Mcneil 12/5    Consultants:  Critical Care  Cardiology  Vascular surgery      12/6: VD #2 on amio drip. SBT trial with some apnea    Interval Events:  Last 24 hours:   - had some agitation overnight requiring precedex, now off  - on levo @0.06  - on spont x 2 hours, 8 peep 30% FiO2, NIF -26, successfully extubated around 0930, currently on room air. Noted to have some left droop to smile, aphasia around 11am, no sensory deficits and moving all extremities to command. pending CT Head w/o stat.   - on unasyn for possible RLE osteo   - K 3.5, repleted by pharmacy  - on amio drip @ 1    Vitals Range last 24h:  Temp:  [36.2 °C (97.1 °F)-36.9 °C (98.4 °F)] 36.2 °C (97.1 °F)  Pulse:  [60-98] 80  Resp:  [7-56] 29  BP: ()/(50-81) 131/63  SpO2:  [95 %-100 %] 97 %      Intake/Output Summary (Last 24 hours) at 12/7/2022 1139  Last data filed at 12/7/2022 1000  Gross per 24 hour   Intake 1798.42 ml   Output 580 ml   Net 1218.42 ml          Review of Systems   Unable to perform ROS: Intubated     PHYSICAL EXAM:  Vitals:    12/07/22 1000 12/07/22 1015 12/07/22 1030 12/07/22 1045   BP: 135/63 127/61 135/67 131/63   Pulse: 70 67 71 80   Resp: (!) 7 18 18 (!) 29   Temp: 36.2 °C (97.1 °F)      TempSrc: Temporal      SpO2: 98% 99% 95% 97%   Weight:       Height:        Body mass index is 22.56 kg/m².    O2 therapy: Pulse Oximetry: 97 %, O2 (LPM): 0, O2 Delivery Device: None - Room Air    Date 12/07/22 0700 - 12/08/22 0659   Shift 8806-9498 5385-2025 5147-4756 24 Hour Total   INTAKE   I.V. 177   177     Precedex Volume 3.1   3.1     Amiodarone Volume 122.5   122.5     Norepinephrine Volume 51.4   51.4   IV Piggyback 159.4   159.4     Volume (mL) (potassium " chloride in water (KCL) ivpb **Administer in ICU only** 20 mEq) 99.4   99.4     Volume (mL) (ampicillin/sulbactam (UNASYN) 3 g in  mL IVPB) 60   60   Shift Total 336.4   336.4   OUTPUT   Urine 135   135     Output (mL) (Urethral Catheter) 135   135   Shift Total 135   135   .4   201.4          Physical Exam  Vitals reviewed.   Constitutional:       General: He is not in acute distress.  HENT:      Head: Normocephalic and atraumatic.      Nose: Nose normal.      Comments: NG tube  Eyes:      General: No scleral icterus.     Conjunctiva/sclera: Conjunctivae normal.   Cardiovascular:      Rate and Rhythm: Normal rate and regular rhythm.      Heart sounds: No murmur heard.  Pulmonary:      Effort: No respiratory distress.      Comments: Extubated this morning  Abdominal:      General: There is no distension.      Palpations: Abdomen is soft.      Tenderness: There is no abdominal tenderness. There is no guarding.   Genitourinary:     Comments: Kimball cath in place  Musculoskeletal:      Right lower leg: No edema.      Left lower leg: No edema.   Skin:     Comments: Wound to right heel. RLE with poor perfusion, non palpable DP pulse. Palpable DP to LLE. Right foot mildly colder than left, no significant change to appearance of right foot compared to yesterday   Neurological:      Comments: Awake, follows command. No sensory deficits to face, extremities. Moving all extremities to command. Minimal left facial droop, but able to raise forehead evenly bilaterally. Aphasia noted, unable to vocalize words.  Per nursing staff initially able to say his first name and state concerns about needing to urinate, but subsequently unable to verbalize.        Recent Labs     12/06/22  0243 12/06/22  1701 12/07/22  0311   ISTATAPH 7.575* 7.415 7.519*   ISTATAPCO2 25.0* 34.6 26.0   ISTATAPO2 177* 117* 127*   ISTATATCO2 24 23 22   [id number] 100* 99 99   ISTATARTHCO3 23.2 22.2 21.2   ISTATARTBE 1 -2 -2   ISTATTEMP 36.4 C 98.0  F 97.1 F   ISTATFIO2 40 30 30   ISTATSPEC Arterial Arterial Arterial   ISTATAPHTC 7.585* 7.420 7.532*   [id number] 174* 115* 122*       Recent Labs     12/05/22  1340 12/05/22  1800 12/06/22  0620 12/06/22  1130 12/06/22  1750 12/06/22  2358 12/07/22  0552   SODIUM 146*  --  146*  --   --   --  146*   POTASSIUM 3.3*   < > 3.9   < > 3.6 3.5* 3.7   CHLORIDE 111  --  113*  --   --   --  115*   CO2 20  --  21  --   --   --  19*   BUN 26*  --  31*  --   --   --  33*   CREATININE 1.04  --  1.30  --   --   --  1.20   MAGNESIUM 1.7  --  2.1  --   --   --  2.0   PHOSPHORUS  --   --  3.5  --   --   --  2.8   CALCIUM 8.9  --  8.2*  --   --   --  7.7*    < > = values in this interval not displayed.       Recent Labs     12/05/22  1340 12/06/22  0620 12/07/22  0552   ALTSGPT  --   --  50   ASTSGOT  --   --  82*   ALKPHOSPHAT  --   --  83   TBILIRUBIN  --   --  0.6   GLUCOSE 188* 157* 130*       Recent Labs     12/06/22  0620 12/07/22  0552   RBC 2.59* 2.28*   HEMOGLOBIN 8.8* 7.8*   HEMATOCRIT 26.4* 23.8*   PLATELETCT 179 168       Recent Labs     12/06/22  0620 12/07/22  0552   WBC 11.0* 9.3   NEUTSPOLYS 67.30 70.50   LYMPHOCYTES 20.30* 20.20*   MONOCYTES 10.80 7.60   EOSINOPHILS 0.10 0.20   BASOPHILS 0.30 0.20   ASTSGOT  --  82*   ALTSGPT  --  50   ALKPHOSPHAT  --  83   TBILIRUBIN  --  0.6         Meds:   lidocaine  1 Patch      amiodarone infusion  0.5-1 mg/min 1 mg/min (12/07/22 0935)    famotidine  20 mg      Or    famotidine  20 mg      NORepinephrine  0-1 mcg/kg/min (Ideal) Stopped (12/07/22 0947)    lidocaine  2 mL      Respiratory Therapy Consult        MD Alert...Adult ICU Electrolyte Replacement per Pharmacy        dexmedetomidine (Precedex) infusion  0-1.5 mcg/kg/hr (Ideal) Stopped (12/07/22 0645)    fentaNYL  25 mcg      Or    fentaNYL  50 mcg      Or    fentaNYL  100 mcg      ipratropium-albuterol  3 mL      levETIRAcetam (Keppra) IV  500 mg      K+ Scale: Goal of 4.5  1 Each      insulin regular  2-9 Units       And    dextrose bolus  25 g      acetaminophen  650 mg      atorvastatin  40 mg      ondansetron  4 mg      senna-docusate  2 Tablet      And    polyethylene glycol/lytes  1 Packet      And    magnesium hydroxide  30 mL      And    bisacodyl  10 mg      gabapentin  100 mg      aspirin  81 mg      ampicillin-sulbactam (UNASYN) IV  3 g Stopped (12/07/22 0629)    heparin  5,000 Units      LR  30 mL/kg      Pharmacy Consult Request  1 Each      ondansetron  4 mg          Procedures:  Extubation 12/7    Imaging:  DX-CHEST-PORTABLE (1 VIEW)   Final Result      Stable mild cardiac enlargement without edema      DX-CHEST-PORTABLE (1 VIEW)   Final Result         1.  Hazy interstitial left lung base infiltrate, stable since prior study.   2.  Atherosclerosis      DX-ABDOMEN FOR TUBE PLACEMENT   Final Result      Orogastric tube tip at the proximal stomach.      EC-ECHOCARDIOGRAM COMPLETE W/ CONT   Final Result      DX-ABDOMEN FOR TUBE PLACEMENT   Final Result      NG tube tip projects over the stomach, and side-port projects 3 cm above the GE junction. It can be advanced further by approximately 7 cm.      DX-ABDOMEN FOR TUBE PLACEMENT   Final Result      NG tube tip projects over the GE junction. It can be advanced 8-10 cm      DX-CHEST-PORTABLE (1 VIEW)   Final Result      1.  Right central catheter tip is in the mid SVC. No pneumothorax.   2.  NGT tip is at the GE junction. It can be advanced 8 cm.   3.  Stable other findings.      DX-CHEST-PORTABLE (1 VIEW)   Final Result      1.  Well-positioned lines and tubes. No pneumothorax.   2.  Ill-defined left basilar opacity, chronic atelectasis/scarring or sequela of recent pneumonia.      CT-CTA AORTA-RO WITH & W/O-POST PROCESS   Final Result      1.  Extensive atherosclerotic vascular calcification involving the aorta and originating arteries. There is ectasia of the ascending thoracic aorta measured at 3.4 x 3.4 cm in diameter.      2.  Extensive atherosclerotic plaque  involving the right common, superficial femoral and profunda arteries. There is occlusion of the right superficial femoral artery at the level of the proximal thigh. Popliteal artery is also occluded and there is    extensive atherosclerotic plaque involving the posterior tibial, anterior tibial and peroneal arteries. No definite continuous runoff vessel identified.      3.  Extensive atherosclerotic plaque involving the left common, profunda and superficial femoral and popliteal arteries with multiple areas of high-grade stenosis of the superficial femoral and popliteal arteries. There is also extensive atherosclerotic    change of the posterior tibial, anterior tibial and peroneal arteries without a definite contiguous runoff vessel seen.      4.  High-grade stenosis involving the origin of the superior mesenteric artery.      5.  Occluded origin of the inferior mesenteric artery.      6.  Atherosclerotic plaque involving the right renal artery origin and the proximal right renal artery resulting in 50% diameter narrowing.      7.  Less than 50% diameter narrowing of the left renal artery origin.      8.  Ill-defined groundglass infiltrates within the right upper lobe possibly representing infectious process.      9.  Multiple hepatic cysts.      3D angiographic/MIP images of the vasculature confirm the vascular findings as described above.      US-VEIN MAPPING LOWER EXTREMITY UNILAT RIGHT   Final Result      DX-FOOT-COMPLETE 3+ RIGHT   Final Result      Likely acute osteomyelitis at the inferior calcaneus. MRI can be obtained as indicated.      MR-FOOT-W/O RIGHT   Final Result      1.  Very limited study due to extensive patient motion artifact and patient comfort preventing multiple sequences.      2.  No gross bony destructive change.      3.  Soft tissue edema.      US-EXTREMITY ARTERY LOWER UNILAT RIGHT   Final Result      US-EXTREMITY VENOUS LOWER UNILAT RIGHT   Final Result      US-ABDOMEN COMPLETE SURVEY    Final Result         1.  Gallbladder sludge, otherwise unremarkable gallbladder   2.  Hepatic cysts   3.  Bilateral echogenic kidneys with mildly thinned cortex, appearance favors medical renal disease      CT-HEAD W/O   Final Result      1.  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   2.  Mild cerebral atrophy. Age-appropriate.   3.  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   4.  No acute findings are evident.         DX-CHEST-PORTABLE (1 VIEW)   Final Result      No evidence of acute cardiopulmonary process.      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 Echo 30% EF  - cardiology consulted and following  -  attempt to wean off amio drip yesterday but noted increased wide complex tachycardia, back on amio drip.   - replete electrolytes prn    Difficulty speaking  Assessment & Plan  Noted by nursing staff after extubation 12/7 at 0930am, initially able to state first name and concerns about needing to urinate, but subsequently unable to speak.   - associated with some mild left sided facial droop and weakness to left eye lid, sensation intact, following commands, moving all 4 extremities  - will obtain STAT CT head without contrast, followup on results    Acute respiratory failure (HCC)  Assessment & Plan  Intubated 12/5, extubated 12/7/22 around 0930.  - currently on room air  - RT, O2 protocol  - PT/OT  - dysphagia screening, initiate diet if appropriate    Congestive heart failure (CHF) (HCC)- (present on admission)  Assessment &  Plan  Recent cath noted takotsubo cardiomyopathy  Echo 12/5: left ventricular ejection fraction is visually estimated to be 30%. Wall motion preserved at the base with dyskinetic apex  - consider HF med optimization when appropriate and blood pressure allows.   - Cardiology following in setting of recent cardiac arrest, will plan for repeat echo in the next few days    Peripheral vascular disease (HCC)  Assessment & Plan  Significant vascular disease  - vascular following, originally planned for right femoropopliteal bypass 12/5 complicated by cardiac arrest during anesthesia induction   - per vascular, hold on procedure until cleared by cardiology. Per cards, will repeat echo in the next few days. Per vascular can hold on intervention for a few weeks.   - unable to obtain DP pulses to RLE on doppler on 12/6 and 12/7, vascular aware. No significant change to appearance of RLE on exam today.  - monitor perfusion    Acute encephalopathy- (present on admission)  Assessment & Plan  Likely secondary to sepsis/dehydration   -improving   -avoid sedatives   -reorient when appropriate    Osteomyelitis of right lower extremity (HCC)- (present on admission)  Assessment & Plan  ID, vascular sx and wound care consulted  - finish 10 day course of Unasyn. Pt remains afebrile, no leukocytosis   - holding on RLE revascularization till cardiology clearance.   - monitor closely for changes.       ALL (acute kidney injury) ([facility])- (present on admission)  Assessment & Plan  ALL on CKD, worsening renal function postcardiac arrest, likely secondary to ATN  Urine output 505 in last 24 hour   Avoid nephrotoxins   Monitor renal function  Cr improved 1.3 > 1.2 this AM    Shock ([facility])  Assessment & Plan  Post cardiac arrest 12/5  - titrate pressor to maintain MAP>65  - monitor urine output and vital signs   - gentle IVF bolus today if unable to hydrate and pass bedside swallow    History of seizures- (present on admission)  Assessment &  Plan  Continue keppra  Seizure precaution    Hypokalemia  Assessment & Plan  Replete per pharmacy    Hyperglycemia  Assessment & Plan  Insulin sliding scale  Goal 120-180    Macrocytic anemia- (present on admission)  Assessment & Plan  Hgb 10.1 > 7.8 no signs of bleeding   - monitor and trend    History of transcatheter aortic valve replacement (TAVR)- (present on admission)  Assessment & Plan  Monitor BP and fluid status  Recent echo with no noted valvular abnormality, EF 30%    Hyperlipidemia- (present on admission)  Assessment & Plan  Statin therapy    Essential hypertension- (present on admission)  Assessment & Plan  Currently off pressors  - monitor      DISPO: remain in ICU    Quality Measures:  Feeding: NPO, pending dysphagia screen  Analgesia: tylenol, fentanyl prn  Sedation: n/a  Thromboprophylaxis: heparin SQ  Head of bed: >30 degrees  Ulcer prophylaxis: famotidine  Glycemic control: Correctional: iss / Basal: n/a  Bowel care: bowel regimen  Indwelling lines:   Deescalation of antibiotics:       Jennifer Falcon D.O.

## 2022-12-07 NOTE — ASSESSMENT & PLAN NOTE
ALL on CKD, worsening renal function postcardiac arrest, likely secondary to ATN  Urine output 505 in last 24 hour   Avoid nephrotoxins   Monitor renal function  Cr improved 1.3 > 1.2 this AM

## 2022-12-07 NOTE — ASSESSMENT & PLAN NOTE
Post cardiac arrest 12/5  - titrate pressor to maintain MAP>65  - monitor urine output and vital signs   - gentle IVF bolus today if unable to hydrate and pass bedside swallow

## 2022-12-07 NOTE — ASSESSMENT & PLAN NOTE
Likely secondary to sepsis/dehydration   -improving   -avoid sedatives   -reorient when appropriate

## 2022-12-07 NOTE — CARE PLAN
The patient is Watcher - Medium risk of patient condition declining or worsening    Shift Goals  Clinical Goals: hemodynamic stability; wean off ventilator, SAT/SBT; get extubated  Patient Goals: pain free  Family Goals: get better and be stable; extubate when ready    Progress made toward(s) clinical / shift goals:        Problem: Hemodynamics  Goal: Patient's hemodynamics, fluid balance and neurologic status will be stable or improve  Outcome: Progressing     Problem: Pain - Standard  Goal: Alleviation of pain or a reduction in pain to the patient’s comfort goal  Outcome: Progressing     Problem: Fall Risk  Goal: Patient will remain free from falls  Outcome: Progressing     Problem: Safety - Medical Restraint  Goal: Remains free of injury from restraints (Restraint for Interference with Medical Device)  Outcome: Progressing       Patient is not progressing towards the following goals:

## 2022-12-07 NOTE — PROGRESS NOTES
VASCULAR SURGERY SERVICE                        Progress Note  _________________________________    Extubated today  Weaning levo  ECHO 12/5 showed EF 30%  Discussed cardiac optimization plan with Cards prior to re-attempting right lower extremity bypass. Will check a repeat ECHO in 1-2 days and then go from there. It is possible we will need to allow a couple weeks for cardiac recovery prior to re-scheduling surgery      Hemant Rdz MD  [facility]   Voal[employer] preferred or call my office [phone]  __________________________________________________  Patient:Gilmer Mae   MRN:[id number]

## 2022-12-07 NOTE — PROGRESS NOTES
"Cardiology Follow Up Progress Note    Date of Service  12/7/2022    Attending Physician  Jeremy M Gonda, M.D.    Primary Cardiologist: Dr. Ward at [facility]    Chief Complaint   Chief Complaint   Patient presents with    ALOC    Edema     Ble edema       HPI  Gilmer Mae is a 82 y.o. male admitted 11/28/2022 with per Dr. Ji, \"with a history of coronary artery disease reported occlusion of his audiogram and 30% lesion of his left main prior to TAVR done this year for severe attic stenosis he has peripheral arterial disease and had a right endarterectomy but comes in with symptoms of peripheral arterial disease and was found to have high-grade lesion of his left leg was planned for bypass today shortly after induction he developed wide-complex tachycardia requiring CPR was transferred immediately to the ICU where he had another ventricular fibrillation arrest had immediate CPR and ROSC.  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  The medical history is obtained from the chart from the patient as he is on a ventilator.\"    Interim Events  12/6/2022: No overnight cardiac events. Non-obstructive CAD on [facility]. Tele monitoring personally interpreted by me shows SR. Intubated; sedation vacation with SBT during rounds. Labs reviewed  12/7/2022: No overnight cardiac events. Tele monitoring personally interpreted by me shows SR, prolonged RI, intermittent accelerated junctional-no pacing . Has BiV PPM; will interrogate. VSS- remains on pressor support and amio gtt. Remain intubated; follows commands    Review of Systems  Review of Systems   Unable to perform ROS: Intubated     Vital signs in last 24 hours  Temp:  [36.2 °C (97.1 °F)-36.8 °C (98.2 °F)] 36.3 °C (97.4 °F)  Pulse:  [60-98] 84  Resp:  [7-56] 25  BP: ()/(50-81) 146/68  SpO2:  [95 %-100 %] 96 %    Physical " Exam  Physical Exam  Constitutional:       Appearance: He is ill-appearing.   Cardiovascular:      Rate and Rhythm: Normal rate and regular rhythm.   Pulmonary:      Comments: intubated  Musculoskeletal:      Right lower leg: No edema.      Left lower leg: No edema.   Skin:     General: Skin is dry.   Neurological:      Comments: Follows commands       Lab Review  Lab Results   Component Value Date/Time    WBC 9.3 12/07/2022 05:52 AM    RBC 2.28 (L) 12/07/2022 05:52 AM    HEMOGLOBIN 7.8 (L) 12/07/2022 05:52 AM    HEMATOCRIT 23.8 (L) 12/07/2022 05:52 AM    .4 (H) 12/07/2022 05:52 AM    MCH 34.2 (H) 12/07/2022 05:52 AM    MCHC 32.8 (L) 12/07/2022 05:52 AM    MPV 11.2 12/07/2022 05:52 AM      Lab Results   Component Value Date/Time    SODIUM 146 (H) 12/07/2022 05:52 AM    POTASSIUM 3.7 12/07/2022 12:32 PM    CHLORIDE 115 (H) 12/07/2022 05:52 AM    CO2 19 (L) 12/07/2022 05:52 AM    GLUCOSE 130 (H) 12/07/2022 05:52 AM    BUN 33 (H) 12/07/2022 05:52 AM    CREATININE 1.20 12/07/2022 05:52 AM      Lab Results   Component Value Date/Time    ASTSGOT 82 (H) 12/07/2022 05:52 AM    ALTSGPT 50 12/07/2022 05:52 AM     Lab Results   Component Value Date/Time    CHOLSTRLTOT 117 09/11/2019 03:17 AM    LDL 52 09/11/2019 03:17 AM    HDL 48 09/11/2019 03:17 AM    TRIGLYCERIDE 83 09/11/2019 03:17 AM    TROPONINT 33 (H) 11/29/2022 11:38 AM       No results for input(s): NTPROBNP in the last 72 hours.    Cardiac Imaging and Procedures Review  EKG:  My personal interpretation of the EKG dated 11/5/2022 is SR with LBBB    Echocardiogram (12/5/2022):  Moderately reduced left ventricular systolic function.   The left ventricular ejection fraction is visually estimated to be 30%.   Wall motion preserved at the base with dyskinetic apex.  Contrast swirling at the apex with no left ventricular apical thrombus.  No evidence of valvular abnormality based on Doppler evaluation.   Normal inferior vena cava size without inspiratory  collapse.    Cardiac Catheterization (12/5/2022):  HEMODYNAMICS:  Aortic pressure: 98 /63 mmHg  LVEDP: 16 mmHg  No significant aortic gradient on pullback     CORONARY ANGIOGRAPHY:  The left main coronary artery : Large in caliber vessel that trifurcates to large in caliber LAD, large in caliber ramus intermedius and very small in caliber /diminutive left circumflex coronary artery.  There is at least 30% stenosis in the proximal left main which was interrogated further using IVUS.  IVUS showed ostial left main MLA of 18.5mm2 and proximal MLA 10-11mm2 hence not suggestive of hemodynamically significant stenosis.  There is no angiographic or IVUS evidence of plaque rupture in the left main.  The left anterior descending coronary artery : Large in caliber vessel with diffuse nonobstructive CAD and a large D1 branch.  The left circumflex coronary artery : Small in caliber/diminutive vessel and most of the circumflex territory is covered by a large right PLB and a large ramus intermedius coronary artery.  Ramus intermedius coronary artery: Large in caliber vessel with diffuse nonobstructive CAD  The right coronary artery  : Large in caliber vessel with mild 30-40% stenosis throughout the proximal and midportion of the vessel.  Dominant vessel with large PDA and PLB systems.     Left ventriculography estimated left ventricular ejection fraction 30% with cineangiographic signs of Takotsubo cardiomyopathy with no evidence of noticeable LV apical thrombus.     IMPRESSION:  1.  Nonobstructive coronary artery disease with mild proximal left main stenosis, MLA 10-11 mm².  2. Reduced estimated left ventricular ejection fraction 30% with cineangiographic signs of Takotsubo cardiomyopathy with no evidence of noticeable LV apical thrombus.  3.  Mildly elevated resting LVEDP at 60 mmHg with no significant transaortic gradient on pullback    Imaging  Chest X-Ray (12/6/2022): Hazy interstitial left lung base infiltrate, stable since  prior study. 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 Records request from primary cardiologist  -Device integration  -Continue aspirin and statin as tolerated  -EP consult for possible ICD once stabilized    Thank you for allowing me to participate in the care of this patient.  I will continue to follow this patient    Please contact me with any questions.    Please see Dr. Ji's attestation for further details and MDM.     I personally spent a total of 14 minutes which includes face-to-face time and non-face-to-face time spent on preparing to see the patient, reviewing hospital notes and tests, obtaining history from the patient, performing a medically appropriate exam, counseling and educating the patient, ordering medications/tests/procedures/referrals as clinically indicated, and documenting information in the electronic medical record.    ALFREDO Kapoor.   [facility]  [phone]

## 2022-12-07 NOTE — PROGRESS NOTES
After extubating at 0930, patient remains lethargic, arouses to voice. Able to state first name, though has difficulty formulating or speaking any other words. Patient nods appropriately, follows commands such as give a thumbs up and wiggle fingers/toes, moves all extremities, though has noticeable left sided weakness when raising arms and legs from bed. Minimal left sided facial droop noted when asked to smile and lift eye brows. NIHSS 10. Dr. Falcon notified. STAT head CT ordered.

## 2022-12-07 NOTE — ASSESSMENT & PLAN NOTE
ID, vascular sx and wound care consulted  - finish 10 day course of Unasyn. Pt remains afebrile, no leukocytosis   - holding on RLE revascularization till cardiology clearance.   - monitor closely for changes.

## 2022-12-07 NOTE — PROGRESS NOTES
[facility] ICU Progress Note      Admit Date: 11/28/2022    Resident(s): Jennifer Flacon D.O.   Attending:  TATE THOMPSON/ Dr. Gonda    Patient ID:    Name:  Gilmer Mae     YOB: 1940  Age:  82 y.o.  male   MRN:  [id number]    Hospital Course (carried forward and updated):  82 y.o. male w/PMHx TAVR, hypertension, prior pacemaker, PVD who presented from OR today post brief cardiac arrest after induction.  He presented 11/28 with ALL and sepsis/osteomyelitis right lower extremity and was seen by ID and nephrology.  Vascular surgery had the patient in the OR for planned right femoropopliteal bypass for right SFA occlusion complicated by right foot ischemia and osteomyelitis.  Approximately 10 minutes after low-dose propofol and fentanyl for induction patient developed wide-complex rhythm and lost pulse while A-line being placed.  He received 1 minute of high-quality CPR and 1 mg epinephrine IV to obtain ROSC.  CVC and A-line were placed in the OR and he was transported to [facility].  Hypotension was responding epinephrine and epinephrine drip was started in OR.  Shortly after arrival CODE BLUE was called again for ventricular fibrillation.  He again received high-quality CPR and was defibrillated fairly quickly back into sinus rhythm.  Epinephrine drip was titrated.  Norepinephrine drip was added to it and while we are waiting 1 dose of Tobin-Synephrine 300 mcg administered which also helped with blood pressure.  Blood gases revealed excellent oxygenation and story alkalosis and minute ventilation was reduced.  EKG revealed left bundle/abnormal EKG consistent with possible STEMI.  Code STEMI was called and he was taken to the cardiac Cath Lab where I reviewed the case with the interventionalists.  Subsequent angiogram did not reveal lesion that required intervention and LV gram suggested Takotsubo syndrome.  The cause transferred back to [facility] on epinephrine and norepinephrine infusions.  Follow-up ABG and Cath Lab showed  "excellent oxygenation and normal ventilation.  Patient started to spontaneously move all extremities but not follow commands.  Patient started having runs of sustained ventricular tachycardia and he is received amiodarone bolus and started on amiodarone infusion.\" - Dr. Mcneil 12/5    Consultants:  Critical Care  Cardiology  Vascular surgery      12/6: VD #2    Interval Events:  Last 24 hours:   - currently off pressors   - resp alkalosis, SBT trial with some apnea  - on unasyn for possible RLE osteo   - K 3.1, repleted by pharmacy  - on amio drip    Vitals Range last 24h:  Temp:  [36.1 °C (97 °F)-38.1 °C (100.6 °F)] 36.9 °C (98.4 °F)  Pulse:  [] 90  Resp:  [7-40] 11  BP: ()/(55-74) 103/65  SpO2:  [87 %-100 %] 100 %      Intake/Output Summary (Last 24 hours) at 12/6/2022 1717  Last data filed at 12/6/2022 1400  Gross per 24 hour   Intake 3607.02 ml   Output 1040 ml   Net 2567.02 ml        Review of Systems   Unable to perform ROS: Intubated     PHYSICAL EXAM:  Vitals:    12/06/22 1430 12/06/22 1445 12/06/22 1500 12/06/22 1515   BP:    103/65   Pulse: 88 87 89 90   Resp: 13 13 15 (!) 11   Temp:       TempSrc:       SpO2: 100% 100% 100% 100%   Weight:       Height:        Body mass index is 22.6 kg/m².    O2 therapy: Pulse Oximetry: 100 %, O2 Delivery Device: Ventilator    Date 12/06/22 0700 - 12/07/22 0659   Shift 7462-9415 0482-3587 5919-4392 24 Hour Total   INTAKE   P.O. 0   0     P.O. 0   0   I.V. 195.7   195.7     Fentanyl Volume 0.9   0.9     Precedex Volume 10.9   10.9     Amiodarone Volume 105.6   105.6     Norepinephrine Volume 78.3   78.3   Other 0   0     Medications (PO/Enteral Liquids) 0   0   IV Piggyback 332.2   332.2     Volume (mL) (potassium chloride (KCL) ivpb 10 mEq) 0   0     Volume (mL) (potassium chloride (KCL) ivpb 10 mEq) 98.4   98.4     Volume (mL) (potassium chloride (KCL) ivpb 10 mEq) 99.6   99.6     Volume (mL) (ampicillin/sulbactam (UNASYN) 3 g in  mL IVPB) 134.2   " 134.2   Shift Total 527.8   527.8   OUTPUT   Urine 210   210     Output (mL) (Urethral Catheter) 210   210   Stool         Number of Times Stooled 0 x   0 x   Shift Total 210   210   .8   317.8        Physical Exam  Vitals reviewed.   Constitutional:       General: He is not in acute distress.  HENT:      Head: Normocephalic and atraumatic.      Nose: Nose normal.      Comments: NG tube  Eyes:      General: No scleral icterus.     Conjunctiva/sclera: Conjunctivae normal.   Cardiovascular:      Rate and Rhythm: Normal rate and regular rhythm.      Heart sounds: No murmur heard.  Pulmonary:      Effort: No respiratory distress.      Breath sounds: Rhonchi present.      Comments: ET tube  Abdominal:      General: There is no distension.      Palpations: Abdomen is soft.      Tenderness: There is no abdominal tenderness. There is no guarding.   Genitourinary:     Comments: Kimball cath in place  Musculoskeletal:         General: Tenderness (to right ankle) present.      Right lower leg: No edema.      Left lower leg: No edema.   Skin:     Comments: Wound to right heel   Neurological:      Comments: RLE with poor perfusion, non palpable DP pulse. Palpable DP to LLE.   Easily aroused       Recent Labs     12/05/22  1112 12/05/22  1202 12/05/22  1319 12/06/22  0243   ISTATAPH 7.632* 7.422 7.494 7.575*   ISTATAPCO2 40.4* 41.5* 30.7 25.0*   ISTATAPO2 458* 476* 479* 177*   ISTATATCO2 44* 28 25 24   OVFKAIB8CQR 100* 100* 100* 100*   ISTATARTHCO3 42.7* 27.0* 23.6 23.2   ISTATARTBE 20* 2 1 1   ISTATTEMP 96.6 F see below 35.5 C 36.4 C   ISTATFIO2 100 50 100 40   ISTATSPEC Arterial Arterial Arterial Arterial   ISTATAPHTC 7.650*  --  7.517* 7.585*   FJOUNLCL2TJ 451*  --  469* 174*     Recent Labs     12/05/22  1340 12/05/22  1800 12/06/22  0039 12/06/22  0620 12/06/22  1130   SODIUM 146*  --   --  146*  --    POTASSIUM 3.3*   < > 3.1* 3.9 3.8   CHLORIDE 111  --   --  113*  --    CO2 20  --   --  21  --    BUN 26*  --   --  31*   --    CREATININE 1.04  --   --  1.30  --    MAGNESIUM 1.7  --   --  2.1  --    PHOSPHORUS  --   --   --  3.5  --    CALCIUM 8.9  --   --  8.2*  --     < > = values in this interval not displayed.     Recent Labs     12/05/22  1340 12/06/22 0620   GLUCOSE 188* 157*     Recent Labs     12/06/22 0620   RBC 2.59*   HEMOGLOBIN 8.8*   HEMATOCRIT 26.4*   PLATELETCT 179     Recent Labs     12/06/22 0620   WBC 11.0*   NEUTSPOLYS 67.30   LYMPHOCYTES 20.30*   MONOCYTES 10.80   EOSINOPHILS 0.10   BASOPHILS 0.30       Meds:   amiodarone infusion  0.5-1 mg/min 1 mg/min (12/06/22 1629)    [START ON 12/7/2022] famotidine  20 mg      Or    [START ON 12/7/2022] famotidine  20 mg      NORepinephrine  0-1 mcg/kg/min (Ideal) 0.02 mcg/kg/min (12/06/22 1151)    lidocaine  2 mL      Respiratory Therapy Consult        MD Alert...Adult ICU Electrolyte Replacement per Pharmacy        dexmedetomidine (Precedex) infusion  0-1.5 mcg/kg/hr (Ideal) Stopped (12/06/22 0713)    fentaNYL  25 mcg      Or    fentaNYL  50 mcg      Or    fentaNYL  100 mcg      ipratropium-albuterol  3 mL      levETIRAcetam (Keppra) IV  500 mg      K+ Scale: Goal of 4.5  1 Each      insulin regular  2-9 Units      And    dextrose bolus  25 g      acetaminophen  650 mg      atorvastatin  40 mg      ondansetron  4 mg      senna-docusate  2 Tablet      And    polyethylene glycol/lytes  1 Packet      And    magnesium hydroxide  30 mL      And    bisacodyl  10 mg      gabapentin  100 mg      aspirin  81 mg      ampicillin-sulbactam (UNASYN) IV  3 g Stopped (12/06/22 1155)    heparin  5,000 Units      LR  30 mL/kg      Pharmacy Consult Request  1 Each      ondansetron  4 mg          Procedures:      Imaging:  DX-CHEST-PORTABLE (1 VIEW)   Final Result         1.  Hazy interstitial left lung base infiltrate, stable since prior study.   2.  Atherosclerosis      DX-ABDOMEN FOR TUBE PLACEMENT   Final Result      Orogastric tube tip at the proximal stomach.      EC-ECHOCARDIOGRAM  COMPLETE W/ CONT   Final Result      DX-ABDOMEN FOR TUBE PLACEMENT   Final Result      NG tube tip projects over the stomach, and side-port projects 3 cm above the GE junction. It can be advanced further by approximately 7 cm.      DX-ABDOMEN FOR TUBE PLACEMENT   Final Result      NG tube tip projects over the GE junction. It can be advanced 8-10 cm      DX-CHEST-PORTABLE (1 VIEW)   Final Result      1.  Right central catheter tip is in the mid SVC. No pneumothorax.   2.  NGT tip is at the GE junction. It can be advanced 8 cm.   3.  Stable other findings.      DX-CHEST-PORTABLE (1 VIEW)   Final Result      1.  Well-positioned lines and tubes. No pneumothorax.   2.  Ill-defined left basilar opacity, chronic atelectasis/scarring or sequela of recent pneumonia.      CT-CTA AORTA-RO WITH & W/O-POST PROCESS   Final Result      1.  Extensive atherosclerotic vascular calcification involving the aorta and originating arteries. There is ectasia of the ascending thoracic aorta measured at 3.4 x 3.4 cm in diameter.      2.  Extensive atherosclerotic plaque involving the right common, superficial femoral and profunda arteries. There is occlusion of the right superficial femoral artery at the level of the proximal thigh. Popliteal artery is also occluded and there is    extensive atherosclerotic plaque involving the posterior tibial, anterior tibial and peroneal arteries. No definite continuous runoff vessel identified.      3.  Extensive atherosclerotic plaque involving the left common, profunda and superficial femoral and popliteal arteries with multiple areas of high-grade stenosis of the superficial femoral and popliteal arteries. There is also extensive atherosclerotic    change of the posterior tibial, anterior tibial and peroneal arteries without a definite contiguous runoff vessel seen.      4.  High-grade stenosis involving the origin of the superior mesenteric artery.      5.  Occluded origin of the inferior mesenteric  artery.      6.  Atherosclerotic plaque involving the right renal artery origin and the proximal right renal artery resulting in 50% diameter narrowing.      7.  Less than 50% diameter narrowing of the left renal artery origin.      8.  Ill-defined groundglass infiltrates within the right upper lobe possibly representing infectious process.      9.  Multiple hepatic cysts.      3D angiographic/MIP images of the vasculature confirm the vascular findings as described above.      US-VEIN MAPPING LOWER EXTREMITY UNILAT RIGHT   Final Result      DX-FOOT-COMPLETE 3+ RIGHT   Final Result      Likely acute osteomyelitis at the inferior calcaneus. MRI can be obtained as indicated.      MR-FOOT-W/O RIGHT   Final Result      1.  Very limited study due to extensive patient motion artifact and patient comfort preventing multiple sequences.      2.  No gross bony destructive change.      3.  Soft tissue edema.      US-EXTREMITY ARTERY LOWER UNILAT RIGHT   Final Result      US-EXTREMITY VENOUS LOWER UNILAT RIGHT   Final Result      US-ABDOMEN COMPLETE SURVEY   Final Result         1.  Gallbladder sludge, otherwise unremarkable gallbladder   2.  Hepatic cysts   3.  Bilateral echogenic kidneys with mildly thinned cortex, appearance favors medical renal disease      CT-HEAD W/O   Final Result      1.  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   2.  Mild cerebral atrophy. Age-appropriate.   3.  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   4.  No acute findings are evident.         DX-CHEST-PORTABLE (1 VIEW)   Final Result      No evidence of acute cardiopulmonary process.      CL-LEFT HEART CATHETERIZATION WITH POSSIBLE INTERVENTION    (Results Pending)       ASSESSEMENT and PLAN:    * Cardiac arrest (HCC)- (present on admission)  Assessment & Plan  Cardiac arrest with anesthesia  induction 12/5 and on ICU floor   - s/p cath revealing non obstructive CAD, finding concerning for takatsubo cardiomyopathy   - cardiology consulted  - currently on amio drip  - replete electrolytes prn    Shock (HCC)  Assessment & Plan  Post cardiac arrest 12/5  - off pressors at this time  - monitor urine output and vital signs   - maintain MAP >65    Acute respiratory failure (HCC)  Assessment & Plan  Intubated 12/5  - wean FiO2 when appropriate, ABG wnl, NIF -18, will extubate when appropriate  - RT, O2 protocol  - limit sedation   - ABCDEF bundle    Peripheral vascular disease (HCC)  Assessment & Plan  Significant vascular disease  - vascular following, originally planned for right femoropopliteal bypass 12/5 complicated by cardiac arrest during anesthesia induction   - per vascular, hold on procedure until cleared by cardiology   - monitor perfusion    Congestive heart failure (CHF) ([facility])- (present on admission)  Assessment & Plan  Recent cath noted takotsubo cardiomyopathy  Echo 12/5: left ventricular ejection fraction is visually estimated to be 30%. Wall motion preserved at the base with dyskinetic apex  - consider HF med optimization when appropriate and blood pressure allows.     Acute encephalopathy- (present on admission)  Assessment & Plan  Likely secondary to sepsis/dehydration   -improving   -avoid sedatives   -reorient when appropriate    Osteomyelitis of right lower extremity (HCC)- (present on admission)  Assessment & Plan  ID consulted  - finish 10 day course of Unasyn  - holding on RLE revascularization till cardiology clearance   - vascular sx and wound care consulted    ALL (acute kidney injury) ([facility])- (present on admission)  Assessment & Plan  ALL on CKD, worsening renal function postcardiac arrest, likely secondary to ATN  Urine output 800 in last 24 hour  Avoid nephrotoxins  Monitor renal function    History of seizures- (present on admission)  Assessment & Plan  Continue keppra  Seizure  precaution    Hypokalemia  Assessment & Plan  Replete per pharmacy    Hyperglycemia  Assessment & Plan  Insulin sliding scale  Goal 120-180    Macrocytic anemia- (present on admission)  Assessment & Plan  Hgb 10.1 > 8.8 no signs of bleeding   - monitor and trend    History of transcatheter aortic valve replacement (TAVR)- (present on admission)  Assessment & Plan  Monitor BP and fluid status  Recent echo with no noted valvular abnormality, EF 30%    Hyperlipidemia- (present on admission)  Assessment & Plan  Statin therapy    Essential hypertension- (present on admission)  Assessment & Plan  Currently off pressors  - monitor      DISPO: remain in ICU    Quality Measures:  Feeding: via NG tube  Analgesia: tylenol, fentanyl prn  Sedation: n/a  Thromboprophylaxis: heparin SQ  Head of bed: >30 degrees  Ulcer prophylaxis: famotidine  Glycemic control: Correctional: iss / Basal: n/a  Bowel care: bowel regimen  Indwelling lines:   Deescalation of antibiotics:       Jennifer Falcon D.O.

## 2022-12-07 NOTE — ASSESSMENT & PLAN NOTE
Cardiac arrest with anesthesia induction 12/5 and on ICU floor   - s/p cath revealing non obstructive CAD, finding concerning for takatsubo cardiomyopathy. Echo 30% EF  - cardiology consulted and following  -  attempt to wean off amio drip yesterday but noted increased wide complex tachycardia, back on amio drip.   - replete electrolytes prn

## 2022-12-07 NOTE — ASSESSMENT & PLAN NOTE
Significant vascular disease  - vascular following, originally planned for right femoropopliteal bypass 12/5 complicated by cardiac arrest during anesthesia induction   - per vascular, hold on procedure until cleared by cardiology. Per cards, will repeat echo in the next few days. Per vascular can hold on intervention for a few weeks.   - unable to obtain DP pulses to RLE on doppler on 12/6 and 12/7, vascular aware. No significant change to appearance of RLE on exam today.  - monitor perfusion

## 2022-12-07 NOTE — THERAPY
Physical Therapy Contact Note    Pt transfered to ICU s/p code blue x2 with attempted fem-pop bypass procedure. Pt not appropriate to mobilize at this time due to ongoing medical management with BBB when drips titrated down. Will continue to follow and re-initiate PT when appropriate.    Adelita Conner, PT, DPT

## 2022-12-08 PROBLEM — E87.0 HYPERNATREMIA: Status: ACTIVE | Noted: 2022-01-01

## 2022-12-08 PROBLEM — Z86.69 HISTORY OF BELL'S PALSY: Status: ACTIVE | Noted: 2022-01-01

## 2022-12-08 PROBLEM — D69.6 THROMBOCYTOPENIA (HCC): Status: ACTIVE | Noted: 2022-01-01

## 2022-12-08 PROBLEM — G51.0 BELL'S PALSY: Status: ACTIVE | Noted: 2022-01-01

## 2022-12-08 PROBLEM — Z71.89 ADVANCE CARE PLANNING: Status: ACTIVE | Noted: 2022-01-01

## 2022-12-08 NOTE — PROGRESS NOTES
Md aware that monitor called to make rn aware that pt rhythm is accelerated junctional rhythm. Will order stat ekg

## 2022-12-08 NOTE — PROGRESS NOTES
Per patient's daughter Anjana, patient has a history of Bell's palsy several years ago and has had slight left sided facial droop and left sided weakness since.

## 2022-12-08 NOTE — CARE PLAN
The patient is Stable - Low risk of patient condition declining or worsening    Shift Goals  Clinical Goals: hemodynamic stability, titrate norepinephrine down, keep amio at 1 mg/min and have no VT/VT. Possibly extubate  Patient Goals: MAYA  Family Goals: MAYA    Progress made toward(s) clinical / shift goals:  norepinephrine titrated off, remains on amiodarone without ectopy. Extubated at 0939, remained on room air during shift    Patient is not progressing towards the following goals:      Problem: Hemodynamics  Goal: Patient's hemodynamics, fluid balance and neurologic status will be stable or improve  Outcome: Progressing     Problem: Fluid Volume  Goal: Fluid volume balance will be maintained  Outcome: Progressing     Problem: Urinary - Renal Perfusion  Goal: Ability to achieve and maintain adequate renal perfusion and functioning will improve  Outcome: Progressing     Problem: Respiratory  Goal: Patient will achieve/maintain optimum respiratory ventilation and gas exchange  Outcome: Progressing     Problem: Physical Regulation  Goal: Diagnostic test results will improve  Outcome: Progressing     Problem: Pain - Standard  Goal: Alleviation of pain or a reduction in pain to the patient’s comfort goal  Outcome: Progressing     Problem: Skin Integrity  Goal: Skin integrity is maintained or improved  Outcome: Progressing     Problem: Fall Risk  Goal: Patient will remain free from falls  Outcome: Progressing

## 2022-12-08 NOTE — THERAPY
Speech Language Pathology   Clinical Swallow Evaluation     Patient Name: Gilmer Mae  AGE:  82 y.o., SEX:  male  Medical Record #: [id number]  Today's Date: 12/8/2022     Precautions  Precautions: Fall Risk    Assessment    HPI: 82 y.o. male presented 11/28/2022 with encephalopathy and RLE edema/pain. Cardiac arrest on12/5  x2 with attempted fem-pop bypass procedure. Per pt's daughter, pt hx of Bell's Palsy w/L-sided facial droop. PMHx  hypertension, hyperlipidemia, sick sinus syndrome (s/p pacemaker placement), seizures (most recent episode in 2019, on keppra), severe aortic stenosis (s/p TAVR 8/2/2022 at [facility]) and peripheral artery disease (s/p vascular surgery on femoral artery in 2022)     CMHx Cardiac arrest, Perpheral vascular disease (R-SFA occlusion), Shock, Acure respiratory failure, CHF, Acute encephalopathy, Osteomyelitis of R-lower extremity, ALL, Bell's Palsy, Hyperglycemia. Received SLP services in September of 2019 with recommended diet of RG/TN.  Intubated 12/5/22 - 12/7/22.    CXR 12/7:  No consolidation detected. No pleural space process is seen.    CT-Head 12/7:  1.  No acute intracranial hemorrhage.  2.  Similar chronic findings.  3.  Mild mucosal thickening of the inferior right maxillary sinus.      Level of Consciousness: Awake  Affect/Behavior: Appropriate, Calm  Follows Directives: Yes - simple commands only  Orientation: Oriented x 4  Hearing: Functional hearing  Vision: Functional vision      Prior Living Situation & Level of Function:  Patient lives with spouse and adult children. Pt denies any hx of dysphagia.      Oral Mechanism Evaluation  Facial Symmetry:  slight L-sided droop, hx of Bell's Palsy  Facial Sensation: Equal  Labial Observations: WFL  Lingual Observations: Midline  Dentition: Some missing dentition  Comments:      Voice  Quality: Hoarse  Resonance: WFL  Intensity: Soft  Cough: Perceptually weak  Comments:       Current Method of Nutrition   NPO until cleared by speech  "pathology      Subjective  Cleared by RN for evaluation.Patient received awake and eager for water.  Pt needing extra time to answer questions. Repositioned HOB 90* for evaluation.       Assessment  Positioning: Velázquez's (60-90 degrees)  Bolus Administration: SLP and Patient  Oxygen Requirements:  1 L Nasal Cannula  Factor(s) Affecting Performance:  Pain    Swallowing Trials  Ice: WFL  Thin Liquid (TN0): Impaired  Liquidised (LQ3): Impaired    Comments:  Patient attempted self-feeding, needing Jamul A d/t slight tremors and poor proprioception. Impaired oral bolus stripping and containment characterized by min anterior bolus loss with thin liquids via tsp/cup. Limited acceptance of liquidized with pt pressing lips, only accepting half tsp. Delayed weak cough with thin liquids via cup in 2/3 trials. Throat clearing with liquidized in 1/2 trials. Increased WOB and wet/gurgly vocal quality as session progressed. All concerning for airway invasion.        Clinical Impressions  Pt presents with clinical s/sx of oropharyngeal dysphagia at this time, in the presence of cough response following thin liquids and throat clearing with liquidized. Dysphagia likely acute given endotracheal intubation, generalized weakness and cardiac arrest. An instrumental swallow study is indicated to objectively assess swallow function. Pt educated and SLP's recommendations and is agreeable to study.        Recommendations  1.  NPO pending FEES  2.  Instrumentation: FEES  3.  Swallowing Instructions & Precautions:   Medication: Non Oral   Oral Care: Q4h      Plan    Recommend Speech Therapy 3 times per week until therapy goals are met for the following treatments:  Dysphagia Training and Patient / Family / Caregiver Education.            Objective       12/08/22 0835   Patient / Family Goals   Patient / Family Goal #1 \"that's good\" - water   Short Term Goals   Short Term Goal # 1 Patient will participate in pre-feeding trials with SLP only with " no overt s/sx of aspiration

## 2022-12-08 NOTE — PROGRESS NOTES
4 Eyes Skin Assessment Completed by LISA Wells and LISA donovan.    Head WDL  Ears WDL  Nose WDL  Mouth WDL  Neck WDL  Breast/Chest WDL  Shoulder Blades Redness  Spine Redness  (R) Arm/Elbow/Hand Redness  (L) Arm/Elbow/Hand Redness  Abdomen WDL  Groin Excoriation  Scrotum/Coccyx/Buttocks Excoriation  (R) Leg Redness  (L) Leg Redness  (R) Heel/Foot/Toe Swelling  (L) Heel/Foot/Toe Redness          Devices In Places Tele Box and Nasal Cannula      Interventions In Place Gray Ear Foams    Possible Skin Injury No    Pictures Uploaded Into Epic Yes  Wound Consult Placed N/A  RN Wound Prevention Protocol Ordered No

## 2022-12-08 NOTE — PROGRESS NOTES
"Md discussed pt code status with this rn at bedside. Pt stated \" I would like to be a DNR/DNI,\". Md will reach out to any family and put in orders appropriately   "

## 2022-12-08 NOTE — PROGRESS NOTES
"Critical Care Progress Note    Date of admission  11/28/2022    Chief Complaint  82 y.o. male admitted 11/28/2022 with cardiac arrest    Hospital Course  \"82 y.o. male w/PMHx TAVR, hypertension, prior pacemaker, PVD who presented from OR today post brief cardiac arrest after induction.  He presented 11/28 with ALL and sepsis/osteomyelitis right lower extremity and was seen by ID and nephrology.  Vascular surgery had the patient in the OR for planned right femoropopliteal bypass for right SFA occlusion complicated by right foot ischemia and osteomyelitis.  Approximately 10 minutes after low-dose propofol and fentanyl for induction patient developed wide-complex rhythm and lost pulse while A-line being placed.  He received 1 minute of high-quality CPR and 1 mg epinephrine IV to obtain ROSC.  CVC and A-line were placed in the OR and he was transported to [facility].  Hypotension was responding epinephrine and epinephrine drip was started in OR.  Shortly after arrival CODE BLUE was called again for ventricular fibrillation.  He again received high-quality CPR and was defibrillated fairly quickly back into sinus rhythm.  Epinephrine drip was titrated.  Norepinephrine drip was added to it and while we are waiting 1 dose of Tobin-Synephrine 300 mcg administered which also helped with blood pressure.  Blood gases revealed excellent oxygenation and story alkalosis and minute ventilation was reduced.  EKG revealed left bundle/abnormal EKG consistent with possible STEMI.  Code STEMI was called and he was taken to the cardiac Cath Lab where I reviewed the case with the interventionalists.  Subsequent angiogram did not reveal lesion that required intervention and LV gram suggested Takotsubo syndrome.  The cause transferred back to [facility] on epinephrine and norepinephrine infusions.  Follow-up ABG and Cath Lab showed excellent oxygenation and normal ventilation.  Patient started to spontaneously move all extremities but not follow commands.  " "Patient started having runs of sustained ventricular tachycardia and he is received amiodarone bolus and started on amiodarone infusion.\" - Dr. Mcneil 12/5 12/7 - extubated, weaned off levophed, remains on amio gtt    Interval Problem Update  Reviewed last 24 hour events:   - extubated without complications   - weaned off levophed gtt   - amio gtt changed to PO, starting BB today   - AF, nl WBC   - NSR   - AAOx4, L facial droop with forehead involvement c/w Bell's palsy   - awaiting SLP/PT/OT   - -160   - completing unasyn   - Hgb down to 7.2   - plts down to 116   - Na up to 148 --> fluid bolus ordered by resident   - low K   - improving ALL with good UOP    Review of Systems  Review of Systems   Constitutional:  Negative for chills, fever and malaise/fatigue.   HENT:  Negative for congestion and sore throat.    Eyes:  Negative for blurred vision.   Respiratory:  Negative for cough, sputum production and shortness of breath.    Cardiovascular:  Positive for chest pain. Negative for palpitations and leg swelling.   Gastrointestinal:  Negative for abdominal pain, nausea and vomiting.   Genitourinary:  Negative for dysuria.   Musculoskeletal:  Positive for back pain and joint pain. Negative for myalgias.   Skin:  Negative for rash.   Neurological:  Negative for dizziness, sensory change, speech change and headaches.   Endo/Heme/Allergies:  Does not bruise/bleed easily.   Psychiatric/Behavioral:  Negative for depression. The patient has insomnia. The patient is not nervous/anxious.    All other systems reviewed and are negative.     Vital Signs for last 24 hours   Temp:  [35.8 °C (96.5 °F)-36.6 °C (97.8 °F)] 36.1 °C (97 °F)  Pulse:  [60-84] 71  Resp:  [7-32] 12  BP: ()/(50-72) 137/67  SpO2:  [90 %-100 %] 97 %    Respiratory Information for the last 24 hours   1 lpm n/c    Physical Exam   Physical Exam  Vitals and nursing note reviewed.   Constitutional:       General: He is awake. He is not in acute " distress.     Appearance: He is well-developed and normal weight.      Interventions: Nasal cannula in place.   HENT:      Head: Normocephalic and atraumatic.      Comments: Left facial droop with forehead involvement consistent with Bell's palsy     Nose: Nose normal. No congestion.      Mouth/Throat:      Mouth: Mucous membranes are moist.   Eyes:      General: No scleral icterus.     Conjunctiva/sclera: Conjunctivae normal.      Pupils: Pupils are equal, round, and reactive to light.   Neck:      Vascular: No JVD.      Comments: Right IJ central venous catheter without surrounding hematoma  Cardiovascular:      Rate and Rhythm: Normal rate and regular rhythm. No extrasystoles are present.     Chest Wall: PMI is displaced.      Pulses: No decreased pulses.      Heart sounds: Normal heart sounds. No murmur heard.     Comments: Poor circulation in lower extremity, palpable left pacemaker in chest, chest wall tenderness likely due to CPR  Pulmonary:      Effort: Pulmonary effort is normal. No accessory muscle usage or respiratory distress.      Breath sounds: No stridor. Examination of the left-lower field reveals rhonchi. Rhonchi present. No wheezing.      Comments: Hoarse voice with weak cough but able to speak in full sentences  Abdominal:      General: Bowel sounds are normal. There is no distension.      Palpations: Abdomen is soft.      Tenderness: There is no abdominal tenderness. There is no guarding or rebound.   Genitourinary:     Comments: Kimball catheter in place  Musculoskeletal:         General: No tenderness.      Cervical back: Neck supple. No tenderness.      Right lower leg: No edema.      Left lower leg: No edema.      Comments: Right lower extremity with poor perfusion and wounds, cool to the touch, first 3 digits with wounds   Skin:     General: Skin is warm and dry.      Capillary Refill: Capillary refill takes less than 2 seconds.      Coloration: Skin is not pale.   Neurological:      General:  No focal deficit present.      Mental Status: He is alert and oriented to person, place, and time.      Cranial Nerves: No cranial nerve deficit.      Sensory: No sensory deficit.      Motor: No weakness.   Psychiatric:         Mood and Affect: Mood normal.         Behavior: Behavior normal. Behavior is cooperative.       Medications  Current Facility-Administered Medications   Medication Dose Route Frequency Provider Last Rate Last Admin    potassium chloride (KCL) ivpb 10 mEq  10 mEq Intravenous Once Jeremy M Gonda, M.D. 100 mL/hr at 12/08/22 0602 10 mEq at 12/08/22 0602    lactated ringer BOLUS infusion  250 mL Intravenous Once Jennifer Falcon D.O.        lidocaine (LIDODERM) 5 % 1 Patch  1 Patch Transdermal Q24HR Jeremy M Gonda, M.D.   1 Patch at 12/07/22 1042    amiodarone (Cordarone) tablet 200 mg  200 mg Oral DAILY Clement Ji M.D.        amiodarone (Nexterone) 360 mg/200 mL infusion  0.5 mg/min Intravenous Continuous Clement Ji M.D. 17 mL/hr at 12/07/22 2238 0.5 mg/min at 12/07/22 2238    Respiratory Therapy Consult   Nebulization Continuous RT Chato Mcneil M.D.        MD Alert...ICU Electrolyte Replacement per Pharmacy   Other PHARMACY TO DOSE Chato Mcneil M.D.        ipratropium-albuterol (DUONEB) nebulizer solution  3 mL Nebulization Q2HRS PRN (RT) Chato Mcneil M.D.        levETIRAcetam (Keppra) injection 500 mg  500 mg Intravenous Q12HRS Chato Mcneil M.D.   500 mg at 12/08/22 0540    K+ Scale: Goal of 4.5  1 Each Intravenous Q6HRS Chato Mcneil M.D.   1 Each at 12/08/22 0600    insulin regular (HumuLIN R,NovoLIN R) injection  2-9 Units Subcutaneous Q6HRS Chato Mcneil M.D.   2 Units at 12/05/22 1757    And    dextrose 10 % BOLUS 25 g  25 g Intravenous Q15 MIN PRN Chato Mcneil M.D.        acetaminophen (Tylenol) tablet 650 mg  650 mg Enteral Tube Q6HRS PRN Chato Mcneil M.D.   650 mg at 12/05/22 1150    atorvastatin (LIPITOR) tablet 40 mg  40 mg  Enteral Tube Q EVENING Chato Mcneil M.D.   40 mg at 12/06/22 1751    ondansetron (ZOFRAN ODT) dispertab 4 mg  4 mg Enteral Tube Q4HRS PRN Chato Mcneil M.D.        senna-docusate (PERICOLACE or SENOKOT S) 8.6-50 MG per tablet 2 Tablet  2 Tablet Enteral Tube BID Chato Mcneil M.D.   2 Tablet at 12/07/22 0600    And    polyethylene glycol/lytes (MIRALAX) PACKET 1 Packet  1 Packet Enteral Tube QDAY PRN Chato Mcneil M.D.   1 Packet at 12/07/22 0601    And    magnesium hydroxide (MILK OF MAGNESIA) suspension 30 mL  30 mL Enteral Tube QDAY PRN Chato Mcneil M.D.        And    bisacodyl (DULCOLAX) suppository 10 mg  10 mg Rectal QDAY PRN Chato Mcneil M.D.        gabapentin (NEURONTIN) capsule 100 mg  100 mg Enteral Tube BID Chato Mcneil M.D.   100 mg at 12/07/22 0600    aspirin (ASA) chewable tab 81 mg  81 mg Enteral Tube DAILY Chato Mcneil M.D.   81 mg at 12/07/22 0600    ampicillin/sulbactam (UNASYN) 3 g in  mL IVPB  3 g Intravenous Q6HRS AKIRA Jeronimo M.D.   Stopped at 12/08/22 0555    heparin injection 5,000 Units  5,000 Units Subcutaneous Q12HRS AKIRA Jeronimo M.D.   5,000 Units at 12/08/22 0524    Pharmacy Consult Request ...Pain Management Review 1 Each  1 Each Other PHARMACY TO DOSE Deshawn Toussaint M.D.        ondansetron (ZOFRAN) syringe/vial injection 4 mg  4 mg Intravenous Q4HRS PRN Deshawn Toussaint M.D.           Fluids    Intake/Output Summary (Last 24 hours) at 12/8/2022 0643  Last data filed at 12/8/2022 0602  Gross per 24 hour   Intake 1040.02 ml   Output 1200 ml   Net -159.98 ml         Laboratory  Recent Labs     12/06/22  0243 12/06/22  1701 12/07/22  0311   ISTATAPH 7.575* 7.415 7.519*   ISTATAPCO2 25.0* 34.6 26.0   ISTATAPO2 177* 117* 127*   ISTATATCO2 24 23 22   [id number] 100* 99 99   ISTATARTHCO3 23.2 22.2 21.2   ISTATARTBE 1 -2 -2   ISTATTEMP 36.4 C 98.0 F 97.1 F   ISTATFIO2 40 30 30   ISTATSPEC Arterial Arterial Arterial   ISTATAPHTC 7.585*  7.420 7.532*   [id number] 174* 115* 122*           Recent Labs     12/06/22  0620 12/06/22  1130 12/07/22  0552 12/07/22  1232 12/07/22  1719 12/07/22  2357 12/08/22 0422   SODIUM 146*  --  146*  --   --   --  148*   POTASSIUM 3.9   < > 3.7   < > 3.8 3.8 3.8   CHLORIDE 113*  --  115*  --   --   --  118*   CO2 21  --  19*  --   --   --  22   BUN 31*  --  33*  --   --   --  22   CREATININE 1.30  --  1.20  --   --   --  1.02   MAGNESIUM 2.1  --  2.0  --   --   --  2.0   PHOSPHORUS 3.5  --  2.8  --   --   --  2.6   CALCIUM 8.2*  --  7.7*  --   --   --  7.6*    < > = values in this interval not displayed.       Recent Labs     12/06/22 0620 12/07/22 0552 12/08/22 0422   ALTSGPT  --  50  --    ASTSGOT  --  82*  --    ALKPHOSPHAT  --  83  --    TBILIRUBIN  --  0.6  --    GLUCOSE 157* 130* 86       Recent Labs     12/06/22 0620 12/07/22 0552 12/08/22 0422   WBC 11.0* 9.3 5.9   NEUTSPOLYS 67.30 70.50 73.00*   LYMPHOCYTES 20.30* 20.20* 17.50*   MONOCYTES 10.80 7.60 8.00   EOSINOPHILS 0.10 0.20 0.30   BASOPHILS 0.30 0.20 0.20   ASTSGOT  --  82*  --    ALTSGPT  --  50  --    ALKPHOSPHAT  --  83  --    TBILIRUBIN  --  0.6  --        Recent Labs     12/06/22 0620 12/07/22 0552 12/08/22 0422   RBC 2.59* 2.28* 2.13*   HEMOGLOBIN 8.8* 7.8* 7.2*   HEMATOCRIT 26.4* 23.8* 23.0*   PLATELETCT 179 168 116*         Imaging  X-Ray:  I have personally reviewed the images and compared with prior images. and No film today  CT:    Reviewed    Assessment/Plan  * Cardiac arrest (HCC)- (present on admission)  Assessment & Plan  Cardiac arrest with anesthesia induction 12/5 (WCT, Vfib s/ defibrillation) S/p cath showing non-obs CAD, EF 30%/LVDP 60 c/w Takotsubo CM  Cardiology consulted  Continue amiodarone oral, begin Coreg today  Optimize electrolytes, continuous telemetry monitoring    Peripheral vascular disease (HCC)  Assessment & Plan  Significant vascular disease, Right SFA occlusion  Vascular surgery consulted with planned right  femoropopliteal bypass 12/5 --> on hold for now  Continue antiplatelets, statin  Monitor perfusion    Shock (HCC)  Assessment & Plan  Post cardiac arrest, cardiogenic in nature - improved  S/p norepinephrine drip (discontinued 12/7)    Acute respiratory failure ([facility])  Assessment & Plan  Intubated 12/5 - 12/7  Strongly encourage incentive spirometry, mobilization  RT/O2 protocol  Titrate oxygen therapy to goal SPO2 greater than 92%    Congestive heart failure (CHF) ([facility])- (present on admission)  Assessment & Plan  History of, 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%)  Begin Coreg, ACE inhibitor  Cardiology consulted  Hold diuresis for now    History of seizures- (present on admission)  Assessment & Plan  Continue Keppra  Seizure precautions    Acute encephalopathy- (present on admission)  Assessment & Plan  Admitted with encephalopathy felt secondary to sepsis/dehydration - improving today  Avoid sedatives  Maintain sleep/wake cycle, reorient    Osteomyelitis of right lower extremity (HCC)- (present on admission)  Assessment & Plan  ID consulted, continue Unasyn x10 days  Planned revascularization right lower extremity in a few weeks after cardiac recovery  Vascular surgery and wound care consulted  As needed analgesics for pain    History of transcatheter aortic valve replacement (TAVR)- (present on admission)  Assessment & Plan  History of TAVR  Strict blood pressure and fluid management    ALL (acute kidney injury) ([facility])- (present on admission)  Assessment & Plan  Acute on CKD - worsened postcardiac arrest secondary to ATN, nonoliguric --> improving again today  Nephrology consulted and signed off  Avoid nephrotoxins  Monitor creatinine, urine output, electrolytes  Small fluid bolus today given n.p.o. status and mild dehydration with hypernatremia    Hyperlipidemia- (present on admission)  Assessment & Plan  Statin    Essential hypertension- (present on admission)  Assessment & Plan  Uncontrolled  Begin Coreg and  lisinopril today, hold amlodipine for now    Difficulty speaking  Assessment & Plan  SLP eval postextubation with diet per assessment    Thrombocytopenia (HCC)  Assessment & Plan  Mild, Monitor    Bell's palsy  Assessment & Plan  With associated L facial droop, CT head neg for AICH 12/7  Supportive care    Hypokalemia  Assessment & Plan  Daily repletion and monitoring    Hyperglycemia  Assessment & Plan  Improving  Continue insulin sliding scale with goal glucose between 120 and 180  Diabetic diet when cleared by SLP    Macrocytic anemia- (present on admission)  Assessment & Plan  Slowly worsening  Daily CBC with conservative transfusion strategy (hemoglobin goal greater than 7)       VTE:  Lovenox  Ulcer: Not Indicated  Lines: Central Line  to be removed today, Arterial Line  to be removed today, and Kimball Catheter  to be removed today    I have performed a physical exam and reviewed and updated ROS and Plan today (12/8/2022). In review of yesterday's note (12/7/2022), there are no changes except as documented above.     Discussed patient condition and risk of morbidity and/or mortality with Hospitalist, RN, RT, Pharmacy, [employer] resident, Charge nurse / hot rounds, Patient, and cardiology and vascular surgery. Critical care services will sign off at this time.  Please call with any questions or concerns.    Please note that this dictation was created using voice recognition software. I have made every reasonable attempt to correct obvious errors, but there may be errors of grammar and possibly content that I did not discover before finalizing the note.

## 2022-12-08 NOTE — THERAPY
Speech Language Pathology   Fiberoptic Endoscopic Evaluation of Swallow     Patient Name: Gilmer Mae  AGE:  82 y.o., SEX:  male  Medical Record #: [id number]  Today's Date: 12/8/2022     Precautions  Precautions: Fall Risk, Swallow Precautions ( See Comments)    Current Method of Nutrition   NPO until cleared by speech pathology    Pertinent Information:  Affect/Behavior: Flat, Somnolent  Oxygen Requirements:  4 L Nasal Cannula  Feeding Tube: None  Dentition: Good  Factor(s) Affecting Performance:  Lethargy/somnolence    Discussed the risks, benefits, and alternatives of the FEES procedure. Patient/family acknowledged and agreed to proceed.     Assessment  Flexible Endoscopic Evaluation of Swallowing (FEES) completed at bedside today. The endoscope was passed transnasally via right nare to evaluate the anatomy and physiology of swallowing. Pt tolerated the procedure with no apparent distress.    Anatomic Findings:  b/l arytenoid edema , b/l space occupying lesions on the posterior aspect of the TVFs consistent w/ endotracheal intubation  Vocal Fold Motion: Bilateral movement; hyperfunction   Secretion Management: excess secretions in valleculae, cleared w/ ice chips  PO trials: ice chips, thin liquids, mildly thick liquids, liquidized, puree, soft & bite sized, regular solids, mixed consistency      Consistency PAS Score Timing Comments   Ice Chips 1 N/A    Thin Liquid 8 During swallow (inferred)    Mildly Thick Liquid 3 Pre swallow and Post swallow    Liquidized 3 During swallow (inferred) High penetration that cleared w/ cleansing swallow   Puree 8 During swallow (inferred) Cued cough ineffective to clear         Soft & Bite Sized 1 N/A    Regular 1 N/A    Mixed Consistency 2 Pre swallow      Penetration-Aspiration Scale (PAS)  1     No contrast enters airway  2     Contrast enters the airway, remains above the vocal folds, and is ejected from the airway (not seen in the airway at the end of the swallow).  3      Contrast enters the airway, remains above the vocal folds, and is not ejected from the airway (is seen in the airway after the swallow).  4     Contrast enters the airway, contacts the vocal folds, and is ejected from the airway.  5     Contrast enters the airway, contacts the vocal folds, and is not ejected from the airway  6     Contrast enters the airway, crosses the plane of the vocal folds, and is ejected from the airway.  7     Contrast enters the airway, crosses the plane of the vocal folds, and is not ejected from the airway despite effort.  8     Contrast enters the airway, crosses the plane of the vocal folds, is not ejected from the airway and there is no response to aspiration.    Oral phase:  Intact labial seal with no anterior bolus loss. Adequate suction around a straw. Slow, inefficient mastication of regular solids, though mastication was complete. Loss of orolingual bolus control w/ thins via cup x2 with spillage to the laryngeal vestibule.     Pharyngeal phase:  Timely onset of the pharyngeal swallow with initial bolus presentation. Delayed onset of f/u cleansing swallows w/ high penetration of mildly thick liquids after the swallow. Tongue base retraction was reduced, resulting in a pattern of mild vallecular residue w/ liquids and moderate vallecular residue w/ solids; vallecular residue was actually severe w/ pudding. Effortful swallow was effective to reduce severe vallecular pudding residue to moderate. Pharyngeal constriction was adequate. Pharyngeal shortening was reduced with mild pyriform sinus residue across intake. Laryngeal vestibule closure was reduced, resulting in a pattern of penetration during the swallow and aspiration events x2 with thin liquids and aspiration of pudding x1. Aspiration events were mostly silent with cued cough ineffective to clear. Pt did sense macro aspiration of thins via cup but reflexive cough was ineffective to clear.     Clinical Impressions  The pt  "presents with a moderate-severe oropharyngeal dysphagia, likely acute related to endotracheal intubation and associated deconditioning from acute illness. Diet modification, compensatory strategies and swallowing exercises are indicated to mitigate risks of aspiration. Swallow prognosis for return to a regular diet is good w/ ongoing medical management and dysphagia exercises, though suspect pt will need a repeat instrumental swallow study prior to diet advancement.     Recommendations  Liquidized/mildly thick diet  2.  Swallowing Instructions & Precautions:   Supervision: 1:1 feeding with constant supervision  Positioning: Fully upright and midline during oral intake  Medication: crush w/ applesauce  Strategies: Small bites/sips, Slow rate of intake, Multiple swallows (x 2-3) per bite/sip , Effortful swallow  Oral Care: after meals    Plan    Recommend Speech Therapy 3 times per week until therapy goals are met for the following treatments:  Dysphagia Training and Patient / Family / Caregiver Education.    Discharge Recommendations: Recommend post-acute placement for additional speech therapy services prior to discharge home       Objective       12/08/22 1209   Vitals   O2 (LPM) 4   O2 Delivery Device Nasal Cannula   Pain 0 - 10 Group   Therapist Pain Assessment Post Activity Pain Same as Prior to Activity;Nurse Notified;0   Patient / Family Goals   Patient / Family Goal #1 \"that's good\" - water   Goal #1 Outcome Goal not met   Short Term Goals   Short Term Goal # 1 Patient will participate in pre-feeding trials with SLP only with no overt s/sx of aspiration   Goal Outcome # 1 Goal met, new goal added   Short Term Goal # 1 B  Patient will consume meals of liquidized solids and mildly thick liquids with no s/sx of aspiration given min cues for safe swallow precautions.   Short Term Goal # 2 Patient will complete BoT resistance, laryngeal elevation exercises x15 reps per session given min cues.     "

## 2022-12-08 NOTE — PROGRESS NOTES
Cardiology Follow Up Progress Note    Date of Service  12/8/2022    Attending Physician  BRYAN Pak*    Chief Complaint   Presented 11/28/2022 with confusion and altered mental status.  Found ALL, severe PVD.    Cardiology consultation 12/5 for VT/V. fib cardiac arrest when presented in OR post anesthesia induction.  Vascular surgery aborted, plan was for right femoral popliteal bypass for right SFA occlusion complicated by right foot ischemia and osteomyelitis.    HPI  Gilmer Mae is a 82 y.o. male with TAVR on 8/2/2022 by Dr. Wolfe, PPM, PVD, hypertension, hyperlipidemia admitted 11/28/2022 with encephalopathy.      Interim Events    12/7/2022-extubated.  Transferred to telemetry 12/8/2022.  Plan for limited echo tomorrow.  Pacemaker in situ.  Device interrogated 12/7/2022, atrial -paced ( >90% ) ,functioning well..  Anemia, worsening H&H 7.2/23, continue daily CBC.      Review of Systems  Review of Systems   Respiratory:  Negative for apnea, cough, choking, chest tightness and shortness of breath.      Vital signs in last 24 hours  Temp:  [35.8 °C (96.5 °F)-37.3 °C (99.1 °F)] 37.3 °C (99.1 °F)  Pulse:  [69-84] 81  Resp:  [7-32] 18  BP: (133-157)/(65-82) 146/82  SpO2:  [90 %-98 %] 92 %    Physical Exam  Physical Exam  Cardiovascular:      Pulses: Normal pulses.      Comments: >90% atrial-paced  Skin:     General: Skin is warm.   Psychiatric:         Mood and Affect: Mood normal.       Lab Review  Lab Results   Component Value Date/Time    WBC 5.9 12/08/2022 04:22 AM    RBC 2.13 (L) 12/08/2022 04:22 AM    HEMOGLOBIN 7.2 (L) 12/08/2022 04:22 AM    HEMATOCRIT 23.0 (L) 12/08/2022 04:22 AM    .0 (H) 12/08/2022 04:22 AM    MCH 33.8 (H) 12/08/2022 04:22 AM    MCHC 31.3 (L) 12/08/2022 04:22 AM    MPV 11.1 12/08/2022 04:22 AM      Lab Results   Component Value Date/Time    SODIUM 148 (H) 12/08/2022 04:22 AM    POTASSIUM 3.8 12/08/2022 04:22 AM    CHLORIDE 118 (H) 12/08/2022 04:22 AM    CO2 22  12/08/2022 04:22 AM    GLUCOSE 86 12/08/2022 04:22 AM    BUN 22 12/08/2022 04:22 AM    CREATININE 1.02 12/08/2022 04:22 AM      Lab Results   Component Value Date/Time    ASTSGOT 82 (H) 12/07/2022 05:52 AM    ALTSGPT 50 12/07/2022 05:52 AM     Lab Results   Component Value Date/Time    CHOLSTRLTOT 117 09/11/2019 03:17 AM    LDL 52 09/11/2019 03:17 AM    HDL 48 09/11/2019 03:17 AM    TRIGLYCERIDE 83 09/11/2019 03:17 AM    TROPONINT 33 (H) 11/29/2022 11:38 AM       No results for input(s): NTPROBNP in the last 72 hours.    Cardiac Imaging and Procedures Review  EKG:  My personal interpretation of the EKG dated 12/5/2022   is a left bundle branch block which is new compared to admission EKG.      Echocardiogram: 12/5/2022  Moderately reduced left ventricular systolic function.   The left ventricular ejection fraction is visually estimated to be 30%.   Wall motion preserved at the base with dyskinetic apex.  Contrast swirling at the apex with no left ventricular apical thrombus.  No evidence of valvular abnormality based on Doppler evaluation.   Normal inferior vena cava size without inspiratory collapse.        Cardiac Catheterization:     12/5/22  IMPRESSION:  1.  Nonobstructive coronary artery disease with mild proximal left main stenosis, MLA 10-11 mm².  2. Reduced estimated left ventricular ejection fraction 30% with cineangiographic signs of Takotsubo cardiomyopathy with no evidence of noticeable LV apical thrombus.  3.  Mildly elevated resting LVEDP at 60 mmHg with no significant transaortic gradient on pullback     RECOMMENDATIONS:  Ongoing primary ASCVD preventive measures  Stress-induced cardiomyopathy management with goal-directed medical therapy  Ongoing care per ICU and cardiology consultant teams        Imaging  Chest X-Ray:  Stable mild cardiac enlargement without edema       Stress Test:  n/a      Assessment/Plan    #Presented with acute encephalopathy 11/28//22.  [place] to be secondary to  sepsis/dehydration.  #V. ib/V. tach cardiac arrest 12/5/2022 with anesthesia induction.  #Required intubation 12/5-12/7.  #Nonobstructive CAD based on coronary angiography 12/5/2022.  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  #Left bundle branch block on recent EKG 12/5/22.  #Planned right femoropopliteal bypass 12/5/2022 on hold.  #Right lower extremity osteomyelitis, appreciate ID consult.  #S/p TAVR 8/2022.  #Hypertension.  # ALL, resolved.  #Bell's palsy      Recommendations  -Currently on amiodarone 200 mg daily.  -Continue aspirin 81 mg, atorvastatin 40 mg daily.  -Continue carvedilol 6.25 mg twice daily.  -Continue lisinopril 10 mg daily.  -Holding diuresis.  -On Unasyn every 6 hours.  -Follow-up on limited echo 12/9/2022.    Cardiology will follow along.    I personally spent a total of 10 minutes which includes face-to-face time and non-face-to-face time spent on preparing to see the patient, reviewing hospital notes and tests, obtaining history from the patient, performing a medically appropriate exam, counseling and educating the patient, ordering medications/tests/procedures/referrals as clinically indicated, and documenting information in the electronic medical record.     Thank you for allowing me to participate in the care of this patient.  I will continue to follow this patient    Please contact me with any questions.    MATT Arora.   Cardiologist, [facility]  [phone]

## 2022-12-08 NOTE — PROGRESS NOTES
1031 Talked to Rogelio from [employer] regarding patient's pacemaker.Per rep, interrogation was done yesterday and report showed no V pacing just atrial pacing at 90%. Dr. Ji updated and he said patient's rhythm is Accelerated junctional with BBB.    1000 Gave report to Tele 7 RN Hien, POC discussed.    1025 Pt in pain but unable to swallow. Updated Dr. Wall, new orders placed and also ordered okay to d/c k+ scale.    1140 Transferred pt to T7 with all personal belongings not limiting to wallet. Pt A&Ox4 just responds slowly, fatigued. Per daughter Maday, it has been like that since cardiac arrest but not baseline. CT head resulted yesterday, no acute changes.

## 2022-12-08 NOTE — PROGRESS NOTES
"Hospital Medicine Daily Progress Note    Date of Service  12/8/2022    Chief Complaint  Gilmer Mae is a 82 y.o. male admitted 11/28/2022 with altered mental status    Hospital Course    As per Dr. Gonda's note  \"  83 y/o with PMH HTN, HLD, sick sinus syndrome (s/p pacemaker), seizures (most recent episode in 2019, on keppra), severe aortic stenosis (s/p TAVR 8/2/2022 at [facility]) and PAD (s/p vascular surgery on femoral artery in 2022) who presented to ED via EMS on 11/28/2022 for LOC, b/l LE edema and pain.  History limited due to patient’s altered mental status, A&Ox2 (self, place) so most history obtained from chart review and daughter Kendra. Patient lives with wife and son. Patient says he is having pain “all over” especially his right leg and says he got surgery on his right leg “2 weeks ago but they never finished.” On arrival to ED, patient was lethargic and confused. RLE especially R foot exquisitely tender, nonhealing ulcer with some erythema and eschar present on bottom of R heel. Ultrasound of leg showed diffuse atherosclerotic plaque throughout LE, absent flow from proximal to distal femoral artery, reconstitution monophasic flow via collaterals at distal femoral artery, absent flow in distal portion of tibial artery. Xray of R foot showed potentially periosteal changes at inferior aspect of calcaneus as well as significant vascular calcification; concern for underlying osteomyelitis secondary to ischemic ulcer. Empirically placed on IV linezolid and IV unasyn however discontinued linezolid as blood cx, wound cx, MRSA and staph aureus PCR negative as well as lack of purulence from wound. Seen by LPS and vascular surgery- scheduled for OR on 12/5 with Dr. Rdz to attempt RLE revascularization in order to avoid amputation.\"    Patient was extubated on December 7, 2022 and he is weaned off from Levophed and amiodarone gtt.  On December 8, 2022 intensivist requested to transfer care to hospitalist " service.    Interval Problem Update    12/08/22    I evaluated and examined him at the bedside.  He expressed that he is feeling better and I ordered morphine for him and his pain is under better control.  I discussed CODE STATUS with him and he expressed that he would like to be DNR and after further discussion he requested that I should call his daughter to discuss about CODE STATUS.  I called patient's daughter and she reported that she is on her way to the hospital.  I reevaluated him again at the bedside and discussed plan of care with patient's 2 daughters and patient's wife.  He expressed that he would like to be full code and he does not want to sign DNR.  If his pain is under control.  I explained at length regarding full code, DNR and hospice care to patient and his daughter at the bedside and answered all of their questions.  As per patient's wishes I continued full CODE STATUS at this time.  Currently he is currently stable and requiring 4 L of oxygen to maintain oxygen saturation.  Currently he is on Unasyn and p.o. amiodarone.  Labs showed hemoglobin of 7.2 with normal white blood cell count.  He found to have platelet count of 116.  He also found to have hyponatremia with current sodium level of 148, chloride of 118.  He underwent CT head without contrast yesterday did not show any acute abnormalities.      He found to have hyponatremia I started him on D5W use IV fluid with caution as his repeat sodium level still came back higher.    I have discussed this patient's plan of care and discharge plan at IDT rounds today with Case Management, Nursing, Nursing leadership, and other members of the IDT team.    Consultants/Specialty  cardiology, critical care, infectious disease, and nephrology    Code Status  Full Code    Disposition  Patient is not medically cleared for discharge.   Anticipate discharge to to skilled nursing facility.  I have placed the appropriate orders for post-discharge  needs.    Review of Systems  Review of Systems   Constitutional:  Negative for chills, fever and weight loss.   HENT:  Negative for hearing loss and tinnitus.    Eyes:  Negative for blurred vision, double vision, photophobia and pain.   Respiratory:  Negative for cough, sputum production and shortness of breath.    Cardiovascular:  Negative for chest pain, palpitations, orthopnea and leg swelling.   Gastrointestinal:  Negative for abdominal pain, constipation, diarrhea, nausea and vomiting.   Genitourinary:  Negative for dysuria, frequency and urgency.   Musculoskeletal:  Positive for myalgias. Negative for back pain, joint pain and neck pain.   Skin:  Negative for rash.   Neurological:  Negative for dizziness, tingling, tremors, sensory change, speech change, focal weakness and headaches.   Psychiatric/Behavioral:  Negative for hallucinations and substance abuse.    All other systems reviewed and are negative.     Physical Exam  Temp:  [35.8 °C (96.5 °F)-37.3 °C (99.1 °F)] 37.3 °C (99.1 °F)  Pulse:  [69-84] 81  Resp:  [7-32] 18  BP: (133-157)/(65-82) 146/82  SpO2:  [90 %-98 %] 92 %    Physical Exam  Vitals reviewed.   Constitutional:       General: He is not in acute distress.     Appearance: He is ill-appearing.   HENT:      Head: Normocephalic and atraumatic. No contusion.      Right Ear: External ear normal.      Left Ear: External ear normal.      Nose: Nose normal.      Mouth/Throat:      Pharynx: No oropharyngeal exudate.   Eyes:      General:         Right eye: No discharge.         Left eye: No discharge.      Pupils: Pupils are equal, round, and reactive to light.   Cardiovascular:      Rate and Rhythm: Normal rate and regular rhythm.      Heart sounds: No murmur heard.    No friction rub. No gallop.   Pulmonary:      Effort: Pulmonary effort is normal.      Breath sounds: No wheezing or rhonchi.   Abdominal:      General: Bowel sounds are normal. There is no distension.      Palpations: Abdomen is soft.       Tenderness: There is no abdominal tenderness. There is no rebound.   Musculoskeletal:         General: No swelling or tenderness. Normal range of motion.      Cervical back: No rigidity. No muscular tenderness.   Skin:     Coloration: Skin is not jaundiced.      Findings: Erythema and lesion (Right foot) present.   Neurological:      General: No focal deficit present.      Mental Status: He is alert and oriented to person, place, and time.      Cranial Nerves: No cranial nerve deficit.      Sensory: No sensory deficit.      Comments: He is alert and oriented and able to follow my commands and answer my questions.   Psychiatric:         Mood and Affect: Mood normal.       Fluids    Intake/Output Summary (Last 24 hours) at 12/8/2022 1237  Last data filed at 12/8/2022 0800  Gross per 24 hour   Intake 660.46 ml   Output 1115 ml   Net -454.54 ml       Laboratory  Recent Labs     12/06/22  0620 12/07/22  0552 12/08/22  0422   WBC 11.0* 9.3 5.9   RBC 2.59* 2.28* 2.13*   HEMOGLOBIN 8.8* 7.8* 7.2*   HEMATOCRIT 26.4* 23.8* 23.0*   .9* 104.4* 108.0*   MCH 34.0* 34.2* 33.8*   MCHC 33.3* 32.8* 31.3*   RDW 48.1 48.8 51.1*   PLATELETCT 179 168 116*   MPV 11.0 11.2 11.1     Recent Labs     12/06/22  0620 12/06/22  1130 12/07/22  0552 12/07/22  1232 12/07/22  1719 12/07/22  2357 12/08/22  0422   SODIUM 146*  --  146*  --   --   --  148*   POTASSIUM 3.9   < > 3.7   < > 3.8 3.8 3.8   CHLORIDE 113*  --  115*  --   --   --  118*   CO2 21  --  19*  --   --   --  22   GLUCOSE 157*  --  130*  --   --   --  86   BUN 31*  --  33*  --   --   --  22   CREATININE 1.30  --  1.20  --   --   --  1.02   CALCIUM 8.2*  --  7.7*  --   --   --  7.6*    < > = values in this interval not displayed.                   Imaging  CT-HEAD W/O   Final Result      1.  No acute intracranial hemorrhage.   2.  Similar chronic findings.   3.  Mild mucosal thickening of the inferior right maxillary sinus.         DX-CHEST-PORTABLE (1 VIEW)   Final Result       Stable mild cardiac enlargement without edema      DX-CHEST-PORTABLE (1 VIEW)   Final Result         1.  Hazy interstitial left lung base infiltrate, stable since prior study.   2.  Atherosclerosis      DX-ABDOMEN FOR TUBE PLACEMENT   Final Result      Orogastric tube tip at the proximal stomach.      EC-ECHOCARDIOGRAM COMPLETE W/ CONT   Final Result      DX-ABDOMEN FOR TUBE PLACEMENT   Final Result      NG tube tip projects over the stomach, and side-port projects 3 cm above the GE junction. It can be advanced further by approximately 7 cm.      DX-ABDOMEN FOR TUBE PLACEMENT   Final Result      NG tube tip projects over the GE junction. It can be advanced 8-10 cm      DX-CHEST-PORTABLE (1 VIEW)   Final Result      1.  Right central catheter tip is in the mid SVC. No pneumothorax.   2.  NGT tip is at the GE junction. It can be advanced 8 cm.   3.  Stable other findings.      DX-CHEST-PORTABLE (1 VIEW)   Final Result      1.  Well-positioned lines and tubes. No pneumothorax.   2.  Ill-defined left basilar opacity, chronic atelectasis/scarring or sequela of recent pneumonia.      CT-CTA AORTA-RO WITH & W/O-POST PROCESS   Final Result      1.  Extensive atherosclerotic vascular calcification involving the aorta and originating arteries. There is ectasia of the ascending thoracic aorta measured at 3.4 x 3.4 cm in diameter.      2.  Extensive atherosclerotic plaque involving the right common, superficial femoral and profunda arteries. There is occlusion of the right superficial femoral artery at the level of the proximal thigh. Popliteal artery is also occluded and there is    extensive atherosclerotic plaque involving the posterior tibial, anterior tibial and peroneal arteries. No definite continuous runoff vessel identified.      3.  Extensive atherosclerotic plaque involving the left common, profunda and superficial femoral and popliteal arteries with multiple areas of high-grade stenosis of the superficial  femoral and popliteal arteries. There is also extensive atherosclerotic    change of the posterior tibial, anterior tibial and peroneal arteries without a definite contiguous runoff vessel seen.      4.  High-grade stenosis involving the origin of the superior mesenteric artery.      5.  Occluded origin of the inferior mesenteric artery.      6.  Atherosclerotic plaque involving the right renal artery origin and the proximal right renal artery resulting in 50% diameter narrowing.      7.  Less than 50% diameter narrowing of the left renal artery origin.      8.  Ill-defined groundglass infiltrates within the right upper lobe possibly representing infectious process.      9.  Multiple hepatic cysts.      3D angiographic/MIP images of the vasculature confirm the vascular findings as described above.      US-VEIN MAPPING LOWER EXTREMITY UNILAT RIGHT   Final Result      DX-FOOT-COMPLETE 3+ RIGHT   Final Result      Likely acute osteomyelitis at the inferior calcaneus. MRI can be obtained as indicated.      MR-FOOT-W/O RIGHT   Final Result      1.  Very limited study due to extensive patient motion artifact and patient comfort preventing multiple sequences.      2.  No gross bony destructive change.      3.  Soft tissue edema.      US-EXTREMITY ARTERY LOWER UNILAT RIGHT   Final Result      US-EXTREMITY VENOUS LOWER UNILAT RIGHT   Final Result      US-ABDOMEN COMPLETE SURVEY   Final Result         1.  Gallbladder sludge, otherwise unremarkable gallbladder   2.  Hepatic cysts   3.  Bilateral echogenic kidneys with mildly thinned cortex, appearance favors medical renal disease      CT-HEAD W/O   Final Result      1.  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   2.  Mild cerebral atrophy. Age-appropriate.   3.  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   4.  No acute  findings are evident.         DX-CHEST-PORTABLE (1 VIEW)   Final Result      No evidence of acute cardiopulmonary process.      CL-LEFT HEART CATHETERIZATION WITH POSSIBLE INTERVENTION    (Results Pending)        Assessment/Plan  Advance care planning  Assessment & Plan  On December 8, 2022 I discussed CODE STATUS with him and he expressed that he would like to be DNR and after further discussion he requested that I should call his daughter to discuss about CODE STATUS.  I called patient's daughter and she reported that she is on her way to the hospital.  I reevaluated him again at the bedside and discussed plan of care with patient's 2 daughters and patient's wife.  He expressed that he would like to be full code and he does not want to sign DNR.  If his pain is under control.  I explained at length regarding full code, DNR and hospice care to patient and his daughter at the bedside and answered all of their questions.  As per patient's wishes I continued full CODE STATUS at this time.  Time spent: 28 minutes    Hypernatremia  Assessment & Plan  Patient found to have hypernatremia and sodium is trending up.  I started him on gentle IV fluid hydration with D5W.  Use IV fluid with caution.  I ordered repeat sodium level at 5 PM.    History of Bell's palsy  Assessment & Plan  With associated L facial droop, CT head neg for AIC 12/7  Supportive care    Thrombocytopenia (HCC)  Assessment & Plan  Mild, Monitor    Hypokalemia  Assessment & Plan  Continue to monitor and replace as needed.    Shock (HCC)  Assessment & Plan  Post cardiac arrest 12/5  - titrate pressor to maintain MAP>65  - monitor urine output and vital signs   - gentle IVF bolus today if unable to hydrate and pass bedside swallow  Now resolved off from pressors.    Congestive heart failure (CHF) (Formerly [facility])- (present on admission)  Assessment & Plan  - per chart review, patient has history of congestive heart failure  - c/w home aspirin 81 everyday, coreg 12.5mg  BID, atorvastatin 80mg qD, lisinopril 20, amlodipine 5  - Continue to hold HCTZ.    History of seizures- (present on admission)  Assessment & Plan  - per patient, last episode of seizure was in 2019  - c/w home keppra 500mg BID    Macrocytic anemia- (present on admission)  Assessment & Plan  - No known history of anemia, not on iron supplementation  - Most recent labs from 8/2022 showing baseline hemoglobin of 14.1 and MCV of 97.7  - pending anemia workup- iron panel, reticulocytes, vitamin B12, folate  - continue to monitor with daily CBC's  - Transfuse if hemoglobin <7.0. Type and screen ordered 11/30  I ordered repeat hemoglobin for this afternoon.    Acute encephalopathy- (present on admission)  Assessment & Plan  - Per family and chart review, patient is at baseline alert and oriented x4.  - Presented to emergency room on 11/28/2022 with confusion, noted to be alert and oriented x2 (self, place only)  - Altered mental status initially thought to be in setting of azotemia from acute kidney injury however continuing to have encephalopathy despite improving azotemia and improving kidney function  - Per nephrology, currently no emergent need for dialysis. Nephrology signed off 11/29.  - Continue to monitor and trend labs. Work with PT/OT inpatient, they recommend SNF post discharge.  Significantly improved.    Increased anion gap metabolic acidosis  Assessment & Plan  Resolved  - Baseline anion gap is 7.0 per most recent labs from 8/2022  - Anion gap on admission noted to be 19.0, improved to 17.0 after IV LR at 150 cc/hr  - Increased anion gap metabolic acidosis likely secondary to uremia from kidney damage  - Continue hydration with IV fluids and encourage PO intake  - Continue to monitor with daily labs    Osteomyelitis of right lower extremity (HCC)- (present on admission)  Assessment & Plan  - Given recent right groin access for TAVR, MARGARITA ordered-   - Pain controlled with tylenol 650mg q6 prn, IV dilaudid  0.5mg q4 prn, oxycodone 5-10mg q3 prn  - can consider lidocaine patch, diclofenac gel as needed  - U/S showed: Occlusion of RIGHT femoral artery with distal reconstitution, Occlusion of RIGHT posterior tibial artery,  Two vessel runoff to the ankle with monophasic flow  -12/1: Spoke with Dr. Medel who is partners with Dr. Quinones (who did patient's previous surgery). She says patient will be having pain for a long time but since this is not an emergency they will see him outpatient and Dr. Quinones will perform a re-anastomosis and other procedures as warranted. This procedure cannot currently be done as Dr. Quinones is currently on vacation and Dr. Medel doesn't have [facility] privileges as well as the fact that this type of surgery cannot even be done at [facility].   - PT/OT recommending SNF  - MRI foot attempted 12/2 however was incomplete as patient was unable to tolerate. Foot xray confirmed osteomyelitis to R calcaneus.  - 12/2: Per LPS, wound care orders for nursing was placed and he was provided with betadine dressing and offloading boot per LPS team.  - 12/3: Consulted LPS and vascular surgery due to concern for osteomyelitis of R heel- seen by Dr. Rdz who will take patient to OR in order to attempt revascularization to attempt to heal foot wound and avoid amputation. OR planned for 12/5 Monday.  - Consulted Dr. Turcios from infectious disease 12/3: believes osteomyelitis of heel will be difficult to cure and pt will need debridement, wound vac, etc however most likely poor outcome. Healing process also hindered due to impaired circulation evidence by MARGARITA. As there is no pus from wound, most likely ischemic ulcer. Linezolid discontinued 12/3, c/w just unasyn for now  - f/u blood cultures, wound cultures, NGTD  - CTA pending  - Vein mapping 12/4: Acute superficial thrombus in the greater saphenous vein near saphenofemoral junction and in the proxmal thigh. Vein wall thickening in the thigh; small caliber vein  throughout the thigh & calf. Vein is unsuitable for use as a bypass graft.  Continue IV antibiotics  Vascular surgery evaluated him and made recommendations.    Sepsis (HCC)- (present on admission)  Assessment & Plan  - This is Sepsis Present on admission  - SIRS criteria identified on my evaluation include: Leukocytosis, with WBC greater than 12,000  - Source is unknown, possibly abdominal etiology. US 11/29 showing US abdomen showing gallbladder sludge otherwise unremarkable, hepatic cysts, bilateral echogenic kidneys with mildly thinned cortex concerning for possible medical renal disease.  - Sepsis protocol initiated  - Fluid resuscitation ordered per protocol  - Crystalloid Fluid Administration: Fluid resuscitation ordered per standard protocol - 30 mL/kg per current or ideal body weight  - IV antibiotics as appropriate for source of sepsis- started on IV ceftriaxone, discontinued on 11/29  - Reassessment: I have reassessed the patient's hemodynamic status  - Blood cultures NGTD  - Urine cultures unremarkable  - Hemodynamically stable  - Uptrending WBC, WBC 14.4 today 11/29 from 13.9 on admission 11/28  - continue to monitor with daily CBC's  - No clear source of sepsis- Discontinued IV ceftriaxone and started on IV unasyn low dose (due to ALL) TID (11/29- ) pending further workup.   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    History of transcatheter aortic valve replacement (TAVR)- (present on admission)  Assessment & Plan  - per chart review, patient has history of severe aortic stenosis and underwent TAVR with Dr. Wolfe on 8/2/2022.   - most recent echo showed well seated TAVR with peak gradient of 12 mmHg and mean gradient of 7.5 mmHg and trace perivalvular leak.     ALL (acute kidney injury) (HCC)- (present on  admission)  Assessment & Plan  RESOLVED  - No noted history of chronic kidney disease, unclear creatinine baseline however recent kidney values appear normal- most recent labs from 8/3/2022 showing baseline Cr 1.0 and BUN 15.0.  - On admission, BUN/Cr ratio noted to be 122/4.67  - Patient is able to urinate, produced about 400cc dark urine in emergency room. No urinary symptoms such as dysuria, polyuria, retention.  - Urinalysis 11/28: +hyaline casts, +granular casts.  - Consulted nephrology- Per Dr. Hearn, currently unclear etiology of ALL however given hyaline casts on UA, volume depletion most likely. Obstructive uropathy was also considered given suprapubic tenderness on exam however renal/bladder US unremarkable for obstructions.   - Started on IV fluids at 125 cc/hr on 11/28, increased rate to 150 cc/hr on 11/29  - Most recent labs from 11/29 at 1500 showing improved renal function with BUN/Cr ratio of 85/1.63.  - Continue to monitor renal function with daily labs  - monitor urine output  - Kidney function improving as of 11/29 and patient nonoliguric, no need for dialysis. Nephrology signed off 11/29.  - Continue gentle IVF and trend Crt/BUN  - Improving- Cr trending down. Cr is 0.90 on 12/1 from 1.05 on 11/30 and 4.67 on admission 11/28.  - ALL resolved, Cr 0.78 on 12/2. D/c'ed renal diet and placed on regular diet to encourage patient PO intake  Now resolved.    Hyperlipidemia- (present on admission)  Assessment & Plan  - no lipid panel on file  - Continue atorvastatin    Essential hypertension- (present on admission)  Assessment & Plan  - at home, on lisinopril, hydrochlorithiazide, amlodipine, carvedilol.  - continue with home coreg 12.5 BID  - restarted on amlodipine 12/1 due to hypertension, continuing to hold HCTZ  - restarted lisinopril 20 on 12/2 due to continued hypertension     I discussed plan of care with bedside RN.    VTE prophylaxis: enoxaparin ppx    I have performed a physical exam and  reviewed and updated ROS and Plan today (12/8/2022). In review of yesterday's note (12/7/2022), there are no changes except as documented above.

## 2022-12-08 NOTE — ASSESSMENT & PLAN NOTE
Post cardiac arrest 12/5  - titrate pressor to maintain MAP>65  - monitor urine output and vital signs   ICD placed 12/13

## 2022-12-08 NOTE — CARE PLAN
The patient is Watcher - Medium risk of patient condition declining or worsening    Shift Goals  Clinical Goals: trend labs, vitals interagate pacer  Patient Goals: MAYA  Family Goals: MAYA    Progress made toward(s) clinical / shift goals:      Patient is not progressing towards the following goals:

## 2022-12-09 NOTE — PROGRESS NOTES
Report received from  Hien Mccann. Assumed pt care. Pt resting in bed. Pt A&O x 4. Fall precautions in place, call light and belongings within reach, bed in lowest position. No signs of distress.

## 2022-12-09 NOTE — DISCHARGE PLANNING
Received Choice form at 0923  Agency/Facility Name: [facility]  Referral sent per Choice form @ [phone]  Agency/Facility Name: [facility]  Spoke To: Tana  Outcome: Pt referral will remain pending until mentation clears and Pt follows commands. DPA to follow up at this time

## 2022-12-09 NOTE — PROGRESS NOTES
Dr Sweet notified, pt pain is not under control. Pt reported 8/10  pain in his right foot this am ,and did not have any significant relief after PRN IV Morphine 2mg. Per  Dr Sweet, new order placed for PRN Oxycodone 10mg for PAIN 4-6, oxycodone 20mg for pain 7-10, IV Hydromorphone for pain persist 1 hour post PO dose.

## 2022-12-09 NOTE — THERAPY
Speech Language Pathology  Daily Treatment     Patient Name: Gilmer Mae  Age:  82 y.o., Sex:  male  Medical Record #: [id number]  Today's Date: 12/9/2022     Precautions  Precautions: (P) Fall Risk, Swallow Precautions ( See Comments)    Assessment    Patient seen this date for dysphagia management. Per RN, patient with pain throughout day and drowsy. Patient initially conversing with RN but after medication administration this AM, has not been speaking. Patient keeping eyes closed but appropriate responding with head nod/shake in conversation and nonverbally interactive. Positioned to upright in bed. Son, Gilmer, at bedside. Discussed FEES recommendations with son. Son exited prior to completion of session.     PO presentations of MT2 and LQ3 from lunch tray. Added thickener to LQ peas to achieve appropriate consistency - other items appropriately thick. Adequate oral bolus containment, complete and timely AP transfer. Single strong and wet cough after completion of session. Vocal quality unable to be assessed as patient did not vocalize this date. Two to three swallows completed per bolus independently, in alignment with FEES recommendations. Patient required significant encouragement to accept PO throughout entirety of session, thus focus of session was on PO intake with implementation of swallow strategies rather than exercises. Patient appears safe to continue on current diet with 1:1 feeding assist and strict adherence to swallowing strategies. Oral care provided following completion of meal.     Of note, patient with Glucerna beverage on tray, which is unable to be thickened. Discussed with LILI Bender, who suggested Boost Glucose Control. Boost Glucose Control is able to be thickened, but has special thickening instructions: To achieve mildly thick, add one mildly thick packet to 8 oz of Boost Glucose Control.     Recommendations  Liquidized/mildly thick liquid diet  2.  Swallowing Instructions & Precautions:  "  Supervision: 1:1 feeding with constant supervision  Positioning: Fully upright and midline during oral intake  Medication: crush w/ applesauce  Strategies: Small bites/sips, Slow rate of intake, Multiple swallows (x 2-3) per bite/sip , Effortful swallow  Oral Care: after meals         Plan    Treatment plan modified to 4 times per week until therapy goals are met for the following treatments:  Dysphagia Training.    Discharge Recommendations: (P) Recommend post-acute placement for additional speech therapy services prior to discharge home     Objective       12/09/22 1515   Charge Group   SLP Swallowing Dysfunction Treatment Swallowing Dysfunction Treatment   Total Treatment Time   SLP Time Spent Yes   SLP Swallowing Dysfunction Treatment (Mins) 30   Precautions   Precautions Fall Risk;Swallow Precautions ( See Comments)   Vitals   O2 (LPM) 4   O2 Delivery Device Silicone Nasal Cannula   Pain 0 - 10 Group   Therapist Pain Assessment Prior to Activity   Non Verbal Descriptors   Non Verbal Scale  Grimacing   Dysphagia    Dysphagia X   Diet / Liquid Recommendation Mildly Thick (2) - (Nectar Thick);Liquidised (3) - (Nectar Thick Full Liquid)   Nutritional Liquid Intake Rating Scale Thickened beverages (mildly thick unless otherwise specified)   Nutritional Food Intake Rating Scale Total oral diet of a single consistency   Nursing Communication Swallow Precaution Sign Posted at Head of Bed   Skilled Intervention Verbal Cueing;Compensatory Strategies   Recommended Route of Medication Administration   Medication Administration    (crush with APPLESAUCE)   Patient / Family Goals   Patient / Family Goal #1 \"that's good\" - water   Goal #1 Outcome Goal not met   Short Term Goals   Short Term Goal # 1 Patient will participate in pre-feeding trials with SLP only with no overt s/sx of aspiration   Goal Outcome # 1 Goal met, new goal added   Short Term Goal # 1 B  Patient will consume meals of liquidized solids and mildly thick " liquids with no s/sx of aspiration given min cues for safe swallow precautions.   Goal Outcome  # 1 B Progressing as expected   Short Term Goal # 2 Patient will complete BoT resistance, laryngeal elevation exercises x15 reps per session given min cues.   Goal Outcome # 2  Progressing slower than expected   Education Group   Education Provided Dysphagia   Dysphagia Patient Response Family;Patient;Acceptance;Explanation;Verbal Demonstration;Reinforcement Needed   Additional Comments Reinforcement needed on behalf of patient   Anticipated Discharge Needs   Discharge Recommendations Recommend post-acute placement for additional speech therapy services prior to discharge home   Therapy Recommendations Upon DC Dysphagia Training   Interdisciplinary Plan of Care Collaboration   IDT Collaboration with  Nursing;Family / Caregiver   Patient Position at End of Therapy In Bed;Bed Alarm On;Call Light within Reach   Collaboration Comments RN updated

## 2022-12-09 NOTE — CARE PLAN
The patient is Watcher - Medium risk of patient condition declining or worsening    Shift Goals  Clinical Goals: saftey, pain managment  Patient Goals: pain managment, rest  Family Goals: MAYA    Progress made toward(s) clinical / shift goals:    Problem: Knowledge Deficit - Standard  Goal: Patient and family/care givers will demonstrate understanding of plan of care, disease process/condition, diagnostic tests and medications  Outcome: Progressing     Problem: Urinary - Renal Perfusion  Goal: Ability to achieve and maintain adequate renal perfusion and functioning will improve  Outcome: Progressing     Problem: Respiratory  Goal: Patient will achieve/maintain optimum respiratory ventilation and gas exchange  Outcome: Progressing     Problem: Pain - Standard  Goal: Alleviation of pain or a reduction in pain to the patient’s comfort goal  Outcome: Progressing

## 2022-12-09 NOTE — DISCHARGE PLANNING
Care Transition Team Assessment    Information Source  Orientation Level: Oriented X4    Readmission Evaluation  Is this a readmission?: No    Elopement Risk  Legal Hold: No  Ambulatory or Self Mobile in Wheelchair: No-Not an Elopement Risk  Elopement Risk: Not at Risk for Elopement    Interdisciplinary Discharge Planning  Does Admitting Nurse Feel This Could be a Complex Discharge?: No  Primary Care Physician: Dr. Nathan Braga  Lives with - Patient's Self Care Capacity: Spouse  Patient or legal guardian wants to designate a caregiver: No  Caregiver name: Krystina Mae  Caregiver contact info: [phone]  Support Systems: Family Member(s), Spouse / Significant Other, Children  Housing / Facility: 2 Story House  [facility] of Care Facility: [facility]  Do You Take your Prescribed Medications Regularly: Yes  Able to Return to Previous ADL's: Future Time w/Therapy  Mobility Issues: Yes  Prior Services: None  Patient Prefers to be Discharged to:: Home  Assistance Needed: Unknown at this Time  Durable Medical Equipment: Not Applicable    Discharge Preparedness  What is your plan after discharge?: Skilled nursing facility  What are your discharge supports?: Child, Spouse  Prior Functional Level: Ambulatory, Independent with Activities of Daily Living  Difficulity with ADLs: Walking, Bathing, Dressing  Difficulity with IADLs: Shopping, Cooking, Driving    Functional Assesment  Prior Functional Level: Ambulatory, Independent with Activities of Daily Living    Finances  Financial Barriers to Discharge: No  Prescription Coverage: Yes    Vision / Hearing Impairment  Vision Impairment : No  Right Eye Vision: Impaired, Wears Glasses  Left Eye Vision: Impaired, Wears Glasses  Hearing Impairment : No    Domestic Abuse  Have you ever been the victim of abuse or violence?: No  Physical Abuse or Sexual Abuse: No  Verbal Abuse or Emotional Abuse: No  Possible Abuse/Neglect Reported to:: Not Applicable    Psychological  Assessment  History of Substance Abuse: None    Discharge Risks or Barriers  Discharge risks or barriers?: No    Anticipated Discharge Information  Discharge Disposition: D/T to SNF with Medicare cert in anticipation of skilled care (03)

## 2022-12-09 NOTE — PROGRESS NOTES
LPS currently following pt. Discussed with Dr Sweet.Patient pending right femoral-popliteal bypass to assist with right heel wound healing once cleared from cardiology standpoint.

## 2022-12-09 NOTE — DIETARY
Nutrition Services:  Consult received for DM education.  RD also following patient for inadequacy of PO intake.  Patient has been extubated and diet advanced per SLP following FEES - Liquidized, mildly thick diet ordered with 1:1 assistance.      Per chart review, patient with no history of diabetes. POC glucose has been acceptable.  No recent A1c - was 5.2 in 2019.  Will obtain order for A1c.  Do not feel patient is appropriate for DM education at this time.      Spoke with RN today who confirms that patient is not eating very well at this time.  Only a few spoonfuls.  Will order boost glucose control at this time. RN also unsure why diabetic education would have been ordered.     Recommended consideration for tube feeding to RN and Dr. Sweet.  Of note, Palliative care has been consulted and consult is pending.       Plan/Recommend:  Will order A1C to see if patient would benefit from carb controlled diet and education.  Recommend starting tube feed to optimize nutrition.  Patient with inadequate nutrition for two weeks at this point, beginning PTA.     RD monitoring

## 2022-12-09 NOTE — PROGRESS NOTES
Cardiology Follow Up Progress Note    Date of Service  12/9/2022    Attending Physician  BRYAN Pak*    Chief Complaint   Presented 11/28/2022 with confusion and altered mental status.  Found ALL, severe PVD.    Cardiology consultation 12/5 for VT/V. fib cardiac arrest when presented in OR post anesthesia induction.  Vascular surgery aborted, plan for right femoral popliteal bypass for right SFA occlusion complicated by right foot ischemia and osteomyelitis.    HPI  Gilmer Mae is a 82 y.o. male with TAVR on 8/2/2022 by Dr. Wolfe, PPM, PVD, hypertension, hyperlipidemia admitted 11/28/2022 with encephalopathy.      Interim Events    12/7/2022-extubated.  Transferred to telemetry 12/8/2022.  Pacemaker in situ.  Device interrogated 12/7/2022, atrial -paced ( >90% ) ,functioning well..  Anemia, worsening H&H 7.2/23, continue daily CBC.  Low K, replaced    Review of Systems  Review of Systems   Respiratory:  Negative for apnea, cough, choking, chest tightness and shortness of breath.      Vital signs in last 24 hours  Temp:  [36.5 °C (97.7 °F)-37.3 °C (99.1 °F)] 37 °C (98.6 °F)  Pulse:  [64-81] 74  Resp:  [18] 18  BP: (123-154)/(72-82) 142/76  SpO2:  [92 %-96 %] 95 %    Physical Exam  Physical Exam  Cardiovascular:      Pulses: Normal pulses.      Comments: >90% atrial-paced  Skin:     General: Skin is warm.   Psychiatric:         Mood and Affect: Mood normal.       Lab Review  Lab Results   Component Value Date/Time    WBC 6.4 12/09/2022 03:17 AM    RBC 2.14 (L) 12/09/2022 03:17 AM    HEMOGLOBIN 7.3 (L) 12/09/2022 03:17 AM    HEMATOCRIT 22.7 (L) 12/09/2022 03:17 AM    .1 (H) 12/09/2022 03:17 AM    MCH 34.1 (H) 12/09/2022 03:17 AM    MCHC 32.2 (L) 12/09/2022 03:17 AM    MPV 10.5 12/09/2022 03:17 AM      Lab Results   Component Value Date/Time    SODIUM 146 (H) 12/09/2022 03:17 AM    POTASSIUM 3.4 (L) 12/09/2022 03:17 AM    CHLORIDE 115 (H) 12/09/2022 03:17 AM    CO2 23 12/09/2022 03:17 AM    GLUCOSE  127 (H) 12/09/2022 03:17 AM    BUN 17 12/09/2022 03:17 AM    CREATININE 0.88 12/09/2022 03:17 AM    BUNCREATRAT 15.0 08/03/2022 04:58 AM      Lab Results   Component Value Date/Time    ASTSGOT 82 (H) 12/07/2022 05:52 AM    ALTSGPT 50 12/07/2022 05:52 AM     Lab Results   Component Value Date/Time    CHOLSTRLTOT 117 09/11/2019 03:17 AM    LDL 52 09/11/2019 03:17 AM    HDL 48 09/11/2019 03:17 AM    TRIGLYCERIDE 83 09/11/2019 03:17 AM    TROPONINT 33 (H) 11/29/2022 11:38 AM       No results for input(s): NTPROBNP in the last 72 hours.    Cardiac Imaging and Procedures Review  EKG:  My personal interpretation of the EKG dated 12/5/2022   is a left bundle branch block which is new compared to admission EKG.      Echocardiogram: 12/5/2022  Moderately reduced left ventricular systolic function.   The left ventricular ejection fraction is visually estimated to be 30%.   Wall motion preserved at the base with dyskinetic apex.  Contrast swirling at the apex with no left ventricular apical thrombus.  No evidence of valvular abnormality based on Doppler evaluation.   Normal inferior vena cava size without inspiratory collapse.        Cardiac Catheterization:     12/5/22  IMPRESSION:  1.  Nonobstructive coronary artery disease with mild proximal left main stenosis, MLA 10-11 mm².  2. Reduced estimated left ventricular ejection fraction 30% with cineangiographic signs of Takotsubo cardiomyopathy with no evidence of noticeable LV apical thrombus.  3.  Mildly elevated resting LVEDP at 60 mmHg with no significant transaortic gradient on pullback     RECOMMENDATIONS:  Ongoing primary ASCVD preventive measures  Stress-induced cardiomyopathy management with goal-directed medical therapy  Ongoing care per ICU and cardiology consultant teams        Imaging  Chest X-Ray:  Stable mild cardiac enlargement without edema       Stress Test:  n/a      Assessment/Plan    #Presented with acute encephalopathy 11/28//22.  Baxter to be secondary to  sepsis/dehydration.  #V. ib/V. tach cardiac arrest 12/5/2022 with anesthesia induction.  #Required intubation 12/5-12/7.  #Nonobstructive CAD based on coronary angiography 12/5/2022.  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  #Left bundle branch block on recent EKG 12/5/22.  #Planned right femoropopliteal bypass 12/5/2022 on hold.  #Right lower extremity osteomyelitis, appreciate ID consult.  #S/p TAVR 8/2022.  #Hypertension.  # ALL, resolved.  #Bell's palsy      Recommendations  -Currently on amiodarone 200 mg daily.  -Continue aspirin 81 mg, atorvastatin 40 mg daily.  -Continue carvedilol 6.25 mg twice daily.  -Continue lisinopril 10 mg daily.  -Follow-up on limited echo 12/9/2022.    Cardiology will follow along.    I personally spent a total of 10 minutes which includes face-to-face time and non-face-to-face time spent on preparing to see the patient, reviewing hospital notes and tests, obtaining history from the patient, performing a medically appropriate exam, counseling and educating the patient, ordering medications/tests/procedures/referrals as clinically indicated, and documenting information in the electronic medical record.     Thank you for allowing me to participate in the care of this patient.  I will continue to follow this patient    Please contact me with any questions.    MATT Arora.   Cardiologist, [facility]  [phone]

## 2022-12-09 NOTE — THERAPY
Physical Therapy   Daily Treatment     Patient Name: Gilmer Mae  Age:  82 y.o., Sex:  male  Medical Record #: [id number]  Today's Date: 12/9/2022     Precautions  Precautions: Fall Risk;Swallow Precautions ( See Comments)    Assessment    Pt with significant change in status since last seen on 12/2. Pt is now a transfer from [facility] after brief cardiac arrest after at start of planned right femoropopliteal bypass for right SFA occlusion. Cath done on 12/5 with findings: Reduced estimated left ventricular ejection fraction 30% with cineangiographic signs of Takotsubo cardiomyopathy with no evidence of noticeable LV apical thrombus. Today, pt with pain issues R LE despite nsg in twice to give pain meds. Pt unable to fully participate today, but agreeable to rolling to have air mattress placed under. See details below. Pt was able to flex both knees up in bed and roll, unable to attempt sitting up or standing. PT to continue.     Plan    Continue current treatment plan.    DC Equipment Recommendations: Unable to determine at this time  Discharge Recommendations: Recommend post-acute placement for additional physical therapy services prior to discharge home         Objective       12/09/22 1149   Charge Group   Charges  Yes   PT Therapeutic Activities 1   Total Time Spent   PT Total Time Yes   PT Therapeutic Activities Time Spent (Mins) 15   PT Total Time Spent (Calculated) 15   Precautions   Precautions Fall Risk;Swallow Precautions ( See Comments)   Pain 0 - 10 Group   Therapist Pain Assessment 8;During Activity;Nurse Notified  (premedicated, nsg in room)   Cognition    Cognition / Consciousness X   Comments slow responses, sleepy, but eventually responds.   Active ROM Lower Body    Active ROM Lower Body  X   Comments follows cues to flex each knee up in bed to prep for rolling   Strength Lower Body   Comments L LE able to assist with rolling in bed, R LE too painful, left it in blue boot for protection.   Gait Analysis    Gait Level Of Assist Unable to Participate   Bed Mobility    Supine to Sit Unable to Participate   Sit to Supine Unable to Participate   Scooting Maximal Assist   Rolling Maximum Assist to Lt.;Maximal Assist to Rt.   Skilled Intervention Verbal Cuing;Sequencing   Comments HOB flat, pt agreeable to roll to have air mattress placed under.   Functional Mobility   Sit to Stand Unable to Participate   How much difficulty does the patient currently have...   Turning over in bed (including adjusting bedclothes, sheets and blankets)? 2   Sitting down on and standing up from a chair with arms (e.g., wheelchair, bedside commode, etc.) 1   Moving from lying on back to sitting on the side of the bed? 1   How much help from another person does the patient currently need...   Moving to and from a bed to a chair (including a wheelchair)? 1   Need to walk in a hospital room? 1   Climbing 3-5 steps with a railing? 1   6 clicks Mobility Score 7   Short Term Goals    Short Term Goal # 1 Pt to move supine to/from eob w/ spv in 6 visits to improve fxl indep   Goal Outcome # 1 goal not met   Short Term Goal # 2 Pt to move sit to/from stand w/ spv in 6 visits to improve fxl indep   Goal Outcome # 2 Goal not met   Short Term Goal # 3 Pt to ambulate 150 ft w/ fww and spv in 6 visits to improve fxl indep   Goal Outcome # 3 Goal not met   Short Term Goal # 4 Pt to move up/down 3 steps w/ spv in 6 visits to access his home (if pt to d/c home)   Goal Outcome # 4 Goal not met   Education Group   Role of Physical Therapist Patient Response Patient;Acceptance;Explanation;Verbal Demonstration   Anticipated Discharge Equipment and Recommendations   DC Equipment Recommendations Unable to determine at this time   Discharge Recommendations Recommend post-acute placement for additional physical therapy services prior to discharge home   Interdisciplinary Plan of Care Collaboration   IDT Collaboration with  Nursing   Patient Position at End of Therapy  In Bed;Bed Alarm On;Call Light within Reach;Tray Table within Reach   Collaboration Comments nsg in room to assist.   Session Information   Date / Session Number  12/9-3 (1/3, 12/13)

## 2022-12-10 NOTE — CARE PLAN
The patient is Watcher - Medium risk of patient condition declining or worsening    Shift Goals  Clinical Goals: pain control, q2 turns, monitor vitals, labs  Patient Goals: pain control  Family Goals: MAYA    Progress made toward(s) clinical / shift goals:    Problem: Hemodynamics  Goal: Patient's hemodynamics, fluid balance and neurologic status will be stable or improve  Outcome: Progressing     Problem: Fluid Volume  Goal: Fluid volume balance will be maintained  Outcome: Progressing     Problem: Respiratory  Goal: Patient will achieve/maintain optimum respiratory ventilation and gas exchange  Outcome: Progressing     Problem: Physical Regulation  Goal: Diagnostic test results will improve  Outcome: Progressing     Problem: Pain - Standard  Goal: Alleviation of pain or a reduction in pain to the patient’s comfort goal  Outcome: Progressing       Patient is not progressing towards the following goals:      Problem: Skin Integrity  Goal: Skin integrity is maintained or improved  Outcome: Not Progressing

## 2022-12-10 NOTE — PROGRESS NOTES
"Hospital Medicine Daily Progress Note    Date of Service  12/10/2022    Chief Complaint  Gilmer Mae is a 82 y.o. male admitted 11/28/2022 with altered mental status    Hospital Course    As per Dr. Gonda's note  \"  83 y/o with PMH HTN, HLD, sick sinus syndrome (s/p pacemaker), seizures (most recent episode in 2019, on keppra), severe aortic stenosis (s/p TAVR 8/2/2022 at [facility]) and PAD (s/p vascular surgery on femoral artery in 2022) who presented to ED via EMS on 11/28/2022 for LOC, b/l LE edema and pain.  History limited due to patient’s altered mental status, A&Ox2 (self, place) so most history obtained from chart review and daughter Kendra. Patient lives with wife and son. Patient says he is having pain “all over” especially his right leg and says he got surgery on his right leg “2 weeks ago but they never finished.” On arrival to ED, patient was lethargic and confused. RLE especially R foot exquisitely tender, nonhealing ulcer with some erythema and eschar present on bottom of R heel. Ultrasound of leg showed diffuse atherosclerotic plaque throughout LE, absent flow from proximal to distal femoral artery, reconstitution monophasic flow via collaterals at distal femoral artery, absent flow in distal portion of tibial artery. Xray of R foot showed potentially periosteal changes at inferior aspect of calcaneus as well as significant vascular calcification; concern for underlying osteomyelitis secondary to ischemic ulcer. Empirically placed on IV linezolid and IV unasyn however discontinued linezolid as blood cx, wound cx, MRSA and staph aureus PCR negative as well as lack of purulence from wound. Seen by LPS and vascular surgery- scheduled for OR on 12/5 with Dr. Rdz to attempt RLE revascularization in order to avoid amputation.\"    Patient was extubated on December 7, 2022 and he is weaned off from Levophed and amiodarone gtt.  On December 8, 2022 intensivist requested to transfer care to hospitalist " service.    Interval Problem Update    12/08/22    I evaluated and examined him at the bedside.  He expressed that he is feeling better and I ordered morphine for him and his pain is under better control.  I discussed CODE STATUS with him and he expressed that he would like to be DNR and after further discussion he requested that I should call his daughter to discuss about CODE STATUS.  I called patient's daughter and she reported that she is on her way to the hospital.  I reevaluated him again at the bedside and discussed plan of care with patient's 2 daughters and patient's wife.  He expressed that he would like to be full code and he does not want to sign DNR.  If his pain is under control.  I explained at length regarding full code, DNR and hospice care to patient and his daughter at the bedside and answered all of their questions.  As per patient's wishes I continued full CODE STATUS at this time.  Currently he is currently stable and requiring 4 L of oxygen to maintain oxygen saturation.  Currently he is on Unasyn and p.o. amiodarone.  Labs showed hemoglobin of 7.2 with normal white blood cell count.  He found to have platelet count of 116.  He also found to have hyponatremia with current sodium level of 148, chloride of 118.  He underwent CT head without contrast yesterday did not show any acute abnormalities.      He found to have hyponatremia I started him on D5W use IV fluid with caution as his repeat sodium level still came back higher.    12/9/2022:  Continue present medical management at present.  Patient's pain better controlled today I have escalated his pain therapy.  I had long discussion with patient's daughter on the phone Lesli regarding course of care.  All questions were answered in detail.  There is no plan for immediate surgery patient represents an extraordinarily high perioperative risk given recent cardiac arrest approximately 4 days prior to this note being dictated.  At present patient  would benefit from pain management and rehabilitation before surgery is entertained anytime in the near future.  Patient required transfusion of 1 unit of blood today.  Repeat CBC in the morning.  12/10/2022:  Patient's pain much improved today.  Appreciate cardiology and subspecialty support recommendations.  No imminent plan for surgery in the near future.  We will continue to discuss surgical options after discharge.  Patient has significant perioperative comorbidities that may limit his options in terms of surgical intervention.  Will defer this to vascular surgery and limb preservation service.  Regardless continue to trend hemoglobin as necessary.  Continue to transfuse as indicated.  No acute events overnight.  I have discussed this patient's plan of care and discharge plan at IDT rounds today with Case Management, Nursing, Nursing leadership, and other members of the IDT team.    Consultants/Specialty  cardiology, critical care, infectious disease, and nephrology    Code Status  DNAR/DNI    Disposition  Patient is not medically cleared for discharge.   Anticipate discharge to to skilled nursing facility.  I have placed the appropriate orders for post-discharge needs.    Review of Systems  Review of Systems   Constitutional:  Negative for chills, fever and weight loss.   HENT:  Negative for hearing loss and tinnitus.    Eyes:  Negative for blurred vision, double vision, photophobia and pain.   Respiratory:  Negative for cough, sputum production and shortness of breath.    Cardiovascular:  Negative for chest pain, palpitations, orthopnea and leg swelling.   Gastrointestinal:  Negative for abdominal pain, constipation, diarrhea, nausea and vomiting.   Genitourinary:  Negative for dysuria, frequency and urgency.   Musculoskeletal:  Positive for myalgias. Negative for back pain, joint pain and neck pain.   Skin:  Negative for rash.   Neurological:  Negative for dizziness, tingling, tremors, sensory change, speech  change, focal weakness and headaches.   Psychiatric/Behavioral:  Negative for hallucinations and substance abuse.    All other systems reviewed and are negative.     Physical Exam  Temp:  [36.5 °C (97.7 °F)-36.9 °C (98.4 °F)] 36.8 °C (98.2 °F)  Pulse:  [61-71] 62  Resp:  [16-18] 17  BP: (111-158)/(62-85) 146/74  SpO2:  [92 %-97 %] 94 %    Physical Exam  Vitals reviewed.   Constitutional:       General: He is not in acute distress.     Appearance: He is ill-appearing.   HENT:      Head: Normocephalic and atraumatic. No contusion.      Right Ear: External ear normal.      Left Ear: External ear normal.      Nose: Nose normal.      Mouth/Throat:      Pharynx: No oropharyngeal exudate.   Eyes:      General:         Right eye: No discharge.         Left eye: No discharge.      Pupils: Pupils are equal, round, and reactive to light.   Cardiovascular:      Rate and Rhythm: Normal rate and regular rhythm.      Heart sounds: No murmur heard.    No friction rub. No gallop.   Pulmonary:      Effort: Pulmonary effort is normal.      Breath sounds: No wheezing or rhonchi.   Abdominal:      General: Bowel sounds are normal. There is no distension.      Palpations: Abdomen is soft.      Tenderness: There is no abdominal tenderness. There is no rebound.   Musculoskeletal:         General: No swelling or tenderness. Normal range of motion.      Cervical back: No rigidity. No muscular tenderness.   Skin:     Coloration: Skin is not jaundiced.      Findings: Erythema and lesion (Right foot) present.   Neurological:      General: No focal deficit present.      Mental Status: He is alert and oriented to person, place, and time.      Cranial Nerves: No cranial nerve deficit.      Sensory: No sensory deficit.      Comments: He is alert and oriented and able to follow my commands and answer my questions.   Psychiatric:         Mood and Affect: Mood normal.       Fluids    Intake/Output Summary (Last 24 hours) at 12/10/2022 1324  Last data  filed at 12/10/2022 0922  Gross per 24 hour   Intake 551 ml   Output 600 ml   Net -49 ml         Laboratory  Recent Labs     12/09/22  1213 12/09/22 1953 12/10/22  0215   WBC 6.8 7.4 7.1   RBC 2.12* 2.68* 2.54*   HEMOGLOBIN 7.3* 9.1* 8.7*   HEMATOCRIT 23.1* 27.6* 26.3*   .0* 103.0* 103.5*   MCH 34.4* 34.0* 34.3*   MCHC 31.6* 33.0* 33.1*   RDW 52.3* 51.2* 56.4*   PLATELETCT 126* 132* 123*   MPV 10.8 11.0 11.2       Recent Labs     12/08/22  0422 12/08/22  1539 12/09/22 0317 12/10/22  0215   SODIUM 148* 148* 146* 144   POTASSIUM 3.8  --  3.4* 3.9   CHLORIDE 118*  --  115* 114*   CO2 22  --  23 22   GLUCOSE 86  --  127* 91   BUN 22  --  17 16   CREATININE 1.02  --  0.88 0.89   CALCIUM 7.6*  --  7.6* 7.8*                     Imaging  EC-ECHOCARDIOGRAM LTD W/ CONT   Final Result      CT-HEAD W/O   Final Result      1.  No acute intracranial hemorrhage.   2.  Similar chronic findings.   3.  Mild mucosal thickening of the inferior right maxillary sinus.         DX-CHEST-PORTABLE (1 VIEW)   Final Result      Stable mild cardiac enlargement without edema      DX-CHEST-PORTABLE (1 VIEW)   Final Result         1.  Hazy interstitial left lung base infiltrate, stable since prior study.   2.  Atherosclerosis      DX-ABDOMEN FOR TUBE PLACEMENT   Final Result      Orogastric tube tip at the proximal stomach.      EC-ECHOCARDIOGRAM COMPLETE W/ CONT   Final Result      DX-ABDOMEN FOR TUBE PLACEMENT   Final Result      NG tube tip projects over the stomach, and side-port projects 3 cm above the GE junction. It can be advanced further by approximately 7 cm.      DX-ABDOMEN FOR TUBE PLACEMENT   Final Result      NG tube tip projects over the GE junction. It can be advanced 8-10 cm      DX-CHEST-PORTABLE (1 VIEW)   Final Result      1.  Right central catheter tip is in the mid SVC. No pneumothorax.   2.  NGT tip is at the GE junction. It can be advanced 8 cm.   3.  Stable other findings.      DX-CHEST-PORTABLE (1 VIEW)   Final  Result      1.  Well-positioned lines and tubes. No pneumothorax.   2.  Ill-defined left basilar opacity, chronic atelectasis/scarring or sequela of recent pneumonia.      CT-CTA AORTA-RO WITH & W/O-POST PROCESS   Final Result      1.  Extensive atherosclerotic vascular calcification involving the aorta and originating arteries. There is ectasia of the ascending thoracic aorta measured at 3.4 x 3.4 cm in diameter.      2.  Extensive atherosclerotic plaque involving the right common, superficial femoral and profunda arteries. There is occlusion of the right superficial femoral artery at the level of the proximal thigh. Popliteal artery is also occluded and there is    extensive atherosclerotic plaque involving the posterior tibial, anterior tibial and peroneal arteries. No definite continuous runoff vessel identified.      3.  Extensive atherosclerotic plaque involving the left common, profunda and superficial femoral and popliteal arteries with multiple areas of high-grade stenosis of the superficial femoral and popliteal arteries. There is also extensive atherosclerotic    change of the posterior tibial, anterior tibial and peroneal arteries without a definite contiguous runoff vessel seen.      4.  High-grade stenosis involving the origin of the superior mesenteric artery.      5.  Occluded origin of the inferior mesenteric artery.      6.  Atherosclerotic plaque involving the right renal artery origin and the proximal right renal artery resulting in 50% diameter narrowing.      7.  Less than 50% diameter narrowing of the left renal artery origin.      8.  Ill-defined groundglass infiltrates within the right upper lobe possibly representing infectious process.      9.  Multiple hepatic cysts.      3D angiographic/MIP images of the vasculature confirm the vascular findings as described above.      US-VEIN MAPPING LOWER EXTREMITY UNILAT RIGHT   Final Result      DX-FOOT-COMPLETE 3+ RIGHT   Final Result      Likely  acute osteomyelitis at the inferior calcaneus. MRI can be obtained as indicated.      MR-FOOT-W/O RIGHT   Final Result      1.  Very limited study due to extensive patient motion artifact and patient comfort preventing multiple sequences.      2.  No gross bony destructive change.      3.  Soft tissue edema.      US-EXTREMITY ARTERY LOWER UNILAT RIGHT   Final Result      US-EXTREMITY VENOUS LOWER UNILAT RIGHT   Final Result      US-ABDOMEN COMPLETE SURVEY   Final Result         1.  Gallbladder sludge, otherwise unremarkable gallbladder   2.  Hepatic cysts   3.  Bilateral echogenic kidneys with mildly thinned cortex, appearance favors medical renal disease      CT-HEAD W/O   Final Result      1.  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   2.  Mild cerebral atrophy. Age-appropriate.   3.  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   4.  No acute findings are evident.         DX-CHEST-PORTABLE (1 VIEW)   Final Result      No evidence of acute cardiopulmonary process.      CL-LEFT HEART CATHETERIZATION WITH POSSIBLE INTERVENTION    (Results Pending)        Assessment/Plan  Advance care planning  Assessment & Plan  On December 8, 2022 I discussed CODE STATUS with him and he expressed that he would like to be DNR and after further discussion he requested that I should call his daughter to discuss about CODE STATUS.  I called patient's daughter and she reported that she is on her way to the hospital.  I reevaluated him again at the bedside and discussed plan of care with patient's 2 daughters and patient's wife.  He expressed that he would like to be full code and he does not want to sign DNR.  If his pain is under control.  I explained at length regarding full code, DNR and hospice care to patient and his daughter at the bedside and answered all of their questions.  As  per patient's wishes I continued full CODE STATUS at this time.  Time spent: 28 minutes    Hypernatremia  Assessment & Plan  Patient found to have hypernatremia and sodium is trending up.  I started him on gentle IV fluid hydration with D5W.  Use IV fluid with caution.  I ordered repeat sodium level at 5 PM.    History of Bell's palsy  Assessment & Plan  With associated L facial droop, CT head neg for AICH 12/7  Supportive care    Thrombocytopenia (HCC)  Assessment & Plan  Mild, Monitor    Hypokalemia  Assessment & Plan  Continue to monitor and replace as needed.    Shock (HCC)  Assessment & Plan  Post cardiac arrest 12/5  - titrate pressor to maintain MAP>65  - monitor urine output and vital signs   - gentle IVF bolus today if unable to hydrate and pass bedside swallow  Now resolved off from pressors.    Congestive heart failure (CHF) ([facility])- (present on admission)  Assessment & Plan  - per chart review, patient has history of congestive heart failure  - c/w home aspirin 81 everyday, coreg 12.5mg BID, atorvastatin 80mg qD, lisinopril 20, amlodipine 5  - Continue to hold HCTZ.    History of seizures- (present on admission)  Assessment & Plan  - per patient, last episode of seizure was in 2019  - c/w home keppra 500mg BID    Macrocytic anemia- (present on admission)  Assessment & Plan  - No known history of anemia, not on iron supplementation  - Most recent labs from 8/2022 showing baseline hemoglobin of 14.1 and MCV of 97.7  - pending anemia workup- iron panel, reticulocytes, vitamin B12, folate  - continue to monitor with daily CBC's  - Transfuse if hemoglobin <7.0. Type and screen ordered 11/30  I ordered repeat hemoglobin for this afternoon.    Acute encephalopathy- (present on admission)  Assessment & Plan  - Per family and chart review, patient is at baseline alert and oriented x4.  - Presented to emergency room on 11/28/2022 with confusion, noted to be alert and oriented x2 (self, place only)  - Altered mental  status initially thought to be in setting of azotemia from acute kidney injury however continuing to have encephalopathy despite improving azotemia and improving kidney function  - Per nephrology, currently no emergent need for dialysis. Nephrology signed off 11/29.  - Continue to monitor and trend labs. Work with PT/OT inpatient, they recommend SNF post discharge.  Significantly improved.    Increased anion gap metabolic acidosis  Assessment & Plan  Resolved  - Baseline anion gap is 7.0 per most recent labs from 8/2022  - Anion gap on admission noted to be 19.0, improved to 17.0 after IV LR at 150 cc/hr  - Increased anion gap metabolic acidosis likely secondary to uremia from kidney damage  - Continue hydration with IV fluids and encourage PO intake  - Continue to monitor with daily labs    Osteomyelitis of right lower extremity (HCC)- (present on admission)  Assessment & Plan  - Given recent right groin access for TAVR, MARGARITA ordered-   - Pain controlled with tylenol 650mg q6 prn, IV dilaudid 0.5mg q4 prn, oxycodone 5-10mg q3 prn  - can consider lidocaine patch, diclofenac gel as needed  - U/S showed: Occlusion of RIGHT femoral artery with distal reconstitution, Occlusion of RIGHT posterior tibial artery,  Two vessel runoff to the ankle with monophasic flow  -12/1: Spoke with Dr. Medel who is partners with Dr. Quinones (who did patient's previous surgery). She says patient will be having pain for a long time but since this is not an emergency they will see him outpatient and Dr. Quinones will perform a re-anastomosis and other procedures as warranted. This procedure cannot currently be done as Dr. Quinones is currently on vacation and Dr. Medel doesn't have [facility] privileges as well as the fact that this type of surgery cannot even be done at [facility].   - PT/OT recommending SNF  - MRI foot attempted 12/2 however was incomplete as patient was unable to tolerate. Foot xray confirmed osteomyelitis to R calcaneus.  - 12/2:  Per LPS, wound care orders for nursing was placed and he was provided with betadine dressing and offloading boot per LPS team.  - 12/3: Consulted LPS and vascular surgery due to concern for osteomyelitis of R heel- seen by Dr. Rdz who will take patient to OR in order to attempt revascularization to attempt to heal foot wound and avoid amputation. OR planned for 12/5 Monday.  - Consulted Dr. Turcios from infectious disease 12/3: believes osteomyelitis of heel will be difficult to cure and pt will need debridement, wound vac, etc however most likely poor outcome. Healing process also hindered due to impaired circulation evidence by MARGARITA. As there is no pus from wound, most likely ischemic ulcer. Linezolid discontinued 12/3, c/w just unasyn for now  - f/u blood cultures, wound cultures, NGTD  - CTA pending  - Vein mapping 12/4: Acute superficial thrombus in the greater saphenous vein near saphenofemoral junction and in the proxmal thigh. Vein wall thickening in the thigh; small caliber vein throughout the thigh & calf. Vein is unsuitable for use as a bypass graft.  Continue IV antibiotics  Vascular surgery evaluated him and made recommendations.    Sepsis (HCC)- (present on admission)  Assessment & Plan  - This is Sepsis Present on admission  - SIRS criteria identified on my evaluation include: Leukocytosis, with WBC greater than 12,000  - Source is unknown, possibly abdominal etiology. US 11/29 showing US abdomen showing gallbladder sludge otherwise unremarkable, hepatic cysts, bilateral echogenic kidneys with mildly thinned cortex concerning for possible medical renal disease.  - Sepsis protocol initiated  - Fluid resuscitation ordered per protocol  - Crystalloid Fluid Administration: Fluid resuscitation ordered per standard protocol - 30 mL/kg per current or ideal body weight  - IV antibiotics as appropriate for source of sepsis- started on IV ceftriaxone, discontinued on 11/29  - Reassessment: I have reassessed  the patient's hemodynamic status  - Blood cultures NGTD  - Urine cultures unremarkable  - Hemodynamically stable  - Uptrending WBC, WBC 14.4 today 11/29 from 13.9 on admission 11/28  - continue to monitor with daily CBC's  - No clear source of sepsis- Discontinued IV ceftriaxone and started on IV unasyn low dose (due to ALL) TID (11/29- ) pending further workup.   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    History of transcatheter aortic valve replacement (TAVR)- (present on admission)  Assessment & Plan  - per chart review, patient has history of severe aortic stenosis and underwent TAVR with Dr. Wolfe on 8/2/2022.   - most recent echo showed well seated TAVR with peak gradient of 12 mmHg and mean gradient of 7.5 mmHg and trace perivalvular leak.     ALL (acute kidney injury) (HCC)- (present on admission)  Assessment & Plan  RESOLVED  - No noted history of chronic kidney disease, unclear creatinine baseline however recent kidney values appear normal- most recent labs from 8/3/2022 showing baseline Cr 1.0 and BUN 15.0.  - On admission, BUN/Cr ratio noted to be 122/4.67  - Patient is able to urinate, produced about 400cc dark urine in emergency room. No urinary symptoms such as dysuria, polyuria, retention.  - Urinalysis 11/28: +hyaline casts, +granular casts.  - Consulted nephrology- Per Dr. Hearn, currently unclear etiology of ALL however given hyaline casts on UA, volume depletion most likely. Obstructive uropathy was also considered given suprapubic tenderness on exam however renal/bladder US unremarkable for obstructions.   - Started on IV fluids at 125 cc/hr on 11/28, increased rate to 150 cc/hr on 11/29  - Most recent labs from 11/29 at 1500 showing improved renal function with BUN/Cr ratio of 85/1.63.  - Continue to  monitor renal function with daily labs  - monitor urine output  - Kidney function improving as of 11/29 and patient nonoliguric, no need for dialysis. Nephrology signed off 11/29.  - Continue gentle IVF and trend Crt/BUN  - Improving- Cr trending down. Cr is 0.90 on 12/1 from 1.05 on 11/30 and 4.67 on admission 11/28.  - ALL resolved, Cr 0.78 on 12/2. D/c'ed renal diet and placed on regular diet to encourage patient PO intake  Now resolved.    Hyperlipidemia- (present on admission)  Assessment & Plan  - no lipid panel on file  - Continue atorvastatin    Essential hypertension- (present on admission)  Assessment & Plan  - at home, on lisinopril, hydrochlorithiazide, amlodipine, carvedilol.  - continue with home coreg 12.5 BID  - restarted on amlodipine 12/1 due to hypertension, continuing to hold HCTZ  - restarted lisinopril 20 on 12/2 due to continued hypertension       Please note that this dictation was created using voice recognition software. I have made every reasonable attempt to correct obvious errors, but I expect that there are errors of grammar and possibly context that I did not discover before finalizing the note.      VTE prophylaxis: enoxaparin ppx    I have performed a physical exam and reviewed and updated ROS and Plan today (12/10/2022). In review of yesterday's note (12/9/2022), there are no changes except as documented above.

## 2022-12-10 NOTE — PROGRESS NOTES
Cardiology Follow Up Progress Note    Date of Service  12/10/2022    Attending Physician  BRYAN Pak*    Chief Complaint   Presented 11/28/2022 with confusion and altered mental status.  Found ALL, severe PVD.    Cardiology consultation 12/5 for VT/V. fib cardiac arrest when presented in OR post anesthesia induction.  Vascular surgery aborted, plan for right femoral popliteal bypass for right SFA occlusion complicated by right foot ischemia and osteomyelitis.    HPI  Gilmer Mae is a 82 y.o. male with TAVR on 8/2/2022 by Dr. Wolfe, PPM, PVD, hypertension, hyperlipidemia admitted 11/28/2022 with acute encephalopathy.      Interim Events    12/7/2022-extubated.  Telemetry tx 12/8/2022.  Pacemaker in situ.  Device interrogated 12/7/2022, atrial -paced ( >90% ) ,functioning well..  Anemia, stable H&H 8.7/26.3  Thrombocytopenia, platelet count 123  Palliative care consult  CODE STATUS changed to DNR/DNI    Review of Systems  Review of Systems   Respiratory:  Negative for apnea, cough, choking, chest tightness and shortness of breath.      Vital signs in last 24 hours  Temp:  [36.5 °C (97.7 °F)-36.9 °C (98.4 °F)] 36.8 °C (98.2 °F)  Pulse:  [61-71] 62  Resp:  [16-18] 17  BP: (111-158)/(62-85) 146/74  SpO2:  [92 %-97 %] 94 %    Physical Exam  Physical Exam  Cardiovascular:      Pulses: Normal pulses.      Comments: >90% atrial-paced  Skin:     General: Skin is warm.      Comments:     Right lower extremity with poor perfusion and wounds   Psychiatric:         Mood and Affect: Mood normal.       Lab Review  Lab Results   Component Value Date/Time    WBC 7.1 12/10/2022 02:15 AM    RBC 2.54 (L) 12/10/2022 02:15 AM    HEMOGLOBIN 8.7 (L) 12/10/2022 02:15 AM    HEMATOCRIT 26.3 (L) 12/10/2022 02:15 AM    .5 (H) 12/10/2022 02:15 AM    MCH 34.3 (H) 12/10/2022 02:15 AM    MCHC 33.1 (L) 12/10/2022 02:15 AM    MPV 11.2 12/10/2022 02:15 AM      Lab Results   Component Value Date/Time    SODIUM 144 12/10/2022 02:15  AM    POTASSIUM 3.9 12/10/2022 02:15 AM    CHLORIDE 114 (H) 12/10/2022 02:15 AM    CO2 22 12/10/2022 02:15 AM    GLUCOSE 91 12/10/2022 02:15 AM    BUN 16 12/10/2022 02:15 AM    CREATININE 0.89 12/10/2022 02:15 AM    BUNCREATRAT 15.0 08/03/2022 04:58 AM      Lab Results   Component Value Date/Time    ASTSGOT 59 (H) 12/10/2022 02:15 AM    ALTSGPT 36 12/10/2022 02:15 AM     Lab Results   Component Value Date/Time    CHOLSTRLTOT 117 09/11/2019 03:17 AM    LDL 52 09/11/2019 03:17 AM    HDL 48 09/11/2019 03:17 AM    TRIGLYCERIDE 83 09/11/2019 03:17 AM    TROPONINT 33 (H) 11/29/2022 11:38 AM       No results for input(s): NTPROBNP in the last 72 hours.    Cardiac Imaging and Procedures Review  EKG:  My personal interpretation of the EKG dated 12/5/2022   is a left bundle branch block which is new compared to admission EKG.      Echocardiogram: 12/5/2022  Moderately reduced left ventricular systolic function.   The left ventricular ejection fraction is visually estimated to be 30%.   Wall motion preserved at the base with dyskinetic apex.  Contrast swirling at the apex with no left ventricular apical thrombus.  No evidence of valvular abnormality based on Doppler evaluation.   Normal inferior vena cava size without inspiratory collapse.    Repeat echocardiogram 12/9/2022  border. Moderately reduced left ventricular systolic function. The left   ventricular ejection fraction is visually estimated to be 40%. Akinesis   of the apex.    Cardiac Catheterization:     12/5/22  IMPRESSION:  1.  Nonobstructive coronary artery disease with mild proximal left main stenosis, MLA 10-11 mm².  2. Reduced estimated left ventricular ejection fraction 30% with cineangiographic signs of Takotsubo cardiomyopathy with no evidence of noticeable LV apical thrombus.  3.  Mildly elevated resting LVEDP at 60 mmHg with no significant transaortic gradient on pullback     RECOMMENDATIONS:  Ongoing primary ASCVD preventive measures  Stress-induced  cardiomyopathy management with goal-directed medical therapy  Ongoing care per ICU and cardiology consultant teams        Imaging  Chest X-Ray:  Stable mild cardiac enlargement without edema       Stress Test:  n/a      Assessment/Plan    #Presented with acute encephalopathy 11/28//22.  [place] to be secondary to sepsis/dehydration.  #V. ib/V. tach cardiac arrest 12/5/2022 with anesthesia induction.  #Required intubation 12/5-12/7.  #Nonobstructive CAD based on coronary angiography 12/5/2022.  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  #Left bundle branch block on recent EKG 12/5/22.  #Planned right femoropopliteal bypass 12/5/2022 on hold.  #Right lower extremity osteomyelitis, appreciate ID consult.  #S/p TAVR 8/2022.  #Hypertension.  # ALL, resolved.  #Repeat echo 12/9/2022 revealed LVEF 40%      Recommendations  -Currently on amiodarone 200 mg daily.  -Continue aspirin 81 mg, atorvastatin 40 mg daily.  -Continue carvedilol 6.25 mg twice daily.  -Continue lisinopril 10 mg daily.      Cardiology will follow along.    I personally spent a total of 5 minutes which includes face-to-face time and non-face-to-face time spent on preparing to see the patient, reviewing hospital notes and tests, obtaining history from the patient, performing a medically appropriate exam, counseling and educating the patient, ordering medications/tests/procedures/referrals as clinically indicated, and documenting information in the electronic medical record.     Thank you for allowing me to participate in the care of this patient.  I will continue to follow this patient    Please contact me with any questions.    MATT Arora.   Cardiologist, [facility]  [phone]

## 2022-12-10 NOTE — PROGRESS NOTES
Monitor summary:        Rhythm: ACC JR   Rate: 64-76  Ectopy: (R)PVC, 10-13 bts VT  Measurements: .0/10/.28        12hr chart check

## 2022-12-10 NOTE — DISCHARGE PLANNING
consult received requesting [facility] assist with obtaining medical records from last visit with Dr. Ward at [facility]. [facility] faxed request to [facility] medical records department request records for continuity of care.

## 2022-12-10 NOTE — PROGRESS NOTES
"Hospital Medicine Daily Progress Note    Date of Service  12/9/2022    Chief Complaint  Gilmer Mae is a 82 y.o. male admitted 11/28/2022 with altered mental status    Hospital Course    As per Dr. Gonda's note  \"  83 y/o with PMH HTN, HLD, sick sinus syndrome (s/p pacemaker), seizures (most recent episode in 2019, on keppra), severe aortic stenosis (s/p TAVR 8/2/2022 at [facility]) and PAD (s/p vascular surgery on femoral artery in 2022) who presented to ED via EMS on 11/28/2022 for LOC, b/l LE edema and pain.  History limited due to patient’s altered mental status, A&Ox2 (self, place) so most history obtained from chart review and daughter Kendra. Patient lives with wife and son. Patient says he is having pain “all over” especially his right leg and says he got surgery on his right leg “2 weeks ago but they never finished.” On arrival to ED, patient was lethargic and confused. RLE especially R foot exquisitely tender, nonhealing ulcer with some erythema and eschar present on bottom of R heel. Ultrasound of leg showed diffuse atherosclerotic plaque throughout LE, absent flow from proximal to distal femoral artery, reconstitution monophasic flow via collaterals at distal femoral artery, absent flow in distal portion of tibial artery. Xray of R foot showed potentially periosteal changes at inferior aspect of calcaneus as well as significant vascular calcification; concern for underlying osteomyelitis secondary to ischemic ulcer. Empirically placed on IV linezolid and IV unasyn however discontinued linezolid as blood cx, wound cx, MRSA and staph aureus PCR negative as well as lack of purulence from wound. Seen by LPS and vascular surgery- scheduled for OR on 12/5 with Dr. Rdz to attempt RLE revascularization in order to avoid amputation.\"    Patient was extubated on December 7, 2022 and he is weaned off from Levophed and amiodarone gtt.  On December 8, 2022 intensivist requested to transfer care to hospitalist " service.    Interval Problem Update    12/08/22    I evaluated and examined him at the bedside.  He expressed that he is feeling better and I ordered morphine for him and his pain is under better control.  I discussed CODE STATUS with him and he expressed that he would like to be DNR and after further discussion he requested that I should call his daughter to discuss about CODE STATUS.  I called patient's daughter and she reported that she is on her way to the hospital.  I reevaluated him again at the bedside and discussed plan of care with patient's 2 daughters and patient's wife.  He expressed that he would like to be full code and he does not want to sign DNR.  If his pain is under control.  I explained at length regarding full code, DNR and hospice care to patient and his daughter at the bedside and answered all of their questions.  As per patient's wishes I continued full CODE STATUS at this time.  Currently he is currently stable and requiring 4 L of oxygen to maintain oxygen saturation.  Currently he is on Unasyn and p.o. amiodarone.  Labs showed hemoglobin of 7.2 with normal white blood cell count.  He found to have platelet count of 116.  He also found to have hyponatremia with current sodium level of 148, chloride of 118.  He underwent CT head without contrast yesterday did not show any acute abnormalities.      He found to have hyponatremia I started him on D5W use IV fluid with caution as his repeat sodium level still came back higher.    12/9/2022:  Continue present medical management at present.  Patient's pain better controlled today I have escalated his pain therapy.  I had long discussion with patient's daughter on the phone Lesli regarding course of care.  All questions were answered in detail.  There is no plan for immediate surgery patient represents an extraordinarily high perioperative risk given recent cardiac arrest approximately 4 days prior to this note being dictated.  At present patient  would benefit from pain management and rehabilitation before surgery is entertained anytime in the near future.  Patient required transfusion of 1 unit of blood today.  Repeat CBC in the morning.    I have discussed this patient's plan of care and discharge plan at IDT rounds today with Case Management, Nursing, Nursing leadership, and other members of the IDT team.    Consultants/Specialty  cardiology, critical care, infectious disease, and nephrology    Code Status  Full Code    Disposition  Patient is not medically cleared for discharge.   Anticipate discharge to to skilled nursing facility.  I have placed the appropriate orders for post-discharge needs.    Review of Systems  Review of Systems   Constitutional:  Negative for chills, fever and weight loss.   HENT:  Negative for hearing loss and tinnitus.    Eyes:  Negative for blurred vision, double vision, photophobia and pain.   Respiratory:  Negative for cough, sputum production and shortness of breath.    Cardiovascular:  Negative for chest pain, palpitations, orthopnea and leg swelling.   Gastrointestinal:  Negative for abdominal pain, constipation, diarrhea, nausea and vomiting.   Genitourinary:  Negative for dysuria, frequency and urgency.   Musculoskeletal:  Positive for myalgias. Negative for back pain, joint pain and neck pain.   Skin:  Negative for rash.   Neurological:  Negative for dizziness, tingling, tremors, sensory change, speech change, focal weakness and headaches.   Psychiatric/Behavioral:  Negative for hallucinations and substance abuse.    All other systems reviewed and are negative.     Physical Exam  Temp:  [36.5 °C (97.7 °F)-37 °C (98.6 °F)] 36.8 °C (98.2 °F)  Pulse:  [64-74] 66  Resp:  [16-18] 18  BP: (111-154)/(62-80) 111/62  SpO2:  [94 %-96 %] 96 %    Physical Exam  Vitals reviewed.   Constitutional:       General: He is not in acute distress.     Appearance: He is ill-appearing.   HENT:      Head: Normocephalic and atraumatic. No  contusion.      Right Ear: External ear normal.      Left Ear: External ear normal.      Nose: Nose normal.      Mouth/Throat:      Pharynx: No oropharyngeal exudate.   Eyes:      General:         Right eye: No discharge.         Left eye: No discharge.      Pupils: Pupils are equal, round, and reactive to light.   Cardiovascular:      Rate and Rhythm: Normal rate and regular rhythm.      Heart sounds: No murmur heard.    No friction rub. No gallop.   Pulmonary:      Effort: Pulmonary effort is normal.      Breath sounds: No wheezing or rhonchi.   Abdominal:      General: Bowel sounds are normal. There is no distension.      Palpations: Abdomen is soft.      Tenderness: There is no abdominal tenderness. There is no rebound.   Musculoskeletal:         General: No swelling or tenderness. Normal range of motion.      Cervical back: No rigidity. No muscular tenderness.   Skin:     Coloration: Skin is not jaundiced.      Findings: Erythema and lesion (Right foot) present.   Neurological:      General: No focal deficit present.      Mental Status: He is alert and oriented to person, place, and time.      Cranial Nerves: No cranial nerve deficit.      Sensory: No sensory deficit.      Comments: He is alert and oriented and able to follow my commands and answer my questions.   Psychiatric:         Mood and Affect: Mood normal.       Fluids  No intake or output data in the 24 hours ending 12/09/22 1709      Laboratory  Recent Labs     12/08/22  0422 12/08/22  1539 12/09/22  0317 12/09/22  1213   WBC 5.9  --  6.4 6.8   RBC 2.13*  --  2.14* 2.12*   HEMOGLOBIN 7.2* 8.0* 7.3* 7.3*   HEMATOCRIT 23.0* 24.7* 22.7* 23.1*   .0*  --  106.1* 109.0*   MCH 33.8*  --  34.1* 34.4*   MCHC 31.3*  --  32.2* 31.6*   RDW 51.1*  --  51.6* 52.3*   PLATELETCT 116*  --  124* 126*   MPV 11.1  --  10.5 10.8       Recent Labs     12/07/22  0552 12/07/22  1232 12/07/22  2357 12/08/22  0422 12/08/22  1539 12/09/22  0317   SODIUM 146*  --   --   148* 148* 146*   POTASSIUM 3.7   < > 3.8 3.8  --  3.4*   CHLORIDE 115*  --   --  118*  --  115*   CO2 19*  --   --  22  --  23   GLUCOSE 130*  --   --  86  --  127*   BUN 33*  --   --  22  --  17   CREATININE 1.20  --   --  1.02  --  0.88   CALCIUM 7.7*  --   --  7.6*  --  7.6*    < > = values in this interval not displayed.                     Imaging  EC-ECHOCARDIOGRAM LTD W/ CONT   Final Result      CT-HEAD W/O   Final Result      1.  No acute intracranial hemorrhage.   2.  Similar chronic findings.   3.  Mild mucosal thickening of the inferior right maxillary sinus.         DX-CHEST-PORTABLE (1 VIEW)   Final Result      Stable mild cardiac enlargement without edema      DX-CHEST-PORTABLE (1 VIEW)   Final Result         1.  Hazy interstitial left lung base infiltrate, stable since prior study.   2.  Atherosclerosis      DX-ABDOMEN FOR TUBE PLACEMENT   Final Result      Orogastric tube tip at the proximal stomach.      EC-ECHOCARDIOGRAM COMPLETE W/ CONT   Final Result      DX-ABDOMEN FOR TUBE PLACEMENT   Final Result      NG tube tip projects over the stomach, and side-port projects 3 cm above the GE junction. It can be advanced further by approximately 7 cm.      DX-ABDOMEN FOR TUBE PLACEMENT   Final Result      NG tube tip projects over the GE junction. It can be advanced 8-10 cm      DX-CHEST-PORTABLE (1 VIEW)   Final Result      1.  Right central catheter tip is in the mid SVC. No pneumothorax.   2.  NGT tip is at the GE junction. It can be advanced 8 cm.   3.  Stable other findings.      DX-CHEST-PORTABLE (1 VIEW)   Final Result      1.  Well-positioned lines and tubes. No pneumothorax.   2.  Ill-defined left basilar opacity, chronic atelectasis/scarring or sequela of recent pneumonia.      CT-CTA AORTA-RO WITH & W/O-POST PROCESS   Final Result      1.  Extensive atherosclerotic vascular calcification involving the aorta and originating arteries. There is ectasia of the ascending thoracic aorta measured at  3.4 x 3.4 cm in diameter.      2.  Extensive atherosclerotic plaque involving the right common, superficial femoral and profunda arteries. There is occlusion of the right superficial femoral artery at the level of the proximal thigh. Popliteal artery is also occluded and there is    extensive atherosclerotic plaque involving the posterior tibial, anterior tibial and peroneal arteries. No definite continuous runoff vessel identified.      3.  Extensive atherosclerotic plaque involving the left common, profunda and superficial femoral and popliteal arteries with multiple areas of high-grade stenosis of the superficial femoral and popliteal arteries. There is also extensive atherosclerotic    change of the posterior tibial, anterior tibial and peroneal arteries without a definite contiguous runoff vessel seen.      4.  High-grade stenosis involving the origin of the superior mesenteric artery.      5.  Occluded origin of the inferior mesenteric artery.      6.  Atherosclerotic plaque involving the right renal artery origin and the proximal right renal artery resulting in 50% diameter narrowing.      7.  Less than 50% diameter narrowing of the left renal artery origin.      8.  Ill-defined groundglass infiltrates within the right upper lobe possibly representing infectious process.      9.  Multiple hepatic cysts.      3D angiographic/MIP images of the vasculature confirm the vascular findings as described above.      US-VEIN MAPPING LOWER EXTREMITY UNILAT RIGHT   Final Result      DX-FOOT-COMPLETE 3+ RIGHT   Final Result      Likely acute osteomyelitis at the inferior calcaneus. MRI can be obtained as indicated.      MR-FOOT-W/O RIGHT   Final Result      1.  Very limited study due to extensive patient motion artifact and patient comfort preventing multiple sequences.      2.  No gross bony destructive change.      3.  Soft tissue edema.      US-EXTREMITY ARTERY LOWER UNILAT RIGHT   Final Result      US-EXTREMITY VENOUS  LOWER UNILAT RIGHT   Final Result      US-ABDOMEN COMPLETE SURVEY   Final Result         1.  Gallbladder sludge, otherwise unremarkable gallbladder   2.  Hepatic cysts   3.  Bilateral echogenic kidneys with mildly thinned cortex, appearance favors medical renal disease      CT-HEAD W/O   Final Result      1.  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   2.  Mild cerebral atrophy. Age-appropriate.   3.  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   4.  No acute findings are evident.         DX-CHEST-PORTABLE (1 VIEW)   Final Result      No evidence of acute cardiopulmonary process.      CL-LEFT HEART CATHETERIZATION WITH POSSIBLE INTERVENTION    (Results Pending)        Assessment/Plan  Advance care planning  Assessment & Plan  On December 8, 2022 I discussed CODE STATUS with him and he expressed that he would like to be DNR and after further discussion he requested that I should call his daughter to discuss about CODE STATUS.  I called patient's daughter and she reported that she is on her way to the hospital.  I reevaluated him again at the bedside and discussed plan of care with patient's 2 daughters and patient's wife.  He expressed that he would like to be full code and he does not want to sign DNR.  If his pain is under control.  I explained at length regarding full code, DNR and hospice care to patient and his daughter at the bedside and answered all of their questions.  As per patient's wishes I continued full CODE STATUS at this time.  Time spent: 28 minutes    Hypernatremia  Assessment & Plan  Patient found to have hypernatremia and sodium is trending up.  I started him on gentle IV fluid hydration with D5W.  Use IV fluid with caution.  I ordered repeat sodium level at 5 PM.    History of Bell's palsy  Assessment & Plan  With associated L facial droop, CT  head neg for [facility] 12/7  Supportive care    Thrombocytopenia (HCC)  Assessment & Plan  Mild, Monitor    Hypokalemia  Assessment & Plan  Continue to monitor and replace as needed.    Shock (HCC)  Assessment & Plan  Post cardiac arrest 12/5  - titrate pressor to maintain MAP>65  - monitor urine output and vital signs   - gentle IVF bolus today if unable to hydrate and pass bedside swallow  Now resolved off from pressors.    Congestive heart failure (CHF) ([facility])- (present on admission)  Assessment & Plan  - per chart review, patient has history of congestive heart failure  - c/w home aspirin 81 everyday, coreg 12.5mg BID, atorvastatin 80mg qD, lisinopril 20, amlodipine 5  - Continue to hold HCTZ.    History of seizures- (present on admission)  Assessment & Plan  - per patient, last episode of seizure was in 2019  - c/w home keppra 500mg BID    Macrocytic anemia- (present on admission)  Assessment & Plan  - No known history of anemia, not on iron supplementation  - Most recent labs from 8/2022 showing baseline hemoglobin of 14.1 and MCV of 97.7  - pending anemia workup- iron panel, reticulocytes, vitamin B12, folate  - continue to monitor with daily CBC's  - Transfuse if hemoglobin <7.0. Type and screen ordered 11/30  I ordered repeat hemoglobin for this afternoon.    Acute encephalopathy- (present on admission)  Assessment & Plan  - Per family and chart review, patient is at baseline alert and oriented x4.  - Presented to emergency room on 11/28/2022 with confusion, noted to be alert and oriented x2 (self, place only)  - Altered mental status initially thought to be in setting of azotemia from acute kidney injury however continuing to have encephalopathy despite improving azotemia and improving kidney function  - Per nephrology, currently no emergent need for dialysis. Nephrology signed off 11/29.  - Continue to monitor and trend labs. Work with PT/OT inpatient, they recommend SNF post discharge.  Significantly  improved.    Increased anion gap metabolic acidosis  Assessment & Plan  Resolved  - Baseline anion gap is 7.0 per most recent labs from 8/2022  - Anion gap on admission noted to be 19.0, improved to 17.0 after IV LR at 150 cc/hr  - Increased anion gap metabolic acidosis likely secondary to uremia from kidney damage  - Continue hydration with IV fluids and encourage PO intake  - Continue to monitor with daily labs    Osteomyelitis of right lower extremity (HCC)- (present on admission)  Assessment & Plan  - Given recent right groin access for TAVR, MARGARITA ordered-   - Pain controlled with tylenol 650mg q6 prn, IV dilaudid 0.5mg q4 prn, oxycodone 5-10mg q3 prn  - can consider lidocaine patch, diclofenac gel as needed  - U/S showed: Occlusion of RIGHT femoral artery with distal reconstitution, Occlusion of RIGHT posterior tibial artery,  Two vessel runoff to the ankle with monophasic flow  -12/1: Spoke with Dr. Medel who is partners with Dr. Quinones (who did patient's previous surgery). She says patient will be having pain for a long time but since this is not an emergency they will see him outpatient and Dr. Quinones will perform a re-anastomosis and other procedures as warranted. This procedure cannot currently be done as Dr. Quinones is currently on vacation and [facility] as well as the fact that this type of surgery cannot even be done at [facility].   - PT/OT recommending SNF  - MRI foot attempted 12/2 however was incomplete as patient was unable to tolerate. Foot xray confirmed osteomyelitis to R calcaneus.  - 12/2: Per LPS, wound care orders for nursing was placed and he was provided with betadine dressing and offloading boot per LPS team.  - 12/3: Consulted LPS and vascular surgery due to concern for osteomyelitis of R heel- seen by Dr. Rdz who will take patient to OR in order to attempt revascularization to attempt to heal foot wound and avoid amputation. OR planned for 12/5 Monday.  -  Consulted Dr. Turcios from infectious disease 12/3: believes osteomyelitis of heel will be difficult to cure and pt will need debridement, wound vac, etc however most likely poor outcome. Healing process also hindered due to impaired circulation evidence by MARGARITA. As there is no pus from wound, most likely ischemic ulcer. Linezolid discontinued 12/3, c/w just unasyn for now  - f/u blood cultures, wound cultures, NGTD  - CTA pending  - Vein mapping 12/4: Acute superficial thrombus in the greater saphenous vein near saphenofemoral junction and in the proxmal thigh. Vein wall thickening in the thigh; small caliber vein throughout the thigh & calf. Vein is unsuitable for use as a bypass graft.  Continue IV antibiotics  Vascular surgery evaluated him and made recommendations.    Sepsis (HCC)- (present on admission)  Assessment & Plan  - This is Sepsis Present on admission  - SIRS criteria identified on my evaluation include: Leukocytosis, with WBC greater than 12,000  - Source is unknown, possibly abdominal etiology. US 11/29 showing US abdomen showing gallbladder sludge otherwise unremarkable, hepatic cysts, bilateral echogenic kidneys with mildly thinned cortex concerning for possible medical renal disease.  - Sepsis protocol initiated  - Fluid resuscitation ordered per protocol  - Crystalloid Fluid Administration: Fluid resuscitation ordered per standard protocol - 30 mL/kg per current or ideal body weight  - IV antibiotics as appropriate for source of sepsis- started on IV ceftriaxone, discontinued on 11/29  - Reassessment: I have reassessed the patient's hemodynamic status  - Blood cultures NGTD  - Urine cultures unremarkable  - Hemodynamically stable  - Uptrending WBC, WBC 14.4 today 11/29 from 13.9 on admission 11/28  - continue to monitor with daily CBC's  - No clear source of sepsis- Discontinued IV ceftriaxone and started on IV unasyn low dose (due to ALL) TID (11/29- ) pending further workup.   - MRSA nares  11/29 negative  - Chest xray 11/28 not showing any evidence of acute cardiopulmonary process  - confirmed osteomyelitis to R calcaneus on 12/2 with foot xray  - Continuing Unasyn   Plan is to transfer to telemetry floor.    History of transcatheter aortic valve replacement (TAVR)- (present on admission)  Assessment & Plan  - per chart review, patient has history of severe aortic stenosis and underwent TAVR with Dr. Wolfe on 8/2/2022.   - most recent echo showed well seated TAVR with peak gradient of 12 mmHg and mean gradient of 7.5 mmHg and trace perivalvular leak.     ALL (acute kidney injury) (HCC)- (present on admission)  Assessment & Plan  RESOLVED  - No noted history of chronic kidney disease, unclear creatinine baseline however recent kidney values appear normal- most recent labs from 8/3/2022 showing baseline Cr 1.0 and BUN 15.0.  - On admission, BUN/Cr ratio noted to be 122/4.67  - Patient is able to urinate, produced about 400cc dark urine in emergency room. No urinary symptoms such as dysuria, polyuria, retention.  - Urinalysis 11/28: +hyaline casts, +granular casts.  - Consulted nephrology- Per Dr. Hearn, currently unclear etiology of ALL however given hyaline casts on UA, volume depletion most likely. Obstructive uropathy was also considered given suprapubic tenderness on exam however renal/bladder US unremarkable for obstructions.   - Started on IV fluids at 125 cc/hr on 11/28, increased rate to 150 cc/hr on 11/29  - Most recent labs from 11/29 at 1500 showing improved renal function with BUN/Cr ratio of 85/1.63.  - Continue to monitor renal function with daily labs  - monitor urine output  - Kidney function improving as of 11/29 and patient nonoliguric, no need for dialysis. Nephrology signed off 11/29.  - Continue gentle IVF and trend Crt/BUN  - Improving- Cr trending down. Cr is 0.90 on 12/1 from 1.05 on 11/30 and 4.67 on admission 11/28.  - ALL resolved, Cr 0.78 on 12/2. D/c'ed renal diet and placed  on regular diet to encourage patient PO intake  Now resolved.    Hyperlipidemia- (present on admission)  Assessment & Plan  - no lipid panel on file  - Continue atorvastatin    Essential hypertension- (present on admission)  Assessment & Plan  - at home, on lisinopril, hydrochlorithiazide, amlodipine, carvedilol.  - continue with home coreg 12.5 BID  - restarted on amlodipine 12/1 due to hypertension, continuing to hold HCTZ  - restarted lisinopril 20 on 12/2 due to continued hypertension       I discussed plan of care with bedside RN.    VTE prophylaxis: enoxaparin ppx    I have performed a physical exam and reviewed and updated ROS and Plan today (12/9/2022). In review of yesterday's note (12/8/2022), there are no changes except as documented above.

## 2022-12-10 NOTE — PROGRESS NOTES
Report received from Bina Mccann. Assumed pt care. Pt resting in bed. Pt A&O x 3. Fall precautions in place, call light and belongings within reach, bed in lowest position. No signs of distress.

## 2022-12-10 NOTE — PROGRESS NOTES
Received report from NOC shift RN. Patient is A&Ox2, oriented to personal and place. VSS, no signs of distress. All questions and concerns answered, call light in reach, will continue to monitor.

## 2022-12-10 NOTE — CONSULTS
Reason for Palliative Care Consult: Advance Care Planning    Consulted by: Dr. Wall    HPI: Gilmer Mae is an 82-year-old male with [facility] cardiac and vascular history admitted 11/28/2022 with altered mental status with decreased oral intake for approximately 1 week and bilateral lower extremity edema. 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  Nephrology consulted 11/28 with recommendations for no acute need for dialysis with continued monitoring, low sodium diet, and some adjustments to cardiology medications due to borderline hypotension. Patient seen by limb preservation 12/2/2022 for right heel wound as well as poorly perfused right toes. X-ray 12/2/2022 revealed osteomyelitis or right calcaneus. Dr. Quinones out of town and Dr. Medel consulted. ID consulted 12/3/2022 for antimicrobial therapy; appreciate impression that unless there is improved perfusion/circulation antibiotics to resolve osteomyelitis would be futile.     Patient taken to the OR for vascular surgery 12/5/2022 but was aborted due to wide-complex tachycardia with pulseless arrest 10 minutes after induction.  Patient received 1 minute of CPR with 1 mg x 1 epinephrine and ROSC obtained in OR.  Patient immediately transferred to CICU on ventilator with epinephrine infusion ongoing and developed ventricular fibrillation.  High-quality CPR was initiated and patient subsequently defibrillated times once obtaining ROSC. EKG showed left bundle/STEMI.  Cardiology consulted and code [facility] called.  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  Patient stabilized and was transferred to hospital medicine services 12/8/2022.  CODE STATUS discussion 12/8/2022 with patient reporting wishes for DNR/DNI.  Subsequent discussions were had with patient's family and ultimately patient/family opted for  full code.      Palliative care consulted for continued goals of care discussions.  Patient transferred out of the ICU to [facility].  Did to see patient yesterday however he was in significant pain followed by some somnolence making it difficult to carry on conversation.     Past medical/surgical history: Sick sinus syndrome status post pacemaker, CAD with reported occlusion of his audiogram and 30% lesion of left main prior to TAVR, severe aortic stenosis status post T[facility]R 2022 at [facility], right endartectomy, PAD status post recent vascular surgery on RLE femoral artery with Dr. Quinones, seizures with most recent episode in 2019 on Keppra, hypertension, and hyperlipidemia.    Additional consults:   Critical care  Cardiology  Infectious disease  Limb preservation  Nephrology    Assessment:  Neuro: Oriented to person, place, disoriented to time though he understands it is 2022, disoriented to event due to acuity of illness.  Today patient is able to hold attention and discuss his understanding of illness once presented.  Dyspnea: Yes; 93 % on 4 L/min via nasal cannula     Last BM: 22   Pain: Yes; right foot  Depression: Mood appropriate for situation.  Dementia: No     Living situation & psychosocial: Patient lives in [place] with his wife Krystina and oldest son.  They have been  for 58 years.  Patient has 7 living children (1 child ) and 14 grandchildren.    Spiritual:  Is Jainism or spirituality important for coping with this illness?  Yes; Restorationist  Has a  or spiritual provider visit been requested?  Yes; patient requesting   Sources of ana/meaning: Family    Palliative Performance Scale: 30%    Healthcare Directive Information:  Advance Directive: None    DPOA: None    POLST: None    Code Status: Full; changed to DNR/DNI    Discussion: Met with patient at patient's bedside. Introduced myself and explained the role of palliative care.  Reintroduced myself and discussed  "role of palliative care/reason for visit/consult.  Patient was severe dry mouth and asking for ice chips.  He reports after he has his dry mouth addressed he wants to tell me about his \"saga.\" performed oral care.  Trialed one ice chip however patient started coughing so confirms she needs to continue with thickened liquids only.  He reports his cough is much less painful than yesterday.  Patient reports pain in his right foot is much better controlled.    Patient shared that his wife had surgery close to when he had recent surgery prior to this hospitalization to remove the kidney due to kidney failure.  He reports their health has been a strain on their family.  Patient reports he had been in so much pain that he was considering letting nature take its course and proceeding with just comfort focused treatment and letting nature take its course.    I inquired if he understands what he has been through this hospitalization.  He is unsure what the plan is with his right foot.  He confirmed he knew some but did not have a clear understanding of what is been happening.  I provided an extensive and thorough overview as discussed in HPI.  Discussed that unfortunately due to complications in surgery, surgery was aborted on his right foot.  Discussed that patient would need to be much more stable and in much better condition to consider further surgery.  Discussed that this may take some time if it is possible.      He asked me what I thought.  I discussed his prognosis in terms of best case, worst case, and most likely.  The best case would be patient is able to slowly recover and become stable enough to do some type of limb preservation surgery, the worst case situation would be he is not stable for any further surgery.  Discussed surgery might be considered but could potentially be amputation.  He expressed he is unsure what he thinks of amputation.  Regarding his goals of care he wants to \"leave as many options as " "possible.\"  He would consider further limb preservation surgery if he is able to become stable enough.  He is unsure if he would be excepting of amputation.    I discussed my recommendation for DNR/DNI given his extensive cardiac history, recent STEMI, and need for CPR in OR.  Confirmed that if his condition worsens and he needed something like CPR or to go on life support he would not want that and would prefer to have nature take its course.  He confirmed that it is okay to change his CODE STATUS to DNR/DNI.  I confirmed that if surgery is being considered goals of care can be readdressed for intraoperative management of cardiac arrest and/or post operative management.  I recommended POLST form completion to outline patient's wishes.  He confirmed he would prefer to wait to do it closer to discharge, again to leave options open.     Patient confirmed that he has completed a healthcare directive with his trust.  He thought that he should have a copy on record.  I confirmed that I was unable to locate document.  Inquired if any family members could provide a copy but he is unsure of this.  He reports he appointed his wife Krystina (home [phone]) as POA HC with his daughter Anjana (resides out of state [phone]) first alternate.  He reports he is spoken to his family regarding his wishes.  I offered to reach out to call chosen family member to find an overview of our discussion today and notify them of the change in CODE STATUS.  He asked me to call Krystina.  He denied having other questions or needs at this time.  Provided POLST form and my contact information.  Recommended we reevaluate and discussed care team's recommendations Wednesday.  Patient anticipates being in the hospital for some time.    Call placed to patient's wife Krystina.  Introduced myself and discussed role on her 's care team.  I provided a thorough overview of hospital course, clinical picture, my recommendations, and my " "discussion with patient.  She confirmed patient's change in CODE STATUS wishes is \"sad news\" however she is excepting of that.  She to is inquisitive about plan of care for patient's right foot.  I reiterated I will follow-up with patient's multiple specialists however discussed high risk for surgery.  She denied having other questions or needs at this time.    Provided therapeutic communication including open-ended questions, therapeutic presence/silence, reflective listening, and validation of thoughts/feelings throughout encounter. Provided palliative care contact information and encouraged patient/confidence to call with any questions or needs.     Outcome: CODE STATUS changed to DNR/DNI.  Patient wishes to continue to work towards any limb preservation surgery to be offered.  He is unsure if he would be excepting of amputation.  He has considered hospice and comfort focused treatment.    Plan: I will follow-up Wednesday when I return.  Please contact palliative care with any needs in the interim.    Updated: Dr. Sweet    Thank you for allowing Palliative Care to participate in this patient's care. Please call our team with questions and/or additional needs.    Total visit time was 90 minutes discussing advance care planning.     ALFREDO Augustine.  Palliative Care Nurse Practitioner  [phone]    "

## 2022-12-10 NOTE — CARE PLAN
The patient is Watcher - Medium risk of patient condition declining or worsening    Shift Goals  Clinical Goals: pain control, q2 turns, monitor labs  Patient Goals: pain control, rest  Family Goals: MAYA    Progress made toward(s) clinical / shift goals:    Problem: Hemodynamics  Goal: Patient's hemodynamics, fluid balance and neurologic status will be stable or improve  Outcome: Progressing     Problem: Pain - Standard  Goal: Alleviation of pain or a reduction in pain to the patient’s comfort goal  Outcome: Progressing       Patient is not progressing towards the following goals:      Problem: Fall Risk  Goal: Patient will remain free from falls  Outcome: Not Progressing

## 2022-12-10 NOTE — THERAPY
"Occupational Therapy   Re-Evaluation     Patient Name: Gilmer Mae  Age:  82 y.o., Sex:  male  Medical Record #: [id number]  Today's Date: 12/9/2022     Precautions: Fall Risk, Swallow Precautions ( See Comments)  Comments: excruciating pain to RLE    Assessment    Patient is 82 y.o. male seen for OT re-evaluation d/t decline in functional independence following VT/V-fib cardiac arrest in OR just prior to vascular surgery, surgery aborted. Pt presents to OT re-eval significantly limited by \"excruciating\" RLE pain despite pain medication administration. Pt able to tolerate only rolling laterally and participated in BUE AROM with education for strengthening and edema mgmt. Acute OT to follow while admitted. At this time recommend post-acute placement prior to DC.     Plan    Recommend Occupational Therapy 3 times per week until therapy goals are met for the following treatments:  Adaptive Equipment, Self Care/Activities of Daily Living, Therapeutic Activities, and Therapeutic Exercises.    DC Equipment Recommendations: Unable to determine at this time  Discharge Recommendations: Recommend post-acute placement for additional occupational therapy services prior to discharge home     Subjective    \"My leg is excruciating always, and it hurts to cough.\"     Objective       12/09/22 1148   Precautions   Precautions Fall Risk;Swallow Precautions ( See Comments)   Comments excruciating pain to RLE   Pain 0 - 10 Group   Therapist Pain Assessment During Activity;Nurse Notified;8   Cognition    Level of Consciousness Responds to voice   Attention Impaired   Initiation Impaired   Comments repeated cues required for verbal responses   Supine Upper Body Exercises   Supine Upper Body Exercises Yes   Comments cued for AROM and against gravity UE exercises to reduce edema and joint stiffness   Balance   Comments unable to tolerate sitting up at this time, refused attempt   Bed Mobility    Rolling Maximum Assist to Lt.;Maximal Assist to " Rt.   Skilled Intervention Verbal Cuing;Tactile Cuing;Sequencing;Facilitation   Comments agreeable to rolling only   Activities of Daily Living   Comments agreeable to rolling only   Functional Mobility   Sit to Stand Unable to Participate   Activity Tolerance   Comments unable to tolerate sitting up   Short Term Goals   Short Term Goal # 1 Pt will tolerate >2min sit EOB in prep for seated ADLs   Short Term Goal # 2 Pt will participate in UB dress with Edgardo   Short Term Goal # 3 pt will complete seated grooming ADLs with set-up assist   Short Term Goal # 4 pt will complete functional transfer with modA   Education Group   Education Provided Role of Occupational Therapist   Role of Occupational Therapist Patient Response Patient;Acceptance;Explanation;Verbal Demonstration   Anticipated Discharge Equipment and Recommendations   DC Equipment Recommendations Unable to determine at this time   Discharge Recommendations Recommend post-acute placement for additional occupational therapy services prior to discharge home

## 2022-12-10 NOTE — CARE PLAN
The patient is Stable - Low risk of patient condition declining or worsening    Shift Goals  Clinical Goals: q2 turns  Patient Goals: pain control, rest  Family Goals: MAYA      Problem: Knowledge Deficit - Standard  Goal: Patient and family/care givers will demonstrate understanding of plan of care, disease process/condition, diagnostic tests and medications  Outcome: Progressing     Problem: Fall Risk  Goal: Patient will remain free from falls  Outcome: Progressing

## 2022-12-11 NOTE — CARE PLAN
The patient is Watcher - Medium risk of patient condition declining or worsening    Shift Goals  Clinical Goals: q2 turns, pain managment  Patient Goals: pain control, rest  Family Goals: MAYA    Progress made toward(s) clinical / shift goals:    Problem: Respiratory  Goal: Patient will achieve/maintain optimum respiratory ventilation and gas exchange  Outcome: Progressing     Problem: Pain - Standard  Goal: Alleviation of pain or a reduction in pain to the patient’s comfort goal  Outcome: Progressing     Problem: Fall Risk  Goal: Patient will remain free from falls  Outcome: Progressing

## 2022-12-11 NOTE — CARE PLAN
The patient is Stable - Low risk of patient condition declining or worsening    Shift Goals  Clinical Goals: pain control, increase PO intake  Patient Goals: pain control  Family Goals: MAYA    Progress made toward(s) clinical / shift goals:    Problem: Hemodynamics  Goal: Patient's hemodynamics, fluid balance and neurologic status will be stable or improve  Outcome: Progressing     Problem: Pain - Standard  Goal: Alleviation of pain or a reduction in pain to the patient’s comfort goal  Outcome: Progressing       Patient is not progressing towards the following goals:      Problem: Dysphagia  Goal: Dysphagia will improve  Outcome: Not Progressing     Problem: Nutrition  Goal: Patient's nutritional and fluid intake will be adequate or improve  Outcome: Not Progressing     Problem: Mobility  Goal: Patient's capacity to carry out activities will improve  Outcome: Not Progressing

## 2022-12-11 NOTE — DISCHARGE PLANNING
This LSW left a voicemail with [facility] regarding bed availability for this pt.

## 2022-12-11 NOTE — PROGRESS NOTES
Bedside report received from NOC RN. Assumed care of pt. Pt awake, laying in bed. A/Ox4, VSS. No concerns, complaints or distress. Pt educated to call before getting out of bed. POC reviewed and white board updated. Tele box on. SR 64 on the monitor. Call light in reach. Bed locked in lowest position with 2 upper bed rails up. Bed alarm on.

## 2022-12-11 NOTE — PROGRESS NOTES
Cardiology Follow Up Progress Note    Date of Service  12/11/2022    Attending Physician  Floyd Sweet M.D.    Chief Complaint   Confusion altered mental status    HPI  Gilmer Mae is a 82 y.o. male with TAVR on 8/2/2022 by Dr. Wolfe, PPM, PVD, hypertension, and hyperlipidemia admitted 11/28/2022 with acute encephalopathy.    Cardiology consultation 12/5 for VT/V. fib cardiac arrest when presented in OR post anesthesia induction.  Vascular surgery aborted, plan for right femoral popliteal bypass for right SFA occlusion complicated by right foot ischemia and osteomyelitis.    Interim Events  12/11/2022: No cardiac events overnight.  Sinus rhythm/paced rhythm on telemetry.  Vital signs stable.  He remains on 4 L supplemental O2 via nasal cannula at SPO2 of 90% to 94%.  I's/O's demonstrated net urine output of 1550.  He states that he does not have any concerns. No chest pain or palpitations. No shortness of breath, dyspnea on exertion, orthopnea or PND. No dizziness or lightheadedness. No syncope or presyncope.      Review of Systems  Review of Systems   Constitutional:  Negative for chills, diaphoresis, fatigue and fever.   HENT: Negative.     Eyes: Negative.    Respiratory:  Negative for cough, chest tightness and shortness of breath.    Cardiovascular:  Negative for chest pain, palpitations and leg swelling.   Gastrointestinal:  Negative for abdominal distention, abdominal pain, constipation, diarrhea, nausea and vomiting.   Endocrine: Negative.    Genitourinary:  Negative for decreased urine volume, difficulty urinating, dysuria, frequency and urgency.   Musculoskeletal:  Negative for arthralgias and myalgias.   Skin:  Negative for color change.   Neurological:  Negative for dizziness, syncope and light-headedness.   Hematological:  Does not bruise/bleed easily.   Psychiatric/Behavioral: Negative.       Vital signs in last 24 hours  Temp:  [36.6 °C (97.9 °F)-37.2 °C (99 °F)] 37.2 °C (99 °F)  Pulse:   [60-73] 61  Resp:  [16-18] 16  BP: (120-155)/(50-84) 137/71  SpO2:  [90 %-95 %] 95 %    Physical Exam  Physical Exam  Vitals and nursing note reviewed.   Constitutional:       General: He is not in acute distress.     Appearance: Normal appearance. He is not toxic-appearing.   HENT:      Head: Normocephalic and atraumatic.      Right Ear: External ear normal.      Left Ear: External ear normal.      Nose: Nose normal.      Mouth/Throat:      Mouth: Mucous membranes are moist.      Pharynx: Oropharynx is clear.   Eyes:      General: No scleral icterus.     Extraocular Movements: Extraocular movements intact.      Conjunctiva/sclera: Conjunctivae normal.      Pupils: Pupils are equal, round, and reactive to light.   Neck:      Vascular: No JVD.   Cardiovascular:      Rate and Rhythm: Normal rate and regular rhythm.      Pulses: Normal pulses.      Heart sounds: Normal heart sounds. No murmur heard.    No friction rub. No gallop.   Pulmonary:      Effort: Pulmonary effort is normal.      Breath sounds: Normal breath sounds.   Abdominal:      General: Abdomen is flat. Bowel sounds are normal. There is no distension.      Palpations: Abdomen is soft.      Tenderness: There is no abdominal tenderness.   Musculoskeletal:      Cervical back: Normal range of motion and neck supple.      Right lower leg: No edema.      Left lower leg: No edema.      Comments: Right lower leg extremity with decreased perfusion and wounds.   Skin:     General: Skin is warm and dry.      Capillary Refill: Capillary refill takes less than 2 seconds.      Coloration: Skin is not jaundiced.   Neurological:      General: No focal deficit present.      Mental Status: He is alert and oriented to person, place, and time. Mental status is at baseline.   Psychiatric:         Mood and Affect: Mood normal.         Behavior: Behavior normal.         Judgment: Judgment normal.       Lab Review  Lab Results   Component Value Date/Time    WBC 7.1 12/10/2022  02:15 AM    RBC 2.54 (L) 12/10/2022 02:15 AM    HEMOGLOBIN 8.7 (L) 12/10/2022 02:15 AM    HEMATOCRIT 26.3 (L) 12/10/2022 02:15 AM    .5 (H) 12/10/2022 02:15 AM    MCH 34.3 (H) 12/10/2022 02:15 AM    MCHC 33.1 (L) 12/10/2022 02:15 AM    MPV 11.2 12/10/2022 02:15 AM      Lab Results   Component Value Date/Time    SODIUM 147 (H) 12/11/2022 12:23 PM    POTASSIUM 3.8 12/11/2022 12:23 PM    CHLORIDE 115 (H) 12/11/2022 12:23 PM    CO2 24 12/11/2022 12:23 PM    GLUCOSE 95 12/11/2022 12:23 PM    BUN 18 12/11/2022 12:23 PM    CREATININE 0.93 12/11/2022 12:23 PM    BUNCREATRAT 15.0 08/03/2022 04:58 AM      Lab Results   Component Value Date/Time    ASTSGOT 59 (H) 12/10/2022 02:15 AM    ALTSGPT 36 12/10/2022 02:15 AM     Lab Results   Component Value Date/Time    CHOLSTRLTOT 117 09/11/2019 03:17 AM    LDL 52 09/11/2019 03:17 AM    HDL 48 09/11/2019 03:17 AM    TRIGLYCERIDE 83 09/11/2019 03:17 AM    TROPONINT 33 (H) 11/29/2022 11:38 AM       No results for input(s): NTPROBNP in the last 72 hours.    Cardiac Imaging and Procedures Review  Cardiac Imaging and Procedures Review  EKG:  My personal interpretation of the EKG dated 12/5/2022   is a left bundle branch block which is new compared to admission EKG.        Echocardiogram: 12/5/2022  Moderately reduced left ventricular systolic function.   The left ventricular ejection fraction is visually estimated to be 30%.   Wall motion preserved at the base with dyskinetic apex.  Contrast swirling at the apex with no left ventricular apical thrombus.  No evidence of valvular abnormality based on Doppler evaluation.   Normal inferior vena cava size without inspiratory collapse.     Repeat echocardiogram 12/9/2022  border. Moderately reduced left ventricular systolic function. The left   ventricular ejection fraction is visually estimated to be 40%. Akinesis   of the apex.     Cardiac Catheterization:     12/5/22  IMPRESSION:  1.  Nonobstructive coronary artery disease with mild  proximal left main stenosis, MLA 10-11 mm².  2. Reduced estimated left ventricular ejection fraction 30% with cineangiographic signs of Takotsubo cardiomyopathy with no evidence of noticeable LV apical thrombus.  3.  Mildly elevated resting LVEDP at 60 mmHg with no significant transaortic gradient on pullback     RECOMMENDATIONS:  Ongoing primary ASCVD preventive measures  Stress-induced cardiomyopathy management with goal-directed medical therapy  Ongoing care per ICU and cardiology consultant teams     Imaging  Chest X-Ray:  Stable mild cardiac enlargement without edema       Assessment/Plan  #Presented with acute encephalopathy 11/28//22.  [place] to be secondary to sepsis/dehydration.  #V. ib/V. tach cardiac arrest 12/5/2022 with anesthesia induction.  #Required intubation 12/5-12/7.  #Nonobstructive CAD based on coronary angiography 12/5/2022.  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  #Left bundle branch block on recent EKG 12/5/22.  #Planned right femoropopliteal bypass 12/5/2022 on hold.  #Right lower extremity osteomyelitis, appreciate ID consult.  #S/p TAVR 8/2022.  #Hypertension.  # ALL, resolved.  #Repeat echo 12/9/2022 revealed LVEF 40%    Recommendations:  -Continue amiodarone 200 mg daily.  -Continue aspirin 81 mg and atorvastatin 40 mg daily.  -Increase carvedilol to 12.5 mg twice daily.  -Continue lisinopril 10 mg daily.  -Repeat limited echocardiogram on 12/16/2022 to assess for LVEF.  If LVEF recovers, cardiac risk for repeat procedure will decrease.  -Timing of repeat vascular procedure to be determined by vascular surgery.    Please see Dr. Ji's attestation for further details and recommendations.  Cardiology will continue to follow.    Please contact me with any questions.    Thank you for allowing me to participate in the care of Gilmer Mae .    Kimberly Shaw PA-C, Cardiology  [facility], [facility]  [address], [facility]  [address]  [place] [address]  Phone: [phone]  Fax: [phone]    PLEASE NOTE: This Note was created using voice recognition Software. I have made every reasonable attempt to correct obvious errors, but I expect that there are errors of grammar and possibly content that I did not discover before finalizing the note.     I personally spent a total of 10 minutes which includes face-to-face time and non-face-to-face time spent on preparing to see the patient, reviewing hospital notes and tests, obtaining history from the patient, performing a medically appropriate exam, counseling and educating the patient, ordering medications/tests/procedures/referrals as clinically indicated, and documenting information in the electronic medical record.

## 2022-12-11 NOTE — PROGRESS NOTES
Report received from Lesli Mccann. Assumed pt care. Pt is resting in bed. Pt A&O x 2, oriented to person and place. Fall precautions in place, call light and belongings within reach, bed in lowest position. No signs of distress.

## 2022-12-11 NOTE — PROGRESS NOTES
"Hospital Medicine Daily Progress Note    Date of Service  12/11/2022    Chief Complaint  Gilmer Mae is a 82 y.o. male admitted 11/28/2022 with altered mental status    Hospital Course    As per Dr. Gonda's note  \"  81 y/o with PMH HTN, HLD, sick sinus syndrome (s/p pacemaker), seizures (most recent episode in 2019, on keppra), severe aortic stenosis (s/p TAVR 8/2/2022 at [facility]) and PAD (s/p vascular surgery on femoral artery in 2022) who presented to ED via EMS on 11/28/2022 for LOC, b/l LE edema and pain.  History limited due to patient’s altered mental status, A&Ox2 (self, place) so most history obtained from chart review and daughter Kendra. Patient lives with wife and son. Patient says he is having pain “all over” especially his right leg and says he got surgery on his right leg “2 weeks ago but they never finished.” On arrival to ED, patient was lethargic and confused. RLE especially R foot exquisitely tender, nonhealing ulcer with some erythema and eschar present on bottom of R heel. Ultrasound of leg showed diffuse atherosclerotic plaque throughout LE, absent flow from proximal to distal femoral artery, reconstitution monophasic flow via collaterals at distal femoral artery, absent flow in distal portion of tibial artery. Xray of R foot showed potentially periosteal changes at inferior aspect of calcaneus as well as significant vascular calcification; concern for underlying osteomyelitis secondary to ischemic ulcer. Empirically placed on IV linezolid and IV unasyn however discontinued linezolid as blood cx, wound cx, MRSA and staph aureus PCR negative as well as lack of purulence from wound. Seen by LPS and vascular surgery- scheduled for OR on 12/5 with Dr. Rdz to attempt RLE revascularization in order to avoid amputation.\"    Patient was extubated on December 7, 2022 and he is weaned off from Levophed and amiodarone gtt.  On December 8, 2022 intensivist requested to transfer care to hospitalist " service.    Interval Problem Update    12/08/22    I evaluated and examined him at the bedside.  He expressed that he is feeling better and I ordered morphine for him and his pain is under better control.  I discussed CODE STATUS with him and he expressed that he would like to be DNR and after further discussion he requested that I should call his daughter to discuss about CODE STATUS.  I called patient's daughter and she reported that she is on her way to the hospital.  I reevaluated him again at the bedside and discussed plan of care with patient's 2 daughters and patient's wife.  He expressed that he would like to be full code and he does not want to sign DNR.  If his pain is under control.  I explained at length regarding full code, DNR and hospice care to patient and his daughter at the bedside and answered all of their questions.  As per patient's wishes I continued full CODE STATUS at this time.  Currently he is currently stable and requiring 4 L of oxygen to maintain oxygen saturation.  Currently he is on Unasyn and p.o. amiodarone.  Labs showed hemoglobin of 7.2 with normal white blood cell count.  He found to have platelet count of 116.  He also found to have hyponatremia with current sodium level of 148, chloride of 118.  He underwent CT head without contrast yesterday did not show any acute abnormalities.      He found to have hyponatremia I started him on D5W use IV fluid with caution as his repeat sodium level still came back higher.    12/9/2022:  Continue present medical management at present.  Patient's pain better controlled today I have escalated his pain therapy.  I had long discussion with patient's daughter on the phone Lesli regarding course of care.  All questions were answered in detail.  There is no plan for immediate surgery patient represents an extraordinarily high perioperative risk given recent cardiac arrest approximately 4 days prior to this note being dictated.  At present patient  would benefit from pain management and rehabilitation before surgery is entertained anytime in the near future.  Patient required transfusion of 1 unit of blood today.  Repeat CBC in the morning.  12/10/2022:  Patient's pain much improved today.  Appreciate cardiology and subspecialty support recommendations.  No imminent plan for surgery in the near future.  We will continue to discuss surgical options after discharge.  Patient has significant perioperative comorbidities that may limit his options in terms of surgical intervention.  Will defer this to vascular surgery and limb preservation service.  Regardless continue to trend hemoglobin as necessary.  Continue to transfuse as indicated.  No acute events overnight.  12/11/2022:  Globin stable today continue present medical management patient with diminished appetite and increasing needed pain control discussed the idea of initiation of Marinol with patient he is open to this idea.  We will initiate Marinol to help stimulate appetite and also facilitate adjunctive therapy to pain control.  Otherwise continue present medical management appreciate cardiology recommendations.    I have discussed this patient's plan of care and discharge plan at IDT rounds today with Case Management, Nursing, Nursing leadership, and other members of the IDT team.    Consultants/Specialty  cardiology, critical care, infectious disease, and nephrology    Code Status  DNAR/DNI    Disposition  Patient is not medically cleared for discharge.   Anticipate discharge to to skilled nursing facility.  I have placed the appropriate orders for post-discharge needs.    Review of Systems  Review of Systems   Constitutional:  Negative for chills, fever and weight loss.   HENT:  Negative for hearing loss and tinnitus.    Eyes:  Negative for blurred vision, double vision, photophobia and pain.   Respiratory:  Negative for cough, sputum production and shortness of breath.    Cardiovascular:  Negative  for chest pain, palpitations, orthopnea and leg swelling.   Gastrointestinal:  Negative for abdominal pain, constipation, diarrhea, nausea and vomiting.   Genitourinary:  Negative for dysuria, frequency and urgency.   Musculoskeletal:  Positive for myalgias. Negative for back pain, joint pain and neck pain.   Skin:  Negative for rash.   Neurological:  Negative for dizziness, tingling, tremors, sensory change, speech change, focal weakness and headaches.   Psychiatric/Behavioral:  Negative for hallucinations and substance abuse.    All other systems reviewed and are negative.     Physical Exam  Temp:  [36.6 °C (97.9 °F)-37.2 °C (99 °F)] 37.2 °C (99 °F)  Pulse:  [60-73] 61  Resp:  [16-18] 16  BP: (120-155)/(50-84) 137/71  SpO2:  [90 %-95 %] 95 %    Physical Exam  Vitals reviewed.   Constitutional:       General: He is not in acute distress.     Appearance: He is ill-appearing.   HENT:      Head: Normocephalic and atraumatic. No contusion.      Right Ear: External ear normal.      Left Ear: External ear normal.      Nose: Nose normal.      Mouth/Throat:      Pharynx: No oropharyngeal exudate.   Eyes:      General:         Right eye: No discharge.         Left eye: No discharge.      Pupils: Pupils are equal, round, and reactive to light.   Cardiovascular:      Rate and Rhythm: Normal rate and regular rhythm.      Heart sounds: No murmur heard.    No friction rub. No gallop.   Pulmonary:      Effort: Pulmonary effort is normal.      Breath sounds: No wheezing or rhonchi.   Abdominal:      General: Bowel sounds are normal. There is no distension.      Palpations: Abdomen is soft.      Tenderness: There is no abdominal tenderness. There is no rebound.   Musculoskeletal:         General: No swelling or tenderness. Normal range of motion.      Cervical back: No rigidity. No muscular tenderness.   Skin:     Coloration: Skin is not jaundiced.      Findings: Erythema and lesion (Right foot) present.   Neurological:       General: No focal deficit present.      Mental Status: He is alert and oriented to person, place, and time.      Cranial Nerves: No cranial nerve deficit.      Sensory: No sensory deficit.      Comments: He is alert and oriented and able to follow my commands and answer my questions.   Psychiatric:         Mood and Affect: Mood normal.       Fluids    Intake/Output Summary (Last 24 hours) at 12/11/2022 1558  Last data filed at 12/11/2022 0554  Gross per 24 hour   Intake --   Output 1550 ml   Net -1550 ml         Laboratory  Recent Labs     12/09/22  1213 12/09/22  1953 12/10/22  0215   WBC 6.8 7.4 7.1   RBC 2.12* 2.68* 2.54*   HEMOGLOBIN 7.3* 9.1* 8.7*   HEMATOCRIT 23.1* 27.6* 26.3*   .0* 103.0* 103.5*   MCH 34.4* 34.0* 34.3*   MCHC 31.6* 33.0* 33.1*   RDW 52.3* 51.2* 56.4*   PLATELETCT 126* 132* 123*   MPV 10.8 11.0 11.2       Recent Labs     12/09/22  0317 12/10/22  0215 12/11/22  1223   SODIUM 146* 144 147*   POTASSIUM 3.4* 3.9 3.8   CHLORIDE 115* 114* 115*   CO2 23 22 24   GLUCOSE 127* 91 95   BUN 17 16 18   CREATININE 0.88 0.89 0.93   CALCIUM 7.6* 7.8* 8.0*                     Imaging  EC-ECHOCARDIOGRAM LTD W/ CONT   Final Result      CT-HEAD W/O   Final Result      1.  No acute intracranial hemorrhage.   2.  Similar chronic findings.   3.  Mild mucosal thickening of the inferior right maxillary sinus.         DX-CHEST-PORTABLE (1 VIEW)   Final Result      Stable mild cardiac enlargement without edema      DX-CHEST-PORTABLE (1 VIEW)   Final Result         1.  Hazy interstitial left lung base infiltrate, stable since prior study.   2.  Atherosclerosis      DX-ABDOMEN FOR TUBE PLACEMENT   Final Result      Orogastric tube tip at the proximal stomach.      EC-ECHOCARDIOGRAM COMPLETE W/ CONT   Final Result      DX-ABDOMEN FOR TUBE PLACEMENT   Final Result      NG tube tip projects over the stomach, and side-port projects 3 cm above the GE junction. It can be advanced further by approximately 7 cm.       DX-ABDOMEN FOR TUBE PLACEMENT   Final Result      NG tube tip projects over the GE junction. It can be advanced 8-10 cm      DX-CHEST-PORTABLE (1 VIEW)   Final Result      1.  Right central catheter tip is in the mid SVC. No pneumothorax.   2.  NGT tip is at the GE junction. It can be advanced 8 cm.   3.  Stable other findings.      DX-CHEST-PORTABLE (1 VIEW)   Final Result      1.  Well-positioned lines and tubes. No pneumothorax.   2.  Ill-defined left basilar opacity, chronic atelectasis/scarring or sequela of recent pneumonia.      CT-CTA AORTA-RO WITH & W/O-POST PROCESS   Final Result      1.  Extensive atherosclerotic vascular calcification involving the aorta and originating arteries. There is ectasia of the ascending thoracic aorta measured at 3.4 x 3.4 cm in diameter.      2.  Extensive atherosclerotic plaque involving the right common, superficial femoral and profunda arteries. There is occlusion of the right superficial femoral artery at the level of the proximal thigh. Popliteal artery is also occluded and there is    extensive atherosclerotic plaque involving the posterior tibial, anterior tibial and peroneal arteries. No definite continuous runoff vessel identified.      3.  Extensive atherosclerotic plaque involving the left common, profunda and superficial femoral and popliteal arteries with multiple areas of high-grade stenosis of the superficial femoral and popliteal arteries. There is also extensive atherosclerotic    change of the posterior tibial, anterior tibial and peroneal arteries without a definite contiguous runoff vessel seen.      4.  High-grade stenosis involving the origin of the superior mesenteric artery.      5.  Occluded origin of the inferior mesenteric artery.      6.  Atherosclerotic plaque involving the right renal artery origin and the proximal right renal artery resulting in 50% diameter narrowing.      7.  Less than 50% diameter narrowing of the left renal artery origin.       8.  Ill-defined groundglass infiltrates within the right upper lobe possibly representing infectious process.      9.  Multiple hepatic cysts.      3D angiographic/MIP images of the vasculature confirm the vascular findings as described above.      US-VEIN MAPPING LOWER EXTREMITY UNILAT RIGHT   Final Result      DX-FOOT-COMPLETE 3+ RIGHT   Final Result      Likely acute osteomyelitis at the inferior calcaneus. MRI can be obtained as indicated.      MR-FOOT-W/O RIGHT   Final Result      1.  Very limited study due to extensive patient motion artifact and patient comfort preventing multiple sequences.      2.  No gross bony destructive change.      3.  Soft tissue edema.      US-EXTREMITY ARTERY LOWER UNILAT RIGHT   Final Result      US-EXTREMITY VENOUS LOWER UNILAT RIGHT   Final Result      US-ABDOMEN COMPLETE SURVEY   Final Result         1.  Gallbladder sludge, otherwise unremarkable gallbladder   2.  Hepatic cysts   3.  Bilateral echogenic kidneys with mildly thinned cortex, appearance favors medical renal disease      CT-HEAD W/O   Final Result      1.  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   2.  Mild cerebral atrophy. Age-appropriate.   3.  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   4.  No acute findings are evident.         DX-CHEST-PORTABLE (1 VIEW)   Final Result      No evidence of acute cardiopulmonary process.      CL-LEFT HEART CATHETERIZATION WITH POSSIBLE INTERVENTION    (Results Pending)        Assessment/Plan  Advance care planning  Assessment & Plan  On December 8, 2022 I discussed CODE STATUS with him and he expressed that he would like to be DNR and after further discussion he requested that I should call his daughter to discuss about CODE STATUS.  I called patient's daughter and she reported that she is on her way to the hospital.  I  reevaluated him again at the bedside and discussed plan of care with patient's 2 daughters and patient's wife.  He expressed that he would like to be full code and he does not want to sign DNR.  If his pain is under control.  I explained at length regarding full code, DNR and hospice care to patient and his daughter at the bedside and answered all of their questions.  As per patient's wishes I continued full CODE STATUS at this time.  Time spent: 28 minutes    Hypernatremia  Assessment & Plan  Patient found to have hypernatremia and sodium is trending up.  I started him on gentle IV fluid hydration with D5W.  Use IV fluid with caution.  I ordered repeat sodium level at 5 PM.    History of Bell's palsy  Assessment & Plan  With associated L facial droop, CT head neg for AICH 12/7  Supportive care    Thrombocytopenia (HCC)  Assessment & Plan  Mild, Monitor    Hypokalemia  Assessment & Plan  Continue to monitor and replace as needed.    Shock (HCC)  Assessment & Plan  Post cardiac arrest 12/5  - titrate pressor to maintain MAP>65  - monitor urine output and vital signs   - gentle IVF bolus today if unable to hydrate and pass bedside swallow  Now resolved off from pressors.    Congestive heart failure (CHF) (HCC)- (present on admission)  Assessment & Plan  - per chart review, patient has history of congestive heart failure  - c/w home aspirin 81 everyday, coreg 12.5mg BID, atorvastatin 80mg qD, lisinopril 20, amlodipine 5  - Continue to hold HCTZ.    History of seizures- (present on admission)  Assessment & Plan  - per patient, last episode of seizure was in 2019  - c/w home keppra 500mg BID    Macrocytic anemia- (present on admission)  Assessment & Plan  - No known history of anemia, not on iron supplementation  - Most recent labs from 8/2022 showing baseline hemoglobin of 14.1 and MCV of 97.7  - pending anemia workup- iron panel, reticulocytes, vitamin B12, folate  - continue to monitor with daily CBC's  - Transfuse if  hemoglobin <7.0. Type and screen ordered 11/30  I ordered repeat hemoglobin for this afternoon.    Acute encephalopathy- (present on admission)  Assessment & Plan  - Per family and chart review, patient is at baseline alert and oriented x4.  - Presented to emergency room on 11/28/2022 with confusion, noted to be alert and oriented x2 (self, place only)  - Altered mental status initially thought to be in setting of azotemia from acute kidney injury however continuing to have encephalopathy despite improving azotemia and improving kidney function  - Per nephrology, currently no emergent need for dialysis. Nephrology signed off 11/29.  - Continue to monitor and trend labs. Work with PT/OT inpatient, they recommend SNF post discharge.  Significantly improved.    Increased anion gap metabolic acidosis  Assessment & Plan  Resolved  - Baseline anion gap is 7.0 per most recent labs from 8/2022  - Anion gap on admission noted to be 19.0, improved to 17.0 after IV LR at 150 cc/hr  - Increased anion gap metabolic acidosis likely secondary to uremia from kidney damage  - Continue hydration with IV fluids and encourage PO intake  - Continue to monitor with daily labs    Osteomyelitis of right lower extremity (HCC)- (present on admission)  Assessment & Plan  - Given recent right groin access for TAVR, MARGARITA ordered-   - Pain controlled with tylenol 650mg q6 prn, IV dilaudid 0.5mg q4 prn, oxycodone 5-10mg q3 prn  - can consider lidocaine patch, diclofenac gel as needed  - U/S showed: Occlusion of RIGHT femoral artery with distal reconstitution, Occlusion of RIGHT posterior tibial artery,  Two vessel runoff to the ankle with monophasic flow  -12/1: Spoke with Dr. Medel who is partners with Dr. Quinones (who did patient's previous surgery). She says patient will be having pain for a long time but since this is not an emergency they will see him outpatient and Dr. Quinones will perform a re-anastomosis and other procedures as warranted.  This procedure cannot currently be done as Dr. Quinones is currently on vacation and Dr. Medel doesn't have [facility] privileges as well as the fact that this type of surgery cannot even be done at [facility].   - PT/OT recommending SNF  - MRI foot attempted 12/2 however was incomplete as patient was unable to tolerate. Foot xray confirmed osteomyelitis to R calcaneus.  - 12/2: Per LPS, wound care orders for nursing was placed and he was provided with betadine dressing and offloading boot per LPS team.  - 12/3: Consulted LPS and vascular surgery due to concern for osteomyelitis of R heel- seen by Dr. Rdz who will take patient to OR in order to attempt revascularization to attempt to heal foot wound and avoid amputation. OR planned for 12/5 Monday.  - Consulted Dr. Turcios from infectious disease 12/3: believes osteomyelitis of heel will be difficult to cure and pt will need debridement, wound vac, etc however most likely poor outcome. Healing process also hindered due to impaired circulation evidence by MARGARITA. As there is no pus from wound, most likely ischemic ulcer. Linezolid discontinued 12/3, c/w just unasyn for now  - f/u blood cultures, wound cultures, NGTD  - CTA pending  - Vein mapping 12/4: Acute superficial thrombus in the greater saphenous vein near saphenofemoral junction and in the proxmal thigh. Vein wall thickening in the thigh; small caliber vein throughout the thigh & calf. Vein is unsuitable for use as a bypass graft.  Continue IV antibiotics  Vascular surgery evaluated him and made recommendations.    Sepsis (HCC)- (present on admission)  Assessment & Plan  - This is Sepsis Present on admission  - SIRS criteria identified on my evaluation include: Leukocytosis, with WBC greater than 12,000  - Source is unknown, possibly abdominal etiology. US 11/29 showing US abdomen showing gallbladder sludge otherwise unremarkable, hepatic cysts, bilateral echogenic kidneys with mildly thinned cortex concerning for  possible medical renal disease.  - Sepsis protocol initiated  - Fluid resuscitation ordered per protocol  - Crystalloid Fluid Administration: Fluid resuscitation ordered per standard protocol - 30 mL/kg per current or ideal body weight  - IV antibiotics as appropriate for source of sepsis- started on IV ceftriaxone, discontinued on 11/29  - Reassessment: I have reassessed the patient's hemodynamic status  - Blood cultures NGTD  - Urine cultures unremarkable  - Hemodynamically stable  - Uptrending WBC, WBC 14.4 today 11/29 from 13.9 on admission 11/28  - continue to monitor with daily CBC's  - No clear source of sepsis- Discontinued IV ceftriaxone and started on IV unasyn low dose (due to ALL) TID (11/29- ) pending further workup.   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    History of transcatheter aortic valve replacement (TAVR)- (present on admission)  Assessment & Plan  - per chart review, patient has history of severe aortic stenosis and underwent TAVR with Dr. Wolfe on 8/2/2022.   - most recent echo showed well seated TAVR with peak gradient of 12 mmHg and mean gradient of 7.5 mmHg and trace perivalvular leak.     ALL (acute kidney injury) (HCC)- (present on admission)  Assessment & Plan  RESOLVED  - No noted history of chronic kidney disease, unclear creatinine baseline however recent kidney values appear normal- most recent labs from 8/3/2022 showing baseline Cr 1.0 and BUN 15.0.  - On admission, BUN/Cr ratio noted to be 122/4.67  - Patient is able to urinate, produced about 400cc dark urine in emergency room. No urinary symptoms such as dysuria, polyuria, retention.  - Urinalysis 11/28: +hyaline casts, +granular casts.  - Consulted nephrology- Per Dr. Hearn, currently unclear etiology of ALL however given hyaline  casts on UA, volume depletion most likely. Obstructive uropathy was also considered given suprapubic tenderness on exam however renal/bladder US unremarkable for obstructions.   - Started on IV fluids at 125 cc/hr on 11/28, increased rate to 150 cc/hr on 11/29  - Most recent labs from 11/29 at 1500 showing improved renal function with BUN/Cr ratio of 85/1.63.  - Continue to monitor renal function with daily labs  - monitor urine output  - Kidney function improving as of 11/29 and patient nonoliguric, no need for dialysis. Nephrology signed off 11/29.  - Continue gentle IVF and trend Crt/BUN  - Improving- Cr trending down. Cr is 0.90 on 12/1 from 1.05 on 11/30 and 4.67 on admission 11/28.  - ALL resolved, Cr 0.78 on 12/2. D/c'ed renal diet and placed on regular diet to encourage patient PO intake  Now resolved.    Hyperlipidemia- (present on admission)  Assessment & Plan  - no lipid panel on file  - Continue atorvastatin    Essential hypertension- (present on admission)  Assessment & Plan  - at home, on lisinopril, hydrochlorithiazide, amlodipine, carvedilol.  - continue with home coreg 12.5 BID  - restarted on amlodipine 12/1 due to hypertension, continuing to hold HCTZ  - restarted lisinopril 20 on 12/2 due to continued hypertension       Please note that this dictation was created using voice recognition software. I have made every reasonable attempt to correct obvious errors, but I expect that there are errors of grammar and possibly context that I did not discover before finalizing the note.      VTE prophylaxis: enoxaparin ppx    I have performed a physical exam and reviewed and updated ROS and Plan today (12/11/2022). In review of yesterday's note (12/10/2022), there are no changes except as documented above.

## 2022-12-12 NOTE — CONSULTS
Cardiology Initial Consultation    Date of Service  12/12/2022    Referring Physician  Floyd Sweet M.D.    Reason for EP Consultation  Consideration for upgrade for existing dual chamber PPM to ICD after VT/VF arrest.     History of Presenting Illness  Gilmer Mae is a 82 y.o. male with a past medical history of Severe AS S/P TAVR in 08/2022 by Dr Wolfe, dual chamber PPM implanted 06/2019, PVD, HTN who presented 11/28/2022 with AMS/acute encephalopathy. Noted to have ischemia and osteomyelitis of his right lower extremity.  Seen by vascular surgery and ID.  He went to the OR on 12/5 for planned right lower extremity bypass of SFA occlusion.  Reported to have brief WCT with pulseless VT requiring brief resuscitation with CPR.  Case was canceled and he was taken to [facility] where noted to have brief VT/VF arrest requiring CPR and defibrillation.     Review of Systems  Review of Systems   Constitutional:  Positive for fatigue. Negative for chills and fever.   HENT:  Negative for trouble swallowing.    Eyes: Negative.    Respiratory:  Negative for cough, chest tightness, shortness of breath and wheezing.    Cardiovascular:  Negative for chest pain.   Endocrine: Negative.    Skin:  Positive for wound (Right lower extremity).   Neurological:  Negative for dizziness, syncope, speech difficulty, weakness, light-headedness and numbness.   Hematological: Negative.    Psychiatric/Behavioral: Negative.       Past Medical History   has a past medical history of Chronic pain, Hyperlipidemia, Hypertension, and Pacemaker.    Surgical History   has a past surgical history that includes pacemaker insertion.    Family History  family history is not on file.    Social History   reports that he has never smoked. He has never been exposed to tobacco smoke. He does not have any smokeless tobacco history on file.    Medications  Prior to Admission Medications   Prescriptions Last Dose Informant Patient Reported? Taking?    HYDROcodone-acetaminophen (NORCO) 5-325 MG Tab per tablet UNK at [facility] Patient's Home Pharm[facility] Yes No   Sig: Take 1-2 Tabs by mouth every four hours as needed.   acetaminophen (TYLENOL) 325 MG Tab UNK at [facility] Patient[facility] Home Pharm[facility] Yes Yes   Sig: Take 650 mg by mouth every four hours as needed.   amLODIPine (NORVASC) 5 MG Tab UNK at [facility] Patient[facility] Home Pharm[facility] Yes No   Sig: Take 10 mg by mouth 2 times a day.   aspirin EC (ECOTRIN) 81 MG Tablet Delayed Response UNK at [facility] Patient's Home Pharm[facility] Yes No   Sig: Take 81 mg by mouth every day.   atorvastatin (LIPITOR) 80 MG tablet UNK at [facility] Patient[facility] Home Pharm[facility] Yes No   Sig: Take 80 mg by mouth every day.   carvedilol (COREG) 6.25 MG Tab UNK at [facility] Patient[facility] Home Pharm[facility] Yes No   Sig: Take 6.25 mg by mouth 2 times a day.   cephALEXin (KEFLEX) 500 MG Cap UNK at [facility] Patient's Home Pharm[facility] Yes No   Sig: Take 500 mg by mouth 4 times a day. 7 day course started 10/24/2022   cloNIDine (CATAPRES) 0.1 MG Tab UNK at [facility] Patient[facility] Home Pharm[facility] Yes No   Sig: Take 0.1 mg by mouth in the morning, at noon, and at bedtime.   clopidogrel (PLAVIX) 75 MG Tab UNK at [facility] Yes No   Sig: Take 75 mg by mouth every day.   gabapentin (NEURONTIN) 100 MG Cap UNK at [facility] Patient[facility] Home Pharm[facility] Yes No   Sig: Take 100 mg by mouth 3 times a day.   hydroCHLOROthiazide (HYDRODIURIL) 25 MG Tab UNK at [facility] Patient[facility] Home Pharm[facility] Yes No   Sig: Take 25 mg by mouth every day.   levETIRAcetam (KEPPRA) 500 MG Tab UNK at [facility] Patient's Home Pharm[facility] No No   Sig: Take 1 Tab by mouth 2 Times a Day.   lisinopril (PRINIVIL) 10 MG Tab UNK at [facility] Home Pharm[facility] Yes No   Sig: Take 40 mg by mouth every day.      Facility-Administered Medications: None       Allergies  No Known Allergies    Vital signs in last 24 hours  Temp:  [36.4 °C (97.5 °F)-37.2 °C (99 °F)] (P) 36.4 °C (97.5 °F)  Pulse:  [61-70] 67  Resp:  [14-16] (P) 14  BP: (105-152)/(51-78) 132/68  SpO2:  [92 %-96 %] (P) 93  %    Physical Exam  Physical Exam  Vitals and nursing note reviewed.   Constitutional:       Appearance: Normal appearance.   HENT:      Head: Normocephalic and atraumatic.      Nose: No congestion.   Eyes:      Extraocular Movements: Extraocular movements intact.      Conjunctiva/sclera: Conjunctivae normal.      Pupils: Pupils are equal, round, and reactive to light.   Cardiovascular:      Rate and Rhythm: Normal rate and regular rhythm.      Pulses: Normal pulses.      Heart sounds: Normal heart sounds. No murmur heard.    No friction rub. No gallop.   Pulmonary:      Effort: Pulmonary effort is normal.      Breath sounds: Normal breath sounds. No wheezing, rhonchi or rales.   Musculoskeletal:         General: Normal range of motion.      Cervical back: Normal range of motion.      Right lower leg: No edema.      Left lower leg: No edema.   Skin:     General: Skin is warm and dry.   Neurological:      Mental Status: He is alert and oriented to person, place, and time.      Gait: Gait normal.   Psychiatric:         Mood and Affect: Mood normal.         Behavior: Behavior normal.         Thought Content: Thought content normal.         Judgment: Judgment normal.       Lab Review  Lab Results   Component Value Date/Time    WBC 6.9 12/12/2022 03:25 AM    RBC 2.75 (L) 12/12/2022 03:25 AM    HEMOGLOBIN 9.4 (L) 12/12/2022 03:25 AM    HEMATOCRIT 29.1 (L) 12/12/2022 03:25 AM    .8 (H) 12/12/2022 03:25 AM    MCH 34.2 (H) 12/12/2022 03:25 AM    MCHC 32.3 (L) 12/12/2022 03:25 AM    MPV 11.1 12/12/2022 03:25 AM      Lab Results   Component Value Date/Time    SODIUM 148 (H) 12/12/2022 03:25 AM    POTASSIUM 3.8 12/12/2022 03:25 AM    CHLORIDE 115 (H) 12/12/2022 03:25 AM    CO2 25 12/12/2022 03:25 AM    GLUCOSE 92 12/12/2022 03:25 AM    BUN 20 12/12/2022 03:25 AM    CREATININE 0.92 12/12/2022 03:25 AM    BUNCREATRAT 15.0 08/03/2022 04:58 AM      Lab Results   Component Value Date/Time    ASTSGOT 59 (H) 12/10/2022 02:15 AM     ALTSGPT 36 12/10/2022 02:15 AM     Lab Results   Component Value Date/Time    CHOLSTRLTOT 117 09/11/2019 03:17 AM    LDL 52 09/11/2019 03:17 AM    HDL 48 09/11/2019 03:17 AM    TRIGLYCERIDE 83 09/11/2019 03:17 AM    TROPONINT 33 (H) 11/29/2022 11:38 AM       No results for input(s): NTPROBNP in the last 72 hours.    Cardiac Imaging and Procedures Review  Echocardiogram:  12/9/22  Limited for LVEF.  The left ventricular ejection fraction is visually estimated to be 40%.    Cardiac Catheterization:  12/5/22  IMPRESSION:  1.  Nonobstructive coronary artery disease with mild proximal left main stenosis, MLA 10-11 mm².  2. Reduced estimated left ventricular ejection fraction 30% with cineangiographic signs of Takotsubo cardiomyopathy with no evidence of noticeable LV apical thrombus.  3.  Mildly elevated resting LVEDP at 60 mmHg with no significant transaortic gradient on pullback     Imaging    Assessment/Plan  WCT  Reported pulseless VT/VF ccardiac Arrest  Takotsubo CM, last ef estiamted to be 40%  PVD secondary to ischemia with osteomyelitis .   MDT dual chmaber PPM    - Device interrogation report reviewed from post arrest and there is no logged sustained VT/VT in device.  I have reviewed this with Dr. Lyman and per Dr Lyman, fine VF cannot be excluded. Nonetheless, he was pulseless with CPR and defibrillation requirement per intensivist and anesthesia documentation.     - Given this, he does meet indication for consideration of ICD upgrade.   Myself and Dr Hassan have discussed this with the patient and his son who is at bedside.  The patient does clearly indicate that he would want resuscitation with defibrillation should he have another cardiac arrest event.  His son agree's with this decision.  - Will plan for device upgrade with moderate sedation, to be reviewed by Dr Lyman.  NPO after MN.  DC daily lovenox.   - WBC count is not elevated today, he has been afebrile. BC 11/28 were negative.  Osteo treated with  unasyn, though per ID my be futile.  Vascular surgery will reevaluate rescheduling lower extremity intervention in the near future (1-2 weeks).    Please see Dr Lyman's attestation for further details.     ALFREDO Sanchez.   [facility]  [phone]

## 2022-12-12 NOTE — DISCHARGE PLANNING
Case Management Discharge Planning    Admission Date: 11/28/2022  GMLOS: 4.3  ALOS: 14    6-Clicks ADL Score: 14  6-Clicks Mobility Score: 6  PT and/or OT Eval ordered: Yes  Post-acute Referrals Ordered: Yes  Post-acute Choice Obtained: Yes  Has referral(s) been sent to post-acute provider:  Yes      Anticipated Discharge Dispo: Discharge Disposition: D/T to SNF with Medicare cert in anticipation of skilled care (03)    DME Needed: No    Action(s) Taken: Patient is clear for discharge to SNF today.  [facility] has accepted the patient and has a bed available today.  [facility] transport is arranged for 1630 today via [facility].  RN CM met with the patient at bedside to discuss, patient consented to transfer.  RN CM delivered the 2nd IMM to the patient, he signed the IMM at 4655.  RN CM called the patient's wife Lianna at [phone], Lianna appreciated the update and supported the patient transferring to [facility].  Bedside LISA Araujo and Dr. Sweet updated.    Escalations Completed: Ride Line    Medically Clear: Yes    Next Steps: Care coordination to complete the transfer packet.    Barriers to Discharge: None    Is the patient up for discharge tomorrow: Discharging today    @1500- Transfer packet given to discharge LISA Jimenez.

## 2022-12-12 NOTE — DISCHARGE SUMMARY
Discharge Summary    CHIEF COMPLAINT ON ADMISSION  Chief Complaint   Patient presents with    ALOC    Edema     Ble edema       Reason for Admission  EMS     Admission Date  11/28/2022    CODE STATUS  DNAR/DNI    HPI & HOSPITAL COURSE  This is a 82 y.o. male here with encephalopathy  83 y/o with PMH HTN, HLD, sick sinus syndrome (s/p pacemaker), seizures (most recent episode in 2019, on keppra), severe aortic stenosis (s/p TAVR 8/2/2022 at [facility]) and PAD (s/p vascular surgery on femoral artery in 2022) who presented to ED via EMS on 11/28/2022 for LOC, b/l LE edema and pain.  History limited due to patient’s altered mental status, A&Ox2 (self, place) so most history obtained from chart review and daughter Kendra. Patient lives with wife and son. Patient says he is having pain “all over” especially his right leg and says he got surgery on his right leg “2 weeks ago but they never finished.” On arrival to ED, patient was lethargic and confused. RLE especially R foot exquisitely tender, nonhealing ulcer with some erythema and eschar present on bottom of R heel. Ultrasound of leg showed diffuse atherosclerotic plaque throughout LE, absent flow from proximal to distal femoral artery, reconstitution monophasic flow via collaterals at distal femoral artery, absent flow in distal portion of tibial artery. Xray of R foot showed potentially periosteal changes at inferior aspect of calcaneus as well as significant vascular calcification; concern for underlying osteomyelitis secondary to ischemic ulcer. Empirically placed on IV linezolid and IV unasyn however discontinued linezolid as blood cx, wound cx, MRSA and staph aureus PCR negative as well as lack of purulence from wound. Seen by LPS and vascular surgery- scheduled for OR on 12/5 with Dr. Rdz to attempt RLE revascularization in order to avoid amputation.  Patient was taken to surgery on 12/5/2022 and subsequently underwent anesthesia induction shortly after  anesthesia induction patient sustained wide-complex cardiac arrest and was pulseless for approximately 10 minutes after induction.  Patient required 1 round of CPR and 1 mg of epinephrine before ROSC was obtained.  Patient was happily transition to cardiac intensive care unit.  Patient was placed on epinephrine drip.  Patient subsequently developed ventricular fibrillation for which CPR again was initiated and patient was defibrillated x1 with ROSC.  Patient was then taken for emergent cardiac catheterization as there is suspicion this may have been a result of ischemic event.  Patient was found to have nonobstructive coronary artery disease however reduced ejection fraction of approximately 30% and significant findings consistent with Takotsubo's cardiomyopathy.  There was no evidence of LV apical thrombus.    Patient was subsequently stabilized further in cardiac intensive care unit and eventually downgraded to stepdown unit and subsequently to telemetry floor.  Patient's pain has been intermittently controlled patient still has resolved complaints of pain because he was not able to undergo vascularization.  His pain was controlled adequately with opioid pain medications.  Patient would benefit further from physical therapy occupational therapy and further rehabilitation.  Patient is an extraordinarily high perioperative surgical candidate at this point time.  Will defer to vascular surgery for further recommendations in the future.      I specifically discussed the case with ABEL Stone for [facility] all questions were answered in detail and I specifically stressed that I would continue patient's aspirin but I had held patient's Plavix as patient was still recovering from anemia requiring transfusion while hospitalized.  I clearly and explicitly stated that patient will require reinitiation of this medication likely in the near future and that patient needs appointment with vascular surgery, Dr. Quinones,   Alexander Stewart endorsed understanding.  Therefore, he is discharged in good and stable condition to skilled nursing facility.    The patient met 2-midnight criteria for an inpatient stay at the time of discharge.    Discharge Date  12/12/2022    FOLLOW UP ITEMS POST DISCHARGE  Take all medication as prescribed  Go to all follow-up appointments as indicated    DISCHARGE DIAGNOSES  Principal Problem:    Cardiac arrest (HCC) POA: Yes  Active Problems:    Essential hypertension POA: Yes    Hyperlipidemia POA: Yes    ALL (acute kidney injury) (HCC) POA: Yes    History of transcatheter aortic valve replacement (TAVR) POA: Yes    Sepsis (HCC) POA: Yes    Osteomyelitis of right lower extremity (HCC) POA: Yes    Acute encephalopathy POA: Yes    Macrocytic anemia POA: Yes    History of seizures POA: Yes    Congestive heart failure (CHF) (HCC) POA: Yes    Acute respiratory failure (HCC) POA: Unknown    Shock (HCC) POA: Unknown    Peripheral vascular disease (HCC) POA: Unknown    Hyperglycemia POA: Unknown    Hypokalemia POA: Unknown    Difficulty speaking POA: Unknown    Bell's palsy POA: Unknown    Thrombocytopenia (HCC) POA: Unknown    History of Bell's palsy POA: Unknown    Hypernatremia POA: Unknown    Advance care planning POA: Unknown  Resolved Problems:    * No resolved hospital problems. *      FOLLOW UP    Judah Braga M.D.  [address] [facility] [id number] [phone]    Schedule an appointment as soon as possible for a visit  As needed      MEDICATIONS ON DISCHARGE     Medication List        START taking these medications        Instructions   amiodarone 200 MG Tabs  Start taking on: December 13, 2022  Commonly known as: Cordarone   Take 1 Tablet by mouth every day.  Dose: 200 mg     lidocaine 5 % Ptch  Start taking on: December 13, 2022  Commonly known as: LIDODERM   Place 1 Patch on the skin every 24 hours.  Dose: 1 Patch     oxyCODONE immediate release 10 MG immediate release tablet  Commonly  known as: ROXICODONE   Take 1 Tablet by mouth every 6 hours as needed for Moderate Pain for up to 5 days.  Dose: 10 mg            CHANGE how you take these medications        Instructions   carvedilol 12.5 MG Tabs  What changed:   medication strength  how much to take  when to take this  Commonly known as: COREG   Take 1 Tablet by mouth 2 times a day with meals.  Dose: 12.5 mg     gabapentin 100 MG Caps  What changed: when to take this  Commonly known as: NEURONTIN   Take 1 Capsule by mouth 2 times a day.  Dose: 100 mg     lisinopril 10 MG Tabs  Start taking on: December 13, 2022  What changed: how much to take  Commonly known as: PRINIVIL   Take 1 Tablet by mouth every day.  Dose: 10 mg            CONTINUE taking these medications        Instructions   aspirin EC 81 MG Tbec  Commonly known as: ECOTRIN   Take 81 mg by mouth every day.  Dose: 81 mg     atorvastatin 80 MG tablet  Commonly known as: LIPITOR   Take 80 mg by mouth every day.  Dose: 80 mg     levETIRAcetam 500 MG Tabs  Commonly known as: KEPPRA   Take 1 Tab by mouth 2 Times a Day.  Dose: 500 mg            STOP taking these medications      acetaminophen 325 MG Tabs  Commonly known as: Tylenol     amLODIPine 5 MG Tabs  Commonly known as: NORVASC     cephALEXin 500 MG Caps  Commonly known as: KEFLEX     cloNIDine 0.1 MG Tabs  Commonly known as: CATAPRES     clopidogrel 75 MG Tabs  Commonly known as: PLAVIX     hydroCHLOROthiazide 25 MG Tabs  Commonly known as: HYDRODIURIL     HYDROcodone-acetaminophen 5-325 MG Tabs per tablet  Commonly known as: NORCO              Allergies  No Known Allergies    DIET  Orders Placed This Encounter   Procedures    Diet Order Diet: Level 3 - Liquidised (NO GLUCERNA - UNABLE TO BE THICKENED; ensure liquidized consistencies are appropriately thick); Liquid level: Level 2 - Mildly Thick; Second Modifier: (optional): Consistent CHO (Diabetic); Tray Modifications...     Standing Status:   Standing     Number of Occurrences:   1      Order Specific Question:   Diet:     Answer:   Level 3 - Liquidised [26]     Comments:   NO GLUCERNA - UNABLE TO BE THICKENED; ensure liquidized consistencies are appropriately thick     Order Specific Question:   Liquid level     Answer:   Level 2 - Mildly Thick     Order Specific Question:   Second Modifier: (optional)     Answer:   Consistent CHO (Diabetic) [4]     Order Specific Question:   Tray Modifications (optional)     Answer:   SLP - 1:1 Supervision by Nursing     Order Specific Question:   Tray Modifications (optional)     Answer:   SLP - Deliver to Nursing Station       ACTIVITY  As tolerated and directed by skilled nursing.  Weight bearing as tolerated    CONSULTATIONS  Vascular surgery  Cardiology  Interventional cardiology  Critical care    PROCEDURES  5/20/2022:  Left heart cardiac catheterization significant for the the following findings:  IMPRESSION:  1.  Nonobstructive coronary artery disease with mild proximal left main stenosis, MLA 10-11 mm².  2. Reduced estimated left ventricular ejection fraction 30% with cineangiographic signs of Takotsubo cardiomyopathy with no evidence of noticeable LV apical thrombus.  3.  Mildly elevated resting LVEDP at 60 mmHg with no significant transaortic gradient on pullback  LABORATORY  Lab Results   Component Value Date    SODIUM 148 (H) 12/12/2022    POTASSIUM 3.8 12/12/2022    CHLORIDE 115 (H) 12/12/2022    CO2 25 12/12/2022    GLUCOSE 92 12/12/2022    BUN 20 12/12/2022    CREATININE 0.92 12/12/2022        Lab Results   Component Value Date    WBC 6.9 12/12/2022    HEMOGLOBIN 9.4 (L) 12/12/2022    HEMATOCRIT 29.1 (L) 12/12/2022    PLATELETCT 153 (L) 12/12/2022      Please note that this dictation was created using voice recognition software. I have made every reasonable attempt to correct obvious errors, but I expect that there are errors of grammar and possibly context that I did not discover before finalizing the note.   Total time of the discharge process  exceeds 35 minutes.

## 2022-12-12 NOTE — DISCHARGE PLANNING
Case Management Discharge Planning    Admission Date: 11/28/2022  GMLOS: 4.3  ALOS: 14    6-Clicks ADL Score: 14  6-Clicks Mobility Score: 6  PT and/or OT Eval ordered: Yes  Post-acute Referrals Ordered: Yes  Post-acute Choice Obtained: Yes  Has referral(s) been sent to post-acute provider:  Yes      Anticipated Discharge Dispo: Discharge Disposition: D/T to SNF with Medicare cert in anticipation of skilled care (03)    DME Needed: No    Action(s) Taken: RN CM was notified by Dr. Sweet that the patient is no longer cleared by Cardiology.  Plan is for a device implantation with Cardiology.  LISA ARAMBULA notified Ride Line and asked for transport to be cancelled.  NIKHIL Tiwari notified Teresita at [facility] with a voicemail.      Escalations Completed: Provider    Medically Clear: No    Next Steps: Care coordination to follow up with [facility] once the patient is medically clear.    Barriers to Discharge: Medical clearance    Is the patient up for discharge tomorrow: Potentially.

## 2022-12-12 NOTE — DISCHARGE PLANNING
DC Transport Scheduled    Received request at: 12/12/2022 at 1153    Transport Company Scheduled: [employer]  Spoke with ALESHIA at [facility] to schedule transport.    Scheduled Date: 12/12/2022  Scheduled Time: 1630    [facility] [address] [facility]    Notified care team of scheduled transport via Voalte.     If there are any changes needed to the DC transportation scheduled, please contact [facility] Line at ext. [phone] between the hours of 0998-9500 Mon-Fri. If outside those hours, contact the ED Case Manager at ext. [phone].

## 2022-12-12 NOTE — CARE PLAN
The patient is Stable - Low risk of patient condition declining or worsening    Shift Goals  Clinical Goals: pain control, monitor VS  Patient Goals: rest  Family Goals: MAYA    Progress made toward(s) clinical / shift goals:  Patient turned and repositioned every two hours to prevent skin breakdown. Barrier cream applied to sacrum.     Problem: Knowledge Deficit - Standard  Goal: Patient and family/care givers will demonstrate understanding of plan of care, disease process/condition, diagnostic tests and medications  Outcome: Progressing     Problem: Pain - Standard  Goal: Alleviation of pain or a reduction in pain to the patient’s comfort goal  Outcome: Progressing     Problem: Skin Integrity  Goal: Skin integrity is maintained or improved  Outcome: Progressing     Problem: Nutrition  Goal: Patient's nutritional and fluid intake will be adequate or improve  Outcome: Not Met       Patient is not progressing towards the following goals:      Problem: Nutrition  Goal: Patient's nutritional and fluid intake will be adequate or improve  Outcome: Not Met

## 2022-12-12 NOTE — CONSULTS
Diabetes education: Pt does not have diabetes listed in H&P or progress notes. Pt was admitted with blood sugar of  116, and Hga1c of 5.2%. Pt was on a regular insulin per sliding scale every six hours but that was discontinued, 12/11. Blood sugars have been 93, 96, 98, 111, and 90 with glucose readings of 92, and 95.  Plan: CDE will no longer follow . Pt to be transferred to SNF today. If needs change, please reorder.

## 2022-12-12 NOTE — PROGRESS NOTES
Monitor summary:        Rhythm: SR  Rate: 68-63  Ectopy:   Measurements: 0.20/.06/.38        12hr chart check

## 2022-12-12 NOTE — THERAPY
"Physical Therapy   Daily Treatment     Patient Name: Gilmer Mae  Age:  82 y.o., Sex:  male  Medical Record #: [id number]  Today's Date: 12/12/2022    Precautions: Fall Risk;Swallow Precautions    Assessment  Pt with limited participation, appears partially d/t lethargy after pre-medicated for RLE pain and partially d/t decr volition. With incr time and incr verbal cues pt able to move from supine > sit with min A. Pt immediately trying to return supine despite education on pt's progress, PT role, and importance of mobility and beginning to sit upright again. Pt verbalizes understanding but repeats \"I'm just tired\" and actively resists therapist's assist to maintain upright sitting. Pt endorses he would like to walk again but does not participate further in discussion regarding functional goals and POC. Will follow. Encourage sitting EOB with nursing to promote incr upright activity tolerance to progress with PT.     Plan  Continue current treatment plan.  DC Equipment Recommendations: Unable to determine at this time  Discharge Recommendations: Recommend post-acute placement for additional physical therapy services prior to discharge home       12/12/22 0911   Pain 0 - 10 Group   Therapist Pain Assessment grimacing with movement but does not express pain, reports pain meds have helped   Cognition    Level of Consciousness Responds to voice   Attention Impaired   Initiation Impaired   Comments very lethargic, appears may be overmedicated   Balance   Sitting Balance (Static) Poor +   Sitting Balance (Dynamic) Poor   Weight Shift Sitting Poor   Skilled Intervention Facilitation   Comments pt with limited motivation to remain sitting EOB and required support to prevent laying down. only sat EOB ~ 3 minutes and would barely engage in conversation   Gait Analysis   Gait Level Of Assist Unable to Participate   Bed Mobility    Supine to Sit Minimal Assist   Sit to Supine Minimal Assist   Skilled Intervention Verbal " Cuing;Sequencing   Comments decr initiation and incr time/effort to complete but only required min A. pt needs step-by-step verbal cues but appears more d/t lethargy and decr volition vs difficulty performing. pt does not c/o pain but reports feeling tired.   Functional Mobility   Sit to Stand Refused   Bed, Chair, Wheelchair Transfer Refused   Comments poor tolerance to sitting EOB   Short Term Goals    Short Term Goal # 1 Pt to move supine to/from eob w/ spv in 6 visits to improve fxl indep   Goal Outcome # 1 goal not met   Short Term Goal # 2 Pt to move sit to/from stand w/ spv in 6 visits to improve fxl indep   Goal Outcome # 2 Goal not met   Short Term Goal # 3 Pt to ambulate 150 ft w/ fww and spv in 6 visits to improve fxl indep   Goal Outcome # 3 Goal not met   Short Term Goal # 4 Pt to move up/down 3 steps w/ spv in 6 visits to access his home (if pt to d/c home)   Goal Outcome # 4 Goal not met

## 2022-12-12 NOTE — THERAPY
"Occupational Therapy  Daily Treatment     Patient Name: Gilmer Mae  Age:  82 y.o., Sex:  male  Medical Record #: [id number]  Today's Date: 12/12/2022     Precautions: Swallow Precautions (See Comments)    Assessment    Pt seen for OT tx, initially agreeable to participate with assistance however quickly requested to lay back down once sitting up EOB. Pt withdrew from therapy activities, kept his eyes closed most of the session but would open them with direct cues. Pt educated on importance of increasing activity daily and repositioning throughout the day. Improved movement tolerance today, did not c/o RLE pain sitting EOB however requested to lay back down almost immediately. Acute OT to continue to follow.     Plan    Continue current treatment plan.    DC Equipment Recommendations: Unable to determine at this time  Discharge Recommendations: Recommend post-acute placement for additional occupational therapy services prior to discharge home    Subjective    \"I have worse things going on at home than here.\"     Objective       12/12/22 0925   Precautions   Precautions Swallow Precautions ( See Comments)   Cognition    Cognition / Consciousness WDL   Level of Consciousness Responds to voice   Safety Awareness Impaired   Attention Impaired   Initiation Impaired   Comments delayed responses, requires repeated cues to open eyes and engage   Balance   Sitting Balance (Static) Poor +   Sitting Balance (Dynamic) Poor   Weight Shift Sitting Poor   Skilled Intervention Verbal Cuing;Tactile Cuing;Facilitation   Comments pt withdrew from therapy activities once seated EOB, declined to participate in grooming or dressing, requested to lay back down repeatedly   Bed Mobility    Supine to Sit Minimal Assist   Sit to Supine Minimal Assist   Skilled Intervention Verbal Cuing;Sequencing;Tactile Cuing   Comments improved independence in bed mobility but cues for sequencing required throughout   Activities of Daily Living   Lower Body " Dressing Maximal Assist   Toileting Maximal Assist   Skilled Intervention Verbal Cuing;Tactile Cuing;Sequencing   Comments pt declined additional ADL participation despite encouragement and conversation regarding his goals for recovery   Functional Mobility   Comments declined   Activity Tolerance   Comments poor tolerance for any functional task, question over medicated? lethargic, not complaining of pain   Short Term Goals   Short Term Goal # 1 Pt will tolerate >2min sit EOB in prep for seated ADLs   Goal Outcome # 1 Progressing as expected   Short Term Goal # 2 Pt will participate in UB dress with Edgardo   Goal Outcome # 2 Goal not met   Short Term Goal # 3 pt will complete seated grooming ADLs with set-up assist   Goal Outcome # 3 Goal not met   Short Term Goal # 4 pt will complete functional transfer with modA   Goal Outcome # 4 Goal not met   Education Group   Education Provided Activities of Daily Living;Role of Occupational Therapist   Role of Occupational Therapist Patient Response Patient;Acceptance;Explanation;Verbal Demonstration   ADL Patient Response Patient;Acceptance;Explanation;Verbal Demonstration;Action Demonstration   Anticipated Discharge Equipment and Recommendations   DC Equipment Recommendations Unable to determine at this time   Discharge Recommendations Recommend post-acute placement for additional occupational therapy services prior to discharge home

## 2022-12-12 NOTE — PROGRESS NOTES
Report received from day shift RN, assumed care of pt. Pt A&Ox4. Plan of care discussed with pt, labs and chart reviewed. All needs met at this time. Tele box on. On 4L O2 via nasal canula. Call light within reach, bed locked and in lowest position. All fall precautions and hourly rounding in place.

## 2022-12-12 NOTE — PROGRESS NOTES
Cardiology Follow Up Progress Note    Date of Service  12/12/2022    Attending Physician  Floyd Sweet M.D.    Chief Complaint   VT arrest    CHRISTOS Mae is a 82 y.o. male admitted 11/28/2022 with VT arrest    Interim Events  No events overnight  Scheduled to transfer this afternoon.  Awaiting EP consultation  No work recorded strips from his defibrillator or from his event.    Review of Systems  Review of Systems   Constitutional:  Negative for activity change, appetite change, chills and diaphoresis.   Eyes:  Negative for pain, discharge, redness and itching.   Respiratory:  Negative for cough, choking, chest tightness and stridor.    Cardiovascular:  Negative for palpitations.   Gastrointestinal:  Negative for abdominal distention, abdominal pain, constipation, diarrhea and nausea.   Endocrine: Negative for cold intolerance, heat intolerance, polydipsia and polyphagia.   Genitourinary:  Negative for difficulty urinating, dysuria, enuresis, flank pain, frequency, genital sores and hematuria.   Musculoskeletal:  Negative for back pain, gait problem, joint swelling and myalgias.   Skin:  Negative for color change, pallor and rash.   Neurological:  Negative for tremors, seizures, syncope, speech difficulty, weakness, light-headedness, numbness and headaches.   Hematological:  Negative for adenopathy. Does not bruise/bleed easily.   Psychiatric/Behavioral:  Negative for agitation, behavioral problems, confusion, decreased concentration and hallucinations. The patient is not nervous/anxious.      Vital signs in last 24 hours  Temp:  [36.4 °C (97.5 °F)-36.8 °C (98.2 °F)] (P) 36.4 °C (97.5 °F)  Pulse:  [60-70] 60  Resp:  [14-16] (P) 14  BP: (105-152)/(51-78) 132/68  SpO2:  [92 %-96 %] 96 %    Physical Exam  Physical Exam  Vitals and nursing note reviewed.   Constitutional:       General: He is not in acute distress.     Appearance: Normal appearance. He is normal weight. He is not ill-appearing,  toxic-appearing or diaphoretic.   HENT:      Head: Normocephalic and atraumatic.      Right Ear: Ear canal and external ear normal.      Left Ear: Ear canal and external ear normal.      Nose: Nose normal. No congestion or rhinorrhea.      Mouth/Throat:      Mouth: Mucous membranes are moist.      Pharynx: Oropharynx is clear. No oropharyngeal exudate or posterior oropharyngeal erythema.   Eyes:      General: No scleral icterus.        Right eye: No discharge.         Left eye: No discharge.      Extraocular Movements: Extraocular movements intact.      Conjunctiva/sclera: Conjunctivae normal.      Pupils: Pupils are equal, round, and reactive to light.   Neck:      Vascular: No carotid bruit.   Cardiovascular:      Rate and Rhythm: Normal rate and regular rhythm.      Pulses: Normal pulses.      Heart sounds: Normal heart sounds. No murmur heard.    No gallop.   Pulmonary:      Effort: Pulmonary effort is normal. No respiratory distress.      Breath sounds: Normal breath sounds. No stridor. No wheezing, rhonchi or rales.   Abdominal:      General: Abdomen is flat. Bowel sounds are normal. There is no distension.      Palpations: Abdomen is soft. There is no mass.      Tenderness: There is no abdominal tenderness. There is no guarding or rebound.      Hernia: No hernia is present.   Musculoskeletal:         General: No swelling or tenderness. Normal range of motion.      Cervical back: Normal range of motion and neck supple. No rigidity. No muscular tenderness.      Right lower leg: No edema.      Left lower leg: No edema.   Lymphadenopathy:      Cervical: No cervical adenopathy.   Skin:     General: Skin is warm and dry.      Capillary Refill: Capillary refill takes 2 to 3 seconds.      Coloration: Skin is not jaundiced or pale.      Findings: No bruising or lesion.   Neurological:      General: No focal deficit present.      Mental Status: He is alert and oriented to person, place, and time. Mental status is at  baseline.      Cranial Nerves: No cranial nerve deficit.      Motor: No weakness.   Psychiatric:         Mood and Affect: Mood normal.         Behavior: Behavior normal.         Thought Content: Thought content normal.         Judgment: Judgment normal.       Lab Review  Lab Results   Component Value Date/Time    WBC 6.9 12/12/2022 03:25 AM    RBC 2.75 (L) 12/12/2022 03:25 AM    HEMOGLOBIN 9.4 (L) 12/12/2022 03:25 AM    HEMATOCRIT 29.1 (L) 12/12/2022 03:25 AM    .8 (H) 12/12/2022 03:25 AM    MCH 34.2 (H) 12/12/2022 03:25 AM    MCHC 32.3 (L) 12/12/2022 03:25 AM    MPV 11.1 12/12/2022 03:25 AM      Lab Results   Component Value Date/Time    SODIUM 148 (H) 12/12/2022 03:25 AM    POTASSIUM 3.8 12/12/2022 03:25 AM    CHLORIDE 115 (H) 12/12/2022 03:25 AM    CO2 25 12/12/2022 03:25 AM    GLUCOSE 92 12/12/2022 03:25 AM    BUN 20 12/12/2022 03:25 AM    CREATININE 0.92 12/12/2022 03:25 AM    BUNCREATRAT 15.0 08/03/2022 04:58 AM      Lab Results   Component Value Date/Time    ASTSGOT 59 (H) 12/10/2022 02:15 AM    ALTSGPT 36 12/10/2022 02:15 AM     Lab Results   Component Value Date/Time    CHOLSTRLTOT 117 09/11/2019 03:17 AM    LDL 52 09/11/2019 03:17 AM    HDL 48 09/11/2019 03:17 AM    TRIGLYCERIDE 83 09/11/2019 03:17 AM    TROPONINT 33 (H) 11/29/2022 11:38 AM       No results for input(s): NTPROBNP in the last 72 hours.    Cardiac Imaging and Procedures Review  EKG:  My personal interpretation of the EKG dated 12/9/2022 is normal sinus rhythm inferior infarct age undetermined anterior infarct age undetermined    Echocardiogram:  Dated 12/9/2022 personally viewed interpreted by myself showing An EF of 40%.     Cardiac Catheterization: Dated 12/5/2022 personally viewed inter myself showing  Cardiac Catheterization:     12/5/22  IMPRESSION:  1.  Nonobstructive coronary artery disease with mild proximal left main stenosis, MLA 10-11 mm².  2. Reduced estimated left ventricular ejection fraction 30% with cineangiographic  signs of Takotsubo cardiomyopathy with no evidence of noticeable LV apical thrombus.  3.  Mildly elevated resting LVEDP at 60 mmHg with no significant transaortic gradient on pullback     RECOMMENDATIONS:  Ongoing primary ASCVD preventive measures  Stress-induced cardiomyopathy management with goal-directed medical therapy  Ongoing care per ICU and cardiology consultant teams    Imaging  Chest X-Ray:  NA     Stress Test:  NA    Assessment/Plan  No new Assessment & Plan notes have been filed under this hospital service since the last note was generated.  Service: Cardiology  82-year-old male with a cardiac arrest in the setting of a left bundle branch block but reported to be in V. fib with no pulses.  Given that he would be appropriate to upgrade him to an ICD.  The risk benefits alternatives have been explained to him.  He has been expressing his wishes to eventually be a DNR/DNI but in front of his son reiterates that he would not want to die.  Therefore EP will pursue a ICD tomorrow.    Thank you for allowing me to participate in the care of this patient.  I will continue to follow this patient    Please contact me with any questions.    Clement Hassan M.D.   Cardiologist, [facility]  [phone]

## 2022-12-12 NOTE — DISCHARGE PLANNING
Agency/Facility Name: [facility]  Outcome: DPA left v-mail in regards to bed availability for today. DPA requested call back     0942  Agency/Facility Name: [facility]  Outcome: DPA left v-mail in regards to referral status. DPA requested call back with decision    0944  Agency/Facility Name: [facility]  Outcome: DPA left v-mail in regards to referral status. DPA requested call back     0945  Agency/Facility Name: [facility]  Outcome: DPA left v-mail in regards to referral status. DPA requested call back    0946  Agency/Facility Name: [facility]  Spoke To: Kel  Outcome: Referral is under review, DPA requested call back with decision    1545  Agency/Facility Name: [facility]  Outcome: DPA left v-mail Pt no longer D/C at 1630. DPA to follow up in AM if Pt is [facility]

## 2022-12-13 NOTE — PROGRESS NOTES
"Hospital Medicine Daily Progress Note    Date of Service  12/12/2022    Chief Complaint  Gilmer Mae is a 82 y.o. male admitted 11/28/2022 with altered mental status    Hospital Course    As per Dr. Gonda's note  \"  81 y/o with PMH HTN, HLD, sick sinus syndrome (s/p pacemaker), seizures (most recent episode in 2019, on keppra), severe aortic stenosis (s/p TAVR 8/2/2022 at [facility]) and PAD (s/p vascular surgery on femoral artery in 2022) who presented to ED via EMS on 11/28/2022 for LOC, b/l LE edema and pain.  History limited due to patient’s altered mental status, A&Ox2 (self, place) so most history obtained from chart review and daughter Kendra. Patient lives with wife and son. Patient says he is having pain “all over” especially his right leg and says he got surgery on his right leg “2 weeks ago but they never finished.” On arrival to ED, patient was lethargic and confused. RLE especially R foot exquisitely tender, nonhealing ulcer with some erythema and eschar present on bottom of R heel. Ultrasound of leg showed diffuse atherosclerotic plaque throughout LE, absent flow from proximal to distal femoral artery, reconstitution monophasic flow via collaterals at distal femoral artery, absent flow in distal portion of tibial artery. Xray of R foot showed potentially periosteal changes at inferior aspect of calcaneus as well as significant vascular calcification; concern for underlying osteomyelitis secondary to ischemic ulcer. Empirically placed on IV linezolid and IV unasyn however discontinued linezolid as blood cx, wound cx, MRSA and staph aureus PCR negative as well as lack of purulence from wound. Seen by LPS and vascular surgery- scheduled for OR on 12/5 with Dr. Rdz to attempt RLE revascularization in order to avoid amputation.\"    Patient was extubated on December 7, 2022 and he is weaned off from Levophed and amiodarone gtt.  On December 8, 2022 intensivist requested to transfer care to hospitalist " service.    Interval Problem Update    12/08/22    I evaluated and examined him at the bedside.  He expressed that he is feeling better and I ordered morphine for him and his pain is under better control.  I discussed CODE STATUS with him and he expressed that he would like to be DNR and after further discussion he requested that I should call his daughter to discuss about CODE STATUS.  I called patient's daughter and she reported that she is on her way to the hospital.  I reevaluated him again at the bedside and discussed plan of care with patient's 2 daughters and patient's wife.  He expressed that he would like to be full code and he does not want to sign DNR.  If his pain is under control.  I explained at length regarding full code, DNR and hospice care to patient and his daughter at the bedside and answered all of their questions.  As per patient's wishes I continued full CODE STATUS at this time.  Currently he is currently stable and requiring 4 L of oxygen to maintain oxygen saturation.  Currently he is on Unasyn and p.o. amiodarone.  Labs showed hemoglobin of 7.2 with normal white blood cell count.  He found to have platelet count of 116.  He also found to have hyponatremia with current sodium level of 148, chloride of 118.  He underwent CT head without contrast yesterday did not show any acute abnormalities.      He found to have hyponatremia I started him on D5W use IV fluid with caution as his repeat sodium level still came back higher.    12/9/2022:  Continue present medical management at present.  Patient's pain better controlled today I have escalated his pain therapy.  I had long discussion with patient's daughter on the phone Lesli regarding course of care.  All questions were answered in detail.  There is no plan for immediate surgery patient represents an extraordinarily high perioperative risk given recent cardiac arrest approximately 4 days prior to this note being dictated.  At present patient  would benefit from pain management and rehabilitation before surgery is entertained anytime in the near future.  Patient required transfusion of 1 unit of blood today.  Repeat CBC in the morning.  12/10/2022:  Patient's pain much improved today.  Appreciate cardiology and subspecialty support recommendations.  No imminent plan for surgery in the near future.  We will continue to discuss surgical options after discharge.  Patient has significant perioperative comorbidities that may limit his options in terms of surgical intervention.  Will defer this to vascular surgery and limb preservation service.  Regardless continue to trend hemoglobin as necessary.  Continue to transfuse as indicated.  No acute events overnight.  12/11/2022:  Globin stable today continue present medical management patient with diminished appetite and increasing needed pain control discussed the idea of initiation of Marinol with patient he is open to this idea.  We will initiate Marinol to help stimulate appetite and also facilitate adjunctive therapy to pain control.  Otherwise continue present medical management appreciate cardiology recommendations.  12/12/2022:  Had intended to discharge patient today however upon further evaluation by cardiology recommendations were for consideration for electrophysiology and device implantation.  We will defer to the respective services for subsequent clearance cardiology has been following throughout the week.  Discharge canceled transport canceled pending further recommendations otherwise no acute events overnight.  I have discussed this patient's plan of care and discharge plan at IDT rounds today with Case Management, Nursing, Nursing leadership, and other members of the IDT team.    Consultants/Specialty  cardiology, critical care, infectious disease, and nephrology    Code Status  DNAR/DNI    Disposition  Patient is not medically cleared for discharge.   Anticipate discharge to to skilled nursing  facility.  I have placed the appropriate orders for post-discharge needs.    Review of Systems  Review of Systems   Constitutional:  Negative for chills, fever and weight loss.   HENT:  Negative for hearing loss and tinnitus.    Eyes:  Negative for blurred vision, double vision, photophobia and pain.   Respiratory:  Negative for cough, sputum production and shortness of breath.    Cardiovascular:  Negative for chest pain, palpitations, orthopnea and leg swelling.   Gastrointestinal:  Negative for abdominal pain, constipation, diarrhea, nausea and vomiting.   Genitourinary:  Negative for dysuria, frequency and urgency.   Musculoskeletal:  Positive for myalgias. Negative for back pain, joint pain and neck pain.   Skin:  Negative for rash.   Neurological:  Negative for dizziness, tingling, tremors, sensory change, speech change, focal weakness and headaches.   Psychiatric/Behavioral:  Negative for hallucinations and substance abuse.    All other systems reviewed and are negative.     Physical Exam  Temp:  [36.4 °C (97.5 °F)-36.8 °C (98.2 °F)] (P) 36.4 °C (97.5 °F)  Pulse:  [60-70] 60  Resp:  [14-16] (P) 14  BP: (105-152)/(51-78) 132/68  SpO2:  [92 %-96 %] 96 %    Physical Exam  Vitals reviewed.   Constitutional:       General: He is not in acute distress.     Appearance: He is ill-appearing.   HENT:      Head: Normocephalic and atraumatic. No contusion.      Right Ear: External ear normal.      Left Ear: External ear normal.      Nose: Nose normal.      Mouth/Throat:      Pharynx: No oropharyngeal exudate.   Eyes:      General:         Right eye: No discharge.         Left eye: No discharge.      Pupils: Pupils are equal, round, and reactive to light.   Cardiovascular:      Rate and Rhythm: Normal rate and regular rhythm.      Heart sounds: No murmur heard.    No friction rub. No gallop.   Pulmonary:      Effort: Pulmonary effort is normal.      Breath sounds: No wheezing or rhonchi.   Abdominal:      General: Bowel  sounds are normal. There is no distension.      Palpations: Abdomen is soft.      Tenderness: There is no abdominal tenderness. There is no rebound.   Musculoskeletal:         General: No swelling or tenderness. Normal range of motion.      Cervical back: No rigidity. No muscular tenderness.   Skin:     Coloration: Skin is not jaundiced.      Findings: Erythema and lesion (Right foot) present.   Neurological:      General: No focal deficit present.      Mental Status: He is alert and oriented to person, place, and time.      Cranial Nerves: No cranial nerve deficit.      Sensory: No sensory deficit.      Comments: He is alert and oriented and able to follow my commands and answer my questions.   Psychiatric:         Mood and Affect: Mood normal.       Fluids    Intake/Output Summary (Last 24 hours) at 12/12/2022 1705  Last data filed at 12/12/2022 0341  Gross per 24 hour   Intake --   Output 380 ml   Net -380 ml         Laboratory  Recent Labs     12/09/22  1953 12/10/22  0215 12/12/22  0325   WBC 7.4 7.1 6.9   RBC 2.68* 2.54* 2.75*   HEMOGLOBIN 9.1* 8.7* 9.4*   HEMATOCRIT 27.6* 26.3* 29.1*   .0* 103.5* 105.8*   MCH 34.0* 34.3* 34.2*   MCHC 33.0* 33.1* 32.3*   RDW 51.2* 56.4* 58.3*   PLATELETCT 132* 123* 153*   MPV 11.0 11.2 11.1       Recent Labs     12/10/22  0215 12/11/22  1223 12/12/22  0325   SODIUM 144 147* 148*   POTASSIUM 3.9 3.8 3.8   CHLORIDE 114* 115* 115*   CO2 22 24 25   GLUCOSE 91 95 92   BUN 16 18 20   CREATININE 0.89 0.93 0.92   CALCIUM 7.8* 8.0* 7.7*                     Imaging  EC-ECHOCARDIOGRAM LTD W/ CONT   Final Result      CT-HEAD W/O   Final Result      1.  No acute intracranial hemorrhage.   2.  Similar chronic findings.   3.  Mild mucosal thickening of the inferior right maxillary sinus.         DX-CHEST-PORTABLE (1 VIEW)   Final Result      Stable mild cardiac enlargement without edema      DX-CHEST-PORTABLE (1 VIEW)   Final Result         1.  Hazy interstitial left lung base  infiltrate, stable since prior study.   2.  Atherosclerosis      DX-ABDOMEN FOR TUBE PLACEMENT   Final Result      Orogastric tube tip at the proximal stomach.      EC-ECHOCARDIOGRAM COMPLETE W/ CONT   Final Result      DX-ABDOMEN FOR TUBE PLACEMENT   Final Result      NG tube tip projects over the stomach, and side-port projects 3 cm above the GE junction. It can be advanced further by approximately 7 cm.      DX-ABDOMEN FOR TUBE PLACEMENT   Final Result      NG tube tip projects over the GE junction. It can be advanced 8-10 cm      DX-CHEST-PORTABLE (1 VIEW)   Final Result      1.  Right central catheter tip is in the mid SVC. No pneumothorax.   2.  NGT tip is at the GE junction. It can be advanced 8 cm.   3.  Stable other findings.      DX-CHEST-PORTABLE (1 VIEW)   Final Result      1.  Well-positioned lines and tubes. No pneumothorax.   2.  Ill-defined left basilar opacity, chronic atelectasis/scarring or sequela of recent pneumonia.      CT-CTA AORTA-RO WITH & W/O-POST PROCESS   Final Result      1.  Extensive atherosclerotic vascular calcification involving the aorta and originating arteries. There is ectasia of the ascending thoracic aorta measured at 3.4 x 3.4 cm in diameter.      2.  Extensive atherosclerotic plaque involving the right common, superficial femoral and profunda arteries. There is occlusion of the right superficial femoral artery at the level of the proximal thigh. Popliteal artery is also occluded and there is    extensive atherosclerotic plaque involving the posterior tibial, anterior tibial and peroneal arteries. No definite continuous runoff vessel identified.      3.  Extensive atherosclerotic plaque involving the left common, profunda and superficial femoral and popliteal arteries with multiple areas of high-grade stenosis of the superficial femoral and popliteal arteries. There is also extensive atherosclerotic    change of the posterior tibial, anterior tibial and peroneal arteries  without a definite contiguous runoff vessel seen.      4.  High-grade stenosis involving the origin of the superior mesenteric artery.      5.  Occluded origin of the inferior mesenteric artery.      6.  Atherosclerotic plaque involving the right renal artery origin and the proximal right renal artery resulting in 50% diameter narrowing.      7.  Less than 50% diameter narrowing of the left renal artery origin.      8.  Ill-defined groundglass infiltrates within the right upper lobe possibly representing infectious process.      9.  Multiple hepatic cysts.      3D angiographic/MIP images of the vasculature confirm the vascular findings as described above.      US-VEIN MAPPING LOWER EXTREMITY UNILAT RIGHT   Final Result      DX-FOOT-COMPLETE 3+ RIGHT   Final Result      Likely acute osteomyelitis at the inferior calcaneus. MRI can be obtained as indicated.      MR-FOOT-W/O RIGHT   Final Result      1.  Very limited study due to extensive patient motion artifact and patient comfort preventing multiple sequences.      2.  No gross bony destructive change.      3.  Soft tissue edema.      US-EXTREMITY ARTERY LOWER UNILAT RIGHT   Final Result      US-EXTREMITY VENOUS LOWER UNILAT RIGHT   Final Result      US-ABDOMEN COMPLETE SURVEY   Final Result         1.  Gallbladder sludge, otherwise unremarkable gallbladder   2.  Hepatic cysts   3.  Bilateral echogenic kidneys with mildly thinned cortex, appearance favors medical renal disease      CT-HEAD W/O   Final Result      1.  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   2.  Mild cerebral atrophy. Age-appropriate.   3.  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   4.  No acute findings are evident.         DX-CHEST-PORTABLE (1 VIEW)   Final Result      No evidence of acute cardiopulmonary process.      CL-LEFT HEART  CATHETERIZATION WITH POSSIBLE INTERVENTION    (Results Pending)        Assessment/Plan  Advance care planning  Assessment & Plan  On December 8, 2022 I discussed CODE STATUS with him and he expressed that he would like to be DNR and after further discussion he requested that I should call his daughter to discuss about CODE STATUS.  I called patient's daughter and she reported that she is on her way to the hospital.  I reevaluated him again at the bedside and discussed plan of care with patient's 2 daughters and patient's wife.  He expressed that he would like to be full code and he does not want to sign DNR.  If his pain is under control.  I explained at length regarding full code, DNR and hospice care to patient and his daughter at the bedside and answered all of their questions.  As per patient's wishes I continued full CODE STATUS at this time.  Time spent: 28 minutes    Hypernatremia  Assessment & Plan  Patient found to have hypernatremia and sodium is trending up.  I started him on gentle IV fluid hydration with D5W.  Use IV fluid with caution.  I ordered repeat sodium level at 5 PM.    History of Bell's palsy  Assessment & Plan  With associated L facial droop, CT head neg for [facility] 12/7  Supportive care    Thrombocytopenia (HCC)  Assessment & Plan  Mild, Monitor    Hypokalemia  Assessment & Plan  Continue to monitor and replace as needed.    Shock (HCC)  Assessment & Plan  Post cardiac arrest 12/5  - titrate pressor to maintain MAP>65  - monitor urine output and vital signs   - gentle IVF bolus today if unable to hydrate and pass bedside swallow  Now resolved off from pressors.    Congestive heart failure (CHF) ([facility])- (present on admission)  Assessment & Plan  - per chart review, patient has history of congestive heart failure  - c/w home aspirin 81 everyday, coreg 12.5mg BID, atorvastatin 80mg qD, lisinopril 20, amlodipine 5  - Continue to hold HCTZ.    History of seizures- (present on admission)  Assessment &  Plan  - per patient, last episode of seizure was in 2019  - c/w home keppra 500mg BID    Macrocytic anemia- (present on admission)  Assessment & Plan  - No known history of anemia, not on iron supplementation  - Most recent labs from 8/2022 showing baseline hemoglobin of 14.1 and MCV of 97.7  - pending anemia workup- iron panel, reticulocytes, vitamin B12, folate  - continue to monitor with daily CBC's  - Transfuse if hemoglobin <7.0. Type and screen ordered 11/30  I ordered repeat hemoglobin for this afternoon.    Acute encephalopathy- (present on admission)  Assessment & Plan  - Per family and chart review, patient is at baseline alert and oriented x4.  - Presented to emergency room on 11/28/2022 with confusion, noted to be alert and oriented x2 (self, place only)  - Altered mental status initially thought to be in setting of azotemia from acute kidney injury however continuing to have encephalopathy despite improving azotemia and improving kidney function  - Per nephrology, currently no emergent need for dialysis. Nephrology signed off 11/29.  - Continue to monitor and trend labs. Work with PT/OT inpatient, they recommend SNF post discharge.  Significantly improved.    Increased anion gap metabolic acidosis  Assessment & Plan  Resolved  - Baseline anion gap is 7.0 per most recent labs from 8/2022  - Anion gap on admission noted to be 19.0, improved to 17.0 after IV LR at 150 cc/hr  - Increased anion gap metabolic acidosis likely secondary to uremia from kidney damage  - Continue hydration with IV fluids and encourage PO intake  - Continue to monitor with daily labs    Osteomyelitis of right lower extremity (HCC)- (present on admission)  Assessment & Plan  - Given recent right groin access for TAVR, MARGARITA ordered-   - Pain controlled with tylenol 650mg q6 prn, IV dilaudid 0.5mg q4 prn, oxycodone 5-10mg q3 prn  - can consider lidocaine patch, diclofenac gel as needed  - U/S showed: Occlusion of RIGHT femoral artery  with distal reconstitution, Occlusion of RIGHT posterior tibial artery,  Two vessel runoff to the ankle with monophasic flow  -12/1: Spoke with Dr. Medel who is partners with Dr. Quinones (who did patient's previous surgery). She says patient will be having pain for a long time but since this is not an emergency they will see him outpatient and Dr. Quinones will perform a re-anastomosis and other procedures as warranted. This procedure cannot currently be done as Dr. Quinones is currently on vacation and Dr. Medel doesn't have [facility] privileges as well as the fact that this type of surgery cannot even be done at [facility].   - PT/OT recommending SNF  - MRI foot attempted 12/2 however was incomplete as patient was unable to tolerate. Foot xray confirmed osteomyelitis to R calcaneus.  - 12/2: Per LPS, wound care orders for nursing was placed and he was provided with betadine dressing and offloading boot per LPS team.  - 12/3: Consulted LPS and vascular surgery due to concern for osteomyelitis of R heel- seen by Dr. Rdz who will take patient to OR in order to attempt revascularization to attempt to heal foot wound and avoid amputation. OR planned for 12/5 Monday.  - Consulted Dr. Turcios from infectious disease 12/3: believes osteomyelitis of heel will be difficult to cure and pt will need debridement, wound vac, etc however most likely poor outcome. Healing process also hindered due to impaired circulation evidence by MARGARITA. As there is no pus from wound, most likely ischemic ulcer. Linezolid discontinued 12/3, c/w just unasyn for now  - f/u blood cultures, wound cultures, NGTD  - CTA pending  - Vein mapping 12/4: Acute superficial thrombus in the greater saphenous vein near saphenofemoral junction and in the proxmal thigh. Vein wall thickening in the thigh; small caliber vein throughout the thigh & calf. Vein is unsuitable for use as a bypass graft.  Continue IV antibiotics  Vascular surgery evaluated him and made  recommendations.    Sepsis (HCC)- (present on admission)  Assessment & Plan  - This is Sepsis Present on admission  - SIRS criteria identified on my evaluation include: Leukocytosis, with WBC greater than 12,000  - Source is unknown, possibly abdominal etiology. US 11/29 showing US abdomen showing gallbladder sludge otherwise unremarkable, hepatic cysts, bilateral echogenic kidneys with mildly thinned cortex concerning for possible medical renal disease.  - Sepsis protocol initiated  - Fluid resuscitation ordered per protocol  - Crystalloid Fluid Administration: Fluid resuscitation ordered per standard protocol - 30 mL/kg per current or ideal body weight  - IV antibiotics as appropriate for source of sepsis- started on IV ceftriaxone, discontinued on 11/29  - Reassessment: I have reassessed the patient's hemodynamic status  - Blood cultures NGTD  - Urine cultures unremarkable  - Hemodynamically stable  - Uptrending WBC, WBC 14.4 today 11/29 from 13.9 on admission 11/28  - continue to monitor with daily CBC's  - No clear source of sepsis- Discontinued IV ceftriaxone and started on IV unasyn low dose (due to ALL) TID (11/29- ) pending further workup.   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    History of transcatheter aortic valve replacement (TAVR)- (present on admission)  Assessment & Plan  - per chart review, patient has history of severe aortic stenosis and underwent TAVR with Dr. Wolfe on 8/2/2022.   - most recent echo showed well seated TAVR with peak gradient of 12 mmHg and mean gradient of 7.5 mmHg and trace perivalvular leak.     ALL (acute kidney injury) (HCC)- (present on admission)  Assessment & Plan  RESOLVED  - No noted history of chronic kidney disease, unclear creatinine baseline however recent kidney values  appear normal- most recent labs from 8/3/2022 showing baseline Cr 1.0 and BUN 15.0.  - On admission, BUN/Cr ratio noted to be 122/4.67  - Patient is able to urinate, produced about 400cc dark urine in emergency room. No urinary symptoms such as dysuria, polyuria, retention.  - Urinalysis 11/28: +hyaline casts, +granular casts.  - Consulted nephrology- Per Dr. Hearn, currently unclear etiology of ALL however given hyaline casts on UA, volume depletion most likely. Obstructive uropathy was also considered given suprapubic tenderness on exam however renal/bladder US unremarkable for obstructions.   - Started on IV fluids at 125 cc/hr on 11/28, increased rate to 150 cc/hr on 11/29  - Most recent labs from 11/29 at 1500 showing improved renal function with BUN/Cr ratio of 85/1.63.  - Continue to monitor renal function with daily labs  - monitor urine output  - Kidney function improving as of 11/29 and patient nonoliguric, no need for dialysis. Nephrology signed off 11/29.  - Continue gentle IVF and trend Crt/BUN  - Improving- Cr trending down. Cr is 0.90 on 12/1 from 1.05 on 11/30 and 4.67 on admission 11/28.  - ALL resolved, Cr 0.78 on 12/2. D/c'ed renal diet and placed on regular diet to encourage patient PO intake  Now resolved.    Hyperlipidemia- (present on admission)  Assessment & Plan  - no lipid panel on file  - Continue atorvastatin    Essential hypertension- (present on admission)  Assessment & Plan  - at home, on lisinopril, hydrochlorithiazide, amlodipine, carvedilol.  - continue with home coreg 12.5 BID  - restarted on amlodipine 12/1 due to hypertension, continuing to hold HCTZ  - restarted lisinopril 20 on 12/2 due to continued hypertension       Please note that this dictation was created using voice recognition software. I have made every reasonable attempt to correct obvious errors, but I expect that there are errors of grammar and possibly context that I did not discover before finalizing the  note.      VTE prophylaxis: enoxaparin ppx    I have performed a physical exam and reviewed and updated ROS and Plan today (12/12/2022). In review of yesterday's note (12/11/2022), there are no changes except as documented above.

## 2022-12-13 NOTE — PROGRESS NOTES
Monitor summary:        Rhythm: paced  Rate: 59-60  Ectopy: n/a  Measurements: paced      12hr chart check

## 2022-12-13 NOTE — OP REPORT
Electrophysiology Procedure Note  [facility]    Patient ID:  Name:             Gilmer Mae     YOB: 1940  Age:                 82 y.o.  male   MRN:               [id number]    Date of procedure: 12/13/2022     Procedure(s) Performed:   1) ICD implant  2) Pacemaker generator removal    Indication(s):  Ventricular fibrillation/ventricular tachycardia  Stress induced cardiomyopathy    Physician(s): Kenan Lyman M.D.     Resident/Assistant(s): None     Anesthesia: Local and moderate sedation (star time 1323, stop time 1412, total dose given 4 mg IV versed, 100 mcg IV fentanyl)     Specimen(s) Removed: None     Estimated Blood Loss:  30cc    Complications: None.    DESCRIPTION OF PROCEDURE: After informed written consent, the patient was brought to the electrophysiology lab in the fasting, unsedated state. The patient was prepped and draped in the usual sterile fashion. The procedure was performed under moderate sedation with local anesthetic. A left upper extremity venogram was performed, demonstrating a patent subclavian vein. A left infraclavicular incision was made with a scalpel and the pre-pectoral device pocket was accessed using a combination of blunt dissection and electrocautery. The existing pacemaker generator and leads were freed from adhesions and the generator removed from the pocket. The modified Seldinger technique was used to gain access to the left axillary vein. A peel-away hemostasis sheath was placed in the vein. Under fluoroscopic guidance, the ICD lead was introduced into the heart. The ventricular lead was advanced into the RVOT position the pulled back and advanced to the RV apex. The lead was tested and had satisfactory sensing and pacing parameters. High output ventricular pacing did not produce extracardiac stimulation. The lead was sutured to the underlying pectoral muscle with interrupted silk over a silastic suture sleeve. We then freed the old RV lead  from the suture sleeve. A stiff stylet was advanced into the lumen. The fixation screw was retracted. We attempted to remove the old RV pace/sense lead with traction but failed due to proximal adhesions. Instead the RV pace/sense lead was capped. The device pocket was irrigated with antibiotic solution, inspected, and no bleeding was seen. The new ICD lead was connected to the ICD pulse generator along with the old RA lead and the device was inserted into the pocket along with the capped lead and a Tyrex antibiotic pouch. The wound was closed with three layers of absorbable sutures and covered with Steri-Strips. Following recovery from sedation, the patient was transferred to a monitored bed in good condition.    IMPLANTED DEVICE INFORMATION:    Pulse generator is a MDT model XNGF2X3   Serial number [id number]      REMOVED DEVICE INFORMATION:  MDT model W1DR01, serial number [id number]    LEAD INFORMATION:  1. Right atrial lead is a MDT model 4076-52, serial number [id number], P wave 2.3 millivolts, threshold 0.75 Volts at 0.4 milliseconds, pacing impedance 304 Ohms.  2. Right ventricular lead is a MDT model 1597X02, serial number [id number], R wave 5.1 millivolts, threshold 1.5 Volts at 0.4 milliseconds, pacing impedance 456 Ohms.     CAPPED LEAD INFORMATION:  MDT 4076-58, serial number [id number]    DEVICE PROGRAMMING:    Nehemias therapy:   Tachy therapy:  MVP   VF zone  240 ms, defib 35 J x 6  VT zone 330 ms, ATP x 3 then, CDV 35 J x 5    DEFIBRILLATION THRESHOLD TESTING:    Deferred    FLUOROSCOPY TIME: 4 minutes    SUMMARY/CONCLUSIONS:  1. Successful automatic implantable cardioverter defibrillator implant (upgrade of existing device)  2. Capped RA lead  3. Successful PPM generator explant    RECOMMENDATIONS:  1. Admit to monitored bed.  2. Chest x-ray.  3. Implantable cardioverter defibrillator interrogation prior to hospital discharge.  4. Follow-up in device clinic for wound check and device  interrogation.

## 2022-12-13 NOTE — DISCHARGE PLANNING
Agency/Facility Name: [facility]  Outcome: DPA confirmed Pt will most likely D/C tomorrow. DPA requested bed availability    1122  Agency/Facility Name: [facility]  Spoke To: Teresita  Outcome: Per [facility] coordinator Pts ride is on will call for tomorrow. DPA to follow up tomorrow and confirm transport time     1138  [facility]  Spoke To: Teresita  Outcome: DPA confirmed Pt will have bed available tomorrow. DPA to follow up with time tomorrow once Pt is [facility]

## 2022-12-13 NOTE — PROGRESS NOTES
Patient off the floor for procedure with Cath Lab RN; Zoll monitor in place, full O2 tank sent with pt

## 2022-12-13 NOTE — CONSULTS
EP Consult Note    DOS:12/12/2022    Consulting physician: Clement Hassan    Chief complaint/Reason for consult: VT/VF arrest    HPI:  Pt is an 83 yo M. He has a history of HTN, PPM placed in 2019 with Dr. Amos, severe AS s/p TAVR, PAD, coronary disease, found to have high grade peripheral disease. Underwent attempted revascularization for the occluded R SFA complicated by R ischemic foot/osteomyelitis. Apparently ~10 minutes have induction developed wide complex tachycardia lost pulse and CPR/epi was started with ROSC shortly afterwards, then subsequently had ventricular fibrillation and received external defibrillation. He was then taken to cath lab where only nonobstructive lesions were found. Echo showed moderately depressed LV function with local WMAs apically located consistent with stress cardiomyopathy. EP asked to consider upgrade of his device to ICD. On interview this evening he has no complaints.    ROS (+ highlighted in red):  Constitutional: Fevers/chills/fatigue/weightloss  HEENT: Blurry vision/eye pain/sore throat/hearing loss  Respiratory: Shortness of breath/cough  Cardiovascular: Chest pain/palpitations/edema/orthopnea/syncope  GI: Nausea/vomitting/diarrhea  MSK: Arthralgias/myagias/muscle weakness  Skin: Rash/sores  Neurological: Numbness/tremors/vertigo  Endocrine: Excessive thirst/polyuria/cold intolerance/heat intolerance  Psych: Depression/anxiety    Past Medical History:   Diagnosis Date    Chronic pain     Hyperlipidemia     Hypertension     Pacemaker        Past Surgical History:   Procedure Laterality Date    PACEMAKER INSERTION         Social History     Socioeconomic History    Marital status:      Spouse name: Not on file    Number of children: Not on file    Years of education: Not on file    Highest education level: Not on file   Occupational History    Not on file   Tobacco Use    Smoking status: Never     Passive exposure: Never    Smokeless tobacco: Not on file    Vaping Use    Vaping Use: Never used   Substance and Sexual Activity    Alcohol use: Not on file    Drug use: Not on file    Sexual activity: Not on file   Other Topics Concern    Not on file   Social History Narrative    Not on file     Social Determinants of Health     Financial Resource Strain: Not on file   Food Insecurity: Not on file   Transportation Needs: Not on file   Physical Activity: Not on file   Stress: Not on file   Social Connections: Not on file   Intimate Partner Violence: Not on file   Housing Stability: Not on file     Fhx: No family history of premature cardiac disease    No Known Allergies    Current Facility-Administered Medications   Medication Dose Route Frequency Provider Last Rate Last Admin    oxyCODONE immediate-release (ROXICODONE) tablet 5 mg  5 mg Oral Q3HRS PRN Floyd Sweet M.D.   5 mg at 12/12/22 1736    Or    oxyCODONE immediate release (ROXICODONE) tablet 10 mg  10 mg Oral Q3HRS PRN Floyd Sweet M.D.        Or    HYDROmorphone (Dilaudid) injection 1 mg  1 mg Intravenous Q3HRS PRN Floyd Sweet M.D.        carvedilol (COREG) tablet 12.5 mg  12.5 mg Oral BID WITH MEALS Clement Ji M.D.   12.5 mg at 12/12/22 1737    dronabinol (MARINOL) capsule 5 mg  5 mg Oral BEFORE LUNCH AND DINNER Floyd Sweet M.D.   5 mg at 12/12/22 1736    levETIRAcetam (KEPPRA) tablet 500 mg  500 mg Oral BID Floyd Sweet M.D.   500 mg at 12/12/22 1736    amiodarone (Cordarone) tablet 200 mg  200 mg Oral DAILY Floyd Sweet M.D.   200 mg at 12/12/22 0435    aspirin (ASA) chewable tab 81 mg  81 mg Oral DAILY Floyd Sweet M.D.   81 mg at 12/12/22 0435    atorvastatin (LIPITOR) tablet 40 mg  40 mg Oral Q EVENING Floyd Sweet M.D.   40 mg at 12/12/22 1736    gabapentin (NEURONTIN) capsule 100 mg  100 mg Oral BID Floyd Sweet M.D.   100 mg at 12/12/22 1736    lisinopril (PRINIVIL) tablet 10 mg  10 mg Oral Q DAY Floyd Sweet M.D.    10 mg at 12/12/22 0435    senna-docusate (PERICOLACE or SENOKOT S) 8.6-50 MG per tablet 2 Tablet  2 Tablet Oral BID Floyd Sweet M.D.   2 Tablet at 12/11/22 0538    And    polyethylene glycol/lytes (MIRALAX) PACKET 1 Packet  1 Packet Oral QDAY PRN Floyd Sweet M.D.        And    magnesium hydroxide (MILK OF MAGNESIA) suspension 30 mL  30 mL Oral QDAY PRN Floyd Sweet M.D.        And    bisacodyl (DULCOLAX) suppository 10 mg  10 mg Rectal QDAY PRN Floyd Sweet M.D.        acetaminophen (Tylenol) tablet 650 mg  650 mg Oral Q6HRS PRN Floyd Sweet M.D.        ondansetron (ZOFRAN ODT) dispertab 4 mg  4 mg Oral Q4HRS PRN Floyd Sweet M.D.        lidocaine (LIDODERM) 5 % 1 Patch  1 Patch Transdermal Q24HR Jeremy M Gonda, M.D.   1 Patch at 12/11/22 1034    Respiratory Therapy Consult   Nebulization Continuous RT Chato Mcneil M.D.        ipratropium-albuterol (DUONEB) nebulizer solution  3 mL Nebulization Q2HRS PRN (RT) Chato Mcneil M.D.        Pharmacy Consult Request ...Pain Management Review 1 Each  1 Each Other PHARMACY TO DOSE Deshawn Toussaint M.D.        ondansetron (ZOFRAN) syringe/vial injection 4 mg  4 mg Intravenous Q4HRS PRN Deshawn Toussaint M.D.           Physical Exam:  Vitals:    12/11/22 2335 12/12/22 0341 12/12/22 0435 12/12/22 1320   BP: 116/65 (!) (P) 146/67 132/68    Pulse: 62 (P) 62 67 60   Resp: 16 (P) 14     Temp: 36.8 °C (98.2 °F) (P) 36.4 °C (97.5 °F)     TempSrc: Temporal (P) Temporal     SpO2: 95% (P) 93%  96%   Weight:       Height:         General appearance: NAD,   Eyes: anicteric sclerae, moist conjunctivae; no lid-lag; PERRLA  HENT: Atraumatic; oropharynx clear with moist mucous membranes and no mucosal ulcerations; normal hard and soft palate  Neck: Trachea midline; FROM, supple, no thyromegaly or lymphadenopathy  Lungs: CTA, with normal respiratory effort and no intercostal retractions  CV: RRR, no MRGs, no JVD   Abdomen: Soft, non-tender;  no masses or HSM  Extremities: ischemic appearing R foot  Skin: Cool to touch lower extremities, normal turgor  Psych: Flat affect but alert and oriented to person, place and time    Data:  Labs reviewed    Prior echo/stress results reviewed:  LVEF 40%, apical akinesis    Prior cath results reviewed:  Nonobstructive disease    CXR interpreted by me:  WNL    EKG interpreted by me:   Sinus, incomplete LBBB that appeared wider previous EKG, long QT    Impression/Plan:  1) VT/VF arrest  2) Stress cardiomyopathy  3) Peripheral vascular disease  4) S/p TAVR    -I have reviewed prior device interrogation and tracings from PPM check as well  -He did have ~30 seconds, technically sustained episode of clearly VT with Vs>As back in august along with frequent NSVT episodes  -His shock-able arrest event here did happened after induction but given his clearly poor substrate with existing structural disease I am not certain this is readily reversible, and given his prior VT event in setting of structrual heart disease technically already meets indications for secondary prevention ICD even without this event  -I did discuss with him given age/comorbidities, not unreasonable to stick with PPM as well, but I do think he meets qualifications for estimated life expectancy > 50%  -Given more advanced age I recommended abandoning the existing RV pace/sense lead if not readily able to be removed via traction rather than considering laser/mechanical extraction, and I discussed that MRIs would no longer be an option in the case of abandoned lead  -All questions were answered, and he wants to proceed  -Keep NPO after midnight    Kenan Lyman MD

## 2022-12-13 NOTE — PROGRESS NOTES
"Hospital Medicine Daily Progress Note    Date of Service  12/13/2022    Chief Complaint  Gilmer Mae is a 82 y.o. male admitted 11/28/2022 with leg pain    Hospital Course  81 y/o with PMH HTN, HLD, sick sinus syndrome (s/p pacemaker), seizures (most recent episode in 2019, on keppra), severe aortic stenosis (s/p TAVR 8/2/2022 at [facility]) and PAD (s/p vascular surgery on femoral artery in 2022) who presented to ED via EMS on 11/28/2022 for LOC, b/l LE edema and pain.  History limited due to patient’s altered mental status, A&Ox2 (self, place) so most history obtained from chart review and daughter Kendra. Patient lives with wife and son. Patient says he is having pain “all over” especially his right leg and says he got surgery on his right leg “2 weeks ago but they never finished.” On arrival to ED, patient was lethargic and confused. RLE especially R foot exquisitely tender, nonhealing ulcer with some erythema and eschar present on bottom of R heel. Ultrasound of leg showed diffuse atherosclerotic plaque throughout LE, absent flow from proximal to distal femoral artery, reconstitution monophasic flow via collaterals at distal femoral artery, absent flow in distal portion of tibial artery. Xray of R foot showed potentially periosteal changes at inferior aspect of calcaneus as well as significant vascular calcification; concern for underlying osteomyelitis secondary to ischemic ulcer. Empirically placed on IV linezolid and IV unasyn however discontinued linezolid as blood cx, wound cx, MRSA and staph aureus PCR negative as well as lack of purulence from wound. Seen by LPS and vascular surgery- scheduled for OR on 12/5 with Dr. Rdz to attempt RLE revascularization in order to avoid amputation.    Interval Problem Update  Patient feels tired. His right foot continues to have pain. But he feels \"pretty good\" today  ICD placement today  Hypernatremic, start D5W and recheck level    I have discussed this patient's plan " of care and discharge plan at IDT rounds today with Case Management, Nursing, Nursing leadership, and other members of the IDT team.    Consultants/Specialty  cardiology, critical care, infectious disease, and nephrology    Code Status  DNAR/DNI    Disposition  Patient is not medically cleared for discharge.   Anticipate discharge to to skilled nursing facility.  I have placed the appropriate orders for post-discharge needs.    Review of Systems  Review of Systems   Constitutional:  Positive for malaise/fatigue.   Respiratory:  Positive for cough.    Cardiovascular:  Negative for chest pain.   Gastrointestinal:  Negative for abdominal pain and vomiting.   Musculoskeletal:  Positive for myalgias.   Neurological:  Positive for weakness.   All other systems reviewed and are negative.     Physical Exam  Temp:  [36.4 °C (97.5 °F)-37 °C (98.6 °F)] 36.4 °C (97.5 °F)  Pulse:  [60-65] 65  Resp:  [15-16] 16  BP: (105-150)/(55-77) 145/72  SpO2:  [91 %-96 %] 96 %    Physical Exam  Vitals and nursing note reviewed.   Constitutional:       Appearance: He is not toxic-appearing or diaphoretic.   HENT:      Head: Normocephalic.      Mouth/Throat:      Mouth: Mucous membranes are moist.   Eyes:      General:         Right eye: No discharge.         Left eye: No discharge.   Cardiovascular:      Rate and Rhythm: Normal rate and regular rhythm.   Pulmonary:      Effort: No respiratory distress.      Breath sounds: Rhonchi and rales present. No wheezing.   Abdominal:      Palpations: Abdomen is soft.      Tenderness: There is no abdominal tenderness.   Musculoskeletal:         General: No swelling.      Cervical back: Neck supple.   Skin:     Comments: Right foot heel and toes with blackened wounds   Neurological:      Mental Status: He is alert and oriented to person, place, and time.       Fluids    Intake/Output Summary (Last 24 hours) at 12/13/2022 1640  Last data filed at 12/13/2022 0800  Gross per 24 hour   Intake 140 ml   Output  400 ml   Net -260 ml       Laboratory  Recent Labs     12/12/22  0325   WBC 6.9   RBC 2.75*   HEMOGLOBIN 9.4*   HEMATOCRIT 29.1*   .8*   MCH 34.2*   MCHC 32.3*   RDW 58.3*   PLATELETCT 153*   MPV 11.1     Recent Labs     12/11/22  1223 12/12/22  0325   SODIUM 147* 148*   POTASSIUM 3.8 3.8   CHLORIDE 115* 115*   CO2 24 25   GLUCOSE 95 92   BUN 18 20   CREATININE 0.93 0.92   CALCIUM 8.0* 7.7*                   Imaging  DX-CHEST-PORTABLE (1 VIEW)   Final Result      1.  Status post left pacer ICD placement. No evidence of large left pleural effusion or pneumothorax.   2.  Apparent interval development of a small right-sided pleural effusion with associated basilar atelectasis and/or consolidation.   3.  Left basilar atelectasis and/or consolidation.   4.  Mild enlargement of the cardiomediastinal silhouette.      EC-ECHOCARDIOGRAM LTD W/ CONT   Final Result      CT-HEAD W/O   Final Result      1.  No acute intracranial hemorrhage.   2.  Similar chronic findings.   3.  Mild mucosal thickening of the inferior right maxillary sinus.         DX-CHEST-PORTABLE (1 VIEW)   Final Result      Stable mild cardiac enlargement without edema      DX-CHEST-PORTABLE (1 VIEW)   Final Result         1.  Hazy interstitial left lung base infiltrate, stable since prior study.   2.  Atherosclerosis      DX-ABDOMEN FOR TUBE PLACEMENT   Final Result      Orogastric tube tip at the proximal stomach.      EC-ECHOCARDIOGRAM COMPLETE W/ CONT   Final Result      DX-ABDOMEN FOR TUBE PLACEMENT   Final Result      NG tube tip projects over the stomach, and side-port projects 3 cm above the GE junction. It can be advanced further by approximately 7 cm.      DX-ABDOMEN FOR TUBE PLACEMENT   Final Result      NG tube tip projects over the GE junction. It can be advanced 8-10 cm      DX-CHEST-PORTABLE (1 VIEW)   Final Result      1.  Right central catheter tip is in the mid SVC. No pneumothorax.   2.  NGT tip is at the GE junction. It can be  advanced 8 cm.   3.  Stable other findings.      DX-CHEST-PORTABLE (1 VIEW)   Final Result      1.  Well-positioned lines and tubes. No pneumothorax.   2.  Ill-defined left basilar opacity, chronic atelectasis/scarring or sequela of recent pneumonia.      CT-CTA AORTA-RO WITH & W/O-POST PROCESS   Final Result      1.  Extensive atherosclerotic vascular calcification involving the aorta and originating arteries. There is ectasia of the ascending thoracic aorta measured at 3.4 x 3.4 cm in diameter.      2.  Extensive atherosclerotic plaque involving the right common, superficial femoral and profunda arteries. There is occlusion of the right superficial femoral artery at the level of the proximal thigh. Popliteal artery is also occluded and there is    extensive atherosclerotic plaque involving the posterior tibial, anterior tibial and peroneal arteries. No definite continuous runoff vessel identified.      3.  Extensive atherosclerotic plaque involving the left common, profunda and superficial femoral and popliteal arteries with multiple areas of high-grade stenosis of the superficial femoral and popliteal arteries. There is also extensive atherosclerotic    change of the posterior tibial, anterior tibial and peroneal arteries without a definite contiguous runoff vessel seen.      4.  High-grade stenosis involving the origin of the superior mesenteric artery.      5.  Occluded origin of the inferior mesenteric artery.      6.  Atherosclerotic plaque involving the right renal artery origin and the proximal right renal artery resulting in 50% diameter narrowing.      7.  Less than 50% diameter narrowing of the left renal artery origin.      8.  Ill-defined groundglass infiltrates within the right upper lobe possibly representing infectious process.      9.  Multiple hepatic cysts.      3D angiographic/MIP images of the vasculature confirm the vascular findings as described above.      US-VEIN MAPPING LOWER EXTREMITY  UNILAT RIGHT   Final Result      DX-FOOT-COMPLETE 3+ RIGHT   Final Result      Likely acute osteomyelitis at the inferior calcaneus. MRI can be obtained as indicated.      MR-FOOT-W/O RIGHT   Final Result      1.  Very limited study due to extensive patient motion artifact and patient comfort preventing multiple sequences.      2.  No gross bony destructive change.      3.  Soft tissue edema.      US-EXTREMITY ARTERY LOWER UNILAT RIGHT   Final Result      US-EXTREMITY VENOUS LOWER UNILAT RIGHT   Final Result      US-ABDOMEN COMPLETE SURVEY   Final Result         1.  Gallbladder sludge, otherwise unremarkable gallbladder   2.  Hepatic cysts   3.  Bilateral echogenic kidneys with mildly thinned cortex, appearance favors medical renal disease      CT-HEAD W/O   Final Result      1.  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   2.  Mild cerebral atrophy. Age-appropriate.   3.  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   4.  No acute findings are evident.         DX-CHEST-PORTABLE (1 VIEW)   Final Result      No evidence of acute cardiopulmonary process.      CL-LEFT HEART CATHETERIZATION WITH POSSIBLE INTERVENTION    (Results Pending)   CL-IMPLANTABLE CARDIOVERTER DEFIBRILLATOR    (Results Pending)        Assessment/Plan  Advance care planning  Assessment & Plan  On December 8, 2022 I discussed CODE STATUS with him and he expressed that he would like to be DNR and after further discussion he requested that I should call his daughter to discuss about CODE STATUS.  I called patient's daughter and she reported that she is on her way to the hospital.  I reevaluated him again at the bedside and discussed plan of care with patient's 2 daughters and patient's wife.  He expressed that he would like to be full code and he does not want to sign DNR.  If his pain is under  control.  I explained at length regarding full code, DNR and hospice care to patient and his daughter at the bedside and answered all of their questions.  As per patient's wishes I continued full CODE STATUS at this time.  Time spent: 28 minutes    Hypernatremia  Assessment & Plan  Patient found to have hypernatremia and sodium is trending up.  I started him on gentle IV fluid hydration with D5W.  Use IV fluid with caution.    History of Bell's palsy  Assessment & Plan  With associated L facial droop, CT head neg for AICH 12/7  Supportive care    Thrombocytopenia (HCC)  Assessment & Plan  Mild, Monitor    Hypokalemia  Assessment & Plan  Continue to monitor and replace as needed.    Shock (HCC)  Assessment & Plan  Post cardiac arrest 12/5  - titrate pressor to maintain MAP>65  - monitor urine output and vital signs   ICD today    Congestive heart failure (CHF) ([facility])- (present on admission)  Assessment & Plan  - per chart review, patient has history of congestive heart failure  - c/w home aspirin 81 everyday, coreg 12.5mg BID, atorvastatin 80mg qD, lisinopril 20, amlodipine 5  - Continue to hold HCTZ.    History of seizures- (present on admission)  Assessment & Plan  - per patient, last episode of seizure was in 2019  - c/w home keppra 500mg BID    Macrocytic anemia- (present on admission)  Assessment & Plan  - No known history of anemia, not on iron supplementation  - Most recent labs from 8/2022 showing baseline hemoglobin of 14.1 and MCV of 97.7  - pending anemia workup- iron panel, reticulocytes, vitamin B12, folate  - continue to monitor with daily CBC's  - Transfuse if hemoglobin <7.0. Type and screen ordered 11/30  I ordered repeat hemoglobin for this afternoon.    Acute encephalopathy- (present on admission)  Assessment & Plan  - Per family and chart review, patient is at baseline alert and oriented x4.  - Presented to emergency room on 11/28/2022 with confusion, noted to be alert and oriented x2 (self, place  only)  - Altered mental status initially thought to be in setting of azotemia from acute kidney injury however continuing to have encephalopathy despite improving azotemia and improving kidney function  - Per nephrology, currently no emergent need for dialysis. Nephrology signed off 11/29.  - Continue to monitor and trend labs. Work with PT/OT inpatient, they recommend SNF post discharge.  Significantly improved.    Increased anion gap metabolic acidosis  Assessment & Plan  Resolved  - Baseline anion gap is 7.0 per most recent labs from 8/2022  - Anion gap on admission noted to be 19.0, improved to 17.0 after IV LR at 150 cc/hr  - Increased anion gap metabolic acidosis likely secondary to uremia from kidney damage  - Continue hydration with IV fluids and encourage PO intake  - Continue to monitor with daily labs    Osteomyelitis of right lower extremity (HCC)- (present on admission)  Assessment & Plan  - Given recent right groin access for TAVR, MARGARITA ordered-   - Pain controlled with tylenol 650mg q6 prn, IV dilaudid 0.5mg q4 prn, oxycodone 5-10mg q3 prn  - can consider lidocaine patch, diclofenac gel as needed  - U/S showed: Occlusion of RIGHT femoral artery with distal reconstitution, Occlusion of RIGHT posterior tibial artery,  Two vessel runoff to the ankle with monophasic flow  -12/1: Spoke with Dr. Medel who is partners with Dr. Quinones (who did patient's previous surgery). She says patient will be having pain for a long time but since this is not an emergency they will see him outpatient and Dr. Quinones will perform a re-anastomosis and other procedures as warranted. This procedure cannot currently be done as Dr. Quinones is currently on vacation and Dr. Medel doesn't have [facility] privileges as well as the fact that this type of surgery cannot even be done at [facility].   - PT/OT recommending SNF  - MRI foot attempted 12/2 however was incomplete as patient was unable to tolerate. Foot xray confirmed osteomyelitis  to R calcaneus.  - 12/2: Per LPS, wound care orders for nursing was placed and he was provided with betadine dressing and offloading boot per LPS team.  - 12/3: Consulted LPS and vascular surgery due to concern for osteomyelitis of R heel- seen by Dr. Rdz who will take patient to OR in order to attempt revascularization to attempt to heal foot wound and avoid amputation. OR planned for 12/5 Monday.  - Consulted Dr. Turcios from infectious disease 12/3: believes osteomyelitis of heel will be difficult to cure and pt will need debridement, wound vac, etc however most likely poor outcome. Healing process also hindered due to impaired circulation evidence by MARGARITA. As there is no pus from wound, most likely ischemic ulcer. Linezolid discontinued 12/3, c/w just unasyn for now  - f/u blood cultures, wound cultures, NGTD  - CTA pending  - Vein mapping 12/4: Acute superficial thrombus in the greater saphenous vein near saphenofemoral junction and in the proxmal thigh. Vein wall thickening in the thigh; small caliber vein throughout the thigh & calf. Vein is unsuitable for use as a bypass graft.  Continue IV antibiotics  Vascular surgery evaluated him and made recommendations.    Sepsis (HCC)- (present on admission)  Assessment & Plan  - This is Sepsis Present on admission  - SIRS criteria identified on my evaluation include: Leukocytosis, with WBC greater than 12,000  - Source is unknown, possibly abdominal etiology. US 11/29 showing US abdomen showing gallbladder sludge otherwise unremarkable, hepatic cysts, bilateral echogenic kidneys with mildly thinned cortex concerning for possible medical renal disease.  - Sepsis protocol initiated  - Fluid resuscitation ordered per protocol  - Crystalloid Fluid Administration: Fluid resuscitation ordered per standard protocol - 30 mL/kg per current or ideal body weight  - IV antibiotics as appropriate for source of sepsis- started on IV ceftriaxone, discontinued on 11/29  -  Reassessment: I have reassessed the patient's hemodynamic status  - Blood cultures NGTD  - Urine cultures unremarkable  - Hemodynamically stable  - Uptrending WBC, WBC 14.4 today 11/29 from 13.9 on admission 11/28  - continue to monitor with daily CBC's  - No clear source of sepsis- Discontinued IV ceftriaxone and started on IV unasyn low dose (due to ALL) TID (11/29- ) pending further workup.   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    History of transcatheter aortic valve replacement (TAVR)- (present on admission)  Assessment & Plan  - per chart review, patient has history of severe aortic stenosis and underwent TAVR with Dr. Wolfe on 8/2/2022.   - most recent echo showed well seated TAVR with peak gradient of 12 mmHg and mean gradient of 7.5 mmHg and trace perivalvular leak.     ALL (acute kidney injury) (HCC)- (present on admission)  Assessment & Plan  RESOLVED  - No noted history of chronic kidney disease, unclear creatinine baseline however recent kidney values appear normal- most recent labs from 8/3/2022 showing baseline Cr 1.0 and BUN 15.0.  - On admission, BUN/Cr ratio noted to be 122/4.67  - Patient is able to urinate, produced about 400cc dark urine in emergency room. No urinary symptoms such as dysuria, polyuria, retention.  - Urinalysis 11/28: +hyaline casts, +granular casts.  - Consulted nephrology- Per Dr. Hearn, currently unclear etiology of ALL however given hyaline casts on UA, volume depletion most likely. Obstructive uropathy was also considered given suprapubic tenderness on exam however renal/bladder US unremarkable for obstructions.   - Started on IV fluids at 125 cc/hr on 11/28, increased rate to 150 cc/hr on 11/29  - Most recent labs from 11/29 at 1500 showing improved renal function with BUN/Cr  ratio of 85/1.63.  - Continue to monitor renal function with daily labs  - monitor urine output  - Kidney function improving as of 11/29 and patient nonoliguric, no need for dialysis. Nephrology signed off 11/29.  - Continue gentle IVF and trend Crt/BUN  - Improving- Cr trending down. Cr is 0.90 on 12/1 from 1.05 on 11/30 and 4.67 on admission 11/28.  - ALL resolved, Cr 0.78 on 12/2. D/c'ed renal diet and placed on regular diet to encourage patient PO intake  Now resolved.    Hyperlipidemia- (present on admission)  Assessment & Plan  - no lipid panel on file  - Continue atorvastatin    Essential hypertension- (present on admission)  Assessment & Plan  - at home, on lisinopril, hydrochlorithiazide, amlodipine, carvedilol.  - continue with home coreg 12.5 BID  - restarted on amlodipine 12/1 due to hypertension, continuing to hold HCTZ  - restarted lisinopril 20 on 12/2 due to continued hypertension       VTE prophylaxis: SCDs/TEDs    I have performed a physical exam and reviewed and updated ROS and Plan today (12/13/2022). In review of yesterday's note (12/12/2022), there are no changes except as documented above.

## 2022-12-13 NOTE — WOUND TEAM
"[facility]  Inpatient Services  Wound and Skin Care Evaluation    Admission Date: 11/28/2022     Last order of IP CONSULT TO WOUND CARE was found on 11/28/2022 from Hospital Encounter on 11/28/2022     HPI, PMH, SH: Reviewed    Past Surgical History:   Procedure Laterality Date    PACEMAKER INSERTION       Social History     Tobacco Use    Smoking status: Never     Passive exposure: Never    Smokeless tobacco: Not on file   Substance Use Topics    Alcohol use: Not on file     Chief Complaint   Patient presents with    ALOC    Edema     Ble edema     Diagnosis: ALL (acute kidney injury) (HCC) [N17.9]    Unit where seen by Wound Team: T737/00     WOUND CONSULT/FOLLOW UP RELATED TO:  Sacrum, and Right heel & Toes Follow Up     WOUND HISTORY:  Patient unable to give any history of wound, not answering any questions.   \"82 y.o. male who presented 11/28/2022 with altered mental status.  Patient recently had TAVR on 8/2/2022 at [facility].  Patient is a poor historian so history was obtained via chart review.  Patient had decreased oral intake both eating and drinking for about a week.  He was having altered mental status as well as bilateral lower extremity edema.  He was brought to the hospital and found to have ALL.\"    WOUND ASSESSMENT/LDA      Wound 11/28/22 Pressure Injury Heel Right POA unstageable (Active)   Wound Image      Site Assessment Black    Periwound Assessment Callused    Margins Attached edges;Defined edges    Closure Open to air    Drainage Amount None    Drainage Description Other (Comment)    Treatments Cleansed;Site care;Offloading    Wound Cleansing Not Applicable    Periwound Protectant Not Applicable    Dressing Cleansing/Solutions 3% Betadine    Dressing Options Open to Air    Dressing Changed Changed    Dressing Status Dry;Clean;Intact    Dressing Change/Treatment Frequency Every Shift, and As Needed    NEXT Dressing Change/Treatment Date 12/13/22    NEXT Weekly Photo (Inpatient " Only) 12/20/22    WOUND NURSE ONLY - Pressure Injury Stage U    Wound Length (cm) 3.5 cm    Wound Width (cm) 4 cm    Wound Surface Area (cm^2) 14 cm^2    Shape Round    Wound Odor None    Exposed Structures MAYA    WOUND NURSE ONLY - Time Spent with Patient (mins) 60        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, Hallux;Toe, 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 (Active)   Wound Image        Site Assessment Black    Periwound Assessment Dry;Intact    Margins Attached edges    Closure Open to air    Drainage Amount None    Treatments Cleansed;Site care;Offloading    Wound Cleansing Not Applicable    Periwound Protectant Not Applicable    Dressing Cleansing/Solutions 3% Betadine    Dressing Options Open to Air    Dressing Changed Changed    Dressing Status Open to Air    Dressing Change/Treatment Frequency Every Shift, and As Needed    NEXT Dressing Change/Treatment Date 12/13/22    NEXT Weekly Photo (Inpatient Only) 12/20/22    Non-staged Wound Description Full thickness    Wound Length (cm) 3 cm    Wound Width (cm) 2 cm    Wound Surface Area (cm^2) 6 cm^2    Shape Oval and Circular    Wound Odor None    Exposed Structures MAYA        Wound 12/12/22 Pressure Injury Sacrum Mid 12/13: Stage 2 (Active)   Wound Image      12/13/22 1300   Site Assessment Red;Yellow    Periwound Assessment Clean;Dry;Intact    Margins Attached edges;Defined edges    Closure Adhesive bandage    Drainage Amount Scant    Drainage Description Serosanguineous    Treatments Cleansed;Site care;Offloading;Zinc - oxide paste    Wound Cleansing Foam Cleanser/Washcloth    Periwound Protectant Skin Protectant Wipes to Periwound    Dressing Cleansing/Solutions Other (Comments)    Dressing Options Mepilex    Dressing Changed Changed    Dressing Status Clean;Dry;Intact    Dressing Change/Treatment Frequency Every 72 hrs, and As Needed    NEXT Dressing Change/Treatment Date 12/16/22    NEXT Weekly Photo (Inpatient Only) 12/20/22    WOUND NURSE ONLY - Pressure Injury Stage 2    Non-staged  Wound Description Not applicable    Wound Length (cm) 2.7 cm    Wound Width (cm) 0.9 cm    Wound Depth (cm) 0.1 cm    Wound Surface Area (cm^2) 2.43 cm^2    Wound Volume (cm^3) 0.243 cm^3    Shape Oval    Wound Odor None    Exposed Structures None    WOUND NURSE ONLY - Time Spent with Patient (mins) 60      Vascular:    MARGARITA:   No results found.    Lab Values:    Lab Results   Component Value Date/Time    WBC 6.9 12/12/2022 03:25 AM    RBC 2.75 (L) 12/12/2022 03:25 AM    HEMOGLOBIN 9.4 (L) 12/12/2022 03:25 AM    HEMATOCRIT 29.1 (L) 12/12/2022 03:25 AM    CREACTPROT 3.92 (H) 12/09/2022 03:17 AM    SEDRATEWES 102 (H) 12/09/2022 03:17 AM    HBA1C 5.1 12/10/2022 02:15 AM      Culture Results show:  No results found for this or any previous visit (from the past 720 hour(s)).    Pain Level/Medicated:  Pt tolerated well, some pain noted to assessment of toes    INTERVENTIONS BY WOUND TEAM:  Chart and images reviewed. Discussed with bedside RN. All areas of concern (based on picture review, LDA review and discussion with bedside RN) have been thoroughly assessed. Documentation of areas based on significant findings. This RN in to assess patient. Performed standard wound care which includes appropriate positioning, dressing removal and non-selective debridement. Pictures and measurements obtained weekly if/when required.       Sacrum:  Preparation for Dressing removal: NA SHIKHA  Cleansed with:  Moist warm washcloth  Sharp debridement: NA  Tiffanie wound: Cleansed with moist warm washcloth, Prepped with barrier paste  Primary Dressing: Barrier paste  Secondary (Outer) Dressing: Mepilex and q2 turns         Right Heel & Toes:  Preparation for Dressing removal: NA SHIKHA  Non-selectively Debrided with:  NA  Sharp debridement: NA  Tiffanie wound: Cleansed with NA, Prepped with NA  Primary Dressing: Betadine  Secondary (Outer) Dressing: Heel float boot    Pt has scattered purple discoloration along the right foot (lateral and medial  aspect)    Interdisciplinary consultation: Patient, Bedside RN, wound RN (Sarah),      EVALUATION / RATIONALE FOR TREATMENT:  Most Recent Date:  12/13/22: R foot continues to deteriorate, may benefit from having vascular reassess ability to revascularize. Betadine continued, eschar dry and stable. Pressure injury to coccyx appears to be a deep stage 2. Pt incontinent of bowel, has condom cath in use. Will order MARY bed. Applied barrier paste and mepilex slightly higher.     12/6/22: Diabetic wounds to right heel and 1st and 2nd toes, pt went to OR on Monday for revascularization but coded and procedure was aborted. Betadine applied to keep eschar intact and prevent infection until re-vascularization can be completed.   11/29/22: Patient admitted with POA unstageable pressure injury to the right heel. Non-viable tissue covering base of wound bed, so unable to assess depth, Honey colloid applied to cleanse and autolytically debride due to its high osmolarity. As well as help lower overall wound pH, while promoting a moisture-balanced environment conducive to wound healing. Mepilex to offload. Palpable pulses DP and PT, edema noted in foot and ankle. Recommend imaging to assure no bone involvement due to the high amounts of pain present in the heel area.        Goals: Steady decrease in wound area and depth weekly.    WOUND TEAM PLAN OF CARE ([X] for frequency of wound follow up,):   Nursing to follow dressing orders written for wound care. Contact wound team if area fails to progress, deteriorates or with any questions/concerns if something comes up before next scheduled follow up (See below as to whether wound is following and frequency of wound follow up)  Dressing changes by wound team:                   Follow up 3 times weekly:                NPWT change 3 times weekly:     Follow up 1-2 times weekly:   X once weekly.    Follow up Bi-Monthly:           Follow up Monthly (High Risk):                        Follow  up as needed:     Other (explain):     NURSING PLAN OF CARE ORDERS (X):  Dressing changes: See Dressing Care orders: X  Skin care: See Skin Care orders:   RN Prevention Protocol: X  Rectal tube care: See Rectal Tube Care orders:   Other orders:    RSKIN:   CURRENTLY IN PLACE (X), APPLIED THIS VISIT (A), ORDERED (O):   Q shift Luis M:  X  Q shift pressure point assessments:  X    Surface/Positioning   Pressure redistribution mattress   X         Low Airloss        O  ICU Low Airloss   Bariatric MARY     Waffle cushion        Waffle Overlay  X        Reposition q 2 hours   X   TAPs Turning system O    Z Renato Pillow     Offloading/Redistribution   Sacral Mepilex (Silicone dressing)     Heel Mepilex (Silicone dressing)     Removed    Heel float boots (Prevalon boot)     X, only to the right heel, applied new one to the left        Float Heels off Bed with Pillows           Respiratory   Silicone O2 tubing    X     Gray Foam Ear protectors   X  Cannula fixation Device (Tender )          High flow offloading Clip    Elastic head band offloading device      Anchorfast                                                        Trach with Optifoam split foam             Containment/Moisture Prevention     Rectal tube or BMS    Purwick/Condom Cath      X  Kimball Catheter    Barrier wipes           Barrier paste     X  Antifungal tx      Interdry        Mobilization     MAYA  Up to chair        Ambulate      PT/OT      Nutrition       Dietician        Diabetes Education      PO  X   TF     TPN     NPO   # days     Other        Anticipated discharge plans: TBD, will likely need ongoing assistance  LTACH:        SNF/Rehab:      X            Home Health Care:   X        Outpatient Wound Center:   X         Self/Family Care:        Other:                  Vac Discharge Needs:   Not Applicable Pt not on a wound vac:     X  Regular Vac while inpatient, alternative dressing at DC:        Regular Vac in use and continued at DC:             Reg. Vac w/ Skin Sub/Biologic in use. Will need to be changed 2x wkly:      Veraflo Vac while inpatient, ok to transition to Regular Vac on Discharge:           Veraflo Vac while inpatient, will need to remain on Veraflo Vac upon discharge:

## 2022-12-13 NOTE — CARE PLAN
Problem: Knowledge Deficit - Standard  Goal: Patient and family/care givers will demonstrate understanding of plan of care, disease process/condition, diagnostic tests and medications  Outcome: Progressing     Problem: Pain - Standard  Goal: Alleviation of pain or a reduction in pain to the patient’s comfort goal  Outcome: Progressing     Problem: Skin Integrity  Goal: Skin integrity is maintained or improved  Outcome: Progressing   The patient is Stable - Low risk of patient condition declining or worsening    Shift Goals  Clinical Goals: pain control, monitor VS  Patient Goals: rest  Family Goals: MAYA    Progress made toward(s) clinical / shift goals:  Patient educated on the importance of the pain scale and we were able to properly medicate him to keep his pain at a comfortable level.

## 2022-12-14 NOTE — THERAPY
Missed Therapy     Patient Name: Gilmer Mae  Age:  82 y.o., Sex:  male  Medical Record #: [id number]  Today's Date: 12/14/2022    Per chart review, pt is pending BKA today and with new ICD placed yesterday. Will hold physical therapy re-evaluation until tomorrow or as appropriate.    Berta Rivera PT, DPT

## 2022-12-14 NOTE — PROGRESS NOTES
Cardiology Follow Up Progress Note    Date of Service  12/14/2022    Attending Physician  Clinton Keen M.D.    Consulting physician: Clement Hassan     Chief complaint/Reason for consult: VT/VF arrest     HPI:  Pt is an 81 yo M. He has a history of HTN, PPM placed in 2019 with Dr. Amos, severe AS s/p TAVR, PAD, coronary disease, found to have high grade peripheral disease. Underwent attempted revascularization for the occluded R SFA complicated by R ischemic foot/osteomyelitis. Apparently ~10 minutes after induction he developed wide complex tachycardia lost pulse and CPR/epi was started with ROSC shortly afterwards, then subsequently had ventricular fibrillation and received external defibrillation. He was then taken to cath lab where only nonobstructive lesions were found. Echo showed moderately depressed LV function with local WMAs apically located consistent with stress cardiomyopathy. EP asked to consider upgrade of his device to ICD.    Underwent successful PPM generator explant and AICD upgrade with capping of his RA lead with Dr. Lyman on 12/13/2022.    Interim Events  No acute events overnight.  ICD site is uncomplicated, CDI in place.   Device interrogation this am showed normal sensing and function.  May have RBKA today.     Review of Systems  Review of Systems   Constitutional:  Positive for fatigue. Negative for fever.   Respiratory:  Negative for chest tightness and shortness of breath.    Cardiovascular:  Negative for chest pain, palpitations and leg swelling.   Gastrointestinal:  Negative for abdominal pain and blood in stool.   Genitourinary:  Negative for hematuria.   Musculoskeletal:  Negative for gait problem.   Neurological:  Positive for weakness. Negative for dizziness and syncope.   All other systems reviewed and are negative.    Vital signs in last 24 hours  Temp:  [36 °C (96.8 °F)-36.5 °C (97.7 °F)] 36.5 °C (97.7 °F)  Pulse:  [60-71] 65  Resp:  [17-18] 18  BP: (136-166)/(71-90)  149/80  SpO2:  [94 %-98 %] 97 %    Physical Exam  Physical Exam  Constitutional:       General: He is not in acute distress.     Appearance: He is ill-appearing.   HENT:      Head: Normocephalic.   Eyes:      Extraocular Movements: Extraocular movements intact.   Cardiovascular:      Rate and Rhythm: Normal rate and regular rhythm.      Pulses: Normal pulses.      Heart sounds: Normal heart sounds. No murmur heard.  Pulmonary:      Effort: Pulmonary effort is normal.      Breath sounds: Normal breath sounds.   Abdominal:      General: There is no distension.      Palpations: Abdomen is soft.      Tenderness: There is no abdominal tenderness.   Musculoskeletal:         General: Swelling (Upper extremities) present.      Cervical back: Normal range of motion.      Comments: Right foot heel and toes with blackened wounds    Skin:     General: Skin is warm and dry.   Neurological:      Mental Status: He is alert and oriented to person, place, and time.   Psychiatric:         Mood and Affect: Mood normal.         Thought Content: Thought content normal.         Judgment: Judgment normal.       Lab Review  Lab Results   Component Value Date/Time    WBC 6.9 12/12/2022 03:25 AM    RBC 2.75 (L) 12/12/2022 03:25 AM    HEMOGLOBIN 9.4 (L) 12/12/2022 03:25 AM    HEMATOCRIT 29.1 (L) 12/12/2022 03:25 AM    .8 (H) 12/12/2022 03:25 AM    MCH 34.2 (H) 12/12/2022 03:25 AM    MCHC 32.3 (L) 12/12/2022 03:25 AM    MPV 11.1 12/12/2022 03:25 AM      Lab Results   Component Value Date/Time    SODIUM 149 (H) 12/14/2022 08:33 AM    POTASSIUM 4.0 12/14/2022 03:09 AM    CHLORIDE 115 (H) 12/14/2022 03:09 AM    CO2 24 12/14/2022 03:09 AM    GLUCOSE 103 (H) 12/14/2022 03:09 AM    BUN 20 12/14/2022 03:09 AM    CREATININE 0.78 12/14/2022 03:09 AM    BUNCREATRAT 15.0 08/03/2022 04:58 AM      Lab Results   Component Value Date/Time    ASTSGOT 59 (H) 12/10/2022 02:15 AM    ALTSGPT 36 12/10/2022 02:15 AM     Lab Results   Component Value  Date/Time    CHOLSTRLTOT 117 09/11/2019 03:17 AM    LDL 52 09/11/2019 03:17 AM    HDL 48 09/11/2019 03:17 AM    TRIGLYCERIDE 83 09/11/2019 03:17 AM    TROPONINT 33 (H) 11/29/2022 11:38 AM       No results for input(s): NTPROBNP in the last 72 hours.    Assessment/Plan  VT/VF arrest  Stress Cardiomyopathy   PVD  - S/P successful MDT AICD upgrade, capping of RA lead and PPM generator explant.   - CXR shows no late signs of PTX, leads appear to be in correct placement.   - EKG and telemetry monitor shows A paced rhythm, no acute findings.   - Device interrogation today showed normal sensing and function.  - Left upper pacemaker site CDI, uncomplicated.   - Discussed ICD discharge education and care with patient and his son at bedside per below. All pt questions/concerns were answered, patient verbalized understanding.   - Patient will follow up with pacemaker clinic in 1 week for pacemaker check.      Thank you for allowing me to participate in the care of this patient.    EP will sign off. Close follow up arranged as below:    Future Appointments   Date Time Provider Department Center   12/22/2022  3:15 PM PACER CHECK-[facility] B 2 [facility] None   1/18/2023 12:45 PM BREANA Kapoor.P.[email or web address]. [facility] None     Please contact me with any questions.    JAMES Alexander.RCORY.   Cardiology, [facility]  [phone]    Pacemaker Discharge Instructions/[facility]    1.  No showers until seen in follow up; may take sponge bath.  Keep dressing dry & in place until seen at for you follow up visit at the cardiology office.     2.  No lifting over 10 lbs with affected arm for six weeks.  3.  Do not raise affected arm above shoulder level or behind head for six weeks.  4.  Avoid excessive pushing, pulling, or twisting for six weeks.  5.  No driving for the first week.  6.  Call our office ([phone]) if you notice any increased swelling, redness, warmth, or drainage at the implant  site.  7.  Needs to be seen in emergency if you develop fever > 101F or uncontrolled pain.  8.  Always check with device clinic before any planned MRI to see if device is MRI compatible.  9.  No routine dental work or cleanings for 3 months.  10.  May remove arm sling after one day, but please wear if you have trouble remembering to keep your arm down.  Please wear at night as a reminder.   11. Do not place cell phones or mobile devices directly over implanted device.     ICD Instructions  If has 1 shock: if feeling fine can notify cardiology office and be seen for interrogation.  If feeling poorly after needs to call 911.  2 Shocks or more in 24 hours: call 911.

## 2022-12-14 NOTE — DIETARY
Nutrition Services: Update   Day 15 of admit.  Gilmer Mae is a 82 y.o. male with admitting DX of ALL (acute kidney injury), Cardiac arrest.    Pt NPO today for ICD placement. Diet now resumed with Level 3 - Liquidized, Level 2 - mildly thick diet. Providing thickened Boost Glucose Control with meals. Recorded PO intake remains minimal with pt consistently eating <25%. RD previously recommended considering nutrition support. Noted Marinol was started 2 days ago.     Wt 12/12: 65.8 kg via bed scale - weight has varied up and down during admission, but is currently below first bed scale weight of 67 kg on 11/28.    Recommendations/Plan:  Continue to recommend consider nutrition support if within GOC.  Continue Marinol.  Continue to offer thickened Boost Glucose Control with all meals.   Encourage intake of meals and supplements >50%.  Document intake of all meals and supplements as % taken in ADL's to provide interdisciplinary communication across all shifts.   Monitor weight.  Nutrition rep will continue to see patient for ongoing meal and snack preferences.    Problem: Nutritional:  Goal: Achieve adequate nutritional intake  Description: Patient will consume >50% of meals  Outcome: Not met    RD following

## 2022-12-14 NOTE — ANESTHESIA PREPROCEDURE EVALUATION
Case: [id number] Date/Time: 12/14/22 1649    Procedure: AMPUTATION, BELOW KNEE (Right)    Location: [facility] OR 09 / SURGERY [facility]    Surgeons: Hemant Rdz M.D.          Relevant Problems   NEURO   (positive) TIA (transient ischemic attack)      CARDIAC   (positive) Cardiac arrest (HCC)   (positive) Congestive heart failure (CHF) (HCC)   (positive) Essential hypertension         (positive) ALL (acute kidney injury) (HCC)       Physical Exam    Airway   Mallampati: II  TM distance: >3 FB  Neck ROM: full       Cardiovascular - normal exam  Rhythm: regular  Rate: normal  (-) murmur     Dental - normal exam           Pulmonary - normal exam  Breath sounds clear to auscultation     Abdominal    Neurological - normal exam                 Anesthesia Plan    ASA 4   ASA physical status 4 criteria: severe reduction of ejection fractions and other (comment)    Plan - general and peripheral nerve block     Peripheral nerve block will be post-op pain control  Airway plan will be ETT    (Cardiac arrest one week ago)      Induction: intravenous    Postoperative Plan: Postoperative administration of opioids is intended.    Pertinent diagnostic labs and testing reviewed    Informed Consent:    Anesthetic plan and risks discussed with patient.    Use of blood products discussed with: patient whom consented to blood products.

## 2022-12-14 NOTE — THERAPY
Occupational Therapy Contact Note:     12/14/22 1412   Interdisciplinary Plan of Care Collaboration   Collaboration Comments Pt going for BKA today at 1630. Will round back post op as able.       Erna Velásquez, OTR/L

## 2022-12-14 NOTE — CARE PLAN
The patient is Stable - Low risk of patient condition declining or worsening    Shift Goals  Clinical Goals: pt will sleep >6hrs overnight  Patient Goals: pain control  Family Goals: stay updated    Progress made toward(s) clinical / shift goals:  pt sleep goal met    Problem: Knowledge Deficit - Standard  Goal: Patient and family/care givers will demonstrate understanding of plan of care, disease process/condition, diagnostic tests and medications  Outcome: Progressing     Problem: Hemodynamics  Goal: Patient's hemodynamics, fluid balance and neurologic status will be stable or improve  Outcome: Progressing     Problem: Fluid Volume  Goal: Fluid volume balance will be maintained  Outcome: Progressing     Problem: Urinary - Renal Perfusion  Goal: Ability to achieve and maintain adequate renal perfusion and functioning will improve  Outcome: Progressing     Problem: Respiratory  Goal: Patient will achieve/maintain optimum respiratory ventilation and gas exchange  Outcome: Progressing     Problem: Physical Regulation  Goal: Diagnostic test results will improve  Outcome: Progressing  Goal: Signs and symptoms of infection will decrease  Outcome: Progressing       Patient is not progressing towards the following goals:

## 2022-12-14 NOTE — PROGRESS NOTES
"Palliative Care Advance Care Planning Progress Note    General: Gilmer Mae is an 82-year-old male with complex cardiac and vascular history admitted 11/28/2022 with altered mental status, decreased oral intake, and bilateral lower extremity edema.  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  Patient taken to the OR for vascular surgery 12/5/2022 which was aborted due to patient having cardiac arrest.  Patient was taken to Cath Lab for left heart catheterization.  Once stabilized he was transferred out of the ICU.  Palliative care was consulted 12/10/2022 for advance care planning.  Patient upgraded existing internal pacemaker for anatomical implantable cardioverter defibrillator implant 12/13/2022.  Since her last visit patient is now agreeable to right below the knee amputation for gangrene of the right foot.  Duplicate consult placed and message received from Dr. Keen expressing that patient wanted to complete an AD and select POA for surgery.    Discussion: Met with patient and his son who is at bedside.  Introduced myself and discussed role on patient's care team.  Inquired about patient's wishes to complete or update AD.  Per our discussion patient has already completed healthcare AD in his trust however family does not have a copy.  Patient's son confirmed that he believes it has been uploaded to the Living Will Lockbox.  Confirmed that I would check.     Patient's son inquired about financial power of .  Discussed that we are unable to help complete this however if he has said document I can facilitate getting form printed and give him the phone number of a mobile notary and confirmed that private mobile notary can be arranged by the patient/family.  Again discussed that today patient appears too drowsy to complete and type of legal documents.    Reviewed CODE STATUS with patient and he confirmed he would want to \"let nature take its course\" should he suffer a " cardiac arrest or respiratory arrest.  To assist with POLST form completion prior to surgery.  Patient oriented to person, place, and event however he is quite lethargic.  He is not oriented to date.  I had a difficult time keeping alertness during our discussion.  He was clear about wishes for DNR/DNI excluding procedural/OR care.  Patient's son reports patient has wavered back-and-forth on this and reports that it has been somewhat confusing because he agreed to have the defibrillator placed.  Confirmed that a defibrillator would prevent cardiac arrest and might be different than an organic cardiac arrest or respiratory arrest.  Discussed that POLST form does not have to be completed now and I feel patient is too lethargic to complete documents.  Discussed that I can Solomon back with patient closer to when he is able to leave the hospital to complete form to articulate his wishes.    Provided therapeutic communication including open-ended questions, therapeutic presence/silence, reflective listening, and validation of thoughts/feelings throughout encounter. Provided palliative care contact information and encouraged patient's son to call with any questions or needs.     Outcome: Per previous discussion patient reports he has completed a living well and contrast which includes a healthcare directive.  He reports his wife Krystina is POA HC and his daughter Betty his first alternate which son confirmed.  We will look in the living will lock box for consent documents as family does not have a readily accessible copy.  Patient declined completing new AD today.  Provided patient's son with information on how financial POA can be completed and confirmed the hospital is unable to assist however it can be arranged privately.    Plan: Patient going to OR this afternoon for right BKA for gangrene of the right foot  We will continue to support patient and family discussing goals of care moving forward.    Please call our  team with questions and/or additional needs.    Total visit time was 50 minutes discussing advance care planning.    ALFREDO Augustine.  Palliative Care Nurse Practitioner  [phone]

## 2022-12-14 NOTE — CARE PLAN
The patient is Watcher - Medium risk of patient condition declining or worsening    Shift Goals  Clinical Goals: complete ICD placement  Patient Goals: pain control  Family Goals: stay updated    Progress made toward(s) clinical / shift goals:    Problem: Knowledge Deficit - Standard  Goal: Patient and family/care givers will demonstrate understanding of plan of care, disease process/condition, diagnostic tests and medications  Outcome: Progressing     Problem: Pain - Standard  Goal: Alleviation of pain or a reduction in pain to the patient’s comfort goal  Outcome: Progressing     Problem: Skin Integrity  Goal: Skin integrity is maintained or improved  Outcome: Progressing       Patient is not progressing towards the following goals:

## 2022-12-14 NOTE — PROGRESS NOTES
"Hospital Medicine Daily Progress Note    Date of Service  12/14/2022    Chief Complaint  Gilmer Mae is a 82 y.o. male admitted 11/28/2022 with leg pain    Hospital Course  81 y/o with PMH HTN, HLD, sick sinus syndrome (s/p pacemaker), seizures (most recent episode in 2019, on keppra), severe aortic stenosis (s/p TAVR 8/2/2022 at [facility]) and PAD (s/p vascular surgery on femoral artery in 2022) who presented to ED via EMS on 11/28/2022 for LOC, b/l LE edema and pain.  History limited due to patient’s altered mental status, A&Ox2 (self, place) so most history obtained from chart review and daughter Kendra. Patient lives with wife and son. Patient says he is having pain “all over” especially his right leg and says he got surgery on his right leg “2 weeks ago but they never finished.” On arrival to ED, patient was lethargic and confused. RLE especially R foot exquisitely tender, nonhealing ulcer with some erythema and eschar present on bottom of R heel. Ultrasound of leg showed diffuse atherosclerotic plaque throughout LE, absent flow from proximal to distal femoral artery, reconstitution monophasic flow via collaterals at distal femoral artery, absent flow in distal portion of tibial artery. Xray of R foot showed potentially periosteal changes at inferior aspect of calcaneus as well as significant vascular calcification; concern for underlying osteomyelitis secondary to ischemic ulcer. Empirically placed on IV linezolid and IV unasyn however discontinued linezolid as blood cx, wound cx, MRSA and staph aureus PCR negative as well as lack of purulence from wound. Seen by LPS and vascular surgery- scheduled for OR on 12/5 with Dr. Rdz to attempt RLE revascularization in order to avoid amputation.    Interval Problem Update  12/13 - Patient feels tired. His right foot continues to have pain. But he feels \"pretty good\" today  ICD placement today  Hypernatremic, start D5W and recheck level    12/14 - Remains " hypernatremic and worsening. Restarted D5W.  His oral intake has been very poor on the liquidized diet. Speech did reeval him today. I consulted Dr. Rdz and planning for amputation. I discussed with patient that nutrition will be important for wound healing. Pall care to see patient to complete AD before surgery today    I have discussed this patient's plan of care and discharge plan at IDT rounds today with Case Management, Nursing, Nursing leadership, and other members of the IDT team.    Consultants/Specialty  cardiology, critical care, infectious disease, and nephrology    Code Status  DNAR/DNI    Disposition  Patient is not medically cleared for discharge.   Anticipate discharge to to skilled nursing facility.  I have placed the appropriate orders for post-discharge needs.    Review of Systems  Review of Systems   Constitutional:  Positive for malaise/fatigue.   Respiratory:  Positive for cough.    Cardiovascular:  Negative for chest pain.   Gastrointestinal:  Negative for abdominal pain and vomiting.   Musculoskeletal:  Positive for myalgias.   Neurological:  Positive for weakness.   All other systems reviewed and are negative.     Physical Exam  Temp:  [36 °C (96.8 °F)-36.5 °C (97.7 °F)] 36.5 °C (97.7 °F)  Pulse:  [60-71] 65  Resp:  [17-18] 18  BP: (136-166)/(71-90) 149/80  SpO2:  [94 %-98 %] 97 %    Physical Exam  Vitals and nursing note reviewed.   Constitutional:       Appearance: He is not toxic-appearing or diaphoretic.   HENT:      Head: Normocephalic.      Mouth/Throat:      Mouth: Mucous membranes are dry.   Eyes:      General:         Right eye: No discharge.         Left eye: No discharge.   Cardiovascular:      Rate and Rhythm: Normal rate and regular rhythm.   Pulmonary:      Effort: No respiratory distress.      Breath sounds: Rhonchi and rales present. No wheezing.   Abdominal:      Palpations: Abdomen is soft.      Tenderness: There is no abdominal tenderness.   Musculoskeletal:          General: No swelling.      Cervical back: Neck supple.   Skin:     Comments: Right foot heel and toes with blackened wounds   Neurological:      Mental Status: He is alert and oriented to person, place, and time.       Fluids    Intake/Output Summary (Last 24 hours) at 12/14/2022 1400  Last data filed at 12/14/2022 0900  Gross per 24 hour   Intake 20 ml   Output 750 ml   Net -730 ml       Laboratory  Recent Labs     12/12/22  0325   WBC 6.9   RBC 2.75*   HEMOGLOBIN 9.4*   HEMATOCRIT 29.1*   .8*   MCH 34.2*   MCHC 32.3*   RDW 58.3*   PLATELETCT 153*   MPV 11.1     Recent Labs     12/12/22  0325 12/13/22  1959 12/14/22  0309 12/14/22  0833   SODIUM 148* 146* 147* 149*   POTASSIUM 3.8  --  4.0  --    CHLORIDE 115*  --  115*  --    CO2 25  --  24  --    GLUCOSE 92  --  103*  --    BUN 20  --  20  --    CREATININE 0.92  --  0.78  --    CALCIUM 7.7*  --  7.8*  --                    Imaging  DX-CHEST-PORTABLE (1 VIEW)   Final Result      1.  Status post left pacer ICD placement. No evidence of large left pleural effusion or pneumothorax.   2.  Apparent interval development of a small right-sided pleural effusion with associated basilar atelectasis and/or consolidation.   3.  Left basilar atelectasis and/or consolidation.   4.  Mild enlargement of the cardiomediastinal silhouette.      EC-ECHOCARDIOGRAM LTD W/ CONT   Final Result      CT-HEAD W/O   Final Result      1.  No acute intracranial hemorrhage.   2.  Similar chronic findings.   3.  Mild mucosal thickening of the inferior right maxillary sinus.         DX-CHEST-PORTABLE (1 VIEW)   Final Result      Stable mild cardiac enlargement without edema      DX-CHEST-PORTABLE (1 VIEW)   Final Result         1.  Hazy interstitial left lung base infiltrate, stable since prior study.   2.  Atherosclerosis      DX-ABDOMEN FOR TUBE PLACEMENT   Final Result      Orogastric tube tip at the proximal stomach.      EC-ECHOCARDIOGRAM COMPLETE W/ CONT   Final Result       DX-ABDOMEN FOR TUBE PLACEMENT   Final Result      NG tube tip projects over the stomach, and side-port projects 3 cm above the GE junction. It can be advanced further by approximately 7 cm.      DX-ABDOMEN FOR TUBE PLACEMENT   Final Result      NG tube tip projects over the GE junction. It can be advanced 8-10 cm      DX-CHEST-PORTABLE (1 VIEW)   Final Result      1.  Right central catheter tip is in the mid SVC. No pneumothorax.   2.  NGT tip is at the GE junction. It can be advanced 8 cm.   3.  Stable other findings.      DX-CHEST-PORTABLE (1 VIEW)   Final Result      1.  Well-positioned lines and tubes. No pneumothorax.   2.  Ill-defined left basilar opacity, chronic atelectasis/scarring or sequela of recent pneumonia.      CT-CTA AORTA-RO WITH & W/O-POST PROCESS   Final Result      1.  Extensive atherosclerotic vascular calcification involving the aorta and originating arteries. There is ectasia of the ascending thoracic aorta measured at 3.4 x 3.4 cm in diameter.      2.  Extensive atherosclerotic plaque involving the right common, superficial femoral and profunda arteries. There is occlusion of the right superficial femoral artery at the level of the proximal thigh. Popliteal artery is also occluded and there is    extensive atherosclerotic plaque involving the posterior tibial, anterior tibial and peroneal arteries. No definite continuous runoff vessel identified.      3.  Extensive atherosclerotic plaque involving the left common, profunda and superficial femoral and popliteal arteries with multiple areas of high-grade stenosis of the superficial femoral and popliteal arteries. There is also extensive atherosclerotic    change of the posterior tibial, anterior tibial and peroneal arteries without a definite contiguous runoff vessel seen.      4.  High-grade stenosis involving the origin of the superior mesenteric artery.      5.  Occluded origin of the inferior mesenteric artery.      6.  Atherosclerotic  plaque involving the right renal artery origin and the proximal right renal artery resulting in 50% diameter narrowing.      7.  Less than 50% diameter narrowing of the left renal artery origin.      8.  Ill-defined groundglass infiltrates within the right upper lobe possibly representing infectious process.      9.  Multiple hepatic cysts.      3D angiographic/MIP images of the vasculature confirm the vascular findings as described above.      US-VEIN MAPPING LOWER EXTREMITY UNILAT RIGHT   Final Result      DX-FOOT-COMPLETE 3+ RIGHT   Final Result      Likely acute osteomyelitis at the inferior calcaneus. MRI can be obtained as indicated.      MR-FOOT-W/O RIGHT   Final Result      1.  Very limited study due to extensive patient motion artifact and patient comfort preventing multiple sequences.      2.  No gross bony destructive change.      3.  Soft tissue edema.      US-EXTREMITY ARTERY LOWER UNILAT RIGHT   Final Result      US-EXTREMITY VENOUS LOWER UNILAT RIGHT   Final Result      US-ABDOMEN COMPLETE SURVEY   Final Result         1.  Gallbladder sludge, otherwise unremarkable gallbladder   2.  Hepatic cysts   3.  Bilateral echogenic kidneys with mildly thinned cortex, appearance favors medical renal disease      CT-HEAD W/O   Final Result      1.  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   2.  Mild cerebral atrophy. Age-appropriate.   3.  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   4.  No acute findings are evident.         DX-CHEST-PORTABLE (1 VIEW)   Final Result      No evidence of acute cardiopulmonary process.      CL-LEFT HEART CATHETERIZATION WITH POSSIBLE INTERVENTION    (Results Pending)   CL-IMPLANTABLE CARDIOVERTER DEFIBRILLATOR    (Results Pending)        Assessment/Plan  Advance care planning  Assessment & Plan  Pall care following. He is  DNR    Hypernatremia  Assessment & Plan  Patient found to have hypernatremia and sodium is trending up.  I started him on gentle IV fluid hydration with D5W.  Use IV fluid with caution.    History of Bell's palsy  Assessment & Plan  With associated L facial droop, CT head neg for AICH 12/7  Supportive care    Thrombocytopenia (HCC)  Assessment & Plan  Mild, Monitor    Hypokalemia  Assessment & Plan  Continue to monitor and replace as needed.    Shock ([facility])  Assessment & Plan  Post cardiac arrest 12/5  - titrate pressor to maintain MAP>65  - monitor urine output and vital signs   ICD placed 12/13    Congestive heart failure (CHF) ([facility])- (present on admission)  Assessment & Plan  - per chart review, patient has history of congestive heart failure  - c/w home aspirin 81 everyday, coreg 12.5mg BID, atorvastatin 80mg qD, lisinopril 20, amlodipine 5  - Continue to hold HCTZ.    History of seizures- (present on admission)  Assessment & Plan  - per patient, last episode of seizure was in 2019  - c/w home keppra 500mg BID    Macrocytic anemia- (present on admission)  Assessment & Plan  - No known history of anemia, not on iron supplementation  - Most recent labs from 8/2022 showing baseline hemoglobin of 14.1 and MCV of 97.7  - pending anemia workup- iron panel, reticulocytes, vitamin B12, folate  - continue to monitor with daily CBC's  - Transfuse if hemoglobin <7.0. Type and screen ordered 11/30  I ordered repeat hemoglobin for this afternoon.    Acute encephalopathy- (present on admission)  Assessment & Plan  - Per family and chart review, patient is at baseline alert and oriented x4.  - Presented to emergency room on 11/28/2022 with confusion, noted to be alert and oriented x2 (self, place only)  - Altered mental status initially thought to be in setting of azotemia from acute kidney injury however continuing to have encephalopathy despite improving azotemia and improving kidney function  - Per nephrology, currently no  emergent need for dialysis. Nephrology signed off 11/29.  - Continue to monitor and trend labs. Work with PT/OT inpatient, they recommend SNF post discharge.  Significantly improved.    Increased anion gap metabolic acidosis  Assessment & Plan  Resolved  - Baseline anion gap is 7.0 per most recent labs from 8/2022  - Anion gap on admission noted to be 19.0, improved to 17.0 after IV LR at 150 cc/hr  - Increased anion gap metabolic acidosis likely secondary to uremia from kidney damage  - Continue hydration with IV fluids and encourage PO intake  - Continue to monitor with daily labs    Osteomyelitis of right lower extremity (HCC)- (present on admission)  Assessment & Plan  - Given recent right groin access for TAVR, MARGARITA ordered-   - Pain controlled with tylenol 650mg q6 prn, IV dilaudid 0.5mg q4 prn, oxycodone 5-10mg q3 prn  - can consider lidocaine patch, diclofenac gel as needed  - U/S showed: Occlusion of RIGHT femoral artery with distal reconstitution, Occlusion of RIGHT posterior tibial artery,  Two vessel runoff to the ankle with monophasic flow  -12/1: Spoke with Dr. Medel who is partners with Dr. Quinones (who did patient's previous surgery). She says patient will be having pain for a long time but since this is not an emergency they will see him outpatient and Dr. Quinones will perform a re-anastomosis and other procedures as warranted. This procedure cannot currently be done as Dr. Quinones is currently on vacation and Dr. Medel doesn't have [facility] privileges as well as the fact that this type of surgery cannot even be done at [facility].   - PT/OT recommending SNF  - MRI foot attempted 12/2 however was incomplete as patient was unable to tolerate. Foot xray confirmed osteomyelitis to R calcaneus.  - 12/2: Per LPS, wound care orders for nursing was placed and he was provided with betadine dressing and offloading boot per LPS team.  - 12/3: Consulted LPS and vascular surgery due to concern for osteomyelitis of R  heel- seen by Dr. Rdz who will take patient to OR in order to attempt revascularization to attempt to heal foot wound and avoid amputation. OR planned for 12/5 Monday.  - Consulted Dr. Turcios from infectious disease 12/3: believes osteomyelitis of heel will be difficult to cure and pt will need debridement, wound vac, etc however most likely poor outcome. Healing process also hindered due to impaired circulation evidence by MARGARITA. As there is no pus from wound, most likely ischemic ulcer. Linezolid discontinued 12/3, c/w just unasyn for now  - f/u blood cultures, wound cultures, NGTD  - CTA pending  - Vein mapping 12/4: Acute superficial thrombus in the greater saphenous vein near saphenofemoral junction and in the proxmal thigh. Vein wall thickening in the thigh; small caliber vein throughout the thigh & calf. Vein is unsuitable for use as a bypass graft.  Continue IV antibiotics  12/14 - he has developed necrosis to right foot, Dr. Rdz plans for amputation    Sepsis (HCC)- (present on admission)  Assessment & Plan  - This is Sepsis Present on admission  - SIRS criteria identified on my evaluation include: Leukocytosis, with WBC greater than 12,000  - Source is unknown, possibly abdominal etiology. US 11/29 showing US abdomen showing gallbladder sludge otherwise unremarkable, hepatic cysts, bilateral echogenic kidneys with mildly thinned cortex concerning for possible medical renal disease.  - Sepsis protocol initiated  - Fluid resuscitation ordered per protocol  - Crystalloid Fluid Administration: Fluid resuscitation ordered per standard protocol - 30 mL/kg per current or ideal body weight  - IV antibiotics as appropriate for source of sepsis- started on IV ceftriaxone, discontinued on 11/29  - Reassessment: I have reassessed the patient's hemodynamic status  - Blood cultures NGTD  - Urine cultures unremarkable  - Hemodynamically stable  - Uptrending WBC, WBC 14.4 today 11/29 from 13.9 on admission 11/28  -  continue to monitor with daily CBC's  - No clear source of sepsis- Discontinued IV ceftriaxone and started on IV unasyn low dose (due to ALL) TID (11/29- ) pending further workup.   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    History of transcatheter aortic valve replacement (TAVR)- (present on admission)  Assessment & Plan  - per chart review, patient has history of severe aortic stenosis and underwent TAVR with Dr. Wolfe on 8/2/2022.   - most recent echo showed well seated TAVR with peak gradient of 12 mmHg and mean gradient of 7.5 mmHg and trace perivalvular leak.     ALL (acute kidney injury) (HCC)- (present on admission)  Assessment & Plan  RESOLVED  - No noted history of chronic kidney disease, unclear creatinine baseline however recent kidney values appear normal- most recent labs from 8/3/2022 showing baseline Cr 1.0 and BUN 15.0.  - On admission, BUN/Cr ratio noted to be 122/4.67  - Patient is able to urinate, produced about 400cc dark urine in emergency room. No urinary symptoms such as dysuria, polyuria, retention.  - Urinalysis 11/28: +hyaline casts, +granular casts.  - Consulted nephrology- Per Dr. Hearn, currently unclear etiology of ALL however given hyaline casts on UA, volume depletion most likely. Obstructive uropathy was also considered given suprapubic tenderness on exam however renal/bladder US unremarkable for obstructions.   - Started on IV fluids at 125 cc/hr on 11/28, increased rate to 150 cc/hr on 11/29  - Most recent labs from 11/29 at 1500 showing improved renal function with BUN/Cr ratio of 85/1.63.  - Continue to monitor renal function with daily labs  - monitor urine output  - Kidney function improving as of 11/29 and patient nonoliguric, no need for dialysis. Nephrology signed off 11/29.  -  Continue gentle IVF and trend Crt/BUN  - Improving- Cr trending down. Cr is 0.90 on 12/1 from 1.05 on 11/30 and 4.67 on admission 11/28.  - ALL resolved, Cr 0.78 on 12/2. D/c'ed renal diet and placed on regular diet to encourage patient PO intake  Now resolved.    Hyperlipidemia- (present on admission)  Assessment & Plan  - no lipid panel on file  - Continue atorvastatin    Essential hypertension- (present on admission)  Assessment & Plan  - at home, on lisinopril, hydrochlorithiazide, amlodipine, carvedilol.  - continue with home coreg 12.5 BID  - restarted on amlodipine 12/1 due to hypertension, continuing to hold HCTZ  - restarted lisinopril 20 on 12/2 due to continued hypertension       VTE prophylaxis: SCDs/TEDs    I have performed a physical exam and reviewed and updated ROS and Plan today (12/14/2022). In review of yesterday's note (12/13/2022), there are no changes except as documented above.

## 2022-12-14 NOTE — THERAPY
Speech Language Pathology  Daily Treatment     Patient Name: Gilmer Mae  Age:  82 y.o., Sex:  male  Medical Record #: [id number]  Today's Date: 12/14/2022     Precautions  Precautions: (P) Swallow Precautions ( See Comments)  Comments: excruciating pain to RLE    HPI: 82 y.o. male presented 11/28/2022 with encephalopathy and RLE edema/pain. Cardiac arrest on 12/5  x2 with attempted fem-pop bypass procedure. FEES 12/8, recommended LQ3/MT2.     Assessment    Patient seen this date for dysphagia management. Patient NPO for foot amputation this evening; RN requested patient be seen with ice chips. Patient with xerostomia and RN hopeful patient to be cleared for ice chips. Patient alert and agreeable to session.    PO presentation of ice chips x6. Adequate oral bolus acceptance/containment, complete AP transfer, and timely mastication. Single cough; potential silent aspiration as indicated by FEES on 12/8. Wet vocal quality x2 which cleared with cued TC/RS. Double swallow completed per bolus in alignment with FEES.     Patient completed swallowing exercises targeting base of tongue retraction and laryngeal vestibule closure as indicated by FEES.  - Effortful swallow: 15/15 with good accuracy. Patient requires mod verbal cueing to implement with good accuracy, presumed by palpation. Encouraged patient to complete during meals for increased reps.   - Effortful pitch glide: 15/15 with fair-to-good pitch increase. Patient achieving high pitch; however, breathy vocal quality significantly increases in falsetto. Benefits from cues to implement pitch glide.     Recommendations  Liquidized/mildly thick liquid diet - ENSURE ADEQUATE THICKNESS  2.  Swallowing Instructions & Precautions:   Supervision: 1:1 feeding with constant supervision  Positioning: Fully upright and midline during oral intake  Medication: crush w/ applesauce  Strategies: Small bites/sips, Slow rate of intake, Multiple swallows (x 2-3) per bite/sip , Effortful  "swallow  Oral Care: after meals    Following for dysphagia management.     Plan    Continue current treatment plan.    Discharge Recommendations: (P) Recommend post-acute placement for additional speech therapy services prior to discharge home    Objective       12/14/22 1154   Charge Group   SLP Swallowing Dysfunction Treatment Swallowing Dysfunction Treatment   Total Treatment Time   SLP Time Spent Yes   SLP Swallowing Dysfunction Treatment (Mins) 22   Precautions   Precautions Swallow Precautions ( See Comments)   Vitals   O2 (LPM) 5   O2 Delivery Device Silicone Nasal Cannula   Pain 0 - 10 Group   Therapist Pain Assessment Post Activity Pain Same as Prior to Activity   Dysphagia    Dysphagia X   Diet / Liquid Recommendation Mildly Thick (2) - (Nectar Thick);Liquidised (3) - (Nectar Thick Full Liquid)   Nutritional Liquid Intake Rating Scale Thickened beverages (mildly thick unless otherwise specified)   Nutritional Food Intake Rating Scale Total oral diet of a single consistency   Nursing Communication Swallow Precaution Sign Posted at Head of Bed   Skilled Intervention Verbal Cueing;Compensatory Strategies   Comments swallowing exercises   Recommended Route of Medication Administration   Medication Administration    (Crush with applesauce)   Patient / Family Goals   Patient / Family Goal #1 \"that's good\" - water   Goal #1 Outcome Progressing slower than expected   Short Term Goals   Short Term Goal # 1 Patient will participate in pre-feeding trials with SLP only with no overt s/sx of aspiration   Goal Outcome # 1 Goal met, new goal added   Short Term Goal # 1 B  Patient will consume meals of liquidized solids and mildly thick liquids with no s/sx of aspiration given min cues for safe swallow precautions.   Goal Outcome  # 1 B Progressing as expected   Short Term Goal # 2 Patient will complete BoT resistance, laryngeal elevation exercises x15 reps per session given min cues.   Goal Outcome # 2  Progressing as " expected   Education Group   Education Provided Dysphagia   Dysphagia Patient Response Patient;Acceptance;Explanation;Verbal Demonstration;Action Demonstration;Reinforcement Needed   Anticipated Discharge Needs   Discharge Recommendations Recommend post-acute placement for additional speech therapy services prior to discharge home   Therapy Recommendations Upon DC Dysphagia Training   Interdisciplinary Plan of Care Collaboration   IDT Collaboration with  Nursing   Patient Position at End of Therapy In Bed;Bed Alarm On;Call Light within Reach;Tray Table within Reach   Collaboration Comments RN updated

## 2022-12-14 NOTE — PROGRESS NOTES
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   This is a severe and rapid change in his right foot compared to a week ago, and likely is due to thrombosis of the right lower extremity after the cardiac arrest last week  The right foot is unsalvageable.  Patient will need either right BKA or hospice  After discussing both patient would prefer to proceed with right BKA    Tentatively around 1630 this afternoon depending on OR schedule availability  NPO except for medications    Hemant Rdz MD  [facility]   [facility] preferred or call my office [phone]  __________________________________________________  Patient:Gilmer Mae   MRN:[id number]

## 2022-12-15 PROBLEM — S31.000A SACRAL WOUND, INITIAL ENCOUNTER: Status: ACTIVE | Noted: 2022-01-01

## 2022-12-15 PROBLEM — E43 SEVERE PROTEIN-CALORIE MALNUTRITION (HCC): Status: ACTIVE | Noted: 2022-01-01

## 2022-12-15 NOTE — ANESTHESIA PROCEDURE NOTES
Peripheral Block    Date/Time: 12/14/2022 5:30 PM  Performed by: Yakov Nava D.O.  Authorized by: Yakov Nava D.O.     Patient Location:  OR  Start Time:  12/14/2022 5:30 PM  End Time:  12/14/2022 5:39 PM  Reason for Block: at surgeon's request and post-op pain management ONLY    patient identified, IV checked, site marked, risks and benefits discussed, surgical consent, monitors and equipment checked, pre-op evaluation and timeout performed    Patient Position:  Supine  Prep: ChloraPrep    Monitoring:  Heart rate, continuous pulse ox and cardiac monitor  Block Region:  Lower Extremity  Lower Extremity - Block Type:  IPACK - Selective SCIATIC block at the posterior capsule of the knee and Saphenous nerve block - OTHER peripheral nerve or branch    Laterality:  Right  Procedures: ultrasound guided  Image captured, interpreted and electronically stored.  Local Infiltration:  Lidocaine  Strength:  1 %  Dose:  3 ml  Block Type:  Single-shot  Needle Length:  100mm  Needle Gauge:  21 G  Needle Localization:  Ultrasound guidance  Injection Assessment:  Negative aspiration for heme, no paresthesia on injection, incremental injection and local visualized surrounding nerve on ultrasound  Evidence of intravascular injection: No

## 2022-12-15 NOTE — PROGRESS NOTES
"Hospital Medicine Daily Progress Note    Date of Service  12/15/2022    Chief Complaint  Gilmer Mae is a 82 y.o. male admitted 11/28/2022 with leg pain    Hospital Course  81 y/o with PMH HTN, HLD, sick sinus syndrome (s/p pacemaker), seizures (most recent episode in 2019, on keppra), severe aortic stenosis (s/p TAVR 8/2/2022 at [facility]) and PAD (s/p vascular surgery on femoral artery in 2022) who presented to ED via EMS on 11/28/2022 for LOC, b/l LE edema and pain.  History limited due to patient’s altered mental status, A&Ox2 (self, place) so most history obtained from chart review and daughter Kendra. Patient lives with wife and son. Patient says he is having pain “all over” especially his right leg and says he got surgery on his right leg “2 weeks ago but they never finished.” On arrival to ED, patient was lethargic and confused. RLE especially R foot exquisitely tender, nonhealing ulcer with some erythema and eschar present on bottom of R heel. Ultrasound of leg showed diffuse atherosclerotic plaque throughout LE, absent flow from proximal to distal femoral artery, reconstitution monophasic flow via collaterals at distal femoral artery, absent flow in distal portion of tibial artery. Xray of R foot showed potentially periosteal changes at inferior aspect of calcaneus as well as significant vascular calcification; concern for underlying osteomyelitis secondary to ischemic ulcer. Empirically placed on IV linezolid and IV unasyn however discontinued linezolid as blood cx, wound cx, MRSA and staph aureus PCR negative as well as lack of purulence from wound. Seen by LPS and vascular surgery- scheduled for OR on 12/5 with Dr. Rdz to attempt RLE revascularization in order to avoid amputation.    Interval Problem Update  12/13 - Patient feels tired. His right foot continues to have pain. But he feels \"pretty good\" today  ICD placement today  Hypernatremic, start D5W and recheck level    12/14 - Remains " hypernatremic and worsening. Restarted D5W.  His oral intake has been very poor on the liquidized diet. Speech did reeval him today. I consulted Dr. Rdz and planning for amputation. I discussed with patient that nutrition will be important for wound healing. Pall care to see patient to complete AD before surgery today    12/15 - He had right BKA. Hypernatremia improved to 144. He still needs a lot of encouragement to eat. Dietician increasing to high kCal diet. His pain is doing better now s/p amputation.     I have discussed this patient's plan of care and discharge plan at IDT rounds today with Case Management, Nursing, Nursing leadership, and other members of the IDT team.    Consultants/Specialty  cardiology, critical care, infectious disease, and nephrology    Code Status  DNAR/DNI    Disposition  Patient is not medically cleared for discharge.   Anticipate discharge to to skilled nursing facility.  I have placed the appropriate orders for post-discharge needs.    Review of Systems  Review of Systems   Constitutional:  Positive for malaise/fatigue.   Cardiovascular:  Negative for chest pain.   Gastrointestinal:  Negative for abdominal pain and vomiting.   Musculoskeletal:  Positive for myalgias.   Neurological:  Positive for weakness.   All other systems reviewed and are negative.     Physical Exam  Temp:  [35.9 °C (96.7 °F)-36.7 °C (98.1 °F)] 36.5 °C (97.7 °F)  Pulse:  [64-81] 71  Resp:  [12-18] 18  BP: (111-177)/(48-91) 112/48  SpO2:  [90 %-99 %] 95 %    Physical Exam  Vitals and nursing note reviewed.   Constitutional:       Appearance: He is not toxic-appearing or diaphoretic.   HENT:      Head: Normocephalic.      Mouth/Throat:      Mouth: Mucous membranes are dry.   Eyes:      General:         Right eye: No discharge.         Left eye: No discharge.   Cardiovascular:      Rate and Rhythm: Normal rate and regular rhythm.   Pulmonary:      Effort: No respiratory distress.      Breath sounds: No  wheezing, rhonchi or rales.      Comments: Diminished throughout  Abdominal:      Palpations: Abdomen is soft.      Tenderness: There is no abdominal tenderness.   Musculoskeletal:         General: No swelling.      Cervical back: Neck supple.      Comments: Right BKA with provena over wound   Neurological:      Mental Status: He is alert and oriented to person, place, and time.       Fluids    Intake/Output Summary (Last 24 hours) at 12/15/2022 1529  Last data filed at 12/15/2022 0602  Gross per 24 hour   Intake 1338.14 ml   Output 450 ml   Net 888.14 ml       Laboratory  Recent Labs     12/15/22  0438   WBC 6.9   RBC 2.94*   HEMOGLOBIN 9.7*   HEMATOCRIT 31.2*   .1*   MCH 33.0   MCHC 31.1*   RDW 61.1*   PLATELETCT 206   MPV 10.7     Recent Labs     12/14/22  0309 12/14/22  0833 12/14/22  1512 12/15/22  0438   SODIUM 147* 149* 147* 144   POTASSIUM 4.0  --   --  4.3   CHLORIDE 115*  --   --  112   CO2 24  --   --  23   GLUCOSE 103*  --   --  184*   BUN 20  --   --  18   CREATININE 0.78  --   --  0.76   CALCIUM 7.8*  --   --  7.5*                   Imaging  DX-CHEST-PORTABLE (1 VIEW)   Final Result      1.  Status post left pacer ICD placement. No evidence of large left pleural effusion or pneumothorax.   2.  Apparent interval development of a small right-sided pleural effusion with associated basilar atelectasis and/or consolidation.   3.  Left basilar atelectasis and/or consolidation.   4.  Mild enlargement of the cardiomediastinal silhouette.      EC-ECHOCARDIOGRAM LTD W/ CONT   Final Result      CT-HEAD W/O   Final Result      1.  No acute intracranial hemorrhage.   2.  Similar chronic findings.   3.  Mild mucosal thickening of the inferior right maxillary sinus.         DX-CHEST-PORTABLE (1 VIEW)   Final Result      Stable mild cardiac enlargement without edema      DX-CHEST-PORTABLE (1 VIEW)   Final Result         1.  Hazy interstitial left lung base infiltrate, stable since prior study.   2.   Atherosclerosis      DX-ABDOMEN FOR TUBE PLACEMENT   Final Result      Orogastric tube tip at the proximal stomach.      EC-ECHOCARDIOGRAM COMPLETE W/ CONT   Final Result      DX-ABDOMEN FOR TUBE PLACEMENT   Final Result      NG tube tip projects over the stomach, and side-port projects 3 cm above the GE junction. It can be advanced further by approximately 7 cm.      DX-ABDOMEN FOR TUBE PLACEMENT   Final Result      NG tube tip projects over the GE junction. It can be advanced 8-10 cm      DX-CHEST-PORTABLE (1 VIEW)   Final Result      1.  Right central catheter tip is in the mid SVC. No pneumothorax.   2.  NGT tip is at the GE junction. It can be advanced 8 cm.   3.  Stable other findings.      DX-CHEST-PORTABLE (1 VIEW)   Final Result      1.  Well-positioned lines and tubes. No pneumothorax.   2.  Ill-defined left basilar opacity, chronic atelectasis/scarring or sequela of recent pneumonia.      CT-CTA AORTA-RO WITH & W/O-POST PROCESS   Final Result      1.  Extensive atherosclerotic vascular calcification involving the aorta and originating arteries. There is ectasia of the ascending thoracic aorta measured at 3.4 x 3.4 cm in diameter.      2.  Extensive atherosclerotic plaque involving the right common, superficial femoral and profunda arteries. There is occlusion of the right superficial femoral artery at the level of the proximal thigh. Popliteal artery is also occluded and there is    extensive atherosclerotic plaque involving the posterior tibial, anterior tibial and peroneal arteries. No definite continuous runoff vessel identified.      3.  Extensive atherosclerotic plaque involving the left common, profunda and superficial femoral and popliteal arteries with multiple areas of high-grade stenosis of the superficial femoral and popliteal arteries. There is also extensive atherosclerotic    change of the posterior tibial, anterior tibial and peroneal arteries without a definite contiguous runoff vessel  seen.      4.  High-grade stenosis involving the origin of the superior mesenteric artery.      5.  Occluded origin of the inferior mesenteric artery.      6.  Atherosclerotic plaque involving the right renal artery origin and the proximal right renal artery resulting in 50% diameter narrowing.      7.  Less than 50% diameter narrowing of the left renal artery origin.      8.  Ill-defined groundglass infiltrates within the right upper lobe possibly representing infectious process.      9.  Multiple hepatic cysts.      3D angiographic/MIP images of the vasculature confirm the vascular findings as described above.      US-VEIN MAPPING LOWER EXTREMITY UNILAT RIGHT   Final Result      DX-FOOT-COMPLETE 3+ RIGHT   Final Result      Likely acute osteomyelitis at the inferior calcaneus. MRI can be obtained as indicated.      MR-FOOT-W/O RIGHT   Final Result      1.  Very limited study due to extensive patient motion artifact and patient comfort preventing multiple sequences.      2.  No gross bony destructive change.      3.  Soft tissue edema.      US-EXTREMITY ARTERY LOWER UNILAT RIGHT   Final Result      US-EXTREMITY VENOUS LOWER UNILAT RIGHT   Final Result      US-ABDOMEN COMPLETE SURVEY   Final Result         1.  Gallbladder sludge, otherwise unremarkable gallbladder   2.  Hepatic cysts   3.  Bilateral echogenic kidneys with mildly thinned cortex, appearance favors medical renal disease      CT-HEAD W/O   Final Result      1.  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   2.  Mild cerebral atrophy. Age-appropriate.   3.  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   4.  No acute findings are evident.         DX-CHEST-PORTABLE (1 VIEW)   Final Result      No evidence of acute cardiopulmonary process.      CL-LEFT HEART CATHETERIZATION WITH POSSIBLE INTERVENTION     (Results Pending)   CL-IMPLANTABLE CARDIOVERTER DEFIBRILLATOR    (Results Pending)        Assessment/Plan  * Osteomyelitis of right lower extremity (HCC)- (present on admission)  Assessment & Plan  - Given recent right groin access for TAVR, MARGARITA ordered-   - Pain controlled with tylenol 650mg q6 prn, IV dilaudid 0.5mg q4 prn, oxycodone 5-10mg q3 prn  - can consider lidocaine patch, diclofenac gel as needed  - U/S showed: Occlusion of RIGHT femoral artery with distal reconstitution, Occlusion of RIGHT posterior tibial artery,  Two vessel runoff to the ankle with monophasic flow  -12/1: Spoke with Dr. Medel who is partners with Dr. Quinones (who did patient's previous surgery). She says patient will be having pain for a long time but since this is not an emergency they will see him outpatient and Dr. Quinones will perform a re-anastomosis and other procedures as warranted. This procedure cannot currently be done as Dr. Quinones is currently on vacation and [facility] as well as the fact that this type of surgery cannot [facility] be [facility] at [facility].   - PT/OT recommending SNF  - MRI foot attempted 12/2 however was incomplete as patient was unable to tolerate. Foot xray confirmed osteomyelitis to R calcaneus.  - 12/2: Per LPS, wound care orders for nursing was placed and he was provided with betadine dressing and offloading boot per LPS team.  - 12/3: Consulted LPS and vascular surgery due to concern for osteomyelitis of R heel- seen by Dr. Rdz who will take patient to OR in order to attempt revascularization to attempt to heal foot wound and avoid amputation. OR planned for 12/5 Monday.  - Consulted Dr. Turcios from infectious disease 12/3: believes osteomyelitis of heel will be difficult to cure and pt will need debridement, wound vac, etc however most likely poor outcome. Healing process also hindered due to impaired circulation evidence by MARGARITA. As there is no pus from wound, most likely  ischemic ulcer. Linezolid discontinued 12/3, c/w just unasyn for now  - f/u blood cultures, wound cultures, NGTD  - CTA pending  - Vein mapping 12/4: Acute superficial thrombus in the greater saphenous vein near saphenofemoral junction and in the proxmal thigh. Vein wall thickening in the thigh; small caliber vein throughout the thigh & calf. Vein is unsuitable for use as a bypass graft.  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    Sacral wound, initial encounter  Assessment & Plan  Poor nutrition  Wound care    Severe protein-calorie malnutrition (HCC)  Assessment & Plan  Poor oral intake  Dietician following  Increasing to high kCal diet    Advance care planning  Assessment & Plan  Pall care following. He is DNR    Hypernatremia  Assessment & Plan  Patient found to have hypernatremia, has poor oral intake  Continue to trend    History of Bell's palsy  Assessment & Plan  With associated L facial droop, CT head neg for AICH 12/7  Supportive care    Thrombocytopenia (HCC)  Assessment & Plan  Mild, Monitor    Hypokalemia  Assessment & Plan  Continue to monitor and replace as needed.    Shock (HCC)  Assessment & Plan  Post cardiac arrest 12/5  - titrate pressor to maintain MAP>65  - monitor urine output and vital signs   ICD placed 12/13    Acute respiratory failure (HCC)  Assessment & Plan  3L O2  Needs mobilization, IS    Congestive heart failure (CHF) (HCC)- (present on admission)  Assessment & Plan  - per chart review, patient has history of congestive heart failure  - c/w home aspirin 81 everyday, coreg 12.5mg BID, atorvastatin 80mg qD, lisinopril 20, amlodipine 5  - Continue to hold HCTZ.    History of seizures- (present on admission)  Assessment & Plan  - per patient, last episode of seizure was in 2019  - c/w home keppra 500mg BID    Macrocytic anemia- (present on admission)  Assessment & Plan  - No known history of anemia, not on iron  supplementation  - Most recent labs from 8/2022 showing baseline hemoglobin of 14.1 and MCV of 97.7  - pending anemia workup- iron panel, reticulocytes, vitamin B12, folate  - continue to monitor with daily CBC's  - Transfuse if hemoglobin <7.0. Type and screen ordered 11/30    Acute encephalopathy- (present on admission)  Assessment & Plan  - Per family and chart review, patient is at baseline alert and oriented x4.  - Presented to emergency room on 11/28/2022 with confusion, noted to be alert and oriented x2 (self, place only)  - Altered mental status initially thought to be in setting of azotemia from acute kidney injury however continuing to have encephalopathy despite improving azotemia and improving kidney function  - Per nephrology, currently no emergent need for dialysis. Nephrology signed off 11/29.  - Continue to monitor and trend labs. Work with PT/OT inpatient, they recommend SNF post discharge.  Significantly improved.    Increased anion gap metabolic acidosis  Assessment & Plan  Resolved  - Baseline anion gap is 7.0 per most recent labs from 8/2022  - Anion gap on admission noted to be 19.0, improved to 17.0 after IV LR at 150 cc/hr  - Increased anion gap metabolic acidosis likely secondary to uremia from kidney damage  - Continue hydration with IV fluids and encourage PO intake  - Continue to monitor with daily labs    Sepsis (HCC)- (present on admission)  Assessment & Plan  - This is Sepsis Present on admission  - SIRS criteria identified on my evaluation include: Leukocytosis, with WBC greater than 12,000  - Source is unknown, possibly abdominal etiology. US 11/29 showing US abdomen showing gallbladder sludge otherwise unremarkable, hepatic cysts, bilateral echogenic kidneys with mildly thinned cortex concerning for possible medical renal disease.  - Sepsis protocol initiated  - Fluid resuscitation ordered per protocol  - Crystalloid Fluid Administration: Fluid resuscitation ordered per standard  protocol - 30 mL/kg per current or ideal body weight  - IV antibiotics as appropriate for source of sepsis- started on IV ceftriaxone, discontinued on 11/29  - Reassessment: I have reassessed the patient's hemodynamic status  - Blood cultures NGTD  - Urine cultures unremarkable  - Hemodynamically stable  - Uptrending WBC, WBC 14.4 today 11/29 from 13.9 on admission 11/28  - continue to monitor with daily CBC's  - No clear source of sepsis- Discontinued IV ceftriaxone and started on IV unasyn low dose (due to ALL) TID (11/29- ) pending further workup.   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    History of transcatheter aortic valve replacement (TAVR)- (present on admission)  Assessment & Plan  - per chart review, patient has history of severe aortic stenosis and underwent TAVR with Dr. Wolfe on 8/2/2022.   - most recent echo showed well seated TAVR with peak gradient of 12 mmHg and mean gradient of 7.5 mmHg and trace perivalvular leak.     ALL (acute kidney injury) (HCC)- (present on admission)  Assessment & Plan  RESOLVED  - No noted history of chronic kidney disease, unclear creatinine baseline however recent kidney values appear normal- most recent labs from 8/3/2022 showing baseline Cr 1.0 and BUN 15.0.  - On admission, BUN/Cr ratio noted to be 122/4.67  - Patient is able to urinate, produced about 400cc dark urine in emergency room. No urinary symptoms such as dysuria, polyuria, retention.  - Urinalysis 11/28: +hyaline casts, +granular casts.  - Consulted nephrology- Per Dr. Hearn, currently unclear etiology of ALL however given hyaline casts on UA, volume depletion most likely. Obstructive uropathy was also considered given suprapubic tenderness on exam however renal/bladder US unremarkable for obstructions.   - Started  on IV fluids at 125 cc/hr on 11/28, increased rate to 150 cc/hr on 11/29  - Most recent labs from 11/29 at 1500 showing improved renal function with BUN/Cr ratio of 85/1.63.  - Continue to monitor renal function with daily labs  - monitor urine output  - Kidney function improving as of 11/29 and patient nonoliguric, no need for dialysis. Nephrology signed off 11/29.  - Continue gentle IVF and trend Crt/BUN  - Improving- Cr trending down. Cr is 0.90 on 12/1 from 1.05 on 11/30 and 4.67 on admission 11/28.  - ALL resolved, Cr 0.78 on 12/2. D/c'ed renal diet and placed on regular diet to encourage patient PO intake  Now resolved.    Hyperlipidemia- (present on admission)  Assessment & Plan  - no lipid panel on file  - Continue atorvastatin    Essential hypertension- (present on admission)  Assessment & Plan  - at home, on lisinopril, hydrochlorithiazide, amlodipine, carvedilol.  - continue with home coreg 12.5 BID  - restarted on amlodipine 12/1 due to hypertension, continuing to hold HCTZ  - restarted lisinopril 20 on 12/2 due to continued hypertension       VTE prophylaxis: lovenox    I have performed a physical exam and reviewed and updated ROS and Plan today (12/15/2022). In review of yesterday's note (12/14/2022), there are no changes except as documented above.

## 2022-12-15 NOTE — DISCHARGE PLANNING
Case Management Discharge Planning    Admission Date: 11/28/2022  GMLOS: 8.5  ALOS: 17    6-Clicks ADL Score: 12  6-Clicks Mobility Score: 7  PT and/or OT Eval ordered: Yes  Post-acute Referrals Ordered: Yes  Post-acute Choice Obtained: Yes  Has referral(s) been sent to post-acute provider:  Yes      Anticipated Discharge Dispo: Discharge Disposition: D/T to SNF with Medicare cert in anticipation of skilled care (03)    DME Needed: No    Action(s) Taken: Patient discussed during IDT rounds.  Patient underwent a foot amputation yesterday.  Surgery will need to clear the patient prior to transfer to SNF, possible DC tomorrow.    Escalations Completed: None    Medically Clear: No    Next Steps: Care coordination to follow up with [facility] SNF once the patient is clear for discharge.    Barriers to Discharge: Medical clearance    Is the patient up for discharge tomorrow: Potentially

## 2022-12-15 NOTE — ANESTHESIA PROCEDURE NOTES
Arterial Line  Performed by: Yakov Nava D.O.  Authorized by: Yakov Nava D.O.     Start Time:  12/14/2022 5:47 PM  End Time:  12/14/2022 5:50 PM  Localization: ultrasound guidance and surface landmarks  Image captured, interpreted and electronically stored.  Patient Location:  OR  Indication: continuous blood pressure monitoring        Catheter Size:  20 G  Seldinger Technique?: Yes    Laterality:  Left  Site:  Radial artery  Line Secured:  Antimicrobial disc, tape and transparent dressing  Events: patient tolerated procedure well with no complications

## 2022-12-15 NOTE — ANESTHESIA PROCEDURE NOTES
Peripheral IV    Date/Time: 12/14/2022 5:29 PM  Performed by: Yakov Nava D.O.  Authorized by: Yakov Nava D.O.     Size:  20 G  Laterality:  Left  Peripheral IV Location:  Upper arm  Site Prep:  Alcohol  Technique:  Modified Seldinger and ultrasound guided  Attempts:  2

## 2022-12-15 NOTE — PROGRESS NOTES
Pt off the floor to pre op with O2 at 1615. All patient belongings in room. CHG completed prior to transport. Family updated.

## 2022-12-15 NOTE — THERAPY
Speech Language Pathology  Daily Treatment     Patient Name: Gilmer Mae  Age:  82 y.o., Sex:  male  Medical Record #: [id number]  Today's Date: 12/15/2022     Precautions  Precautions: (P) Fall Risk, Swallow Precautions ( See Comments)  Comments: excruciating pain to RLE    HPI: 82 y.o. male presented 11/28/2022 with encephalopathy and RLE edema/pain. Cardiac arrest on 12/5  x2 with attempted fem-pop bypass procedure. FEES 12/8, recommended LQ3/MT2.     Assessment    Patient seen this date for dysphagia management. Breakfast tray at bedside, recommended modifiers not on order set - likely error following NPO status yesterday. Patient sleeping but easily wakened upon arrival. Patient agreeable to session, HOB raised.     PO presentations of MT2 and LQ3 from breakfast tray. Adequate oral bolus acceptance/containment, complete and timely AP transfer. Single cough with MT2 not replicated across entire MT2 orange juice and boost. 2-3 swallows completed per bolus in alignment with FEES.     Patient completed swallowing exercises targeting base of tongue retraction and laryngeal vestibule closure as indicated by FEES.  - Effortful swallow: 15/19 with good accuracy. Patient requires min verbal cueing to implement with good accuracy, presumed by palpation. Encouraged patient to complete during meals for increased reps.   - Effortful pitch glide: 15/16 with fair-to-good pitch increase. Patient achieving high pitch; however, breathy vocal quality significantly increases in falsetto. Benefits from cues to implement pitch glide. Patient also benefits from repeated explanation of rationale for exercise.     Boost Glucose Control is able to be thickened, but has special thickening instructions: To achieve mildly thick, add one mildly thick packet to 8 oz of Boost Glucose Control.     Recommendations  Liquidized/mildly thick liquid diet - ENSURE ADEQUATE THICKNESS  2.  Swallowing Instructions & Precautions:   Supervision: 1:1  "feeding with constant supervision  Positioning: Fully upright and midline during oral intake  Medication: crush w/ applesauce  Strategies: Small bites/sips, Slow rate of intake, Multiple swallows (x 2-3) per bite/sip , Effortful swallow  Oral Care: after meals     Following for dysphagia management.     Plan    Continue current treatment plan.    Discharge Recommendations: (P) Recommend post-acute placement for additional speech therapy services prior to discharge home     Objective       12/15/22 1014   Charge Group   SLP Swallowing Dysfunction Treatment Swallowing Dysfunction Treatment   Total Treatment Time   SLP Time Spent Yes   SLP Swallowing Dysfunction Treatment (Mins) 23   Precautions   Precautions Fall Risk;Swallow Precautions ( See Comments)   Vitals   O2 (LPM) 3   O2 Delivery Device Nasal Cannula   Pain 0 - 10 Group   Therapist Pain Assessment Post Activity Pain Same as Prior to Activity;0   Dysphagia    Dysphagia X   Diet / Liquid Recommendation Mildly Thick (2) - (Nectar Thick);Liquidised (3) - (Nectar Thick Full Liquid)   Nutritional Liquid Intake Rating Scale Thickened beverages (mildly thick unless otherwise specified)   Nutritional Food Intake Rating Scale Total oral diet of a single consistency   Nursing Communication Swallow Precaution Sign Posted at Head of Bed   Skilled Intervention Verbal Cueing;Compensatory Strategies   Comments Swallowing exercises   Recommended Route of Medication Administration   Medication Administration    (Crush with applesauce)   Patient / Family Goals   Patient / Family Goal #1 \"that's good\" - water   Goal #1 Outcome Progressing slower than expected   Short Term Goals   Short Term Goal # 1 Patient will participate in pre-feeding trials with SLP only with no overt s/sx of aspiration   Goal Outcome # 1 Goal met, new goal added   Short Term Goal # 1 B  Patient will consume meals of liquidized solids and mildly thick liquids with no s/sx of aspiration given min cues for " safe swallow precautions.   Goal Outcome  # 1 B Progressing as expected   Short Term Goal # 2 Patient will complete BoT resistance, laryngeal elevation exercises x15 reps per session given min cues.   Goal Outcome # 2  Progressing as expected   Education Group   Education Provided Dysphagia   Dysphagia Patient Response Patient;Family;Acceptance;Explanation;Verbal Demonstration;Action Demonstration;Reinforcement Needed   Additional Comments Reinforcement needed on behalf of patient   Anticipated Discharge Needs   Discharge Recommendations Recommend post-acute placement for additional speech therapy services prior to discharge home   Therapy Recommendations Upon DC Dysphagia Training   Interdisciplinary Plan of Care Collaboration   IDT Collaboration with  Nursing;Family / Caregiver   Patient Position at End of Therapy In Bed;Bed Alarm On;Call Light within Reach;Family / Friend in Room   Collaboration Comments RN updated

## 2022-12-15 NOTE — CARE PLAN
The patient is Watcher - Medium risk of patient condition declining or worsening    Shift Goals  Clinical Goals: monitor vascular function  Patient Goals: pain control  Family Goals: discharge to [facility] with wife    Progress made toward(s) clinical / shift goals:    Problem: Knowledge Deficit - Standard  Goal: Patient and family/care givers will demonstrate understanding of plan of care, disease process/condition, diagnostic tests and medications  Outcome: Progressing     Problem: Skin Integrity  Goal: Skin integrity is maintained or improved  Outcome: Progressing       Patient is not progressing towards the following goals:      Problem: Hemodynamics  Goal: Patient's hemodynamics, fluid balance and neurologic status will be stable or improve  Outcome: Not Met     Problem: Fluid Volume  Goal: Fluid volume balance will be maintained  Outcome: Not Met

## 2022-12-15 NOTE — PROGRESS NOTES
"  VASCULAR SURGERY SERVICE  Progress Note  ___________________________________    12/15/2022:  CHRIS, pain controlled,       Vitals  /89   Pulse 72   Temp 36.6 °C (97.9 °F) (Temporal)   Resp 18   Ht 1.727 m (5' 7.99\")   Wt 76.4 kg (168 lb 6.9 oz)   SpO2 96%   BMI 25.62 kg/m²     Exam  General: alert, conversant, NAD  Chest: good air entry  Heart: RRR  Abdomen: benign  Extremities: right BKA stump CDI with prevena, no output, no hematoma    Labs  WBC normal  Hgb 9.7  Creatinine normal    A/P)  12/14/22: Right BKA    Doing well  Continue Prevena for 5 days  [employer] consulted for custom stump protector    Appreciate Hospitalist services support  Following along      [facility]  [facility] preferred or call my office [phone]  __________________________________________________________________  Patient:Gilmer Mae   MRN:[id number]   CSN:[id number]      "

## 2022-12-15 NOTE — ANESTHESIA PROCEDURE NOTES
Airway    Date/Time: 12/14/2022 5:44 PM  Performed by: Yakov Nava D.O.  Authorized by: Yakov Nava D.O.     Location:  OR  Urgency:  Elective  Difficult Airway: No    Indications for Airway Management:  Anesthesia      Spontaneous Ventilation: absent    Sedation Level:  Deep  Preoxygenated: Yes    Patient Position:  Sniffing  Mask Difficulty Assessment:  1 - vent by mask  Final Airway Type:  Endotracheal airway  Final Endotracheal Airway:  ETT  Cuffed: Yes    Technique Used for Successful ETT Placement:  Direct laryngoscopy    Insertion Site:  Oral  Blade Type:  Laurie  Laryngoscope Blade/Videolaryngoscope Blade Size:  3  ETT Size (mm):  7.5  Measured from:  Lips  ETT to Lips (cm):  23  Placement Verified by: auscultation and capnometry    Cormack-Lehane Classification:  Grade I - full view of glottis  Number of Attempts at Approach:  1

## 2022-12-15 NOTE — CARE PLAN
The patient is Stable - Low risk of patient condition declining or worsening    Shift Goals  Clinical Goals: Remain HD stable postoperatively overnight.  Patient Goals: pain control  Family Goals: discharge to [facility] with wife    Progress made toward(s) clinical / shift goals:  VSS postoperatively.    Problem: Knowledge Deficit - Standard  Goal: Patient and family/care givers will demonstrate understanding of plan of care, disease process/condition, diagnostic tests and medications  Outcome: Progressing     Problem: Hemodynamics  Goal: Patient's hemodynamics, fluid balance and neurologic status will be stable or improve  Outcome: Progressing     Problem: Fluid Volume  Goal: Fluid volume balance will be maintained  Outcome: Progressing     Problem: Urinary - Renal Perfusion  Goal: Ability to achieve and maintain adequate renal perfusion and functioning will improve  Outcome: Progressing     Problem: Respiratory  Goal: Patient will achieve/maintain optimum respiratory ventilation and gas exchange  Outcome: Progressing     Problem: Physical Regulation  Goal: Diagnostic test results will improve  Outcome: Progressing  Goal: Signs and symptoms of infection will decrease  Outcome: Progressing     Problem: Pain - Standard  Goal: Alleviation of pain or a reduction in pain to the patient’s comfort goal  Outcome: Progressing     Problem: Skin Integrity  Goal: Skin integrity is maintained or improved  Outcome: Progressing     Problem: Fall Risk  Goal: Patient will remain free from falls  Outcome: Progressing     Problem: Psychosocial  Goal: Patient's level of anxiety will decrease  Outcome: Progressing  Goal: Patient's ability to verbalize feelings about condition will improve  Outcome: Progressing  Goal: Patient's ability to re-evaluate and adapt role responsibilities will improve  Outcome: Progressing  Goal: Patient and family will demonstrate ability to cope with life altering diagnosis and/or procedure  Outcome:  Progressing  Goal: Spiritual and cultural needs incorporated into hospitalization  Outcome: Progressing     Problem: Communication  Goal: The ability to communicate needs accurately and effectively will improve  Outcome: Progressing     Problem: Discharge Barriers/Planning  Goal: Patient's continuum of care needs are met  Outcome: Progressing     Problem: Chest Tube Management  Goal: Complications related to chest tube will be avoided or minimized  Outcome: Progressing     Problem: Mechanical Ventilation  Goal: Safe management of artificial airway and ventilation  Outcome: Progressing  Goal: Successful weaning off mechanical ventilator, spontaneously maintains adequate gas exchange  Outcome: Progressing  Goal: Patient will be able to express needs and understand communication  Outcome: Progressing     Problem: Dysphagia  Goal: Dysphagia will improve  Outcome: Progressing     Problem: Risk for Aspiration  Goal: Patient's risk for aspiration will be absent or decrease  Outcome: Progressing     Problem: Nutrition  Goal: Patient's nutritional and fluid intake will be adequate or improve  Outcome: Progressing  Goal: Enteral nutrition will be maintained or improve  Outcome: Progressing  Goal: Enteral nutrition will be maintained or improve  Outcome: Progressing     Problem: Urinary Elimination  Goal: Establish and maintain regular urinary output  Outcome: Progressing     Problem: Bowel Elimination  Goal: Establish and maintain regular bowel function  Outcome: Progressing     Problem: Gastrointestinal Irritability  Goal: Nausea and vomiting will be absent or improve  Outcome: Progressing  Goal: Diarrhea will be absent or improved  Outcome: Progressing     Problem: Mobility  Goal: Patient's capacity to carry out activities will improve  Outcome: Progressing     Problem: Self Care  Goal: Patient will have the ability to perform ADLs independently or with assistance (bathe, groom, dress, toilet and feed)  Outcome:  Progressing     Problem: Infection - Standard  Goal: Patient will remain free from infection  Outcome: Progressing     Problem: Wound/ / Incision Healing  Goal: Patient's wound/surgical incision will decrease in size and heals properly  Outcome: Progressing       Patient is not progressing towards the following goals:

## 2022-12-15 NOTE — OP REPORT
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    Postoperative Diagnosis:  Same    Procedure:  46696 right below knee amputation  _____________________________________________________    Surgeon:                                 Hemant Rdz MD    Assistant:   None    Anesthesia:                            General endotracheal anesthesia    IVF:                                         Per anesthesia report    EBL:                                        125 cc    Specimen:   right foot to pathology    Complications:                        none     Disposition:                             Extubated and to recovery in stable condition    _____________________________________________________    History:  Gilmer Mae is a 82 y.o. 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  I explained to the patient that the foot could not be salvaged and that amputation was recommended.  I explained the details of the operation, alternatives, and potential risks, including but not limited to bleeding, infection, risk of non-healing, and risks of anesthesia.  All questions were answered. They understand and agree to proceed.    Procedure Summary:  The patient was brought to the operating suite, placed supine on the OR table, and GETA was administered by the anesthesia service.  The patient was prepped and draped in the usual sterile fashion.  A tourniquet was applied to the thigh. Timeout was called to identify the correct patient procedure and equipment.  Everyone was in agreement.    The tourniquet was inflated.  The skin was incised sharply and then a combination of sharp dissection and  ASAP without delay    Thanks electrocautery was used to dissect the soft tissues circumferentially around the tibia and fibula.  Larger vessels were suture ligated as needed.  The tibia was transected with the oscillating saw at a length of approximately 12 cm, and the fibula was transected slightly shorter.  The anterior edge of the tibia was beveled. The posterior flap was created with the amputation knife, freeing the specimen and allowing it to be passed off the field.  The tourniquet was released.  The flap was inspected for hemostasis, copiously irrigated, excess muscle was debrided as needed, and the flap was tailored to the appropriate length to cover the stump. The soft tissue and fascia were approximated with multiple layers of absorbable sutures.  The skin was reapproximated with staples and protected with a prevena bandage.    All sponge, instrument, and needle counts were correct at the conclusion of the case.  Patient was extubated and sent to PACU in stable condition.    Hemant Rdz MD  [facility]

## 2022-12-15 NOTE — OR NURSING
Pt arrived to room via bed with transport. VSS, and preop checklist incomplete, pt very sleepy and not aware of most of the answers. IV is present and patent. Awaiting surgeon and anesthesiologist to see pt at bedside. Care will continue in preop until pt is taken to the OR.

## 2022-12-15 NOTE — ANESTHESIA POSTPROCEDURE EVALUATION
Patient: Gilmer Mae    Procedure Summary     Date: 12/14/22 Room / Location: [place] [address] / SURGERY [facility]    Anesthesia Start: 1709 Anesthesia Stop: 1907    Procedure: AMPUTATION, BELOW KNEE (Right: Foot) Diagnosis: (Right Foot Gangrene)    Surgeons: Hemant Rdz M.D. Responsible Provider: Yakov Nava D.O.    Anesthesia Type: general ASA Status: 4          Final Anesthesia Type: general  Last vitals  BP   Blood Pressure : 136/87    Temp   35.9 °C (96.7 °F)    Pulse   76   Resp   14    SpO2   98 %      Anesthesia Post Evaluation    Patient location during evaluation: PACU  Patient participation: complete - patient participated  Level of consciousness: awake and alert  Pain score: 4    Airway patency: patent  Anesthetic complications: no  Cardiovascular status: hemodynamically stable  Respiratory status: acceptable  Hydration status: euvolemic    PONV: none          No notable events documented.     Nurse Pain Score: 7 (NPRS)

## 2022-12-15 NOTE — PROGRESS NOTES
Monitor summary: Paced/Sinus Rhythm @ 65 RI 0.15 QRS 0.08 QT 0.39 with rare PVCs per strip from monitor room.

## 2022-12-15 NOTE — OR NURSING
1904 Pt arrived to PACU with Anesthesiologist and OR RN. OPA in place. Even, unlabored respirations. VSS. RLE dressing CDI with prevena. RLE elevated on pillow.    1930 OPA d/c'd. AAOx4, sleepy.  Denies pain. Denies nausea. RLE dressing CDI.    1942 POC update given to Krystina, wife, over the phone. All questions answered.     2005 Pt meets floor criteria. Report called to LISA Fraga on tele.     2022 Pt transported to [facility] with RN. Chart and full oxygen tank with patient.

## 2022-12-15 NOTE — ANESTHESIA TIME REPORT
Anesthesia Start and Stop Event Times     Date Time Event    12/14/2022 1546 Ready for Procedure     1709 Anesthesia Start     1907 Anesthesia Stop        Responsible Staff  12/14/22    Name Role Begin End    Yakov Nava D.O. Anesth 1709 1907        Overtime Reason:  no overtime (within assigned shift)    Comments:

## 2022-12-16 NOTE — DISCHARGE PLANNING
Case Management Discharge Planning    Admission Date: 11/28/2022  GMLOS: 8.5  ALOS: 18    6-Clicks ADL Score: 12  6-Clicks Mobility Score: 6  PT and/or OT Eval ordered: Yes  Post-acute Referrals Ordered: Yes  Post-acute Choice Obtained: Yes  Has referral(s) been sent to post-acute provider:  Yes      Anticipated Discharge Dispo: Discharge Disposition: D/T to SNF with Medicare cert in anticipation of skilled care (03)    DME Needed: No    Action(s) Taken: Patient discussed during IDT rounds.  Patient is still pending surgical clearance.  Patient is also experiencing an increase in pain and is requiring pain medication escalation.  Patient likely won't be clear for discharge until Monday.    Escalations Completed: None    Medically Clear: No    Next Steps: Care coordination to follow up with [facility] once the patient is clear for discharge.    Barriers to Discharge: Medical clearance    Is the patient up for discharge tomorrow: No

## 2022-12-16 NOTE — PROGRESS NOTES
"Hospital Medicine Daily Progress Note    Date of Service  12/16/2022    Chief Complaint  Gilmer Mae is a 82 y.o. male admitted 11/28/2022 with leg pain    Hospital Course  81 y/o with PMH HTN, HLD, sick sinus syndrome (s/p pacemaker), seizures (most recent episode in 2019, on keppra), severe aortic stenosis (s/p TAVR 8/2/2022 at [facility]) and PAD (s/p vascular surgery on femoral artery in 2022) who presented to ED via EMS on 11/28/2022 for LOC, b/l LE edema and pain.  History limited due to patient’s altered mental status, A&Ox2 (self, place) so most history obtained from chart review and daughter Kendra. Patient lives with wife and son. Patient says he is having pain “all over” especially his right leg and says he got surgery on his right leg “2 weeks ago but they never finished.” On arrival to ED, patient was lethargic and confused. RLE especially R foot exquisitely tender, nonhealing ulcer with some erythema and eschar present on bottom of R heel. Ultrasound of leg showed diffuse atherosclerotic plaque throughout LE, absent flow from proximal to distal femoral artery, reconstitution monophasic flow via collaterals at distal femoral artery, absent flow in distal portion of tibial artery. Xray of R foot showed potentially periosteal changes at inferior aspect of calcaneus as well as significant vascular calcification; concern for underlying osteomyelitis secondary to ischemic ulcer. Empirically placed on IV linezolid and IV unasyn however discontinued linezolid as blood cx, wound cx, MRSA and staph aureus PCR negative as well as lack of purulence from wound. Seen by LPS and vascular surgery- scheduled for OR on 12/5 with Dr. Rdz to attempt RLE revascularization in order to avoid amputation.    Interval Problem Update  12/13 - Patient feels tired. His right foot continues to have pain. But he feels \"pretty good\" today  ICD placement today  Hypernatremic, start D5W and recheck level    12/14 - Remains " hypernatremic and worsening. Restarted D5W.  His oral intake has been very poor on the liquidized diet. Speech did reeval him today. I consulted Dr. Rdz and planning for amputation. I discussed with patient that nutrition will be important for wound healing. Pall care to see patient to complete AD before surgery today    12/15 - He had right BKA. Hypernatremia improved to 144. He still needs a lot of encouragement to eat. Dietician increasing to high kCal diet. His pain is doing better now s/p amputation.     12/16 - Mildly hypotensive to 94/54 overnight, given 500cc bolus and BP improved. He says his pain is unbearable today. He does have some phantom pain. I will increased gabapentin. I discussed artifical nutrition, he was quick to decline, does not want feeding tube    I have discussed this patient's plan of care and discharge plan at IDT rounds today with Case Management, Nursing, Nursing leadership, and other members of the IDT team.    Consultants/Specialty  cardiology, critical care, infectious disease, and nephrology    Code Status  DNAR/DNI    Disposition  Patient is not medically cleared for discharge.   Anticipate discharge to to skilled nursing facility.  I have placed the appropriate orders for post-discharge needs.    Review of Systems  Review of Systems   Constitutional:  Positive for malaise/fatigue.   Cardiovascular:  Negative for chest pain.   Gastrointestinal:  Negative for abdominal pain and vomiting.   Musculoskeletal:  Positive for myalgias.   Neurological:  Positive for tingling and weakness.   All other systems reviewed and are negative.     Physical Exam  Temp:  [36.5 °C (97.7 °F)-37 °C (98.6 °F)] 36.8 °C (98.2 °F)  Pulse:  [61-85] 85  Resp:  [14-18] 17  BP: ()/(41-82) 123/82  SpO2:  [93 %-98 %] 97 %    Physical Exam  Vitals and nursing note reviewed.   Constitutional:       Appearance: He is ill-appearing. He is not diaphoretic.   HENT:      Head: Normocephalic.       Mouth/Throat:      Mouth: Mucous membranes are dry.   Eyes:      General:         Right eye: No discharge.         Left eye: No discharge.   Cardiovascular:      Rate and Rhythm: Normal rate and regular rhythm.   Pulmonary:      Effort: No respiratory distress.      Breath sounds: No wheezing, rhonchi or rales.      Comments: Diminished throughout  Abdominal:      Palpations: Abdomen is soft.      Tenderness: There is no abdominal tenderness.   Musculoskeletal:         General: No swelling.      Cervical back: Neck supple.      Comments: Right BKA with provena over wound   Neurological:      Mental Status: He is oriented to person, place, and time.       Fluids    Intake/Output Summary (Last 24 hours) at 12/16/2022 1341  Last data filed at 12/15/2022 2145  Gross per 24 hour   Intake 500 ml   Output --   Net 500 ml       Laboratory  Recent Labs     12/15/22  0438 12/15/22  2045 12/16/22  0250   WBC 6.9  --  6.9   RBC 2.94*  --  2.67*   HEMOGLOBIN 9.7* 8.9* 8.8*   HEMATOCRIT 31.2* 28.7* 28.3*   .1*  --  106.0*   MCH 33.0  --  33.0   MCHC 31.1*  --  31.1*   RDW 61.1*  --  60.6*   PLATELETCT 206  --  189   MPV 10.7  --  10.7     Recent Labs     12/14/22  0309 12/14/22  0833 12/14/22  1512 12/15/22  0438 12/16/22  0250   SODIUM 147*   < > 147* 144 143   POTASSIUM 4.0  --   --  4.3 3.8   CHLORIDE 115*  --   --  112 113*   CO2 24  --   --  23 25   GLUCOSE 103*  --   --  184* 104*   BUN 20  --   --  18 31*   CREATININE 0.78  --   --  0.76 1.17   CALCIUM 7.8*  --   --  7.5* 7.5*    < > = values in this interval not displayed.                   Imaging  DX-CHEST-PORTABLE (1 VIEW)   Final Result      1.  Status post left pacer ICD placement. No evidence of large left pleural effusion or pneumothorax.   2.  Apparent interval development of a small right-sided pleural effusion with associated basilar atelectasis and/or consolidation.   3.  Left basilar atelectasis and/or consolidation.   4.  Mild enlargement of the  cardiomediastinal silhouette.      EC-ECHOCARDIOGRAM LTD W/ CONT   Final Result      CT-HEAD W/O   Final Result      1.  No acute intracranial hemorrhage.   2.  Similar chronic findings.   3.  Mild mucosal thickening of the inferior right maxillary sinus.         DX-CHEST-PORTABLE (1 VIEW)   Final Result      Stable mild cardiac enlargement without edema      DX-CHEST-PORTABLE (1 VIEW)   Final Result         1.  Hazy interstitial left lung base infiltrate, stable since prior study.   2.  Atherosclerosis      DX-ABDOMEN FOR TUBE PLACEMENT   Final Result      Orogastric tube tip at the proximal stomach.      EC-ECHOCARDIOGRAM COMPLETE W/ CONT   Final Result      DX-ABDOMEN FOR TUBE PLACEMENT   Final Result      NG tube tip projects over the stomach, and side-port projects 3 cm above the GE junction. It can be advanced further by approximately 7 cm.      DX-ABDOMEN FOR TUBE PLACEMENT   Final Result      NG tube tip projects over the GE junction. It can be advanced 8-10 cm      DX-CHEST-PORTABLE (1 VIEW)   Final Result      1.  Right central catheter tip is in the mid SVC. No pneumothorax.   2.  NGT tip is at the GE junction. It can be advanced 8 cm.   3.  Stable other findings.      DX-CHEST-PORTABLE (1 VIEW)   Final Result      1.  Well-positioned lines and tubes. No pneumothorax.   2.  Ill-defined left basilar opacity, chronic atelectasis/scarring or sequela of recent pneumonia.      CT-CTA AORTA-RO WITH & W/O-POST PROCESS   Final Result      1.  Extensive atherosclerotic vascular calcification involving the aorta and originating arteries. There is ectasia of the ascending thoracic aorta measured at 3.4 x 3.4 cm in diameter.      2.  Extensive atherosclerotic plaque involving the right common, superficial femoral and profunda arteries. There is occlusion of the right superficial femoral artery at the level of the proximal thigh. Popliteal artery is also occluded and there is    extensive atherosclerotic plaque  involving the posterior tibial, anterior tibial and peroneal arteries. No definite continuous runoff vessel identified.      3.  Extensive atherosclerotic plaque involving the left common, profunda and superficial femoral and popliteal arteries with multiple areas of high-grade stenosis of the superficial femoral and popliteal arteries. There is also extensive atherosclerotic    change of the posterior tibial, anterior tibial and peroneal arteries without a definite contiguous runoff vessel seen.      4.  High-grade stenosis involving the origin of the superior mesenteric artery.      5.  Occluded origin of the inferior mesenteric artery.      6.  Atherosclerotic plaque involving the right renal artery origin and the proximal right renal artery resulting in 50% diameter narrowing.      7.  Less than 50% diameter narrowing of the left renal artery origin.      8.  Ill-defined groundglass infiltrates within the right upper lobe possibly representing infectious process.      9.  Multiple hepatic cysts.      3D angiographic/MIP images of the vasculature confirm the vascular findings as described above.      US-VEIN MAPPING LOWER EXTREMITY UNILAT RIGHT   Final Result      DX-FOOT-COMPLETE 3+ RIGHT   Final Result      Likely acute osteomyelitis at the inferior calcaneus. MRI can be obtained as indicated.      MR-FOOT-W/O RIGHT   Final Result      1.  Very limited study due to extensive patient motion artifact and patient comfort preventing multiple sequences.      2.  No gross bony destructive change.      3.  Soft tissue edema.      US-EXTREMITY ARTERY LOWER UNILAT RIGHT   Final Result      US-EXTREMITY VENOUS LOWER UNILAT RIGHT   Final Result      US-ABDOMEN COMPLETE SURVEY   Final Result         1.  Gallbladder sludge, otherwise unremarkable gallbladder   2.  Hepatic cysts   3.  Bilateral echogenic kidneys with mildly thinned cortex, appearance favors medical renal disease      CT-HEAD W/O   Final Result      1.  Old  lacunar size infarct centered in the deep white matter along the right corona radiata concordant with MRI findings.   2.  Mild cerebral atrophy. Age-appropriate.   3.  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   4.  No acute findings are evident.         DX-CHEST-PORTABLE (1 VIEW)   Final Result      No evidence of acute cardiopulmonary process.      CL-LEFT HEART CATHETERIZATION WITH POSSIBLE INTERVENTION    (Results Pending)   CL-IMPLANTABLE CARDIOVERTER DEFIBRILLATOR    (Results Pending)        Assessment/Plan  * Osteomyelitis of right lower extremity (HCC)- (present on admission)  Assessment & Plan  - Given recent right groin access for TAVR, MARGARITA ordered-   - Pain controlled with tylenol 650mg q6 prn, IV dilaudid 0.5mg q4 prn, oxycodone 5-10mg q3 prn  - can consider lidocaine patch, diclofenac gel as needed  - U/S showed: Occlusion of RIGHT femoral artery with distal reconstitution, Occlusion of RIGHT posterior tibial artery,  Two vessel runoff to the ankle with monophasic flow  -12/1: Spoke with Dr. Medel who is partners with Dr. Quinones (who did patient's previous surgery). She says patient will be having pain for a long time but since this is not an emergency they will see him outpatient and Dr. Quinones will perform a re-anastomosis and other procedures as warranted. This procedure cannot currently be done as Dr. Quinones is currently on vacation and Dr. Medel doesn't have [facility] privileges as well as the fact that this type of surgery cannot even be done at [facility].   - PT/OT recommending SNF  - MRI foot attempted 12/2 however was incomplete as patient was unable to tolerate. Foot xray confirmed osteomyelitis to R calcaneus.  - 12/2: Per LPS, wound care orders for nursing was placed and he was provided with betadine dressing and offloading boot per LPS team.  - 12/3: Consulted LPS and vascular surgery due to concern for osteomyelitis  of R heel- seen by Dr. Rdz who will take patient to OR in order to attempt revascularization to attempt to heal foot wound and avoid amputation. OR planned for 12/5 Monday.  - Consulted Dr. Turcios from infectious disease 12/3: believes osteomyelitis of heel will be difficult to cure and pt will need debridement, wound vac, etc however most likely poor outcome. Healing process also hindered due to impaired circulation evidence by MARGARITA. As there is no pus from wound, most likely ischemic ulcer. Linezolid discontinued 12/3, c/w just unasyn for now  - f/u blood cultures, wound cultures, NGTD  - CTA pending  - Vein mapping 12/4: Acute superficial thrombus in the greater saphenous vein near saphenofemoral junction and in the proxmal thigh. Vein wall thickening in the thigh; small caliber vein throughout the thigh & calf. Vein is unsuitable for use as a bypass graft.  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    Sacral wound, initial encounter  Assessment & Plan  Poor nutrition  Wound care    Severe protein-calorie malnutrition (HCC)  Assessment & Plan  Poor oral intake  Dietician following  Increasing to high kCal diet    Advance care planning  Assessment & Plan  Pall care following. He is DNR    Hypernatremia  Assessment & Plan  Patient found to have hypernatremia, has poor oral intake  Continue to trend    History of Bell's palsy  Assessment & Plan  With associated L facial droop, CT head neg for [facility] 12/7  Supportive care    Thrombocytopenia (HCC)  Assessment & Plan  Mild, Monitor    Hypokalemia  Assessment & Plan  Continue to monitor and replace as needed.    Shock (HCC)  Assessment & Plan  Post cardiac arrest 12/5  - titrate pressor to maintain MAP>65  - monitor urine output and vital signs   ICD placed 12/13    Acute respiratory failure (HCC)  Assessment & Plan  3L O2  Needs mobilization, IS    Congestive heart failure (CHF) ([facility])- (present on  admission)  Assessment & Plan  - per chart review, patient has history of congestive heart failure  - c/w home aspirin 81 everyday, coreg 12.5mg BID, atorvastatin 80mg qD, lisinopril 20, amlodipine 5  - Continue to hold HCTZ.    History of seizures- (present on admission)  Assessment & Plan  - per patient, last episode of seizure was in 2019  - c/w home keppra 500mg BID    Macrocytic anemia- (present on admission)  Assessment & Plan  - No known history of anemia, not on iron supplementation  - Most recent labs from 8/2022 showing baseline hemoglobin of 14.1 and MCV of 97.7  - pending anemia workup- iron panel, reticulocytes, vitamin B12, folate  - continue to monitor with daily CBC's  - Transfuse if hemoglobin <7.0. Type and screen ordered 11/30    Acute encephalopathy- (present on admission)  Assessment & Plan  - Per family and chart review, patient is at baseline alert and oriented x4.  - Presented to emergency room on 11/28/2022 with confusion, noted to be alert and oriented x2 (self, place only)  - Altered mental status initially thought to be in setting of azotemia from acute kidney injury however continuing to have encephalopathy despite improving azotemia and improving kidney function  - Per nephrology, currently no emergent need for dialysis. Nephrology signed off 11/29.  - Continue to monitor and trend labs. Work with PT/OT inpatient, they recommend SNF post discharge.  Significantly improved.    Increased anion gap metabolic acidosis  Assessment & Plan  Resolved  - Baseline anion gap is 7.0 per most recent labs from 8/2022  - Anion gap on admission noted to be 19.0, improved to 17.0 after IV LR at 150 cc/hr  - Increased anion gap metabolic acidosis likely secondary to uremia from kidney damage  - Continue hydration with IV fluids and encourage PO intake  - Continue to monitor with daily labs    Sepsis (HCC)- (present on admission)  Assessment & Plan  - This is Sepsis Present on admission  - SIRS criteria  identified on my evaluation include: Leukocytosis, with WBC greater than 12,000  - Source is unknown, possibly abdominal etiology. US 11/29 showing US abdomen showing gallbladder sludge otherwise unremarkable, hepatic cysts, bilateral echogenic kidneys with mildly thinned cortex concerning for possible medical renal disease.  - Sepsis protocol initiated  - Fluid resuscitation ordered per protocol  - Crystalloid Fluid Administration: Fluid resuscitation ordered per standard protocol - 30 mL/kg per current or ideal body weight  - IV antibiotics as appropriate for source of sepsis- started on IV ceftriaxone, discontinued on 11/29  - Reassessment: I have reassessed the patient's hemodynamic status  - Blood cultures NGTD  - Urine cultures unremarkable  - Hemodynamically stable  - Uptrending WBC, WBC 14.4 today 11/29 from 13.9 on admission 11/28  - continue to monitor with daily CBC's  - No clear source of sepsis- Discontinued IV ceftriaxone and started on IV unasyn low dose (due to ALL) TID (11/29- ) pending further workup.   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    History of transcatheter aortic valve replacement (TAVR)- (present on admission)  Assessment & Plan  - per chart review, patient has history of severe aortic stenosis and underwent TAVR with Dr. Wolfe on 8/2/2022.   - most recent echo showed well seated TAVR with peak gradient of 12 mmHg and mean gradient of 7.5 mmHg and trace perivalvular leak.     ALL (acute kidney injury) (HCC)- (present on admission)  Assessment & Plan  RESOLVED  - No noted history of chronic kidney disease, unclear creatinine baseline however recent kidney values appear normal- most recent labs from 8/3/2022 showing baseline Cr 1.0 and BUN 15.0.  - On admission, BUN/Cr ratio noted to be  122/4.67  - Patient is able to urinate, produced about 400cc dark urine in emergency room. No urinary symptoms such as dysuria, polyuria, retention.  - Urinalysis 11/28: +hyaline casts, +granular casts.  - Consulted nephrology- Per Dr. Hearn, currently unclear etiology of ALL however given hyaline casts on UA, volume depletion most likely. Obstructive uropathy was also considered given suprapubic tenderness on exam however renal/bladder US unremarkable for obstructions.   - Started on IV fluids at 125 cc/hr on 11/28, increased rate to 150 cc/hr on 11/29  - Most recent labs from 11/29 at 1500 showing improved renal function with BUN/Cr ratio of 85/1.63.  - Continue to monitor renal function with daily labs  - monitor urine output  - Kidney function improving as of 11/29 and patient nonoliguric, no need for dialysis. Nephrology signed off 11/29.  - Continue gentle IVF and trend Crt/BUN  - Improving- Cr trending down. Cr is 0.90 on 12/1 from 1.05 on 11/30 and 4.67 on admission 11/28.  - ALL resolved, Cr 0.78 on 12/2. D/c'ed renal diet and placed on regular diet to encourage patient PO intake  Now resolved.    Hyperlipidemia- (present on admission)  Assessment & Plan  - no lipid panel on file  - Continue atorvastatin    Essential hypertension- (present on admission)  Assessment & Plan  - at home, on lisinopril, hydrochlorithiazide, amlodipine, carvedilol.  - continue with home coreg 12.5 BID  - restarted on amlodipine 12/1 due to hypertension, continuing to hold HCTZ  - restarted lisinopril 20 on 12/2 due to continued hypertension       VTE prophylaxis: lovenox    I have performed a physical exam and reviewed and updated ROS and Plan today (12/16/2022). In review of yesterday's note (12/15/2022), there are no changes except as documented above.    I certify that the patient requires continued medically necessary hospital services for the treatment of amputation pain and will remain in the hospital for  3 more days.   Discharge plan is anticipated to be SNF.

## 2022-12-16 NOTE — THERAPY
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 Today, pt required Max - Total A to complete functional mobility as detailed below. Unable to attempt standing at this time due to L UE pacer precautions in place and R BKA pain. PT goals updated to focus on seated slide board transfers and WC mobility until pacer precautions have been lifted. Continue PT POC at 3x/wk.     Plan    Continue 3x/wk with updated goals to reflect current functional status    DC Equipment Recommendations: Unable to determine at this time  Discharge Recommendations: Recommend post-acute placement for additional physical therapy services prior to discharge home       12/16/22 1020   Precautions   Precautions Fall Risk;Swallow Precautions ( See Comments)   Comments pacer precautions, R BKA (stump protector pending)   Vitals   O2 Delivery Device Silicone Nasal Cannula   Pain 0 - 10 Group   Therapist Pain Assessment During Activity;Nurse Notified  (painful residual limb and fantom foot pain)   Cognition    Comments pleasant, kept eyes closed throughout session until prompted to open. Most limited by pain today   Active ROM Lower Body    Active ROM Lower Body  WDL   Comments through available joints. pt hesitant to extend R knee due to pain, educated on need   Strength Lower Body   Lower Body Strength  X   Comments grossly assessed B LE at 4-/5.   Neuro-Muscular Treatments   Neuro-Muscular Treatments Anterior weight shift   Comments seated weight shift and core activation. Fair strength through limited tolerance due to pain   Neurological Concerns   Comments seated EOB with support, posterior lean for pain management    Balance   Sitting Balance (Static) Poor   Sitting Balance (Dynamic) Poor -   Weight Shift Sitting Poor   Skilled Intervention Verbal Cuing;Sequencing;Compensatory Strategies   Gait Analysis   Gait Level Of Assist Unable to Participate   Weight Bearing Status R BKA, pacer precautions   Bed Mobility    Supine to Sit Maximal Assist   Sit to Supine Total Assist   Scooting Maximal Assist   Skilled Intervention Verbal Cuing;Compensatory Strategies   Functional Mobility   Sit to Stand Unable to Participate   How much difficulty does the patient currently have...   Turning over in bed (including adjusting bedclothes, sheets and blankets)? 1   Sitting down on and standing up from a chair with arms (e.g., wheelchair, bedside commode, etc.) 1   Moving from lying on back to sitting on the side of the bed? 1   How much help from another person does the patient currently need...   Moving to and from a bed to a chair (including a wheelchair)? 1   Need to walk in a hospital room? 1   Climbing 3-5 steps with a railing? 1   6 clicks Mobility Score 6   Activity Tolerance   Sitting Edge of Bed 7-8 min fully supported   Short Term Goals    Short Term Goal # 1 Pt to move supine to/from eob w/ spv in 6 visits to improve fxl indep   Goal Outcome # 1 goal not met   Short Term Goal # 2 Pt will complete seated slide board transfers between various surfaces with Mod A in 6 visits   Short Term Goal # 3 Pt will self propel WC x 50 ft with Min A to impove mobility independence in 6 visits   Short Term Goal # 4 Pt to move up/down 3 steps w/ spv in 6 visits to access his home (if pt to d/c home)   Goal Outcome # 4 Goal not met  (likely not applicable at this time, will need ramp access if goal is home vs placement)   Education Group   Education Provided Role of Physical Therapist;Weight Bearing Status   Role of Physical Therapist Patient Response Patient;Family;Acceptance;Explanation;Verbal Demonstration   Weight Bearing Status Patient Response  Family;Patient;Acceptance;Explanation;Verbal Demonstration   Anticipated Discharge Equipment and Recommendations   Discharge Recommendations Recommend post-acute placement for additional physical therapy services prior to discharge home   Interdisciplinary Plan of Care Collaboration   IDT Collaboration with  Nursing;Family / Caregiver   Patient Position at End of Therapy In Bed;Bed Alarm On;Call Light within Reach;Tray Table within Reach;Family / Friend in Room   Collaboration Comments aware of PT re-eval   Session Information   Date / Session Number  12/16- 5 (1/3, 12/22)

## 2022-12-16 NOTE — DISCHARGE PLANNING
Agency/Facility Name: [facility]  Spoke To: Teresita  Outcome: Pt may have a bed today if he is confirmed  facility is checking census for D/Cs today. If Pt cannot go today it will be Monday, there is no admission staff this weekend. DPA to follow up once Pt is confirmed

## 2022-12-16 NOTE — CARE PLAN
The patient is Watcher - Medium risk of patient condition declining or worsening    Shift Goals  Clinical Goals: pt will remain HD stable overnight.  Patient Goals: pain control  Family Goals: discharge to [facility] with wife    Progress made toward(s) clinical / shift goals:  soft BP early in shift stable after 500cc LR bolus.    Problem: Hemodynamics  Goal: Patient's hemodynamics, fluid balance and neurologic status will be stable or improve  Outcome: Progressing     Problem: Pain - Standard  Goal: Alleviation of pain or a reduction in pain to the patient’s comfort goal  Outcome: Progressing     Problem: Skin Integrity  Goal: Skin integrity is maintained or improved  Outcome: Progressing     Problem: Fall Risk  Goal: Patient will remain free from falls  Outcome: Progressing     Problem: Communication  Goal: The ability to communicate needs accurately and effectively will improve  Outcome: Progressing     Problem: Urinary Elimination  Goal: Establish and maintain regular urinary output  Outcome: Progressing     Problem: Bowel Elimination  Goal: Establish and maintain regular bowel function  Outcome: Progressing       Patient is not progressing towards the following goals:

## 2022-12-16 NOTE — THERAPY
"Occupational Therapy   Re-Evaluation     Patient Name: Gilmer Mae  Age:  82 y.o., Sex:  male  Medical Record #: [id number]  Today's Date: 12/16/2022     Precautions  Precautions: (P) Fall Risk, Swallow Precautions ( See Comments), Non Weight Bearing Right Lower Extremity  Comments: (P) s/p R BKA, wound vac, pacer precautions    Assessment  Patient is 82 y.o. male who presents to acute following ICD placement (12/13) and R BKA (12/14). Pt required max/total A for bed mobility, mod A for self feeding at EOB, max A for seated grooming. Son present and very supportive throughout session, reports pts spouse is at [facility] and that is where they are hoping he will go. Will continue to follow while in house.     Plan    Recommend Occupational Therapy 3 times per week until therapy goals are met for the following treatments:  Adaptive Equipment, Neuro Re-Education / Balance, Self Care/Activities of Daily Living, Therapeutic Activities, and Therapeutic Exercises.    DC Equipment Recommendations: (P) Unable to determine at this time  Discharge Recommendations: (P) Recommend post-acute placement for additional occupational therapy services prior to discharge home     Subjective    \"My leg hurts where they cut it off\"     Objective       12/16/22 1028   Charge Group   OT Evaluation OT Re-evaluation   Total Time Spent   OT Re-Evaluation Time Spent (Mins) 28   Initial Contact Note    Initial Contact Note Order Received and Verified, Occupational Therapy Evaluation in Progress with Full Report to Follow.   Prior Living Situation   Prior Services None   Housing / Facility 2 Story House   Bathroom Set up Walk In Shower;Shower Chair   Equipment Owned 4-Wheel Walker   Lives with - Patient's Self Care Capacity Spouse   Precautions   Precautions Fall Risk;Swallow Precautions ( See Comments);Non Weight Bearing Right Lower Extremity   Comments s/p R BKA, wound vac, pacer precautions   Vitals   O2 (LPM) 3   O2 Delivery Device Silicone " Nasal Cannula   Pain 0 - 10 Group   Therapist Pain Assessment Post Activity Pain Same as Prior to Activity;Nurse Notified  (c/o residual foot and limb pain)   Cognition    Cognition / Consciousness WDL   Level of Consciousness Alert   Safety Awareness Impaired   Attention Impaired   Initiation Impaired   Comments pleasent, increased processing time, cues to open eyes   Balance Assessment   Sitting Balance (Static) Poor   Sitting Balance (Dynamic) Poor -   Weight Shift Sitting Poor   Comments w/ B UE support at EOB   Bed Mobility    Supine to Sit Maximal Assist   Sit to Supine Total Assist   Scooting Maximal Assist   ADL Assessment   Eating Moderate Assist  (sitting EOB)   Grooming Maximal Assist;Seated   Upper Body Dressing Moderate Assist   Lower Body Dressing Total Assist   How much help from another person does the patient currently need...   Putting on and taking off regular lower body clothing? 1   Bathing (including washing, rinsing, and drying)? 1   Toileting, which includes using a toilet, bedpan, or urinal? 1   Putting on and taking off regular upper body clothing? 2   Taking care of personal grooming such as brushing teeth? 2   Eating meals? 2   6 Clicks Daily Activity Score 9   Functional Mobility   Sit to Stand Unable to Participate   Mobility sat EOB   Activity Tolerance   Sitting Edge of Bed 7 min fully supported   Short Term Goals   Short Term Goal # 1 Pt will tolerate >2min sit EOB in prep for seated ADLs   Short Term Goal # 2 Pt will demo toilet hygiene w/ min A   Short Term Goal # 3 pt will complete seated grooming ADLs with set-up assist   Short Term Goal # 4 Pt will demo BSC txf w/ mod A   Education Group   Role of Occupational Therapist Patient Response Patient;Acceptance;Explanation;Verbal Demonstration   ADL Patient Response Patient;Acceptance;Explanation;Verbal Demonstration;Action Demonstration   Problem List   Problem List Decreased Active Daily Living Skills;Decreased Homemaking  Skills;Decreased Functional Mobility;Decreased Activity Tolerance;Impaired Postural Control / Balance;Safety Awareness Deficits / Cognition   Anticipated Discharge Equipment and Recommendations   DC Equipment Recommendations Unable to determine at this time   Discharge Recommendations Recommend post-acute placement for additional occupational therapy services prior to discharge home   Interdisciplinary Plan of Care Collaboration   IDT Collaboration with  Nursing;Family / Caregiver   Patient Position at End of Therapy In Bed;Call Light within Reach;Tray Table within Reach;Phone within Reach;Family / Friend in Room   Collaboration Comments report given   Session Information   Date / Session Number  12/16, 4 (migue qureshi) (1/3, 12/22)   Priority 2

## 2022-12-16 NOTE — PROGRESS NOTES
LIMB PRESERVATION SERVICE   POST SURGICAL PROGRESS NOTE      HPI:  Gilmer Mae is a 82 y.o.  with a past medical history that includes chronic pain, hyperlipidemia, hypertension, pacemaker.  Admitted 11/28/2022 for ALL (acute kidney injury) (HCC) [N17.9].     LPS has been consulted for right heel wound.  Patient is a poor historian and unable to provide any history on current right heel wound.  Patient admitted 11/28/2022 with altered mental status.  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  Patient was having decreased oral intake for approximately 1 week with lower extremity edema brought into the ED and found to have ALL.  Upon admission it was noticed the patient has eschar to the plantar surface of the right heel.    SURGERY DATE: 12/14/2022 Dr Rdz   PROCEDURE: right below knee amputation      INTERVAL HISTORY:  12/16/2022: POD #2.  Patient denies fevers, chills, nausea, vomiting.  Pain controlled.  Patient resting in bed with knee immobilizer in place.      PERTINENT LPS RESULTS:   Pathology: 12/14/2022  FINAL DIAGNOSIS:     A. Right foot:          Right below-the-knee amputation demonstrating the following:          -The first and second digits demonstrate gangrenous necrosis of           the skin surface. There is also an additional 3.8 x 3.2 cm skin           lesion at the plantar surface of the heel. The posterior tibial           vessels are grossly markedly calcified and moderately stenotic.          -The histologic section through the distal second digit           demonstrates gangrenous necrosis of the skin surface showing           several congested blood vessels. The underlying bone           demonstrates adipose tissue within the medullary space and           focal mild acute inflammation consistent with acute           osteomyelitis. There is viable skin and soft tissue at the           proximal resection margin. The posterior tibial blood vessels           demonstrate focal marked  "atherosclerosis with associated           calcifications and marked stenosis of the lumen. The proximal           tibia bone marrow demonstrates no evidence for acute or chronic           osteomyelitis.          -No malignancy is seen.     OR culture: none    SURGICAL SITE EXAM:      /82   Pulse 85   Temp 36.8 °C (98.2 °F) (Temporal)   Resp 17   Ht 1.727 m (5' 7.99\") Comment: copied from last entry  Wt 74 kg (163 lb 2.3 oz)   SpO2 97%   BMI 24.81 kg/m²     Residual surgical limb warm      Incision   location: Right BKA  Prevena incisional VAC in place and functioning  No erythema  No edema      Photo(s):       DIABETES MANAGEMENT:    A1c:   Lab Results   Component Value Date/Time    HBA1C 5.1 12/10/2022 02:15 AM            INFECTION MANAGEMENT:    Results from last 7 days   Lab Units 12/16/22  0250 12/15/22  0438 12/12/22  0325 12/10/22  0215 12/09/22  1953   WBC 1501 K/uL 6.9 6.9 6.9 7.1 7.4   PLATELET COUNT 1518 K/uL 189 206 153* 123* 132*     Wound culture results:   Results       ** No results found for the last 168 hours. **                 ASSESSMENT/PLAN:   POD #2 S/P right BKA Dr. Rdz     -Right BKA has Prevena VAC in place with anticipated end date of 12/20/2022  -Once Prevena completed dry dressing can be initiated  -No erythema or edema noted  -Patient has knee immobilizer in place        Wound care:   -Wound care orders to be updated once Prevena has been removed after 12/20/22    Imaging/Labs:  -COVID-19: not completed this admission    Vascular status:   -Residual limb warm    Surgery:   --no further surgeries planned at this time     Antibiotics:   -None    Weight Bearing Status:   -Non Weight bearing    Offloading:   -Immobilizer;   - Offloading device in place  -Orthotic company: [employer].        PT/OT:   - involved. Last seen 12/16.       Diabetes Education:   - involved.     - Implications of loss of protective sensation (LOPS) discussed with patient- including increased risk for " amputation.  Advised to check feet at least daily, moisturize feet, and to always wear protective foot wear.   -avoid trimming own nails. See podiatrist or certified foot and nail RN  -keep blood sugars <150 for improved wound healing            DISCHARGE PLAN:    Disposition:   -referrals have been placed for SNF  -recommend patient discharges to SNF      Follow-up: Dr. Rdz. Sutures to be removed approximately 3 weeks post-op    Does not need wound care clinic or LPS follow up at this time as patient has an incision without open wound.      Discussed with: pt, RN, Dr. Keen            Please note that this dictation was created using voice recognition software. I have  worked with technical experts from [employer] to optimize the interface.  I have made every reasonable attempt to correct obvious errors, but there may be errors of grammar and possibly content that I did not discover before finalizing the note.      Faith England, A.P.R.N.    If any questions or concerns, please contact [facility] through [employer]te.

## 2022-12-17 NOTE — CARE PLAN
The patient is Stable - Low risk of patient condition declining or worsening    Shift Goals  Clinical Goals: Pain management  Patient Goals: pain control  Family Goals: discharge to [facility] with wife    Progress made toward(s) clinical / shift goals: Pt had no s/s of aspiration, and no falls    Problem: Fall Risk  Goal: Patient will remain free from falls  Outcome: Progressing     Problem: Risk for Aspiration  Goal: Patient's risk for aspiration will be absent or decrease  Outcome: Progressing       Patient is not progressing towards the following goals:Pt refusing turns, pt educated and verbalized understanding      Problem: Skin Integrity  Goal: Skin integrity is maintained or improved  Outcome: Not Progressing

## 2022-12-17 NOTE — PROGRESS NOTES
Palliative Care   Records request sent for any healthcare directives with [facility] registry/Living Will Lockbox. No documents on record. No family at bedside. Patient sleeping in bed. No apparent distress. POD #1 BKA. Discharge planning for SNF.     ALFREDO Augustine.  Palliative Care Nurse Practitioner  [phone]

## 2022-12-17 NOTE — PROGRESS NOTES
On call hospitalist notified regarding pt's UOP:  50 mL this shift. Per documentation, last UOP was 350 mL on 12/15/2022 @ 0600.  Bladder scan protocol ordered and initiated.

## 2022-12-17 NOTE — PROGRESS NOTES
Vascular surgery    Status post right below-knee amputation  Knee immobilizer in place  No complaints    Vascular surgery following     Kevin Alejandro MD

## 2022-12-17 NOTE — PROGRESS NOTES
Patient and family prefer he be discharged to [facility] to be with his wife. Please contact family for arrangements.

## 2022-12-17 NOTE — CARE PLAN
The patient is Stable - Low risk of patient condition declining or worsening    Shift Goals  Clinical Goals: vs, labs  Patient Goals: comfort, rest  Family Goals: discharge to [facility] with wife    Progress made toward(s) clinical / shift goals:    Problem: Knowledge Deficit - Standard  Goal: Patient and family/care givers will demonstrate understanding of plan of care, disease process/condition, diagnostic tests and medications  Outcome: Progressing     Problem: Hemodynamics  Goal: Patient's hemodynamics, fluid balance and neurologic status will be stable or improve  Outcome: Progressing     Problem: Respiratory  Goal: Patient will achieve/maintain optimum respiratory ventilation and gas exchange  Outcome: Progressing     Problem: Pain - Standard  Goal: Alleviation of pain or a reduction in pain to the patient’s comfort goal  Outcome: Progressing     Problem: Fall Risk  Goal: Patient will remain free from falls  Outcome: Progressing     Problem: Psychosocial  Goal: Patient's level of anxiety will decrease  Outcome: Progressing       Patient is not progressing towards the following goals:

## 2022-12-17 NOTE — PROGRESS NOTES
Received bedside report from RN, pt care assumed, VSS, pt assessment complete. Pt AAOx4, with no complaints of pain at this time. No signs of acute distress noted at this time. Plan of care discussed with pt and verbalizes no questions. Pt denies any additional needs at this time. Bed locked/in lowest position, bed alarm is on, pt educated on fall risk and verbalized understanding, call light within reach, hourly rounding initiated.

## 2022-12-18 PROBLEM — I82.612 SUPERFICIAL VENOUS THROMBOSIS OF ARM, LEFT: Status: ACTIVE | Noted: 2022-01-01

## 2022-12-18 NOTE — PROGRESS NOTES
"Hospital Medicine Daily Progress Note    Date of Service  12/18/2022    Chief Complaint  Gilmer Mae is a 82 y.o. male admitted 11/28/2022 with leg pain    Hospital Course  83 y/o with PMH HTN, HLD, sick sinus syndrome (s/p pacemaker), seizures (most recent episode in 2019, on keppra), severe aortic stenosis (s/p TAVR 8/2/2022 at [facility]) and PAD (s/p vascular surgery on femoral artery in 2022) who presented to ED via EMS on 11/28/2022 for LOC, b/l LE edema and pain.  History limited due to patient’s altered mental status, A&Ox2 (self, place) so most history obtained from chart review and daughter Kendra. Patient lives with wife and son. Patient says he is having pain “all over” especially his right leg and says he got surgery on his right leg “2 weeks ago but they never finished.” On arrival to ED, patient was lethargic and confused. RLE especially R foot exquisitely tender, nonhealing ulcer with some erythema and eschar present on bottom of R heel. Ultrasound of leg showed diffuse atherosclerotic plaque throughout LE, absent flow from proximal to distal femoral artery, reconstitution monophasic flow via collaterals at distal femoral artery, absent flow in distal portion of tibial artery. Xray of R foot showed potentially periosteal changes at inferior aspect of calcaneus as well as significant vascular calcification; concern for underlying osteomyelitis secondary to ischemic ulcer. Empirically placed on IV linezolid and IV unasyn however discontinued linezolid as blood cx, wound cx, MRSA and staph aureus PCR negative as well as lack of purulence from wound. Seen by LPS and vascular surgery- scheduled for OR on 12/5 with Dr. Rdz to attempt RLE revascularization in order to avoid amputation.    Interval Problem Update  12/13 - Patient feels tired. His right foot continues to have pain. But he feels \"pretty good\" today  ICD placement today  Hypernatremic, start D5W and recheck level    12/14 - Remains " hypernatremic and worsening. Restarted D5W.  His oral intake has been very poor on the liquidized diet. Speech did reeval him today. I consulted Dr. Rdz and planning for amputation. I discussed with patient that nutrition will be important for wound healing. Pall care to see patient to complete AD before surgery today    12/15 - He had right BKA. Hypernatremia improved to 144. He still needs a lot of encouragement to eat. Dietician increasing to high kCal diet. His pain is doing better now s/p amputation.     12/16 - Mildly hypotensive to 94/54 overnight, given 500cc bolus and BP improved. He says his pain is unbearable today. He does have some phantom pain. I will increased gabapentin. I discussed artifical nutrition, he was quick to decline, does not want feeding tube    12/17 - His pain is doing better today. He is thirsty. Sodium 145. Ok for dry dressings to leg wound on 12/20 per wound care. SNF pending    12/18 - Continues to have minimal oral intake, though he says he is eating everything. Sodium remains wnl. He has some right leg pain, 3/10. LUE swelling, he denies pain. Doppler ordered to assess for DVT    I have discussed this patient's plan of care and discharge plan at IDT rounds today with Case Management, Nursing, Nursing leadership, and other members of the IDT team.    Consultants/Specialty  cardiology, critical care, infectious disease, and nephrology    Code Status  DNAR/DNI    Disposition  Patient is medically cleared for discharge.   Anticipate discharge to to skilled nursing facility.  I have placed the appropriate orders for post-discharge needs.    Review of Systems  Review of Systems   Constitutional:  Positive for malaise/fatigue.   Cardiovascular:  Negative for chest pain.   Gastrointestinal:  Negative for abdominal pain and vomiting.   Musculoskeletal:  Positive for joint pain and myalgias.   Neurological:  Positive for weakness.   All other systems reviewed and are negative.      Physical Exam  Temp:  [35.9 °C (96.6 °F)-36.9 °C (98.4 °F)] 36.8 °C (98.2 °F)  Pulse:  [60-67] 60  Resp:  [15-19] 15  BP: (130-141)/(63-70) 130/63  SpO2:  [94 %-97 %] 97 %    Physical Exam  Vitals and nursing note reviewed.   Constitutional:       Appearance: He is ill-appearing. He is not diaphoretic.   HENT:      Head: Normocephalic.      Mouth/Throat:      Mouth: Mucous membranes are dry.   Eyes:      General:         Right eye: No discharge.         Left eye: No discharge.   Cardiovascular:      Rate and Rhythm: Normal rate and regular rhythm.   Pulmonary:      Effort: No respiratory distress.      Breath sounds: No wheezing, rhonchi or rales.      Comments: Diminished throughout  Abdominal:      Palpations: Abdomen is soft.      Tenderness: There is no abdominal tenderness.   Musculoskeletal:         General: Swelling (LUE>RUE) present.      Cervical back: Neck supple.      Comments: Right BKA with immobilizer   Neurological:      Comments: AOX  to self and place       Fluids    Intake/Output Summary (Last 24 hours) at 12/18/2022 1214  Last data filed at 12/18/2022 0540  Gross per 24 hour   Intake 460 ml   Output 750 ml   Net -290 ml       Laboratory  Recent Labs     12/15/22  2045 12/16/22  0250 12/17/22  0341   WBC  --  6.9 6.1   RBC  --  2.67* 2.72*   HEMOGLOBIN 8.9* 8.8* 9.1*   HEMATOCRIT 28.7* 28.3* 29.1*   MCV  --  106.0* 107.0*   MCH  --  33.0 33.5*   MCHC  --  31.1* 31.3*   RDW  --  60.6* 63.3*   PLATELETCT  --  189 177   MPV  --  10.7 10.7     Recent Labs     12/16/22  0250 12/17/22  0341   SODIUM 143 145   POTASSIUM 3.8 3.8   CHLORIDE 113* 112   CO2 25 26   GLUCOSE 104* 100*   BUN 31* 31*   CREATININE 1.17 0.84   CALCIUM 7.5* 7.5*                   Imaging  DX-CHEST-PORTABLE (1 VIEW)   Final Result      1.  Status post left pacer ICD placement. No evidence of large left pleural effusion or pneumothorax.   2.  Apparent interval development of a small right-sided pleural effusion with associated  basilar atelectasis and/or consolidation.   3.  Left basilar atelectasis and/or consolidation.   4.  Mild enlargement of the cardiomediastinal silhouette.      EC-ECHOCARDIOGRAM LTD W/ CONT   Final Result      CT-HEAD W/O   Final Result      1.  No acute intracranial hemorrhage.   2.  Similar chronic findings.   3.  Mild mucosal thickening of the inferior right maxillary sinus.         DX-CHEST-PORTABLE (1 VIEW)   Final Result      Stable mild cardiac enlargement without edema      DX-CHEST-PORTABLE (1 VIEW)   Final Result         1.  Hazy interstitial left lung base infiltrate, stable since prior study.   2.  Atherosclerosis      DX-ABDOMEN FOR TUBE PLACEMENT   Final Result      Orogastric tube tip at the proximal stomach.      EC-ECHOCARDIOGRAM COMPLETE W/ CONT   Final Result      DX-ABDOMEN FOR TUBE PLACEMENT   Final Result      NG tube tip projects over the stomach, and side-port projects 3 cm above the GE junction. It can be advanced further by approximately 7 cm.      DX-ABDOMEN FOR TUBE PLACEMENT   Final Result      NG tube tip projects over the GE junction. It can be advanced 8-10 cm      DX-CHEST-PORTABLE (1 VIEW)   Final Result      1.  Right central catheter tip is in the mid SVC. No pneumothorax.   2.  NGT tip is at the GE junction. It can be advanced 8 cm.   3.  Stable other findings.      DX-CHEST-PORTABLE (1 VIEW)   Final Result      1.  Well-positioned lines and tubes. No pneumothorax.   2.  Ill-defined left basilar opacity, chronic atelectasis/scarring or sequela of recent pneumonia.      CT-CTA AORTA-RO WITH & W/O-POST PROCESS   Final Result      1.  Extensive atherosclerotic vascular calcification involving the aorta and originating arteries. There is ectasia of the ascending thoracic aorta measured at 3.4 x 3.4 cm in diameter.      2.  Extensive atherosclerotic plaque involving the right common, superficial femoral and profunda arteries. There is occlusion of the right superficial femoral artery  at the level of the proximal thigh. Popliteal artery is also occluded and there is    extensive atherosclerotic plaque involving the posterior tibial, anterior tibial and peroneal arteries. No definite continuous runoff vessel identified.      3.  Extensive atherosclerotic plaque involving the left common, profunda and superficial femoral and popliteal arteries with multiple areas of high-grade stenosis of the superficial femoral and popliteal arteries. There is also extensive atherosclerotic    change of the posterior tibial, anterior tibial and peroneal arteries without a definite contiguous runoff vessel seen.      4.  High-grade stenosis involving the origin of the superior mesenteric artery.      5.  Occluded origin of the inferior mesenteric artery.      6.  Atherosclerotic plaque involving the right renal artery origin and the proximal right renal artery resulting in 50% diameter narrowing.      7.  Less than 50% diameter narrowing of the left renal artery origin.      8.  Ill-defined groundglass infiltrates within the right upper lobe possibly representing infectious process.      9.  Multiple hepatic cysts.      3D angiographic/MIP images of the vasculature confirm the vascular findings as described above.      US-VEIN MAPPING LOWER EXTREMITY UNILAT RIGHT   Final Result      DX-FOOT-COMPLETE 3+ RIGHT   Final Result      Likely acute osteomyelitis at the inferior calcaneus. MRI can be obtained as indicated.      MR-FOOT-W/O RIGHT   Final Result      1.  Very limited study due to extensive patient motion artifact and patient comfort preventing multiple sequences.      2.  No gross bony destructive change.      3.  Soft tissue edema.      US-EXTREMITY ARTERY LOWER UNILAT RIGHT   Final Result      US-EXTREMITY VENOUS LOWER UNILAT RIGHT   Final Result      US-ABDOMEN COMPLETE SURVEY   Final Result         1.  Gallbladder sludge, otherwise unremarkable gallbladder   2.  Hepatic cysts   3.  Bilateral echogenic  kidneys with mildly thinned cortex, appearance favors medical renal disease      CT-HEAD W/O   Final Result      1.  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   2.  Mild cerebral atrophy. Age-appropriate.   3.  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   4.  No acute findings are evident.         DX-CHEST-PORTABLE (1 VIEW)   Final Result      No evidence of acute cardiopulmonary process.      CL-LEFT HEART CATHETERIZATION WITH POSSIBLE INTERVENTION    (Results Pending)   CL-IMPLANTABLE CARDIOVERTER DEFIBRILLATOR    (Results Pending)   US-EXTREMITY VENOUS UPPER UNILAT LEFT    (Results Pending)        Assessment/Plan  * Osteomyelitis of right lower extremity (HCC)- (present on admission)  Assessment & Plan  - Given recent right groin access for TAVR, MARGARITA ordered-   - Pain controlled with tylenol 650mg q6 prn, IV dilaudid 0.5mg q4 prn, oxycodone 5-10mg q3 prn  - can consider lidocaine patch, diclofenac gel as needed  - U/S showed: Occlusion of RIGHT femoral artery with distal reconstitution, Occlusion of RIGHT posterior tibial artery,  Two vessel runoff to the ankle with monophasic flow  -12/1: Spoke with Dr. Medel who is partners with Dr. Quinones (who did patient's previous surgery). She says patient will be having pain for a long time but since this is not an emergency they will see him outpatient and Dr. Quinones will perform a re-anastomosis and other procedures as warranted. This procedure cannot currently be done as Dr. Quinones is currently on vacation and Dr. Medel doesn't have [facility] privileges as well as the fact that this type of surgery cannot even be done at [facility].   - PT/OT recommending SNF  - MRI foot attempted 12/2 however was incomplete as patient was unable to tolerate. Foot xray confirmed osteomyelitis to R calcaneus.  - 12/2: Per LPS, wound care  orders for nursing was placed and he was provided with betadine dressing and offloading boot per LPS team.  - 12/3: Consulted LPS and vascular surgery due to concern for osteomyelitis of R heel- seen by Dr. Rdz who will take patient to OR in order to attempt revascularization to attempt to heal foot wound and avoid amputation. OR planned for 12/5 Monday.  - Consulted Dr. Turcios from infectious disease 12/3: believes osteomyelitis of heel will be difficult to cure and pt will need debridement, wound vac, etc however most likely poor outcome. Healing process also hindered due to impaired circulation evidence by MARGARITA. As there is no pus from wound, most likely ischemic ulcer. Linezolid discontinued 12/3, c/w just unasyn for now  - f/u blood cultures, wound cultures, NGTD  - CTA pending  - Vein mapping 12/4: Acute superficial thrombus in the greater saphenous vein near saphenofemoral junction and in the proxmal thigh. Vein wall thickening in the thigh; small caliber vein throughout the thigh & calf. Vein is unsuitable for use as a bypass graft.  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    Sacral wound, initial encounter  Assessment & Plan  Poor nutrition  Wound care    Severe protein-calorie malnutrition (HCC)  Assessment & Plan  Poor oral intake  Dietician following  Increasing to high kCal diet    Advance care planning  Assessment & Plan  Pall care following. He is DNR    Hypernatremia  Assessment & Plan  Patient found to have hypernatremia, has poor oral intake  Continue to trend    History of Bell's palsy  Assessment & Plan  With associated L facial droop, CT head neg for AICH 12/7  Supportive care    Thrombocytopenia (HCC)  Assessment & Plan  Mild, Monitor    Hypokalemia  Assessment & Plan  Continue to monitor and replace as needed.    Shock (HCC)  Assessment & Plan  Post cardiac arrest 12/5  - titrate pressor to maintain MAP>65  - monitor urine output  and vital signs   ICD placed 12/13    Acute respiratory failure (HCC)  Assessment & Plan  3L O2  Needs mobilization, IS    Congestive heart failure (CHF) (HCC)- (present on admission)  Assessment & Plan  - per chart review, patient has history of congestive heart failure  - c/w home aspirin 81 everyday, coreg 12.5mg BID, atorvastatin 80mg qD, lisinopril 20, amlodipine 5  - Continue to hold HCTZ.    History of seizures- (present on admission)  Assessment & Plan  - per patient, last episode of seizure was in 2019  - c/w home keppra 500mg BID    Macrocytic anemia- (present on admission)  Assessment & Plan  - No known history of anemia, not on iron supplementation  - Most recent labs from 8/2022 showing baseline hemoglobin of 14.1 and MCV of 97.7  - pending anemia workup- iron panel, reticulocytes, vitamin B12, folate  - continue to monitor with daily CBC's  - Transfuse if hemoglobin <7.0. Type and screen ordered 11/30    Acute encephalopathy- (present on admission)  Assessment & Plan  - Per family and chart review, patient is at baseline alert and oriented x4.  - Presented to emergency room on 11/28/2022 with confusion, noted to be alert and oriented x2 (self, place only)  - Altered mental status initially thought to be in setting of azotemia from acute kidney injury however continuing to have encephalopathy despite improving azotemia and improving kidney function  - Per nephrology, currently no emergent need for dialysis. Nephrology signed off 11/29.  - Continue to monitor and trend labs. Work with PT/OT inpatient, they recommend SNF post discharge.  Significantly improved.    Increased anion gap metabolic acidosis  Assessment & Plan  Resolved  - Baseline anion gap is 7.0 per most recent labs from 8/2022  - Anion gap on admission noted to be 19.0, improved to 17.0 after IV LR at 150 cc/hr  - Increased anion gap metabolic acidosis likely secondary to uremia from kidney damage  - Continue hydration with IV fluids and  encourage PO intake  - Continue to monitor with daily labs    Sepsis (HCC)- (present on admission)  Assessment & Plan  - This is Sepsis Present on admission  - SIRS criteria identified on my evaluation include: Leukocytosis, with WBC greater than 12,000  - Source is unknown, possibly abdominal etiology. US 11/29 showing US abdomen showing gallbladder sludge otherwise unremarkable, hepatic cysts, bilateral echogenic kidneys with mildly thinned cortex concerning for possible medical renal disease.  - Sepsis protocol initiated  - Fluid resuscitation ordered per protocol  - Crystalloid Fluid Administration: Fluid resuscitation ordered per standard protocol - 30 mL/kg per current or ideal body weight  - IV antibiotics as appropriate for source of sepsis- started on IV ceftriaxone, discontinued on 11/29  - Reassessment: I have reassessed the patient's hemodynamic status  - Blood cultures NGTD  - Urine cultures unremarkable  - Hemodynamically stable  - Uptrending WBC, WBC 14.4 today 11/29 from 13.9 on admission 11/28  - continue to monitor with daily CBC's  - No clear source of sepsis- Discontinued IV ceftriaxone and started on IV unasyn low dose (due to ALL) TID (11/29- ) pending further workup.   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    History of transcatheter aortic valve replacement (TAVR)- (present on admission)  Assessment & Plan  - per chart review, patient has history of severe aortic stenosis and underwent TAVR with Dr. Wolfe on 8/2/2022.   - most recent echo showed well seated TAVR with peak gradient of 12 mmHg and mean gradient of 7.5 mmHg and trace perivalvular leak.     ALL (acute kidney injury) (HCC)- (present on admission)  Assessment & Plan  RESOLVED  - No noted history of chronic kidney disease, unclear  creatinine baseline however recent kidney values appear normal- most recent labs from 8/3/2022 showing baseline Cr 1.0 and BUN 15.0.  - On admission, BUN/Cr ratio noted to be 122/4.67  - Patient is able to urinate, produced about 400cc dark urine in emergency room. No urinary symptoms such as dysuria, polyuria, retention.  - Urinalysis 11/28: +hyaline casts, +granular casts.  - Consulted nephrology- Per Dr. Hearn, currently unclear etiology of ALL however given hyaline casts on UA, volume depletion most likely. Obstructive uropathy was also considered given suprapubic tenderness on exam however renal/bladder US unremarkable for obstructions.   - Started on IV fluids at 125 cc/hr on 11/28, increased rate to 150 cc/hr on 11/29  - Most recent labs from 11/29 at 1500 showing improved renal function with BUN/Cr ratio of 85/1.63.  - Continue to monitor renal function with daily labs  - monitor urine output  - Kidney function improving as of 11/29 and patient nonoliguric, no need for dialysis. Nephrology signed off 11/29.  - Continue gentle IVF and trend Crt/BUN  - Improving- Cr trending down. Cr is 0.90 on 12/1 from 1.05 on 11/30 and 4.67 on admission 11/28.  - ALL resolved, Cr 0.78 on 12/2. D/c'ed renal diet and placed on regular diet to encourage patient PO intake  Now resolved.    Hyperlipidemia- (present on admission)  Assessment & Plan  - no lipid panel on file  - Continue atorvastatin    Essential hypertension- (present on admission)  Assessment & Plan  - at home, on lisinopril, hydrochlorithiazide, amlodipine, carvedilol.  - continue with home coreg 12.5 BID  - restarted on amlodipine 12/1 due to hypertension, continuing to hold HCTZ  - restarted lisinopril 20 on 12/2 due to continued hypertension       VTE prophylaxis: lovenox    I have performed a physical exam and reviewed and updated ROS and Plan today (12/18/2022). In review of yesterday's note (12/17/2022), there are no changes except as documented  above.    I certify that the patient requires continued medically necessary hospital services for the treatment of amputation pain and will remain in the hospital for  3 more days.  Discharge plan is anticipated to be SNF.

## 2022-12-18 NOTE — PROGRESS NOTES
Palliative Care Advance Care Planning Progress Note    General: Gilmer Mae is an 82-year-old male with complex cardiac and vascular history admitted 11/28/2022 found to have osteomyelitis of the right calcaneus and ALL.  Patient suffered cardiac arrest in OR 12/5/2022 and vascular surgery was aborted.  Patient taken to Cath Lab for STEMI.  Palliative care consulted 12/10/2022 for advance care planning.  Patient underwent automatic implantable cardioverter defibrillator implant (upgrade of existing device) 12/13/2022 and right BKA 12/14/2022.    Discussion: Patient resting with eyes closed however easily awoke to voice.  He is concerned as he is now developing left heel pain.  Asked permission to turn lights on and assessed heel.  It is pink and non blanching.  Placed heel elevating boot on patient he reports improvement in pain.      He is expressing wishes to go to [facility] in [facility] as his wife in assisted living close to there and he wants to be close to her.    Discussed that search was completed and living will lock box and patient does not have healthcare directive uploaded in their system.  Confirmed his wishes for DNR/DNI.  Recommended POLST form prior to discharge.  Patient reports he is feeling tired/drowsy and would prefer to do closer to discharge in the morning.  Inquired if he needed family present for completion of form however he dosed off.  Confirmed I would recommend my team follow-up Monday morning sometime.  Patient asked for an ice chip.  Patient remains on thickened liquid diet.  Confirmed last time we trialed ice chips patient coughed and could be at risk for aspiration.  Offered him thickened water however he does not like the taste.  He denied having other questions or needs.    Provided therapeutic communication including but ended questions, therapeutic presence, reflective listening, and validation of thoughts/feelings throughout encounter.     Outcome: Repositioned patient.   Confirmed wishes for DNR/DNI and circumstances of organic cardiac arrest or respiratory arrest.  Patient declined POLST form as he is too tired however he is open to completing one prior to discharge.  Patient expressed wishes for SNF discharge planning for [facility] so he can be close to his wife.    Plan: POLST prior to DC.     Updated: Dr. Keen, RN [place], DC  Landy Lilly.     Please call our team with questions and/or additional needs.    Total visit time was 25 minutes discussing advance care planning.    JAMES Augustine.R.N.  Palliative Care Nurse Practitioner  [phone]

## 2022-12-18 NOTE — PROGRESS NOTES
Assumed care of patient. Pt is A+O x4. No chest pain or SOB. No active bleeding noted. Informed of safety and call system. rhythm is paced. Pt requesting break through pain medication. No other concerns at this time.

## 2022-12-19 NOTE — PROGRESS NOTES
VASCULAR SURGERY SERVICE                        Progress Note  _________________________________    Prevena removed from right BKA stump  Incision is healing perfectly, no concerns at all  Gauze, stump sock, and protective cover reapplied  Recommend dry gauze and stump sock only from here forward, continuous use of stump protector    To SNF today  FU in clinic 2-3 weeks for staple removal    Hemant Rdz MD  [facility]   [place] preferred or call my office [phone]  __________________________________________________  Patient:Gilmer Mae   MRN:[id number]

## 2022-12-19 NOTE — DISCHARGE PLANNING
Agency/Facility Name: [facility]  Spoke To: Lorie  Outcome: Per patti Melo referral facility has Pts wife and most likely will be able to accept him with beds available today      0906  Agency/Facility Name: [facility]  Spoke To: Lorie  Outcome: Facility can accept Pt and requests transport time of 1400. DPA to follow up once transport has been confirmed       1014  Agency/Facility Name: [facility]  Spoke To: Lorie  Outcome: DPA confirmed transport time of 1400 for Pt, facility requesting DC summary when it is available. No further follow up needs

## 2022-12-19 NOTE — DISCHARGE SUMMARY
Discharge Summary    CHIEF COMPLAINT ON ADMISSION  Chief Complaint   Patient presents with    ALOC    Edema     Ble edema       Reason for Admission  EMS    Admission Date  11/28/2022     CODE STATUS  DNAR/DNI    HPI & HOSPITAL COURSE  83 y/o with PMH HTN, HLD, sick sinus syndrome (s/p pacemaker), seizures (most recent episode in 2019, on keppra), severe aortic stenosis (s/p TAVR 8/2/2022 at [facility]) and PAD (s/p vascular surgery on femoral artery in 2022) who presented to ED via EMS on 11/28/2022 for LOC, b/l LE edema and pain.   RLE especially R foot exquisitely tender, nonhealing ulcer with some erythema and eschar present on bottom of R heel. Ultrasound of leg showed diffuse atherosclerotic plaque throughout LE, absent flow from proximal to distal femoral artery, reconstitution monophasic flow via collaterals at distal femoral artery, absent flow in distal portion of tibial artery. Xray of R foot showed potentially periosteal changes at inferior aspect of calcaneus as well as significant vascular calcification; concern for underlying osteomyelitis secondary to ischemic ulcer. Empirically placed on IV linezolid and IV unasyn however discontinued linezolid as blood cx, wound cx, MRSA and staph aureus PCR negative as well as lack of purulence from wound. Seen by LPS and vascular surgery- scheduled for OR on 12/5 with Dr. Rdz to attempt RLE revascularization in order to avoid amputation.  Patient was taken to surgery on 12/5/2022 and subsequently underwent anesthesia induction shortly after anesthesia induction patient sustained wide-complex cardiac arrest and was pulseless for approximately 10 minutes after induction.  Patient required 1 round of CPR and 1 mg of epinephrine before ROSC was obtained.  Patient was happily transition to cardiac intensive care unit.  Patient was placed on epinephrine drip.  Patient subsequently developed ventricular fibrillation for which CPR again was initiated and patient was  defibrillated x1 with ROSC.  Patient was then taken for emergent cardiac catheterization as there is suspicion this may have been a result of ischemic event.  Patient was found to have nonobstructive coronary artery disease however reduced ejection fraction of approximately 30% and significant findings consistent with Takotsubo's cardiomyopathy.  There was no evidence of LV apical thrombus.  Patient was subsequently stabilized further in cardiac intensive care unit and eventually downgraded to stepdown unit and subsequently to telemetry floor.  Patient had ICD placed and pacemaker removed 12/13. 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 He has provena in place until 12/20 and can have dry dressings after. He's to f/u with Dr. Rdz in 2-3 weeks. Patient's had poor oral intake on his modified diet recommended by speech. With intermittent hypernatremia. Patient's mental status returned to baseline and palliative care discussed GOC with him. He is DNR but not ready for hospice. He does not want artifical nutrition and wants to continue to try eating. He was trialed on marinol with no effect on appetite.    Therefore, he is discharged in good and stable condition to skilled nursing facility.    The patient met 2-midnight criteria for an inpatient stay at the time of discharge.      FOLLOW UP ITEMS POST DISCHARGE  F/u Dr. Rdz 2-3 weeks  F/u cardiology  Monitor oral intake and signed of dehydration closely, may need to revisit discussion of hospice if he does not progress    DISCHARGE DIAGNOSES  Principal Problem:    Osteomyelitis of right lower extremity (HCC) POA: Yes  Active Problems:    Essential hypertension POA: Yes    Hyperlipidemia POA: Yes    ALL (acute kidney injury) (HCC) POA: Yes    History of transcatheter aortic valve replacement (TAVR) POA: Yes    Sepsis (HCC) POA: Yes    Acute encephalopathy POA: Yes    Macrocytic anemia POA: Yes    History of seizures POA: Yes    Congestive heart failure  (CHF) (HCC) POA: Yes    Cardiac arrest (HCC) POA: Yes    Acute respiratory failure (HCC) POA: Unknown    Shock (HCC) POA: Unknown    Peripheral vascular disease (HCC) POA: Unknown    Hyperglycemia POA: Unknown    Hypokalemia POA: Unknown    Difficulty speaking POA: Unknown    Bell's palsy POA: Unknown    Thrombocytopenia (HCC) POA: Unknown    History of Bell's palsy POA: Unknown    Hypernatremia POA: Unknown    Advance care planning POA: Unknown    Severe protein-calorie malnutrition (HCC) POA: Unknown    Sacral wound, initial encounter POA: Unknown    Superficial venous thrombosis of arm, left POA: Unknown  Resolved Problems:    * No resolved hospital problems. *      FOLLOW UP  Future Appointments   Date Time Provider Department Center   12/22/2022  3:15 PM PACER CHECK-[facility] 2 [facility] None   1/18/2023 12:45 PM ANGELES Kapoor [facility] None     Judah Braga M.D.  [address]  [place] [address]  [phone]    Schedule an appointment as soon as possible for a visit  As needed    Hemant Rdz M.D.  [address] [facility] [address]  [place] [address]  [phone]    Follow up in 2 week(s)  For suture removal    [facility]  [address] [place] [address]  [phone]          MEDICATIONS ON DISCHARGE     Medication List        START taking these medications        Instructions   amiodarone 200 MG Tabs  Commonly known as: Cordarone   Take 1 Tablet by mouth every day.  Dose: 200 mg     docusate sodium 100 MG Caps   Take 100 mg by mouth 2 times a day.  Dose: 100 mg     lidocaine 5 % Ptch  Commonly known as: LIDODERM   Place 1 Patch on the skin every 24 hours.  Dose: 1 Patch     oxyCODONE immediate release 10 MG immediate release tablet  Commonly known as: ROXICODONE   Take 1 Tablet by mouth every four hours as needed for Moderate Pain or Severe Pain for up to 3 days.  Dose: 10 mg            CHANGE how you take these medications        Instructions    acetaminophen 325 MG Tabs  Start taking on: December 20, 2022  What changed:   when to take this  reasons to take this  These instructions start on December 20, 2022. If you are unsure what to do until then, ask your doctor or other care provider.  Commonly known as: Tylenol   Take 2 Tablets by mouth every 6 hours as needed for Mild Pain or Fever.  Dose: 650 mg     carvedilol 12.5 MG Tabs  What changed:   medication strength  how much to take  when to take this  Commonly known as: COREG   Take 1 Tablet by mouth 2 times a day with meals.  Dose: 12.5 mg     gabapentin 100 MG Caps  What changed:   how much to take  when to take this  Commonly known as: NEURONTIN   Take 2 Capsules by mouth 2 times a day.  Dose: 200 mg     lisinopril 10 MG Tabs  What changed: how much to take  Commonly known as: PRINIVIL   Take 1 Tablet by mouth every day.  Dose: 10 mg            CONTINUE taking these medications        Instructions   aspirin EC 81 MG Tbec  Commonly known as: ECOTRIN   Take 81 mg by mouth every day.  Dose: 81 mg     atorvastatin 80 MG tablet  Commonly known as: LIPITOR   Take 80 mg by mouth every day.  Dose: 80 mg     levETIRAcetam 500 MG Tabs  Commonly known as: KEPPRA   Take 1 Tab by mouth 2 Times a Day.  Dose: 500 mg            STOP taking these medications      amLODIPine 5 MG Tabs  Commonly known as: NORVASC     cephALEXin 500 MG Caps  Commonly known as: KEFLEX     cloNIDine 0.1 MG Tabs  Commonly known as: CATAPRES     clopidogrel 75 MG Tabs  Commonly known as: PLAVIX     hydroCHLOROthiazide 25 MG Tabs  Commonly known as: HYDRODIURIL     HYDROcodone-acetaminophen 5-325 MG Tabs per tablet  Commonly known as: NORCO              Allergies  No Known Allergies    DIET  Orders Placed This Encounter   Procedures    Diet Order Diet: Level 3 - Liquidised (no glucerna, 1 thickener for boost glucose control; ensure solids are adequate thickness); Liquid level: Level 2 - Mildly Thick; Tray Modifications (optional): SLP - 1:1  Supervision by Nursing, SLP - Deliver ...     Standing Status:   Standing     Number of Occurrences:   1     Order Specific Question:   Diet:     Answer:   Level 3 - Liquidised [26]     Comments:   no glucerna, 1 thickener for boost glucose control; ensure solids are adequate thickness     Order Specific Question:   Liquid level     Answer:   Level 2 - Mildly Thick     Order Specific Question:   Tray Modifications (optional)     Answer:   SLP - 1:1 Supervision by Nursing     Order Specific Question:   Tray Modifications (optional)     Answer:   SLP - Deliver to Nursing Station         LINES, DRAINS, AND WOUNDS  This is an automated list. Peripheral IVs will be removed prior to discharge.  Peripheral IV 12/18/22 22 G Anterior;Proximal;Right Forearm (Active)   Site Assessment Clean;Dry;Intact 12/19/22 0800   Dressing Type Transparent 12/19/22 0800   Line Status No blood return;Infusing;Scrubbed the hub prior to access 12/19/22 0800   Dressing Status Clean;Dry;Intact 12/19/22 0800   Dressing Intervention Initial dressing 12/19/22 0800   Infiltration Grading ([facility], [facility]) 0 12/19/22 0800   Phlebitis Scale ([facility] Only) 0 12/19/22 0800       Wound 11/28/22 Incision Thigh Proximal Right (Active)   Wound Image   11/28/22 2308   Site Assessment Clean;Dry;Intact 12/16/22 2015   Periwound Assessment Clean;Dry;Intact 12/16/22 2015   Margins Attached edges;Defined edges 12/14/22 2120   Closure Dermabond 12/15/22 2000   Drainage Amount None 12/15/22 2000   Treatments Site care 12/07/22 0800   Dressing Options Open to Air 12/14/22 2120   Dressing Changed Observed 12/19/22 0800   Dressing Status Dry;Clean;Intact 12/19/22 0800       Wound 12/12/22 Pressure Injury Sacrum Mid 12/13: Stage 2 (Active)   Wound Image      12/13/22 1300   Site Assessment Pink;Fragile 12/19/22 0800   Periwound Assessment Clean;Dry;Intact 12/16/22 2015   Margins Unattached edges 12/19/22 0800   Closure Adhesive bandage 12/13/22 1300   Drainage Amount Scant  12/18/22 1400   Drainage Description Serosanguineous 12/18/22 0800   Treatments Cleansed 12/18/22 0800   Wound Cleansing Normal Saline Irrigation 12/18/22 1400   Periwound Protectant Barrier Paste 12/18/22 1400   Dressing Cleansing/Solutions Other (Comments) 12/13/22 1300   Dressing Options Mepilex 12/16/22 0800   Dressing Changed Observed 12/19/22 0800   Dressing Status Clean;Dry;Intact 12/19/22 0800   Dressing Change/Treatment Frequency Every 72 hrs, and As Needed 12/13/22 1300   NEXT Dressing Change/Treatment Date 12/16/22 12/13/22 1300   NEXT Weekly Photo (Inpatient Only) 12/20/22 12/13/22 1300   WOUND NURSE ONLY - Pressure Injury Stage 2 12/13/22 1300   Non-staged Wound Description Not applicable 12/13/22 1300   Wound Length (cm) 2.7 cm 12/13/22 1300   Wound Width (cm) 0.9 cm 12/13/22 1300   Wound Depth (cm) 0.1 cm 12/13/22 1300   Wound Surface Area (cm^2) 2.43 cm^2 12/13/22 1300   Wound Volume (cm^3) 0.243 cm^3 12/13/22 1300   Shape Oval 12/13/22 1300   Wound Odor None 12/13/22 1300   Exposed Structures None 12/13/22 1300   WOUND NURSE ONLY - Time Spent with Patient (mins) 60 12/13/22 1400       Peripheral IV 12/18/22 22 G Anterior;Proximal;Right Forearm (Active)   Site Assessment Clean;Dry;Intact 12/19/22 0800   Dressing Type Transparent 12/19/22 0800   Line Status No blood return;Infusing;Scrubbed the hub prior to access 12/19/22 0800   Dressing Status Clean;Dry;Intact 12/19/22 0800   Dressing Intervention Initial dressing 12/19/22 0800   Infiltration Grading [place] 0 12/19/22 0800   Phlebitis Scale ([facility] Only) 0 12/19/22 0800               MENTAL STATUS ON TRANSFER      AOx4       CONSULTATIONS  cardiology, critical care, infectious disease, and nephrology and vascular surgery    PROCEDURES  Date of procedure: 12/13/2022     Procedure(s) Performed:   1) ICD implant  2) Pacemaker generator removal     Indication(s):  Ventricular fibrillation/ventricular tachycardia  Stress induced cardiomyopathy      Physician(s): Kenan Lyman M.D.    Date:    2022     Patient: Gilmer Mae  :    1940  MRN:    [id number]  _____________________________________________________     Preoperative Diagnosis:  Peripheral arterial occlusive disease with gangrene of the right foot     Postoperative Diagnosis:  Same     Procedure:  78472  right below knee amputation  _____________________________________________________     Surgeon:                                 Hemant Rdz MD    US-EXTREMITY VENOUS UPPER UNILAT LEFT   Final Result      DX-CHEST-PORTABLE (1 VIEW)   Final Result      1.  Status post left pacer ICD placement. No evidence of large left pleural effusion or pneumothorax.   2.  Apparent interval development of a small right-sided pleural effusion with associated basilar atelectasis and/or consolidation.   3.  Left basilar atelectasis and/or consolidation.   4.  Mild enlargement of the cardiomediastinal silhouette.      EC-ECHOCARDIOGRAM LTD W/ CONT   Final Result      CT-HEAD W/O   Final Result      1.  No acute intracranial hemorrhage.   2.  Similar chronic findings.   3.  Mild mucosal thickening of the inferior right maxillary sinus.         DX-CHEST-PORTABLE (1 VIEW)   Final Result      Stable mild cardiac enlargement without edema      DX-CHEST-PORTABLE (1 VIEW)   Final Result         1.  Hazy interstitial left lung base infiltrate, stable since prior study.   2.  Atherosclerosis      DX-ABDOMEN FOR TUBE PLACEMENT   Final Result      Orogastric tube tip at the proximal stomach.      EC-ECHOCARDIOGRAM COMPLETE W/ CONT   Final Result      DX-ABDOMEN FOR TUBE PLACEMENT   Final Result      NG tube tip projects over the stomach, and side-port projects 3 cm above the GE junction. It can be advanced further by approximately 7 cm.      DX-ABDOMEN FOR TUBE PLACEMENT   Final Result      NG tube tip projects over the GE junction. It can be advanced 8-10 cm      DX-CHEST-PORTABLE (1 VIEW)   Final Result      1.   Right central catheter tip is in the mid SVC. No pneumothorax.   2.  NGT tip is at the GE junction. It can be advanced 8 cm.   3.  Stable other findings.      DX-CHEST-PORTABLE (1 VIEW)   Final Result      1.  Well-positioned lines and tubes. No pneumothorax.   2.  Ill-defined left basilar opacity, chronic atelectasis/scarring or sequela of recent pneumonia.      CT-CTA AORTA-RO WITH & W/O-POST PROCESS   Final Result      1.  Extensive atherosclerotic vascular calcification involving the aorta and originating arteries. There is ectasia of the ascending thoracic aorta measured at 3.4 x 3.4 cm in diameter.      2.  Extensive atherosclerotic plaque involving the right common, superficial femoral and profunda arteries. There is occlusion of the right superficial femoral artery at the level of the proximal thigh. Popliteal artery is also occluded and there is    extensive atherosclerotic plaque involving the posterior tibial, anterior tibial and peroneal arteries. No definite continuous runoff vessel identified.      3.  Extensive atherosclerotic plaque involving the left common, profunda and superficial femoral and popliteal arteries with multiple areas of high-grade stenosis of the superficial femoral and popliteal arteries. There is also extensive atherosclerotic    change of the posterior tibial, anterior tibial and peroneal arteries without a definite contiguous runoff vessel seen.      4.  High-grade stenosis involving the origin of the superior mesenteric artery.      5.  Occluded origin of the inferior mesenteric artery.      6.  Atherosclerotic plaque involving the right renal artery origin and the proximal right renal artery resulting in 50% diameter narrowing.      7.  Less than 50% diameter narrowing of the left renal artery origin.      8.  Ill-defined groundglass infiltrates within the right upper lobe possibly representing infectious process.      9.  Multiple hepatic cysts.      3D angiographic/MIP images  of the vasculature confirm the vascular findings as described above.      US-VEIN MAPPING LOWER EXTREMITY UNILAT RIGHT   Final Result      DX-FOOT-COMPLETE 3+ RIGHT   Final Result      Likely acute osteomyelitis at the inferior calcaneus. MRI can be obtained as indicated.      MR-FOOT-W/O RIGHT   Final Result      1.  Very limited study due to extensive patient motion artifact and patient comfort preventing multiple sequences.      2.  No gross bony destructive change.      3.  Soft tissue edema.      US-EXTREMITY ARTERY LOWER UNILAT RIGHT   Final Result      US-EXTREMITY VENOUS LOWER UNILAT RIGHT   Final Result      US-ABDOMEN COMPLETE SURVEY   Final Result         1.  Gallbladder sludge, otherwise unremarkable gallbladder   2.  Hepatic cysts   3.  Bilateral echogenic kidneys with mildly thinned cortex, appearance favors medical renal disease      CT-HEAD W/O   Final Result      1.  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   2.  Mild cerebral atrophy. Age-appropriate.   3.  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   4.  No acute findings are evident.         DX-CHEST-PORTABLE (1 VIEW)   Final Result      No evidence of acute cardiopulmonary process.      CL-LEFT HEART CATHETERIZATION WITH POSSIBLE INTERVENTION    (Results Pending)   CL-IMPLANTABLE CARDIOVERTER DEFIBRILLATOR    (Results Pending)       LABORATORY  Lab Results   Component Value Date    SODIUM 148 (H) 12/19/2022    POTASSIUM 3.6 12/19/2022    CHLORIDE 114 (H) 12/19/2022    CO2 27 12/19/2022    GLUCOSE 101 (H) 12/19/2022    BUN 23 (H) 12/19/2022    CREATININE 0.78 12/19/2022        Lab Results   Component Value Date    WBC 7.1 12/19/2022    HEMOGLOBIN 8.6 (L) 12/19/2022    HEMATOCRIT 27.8 (L) 12/19/2022    PLATELETCT 180 12/19/2022        Total time of the discharge process exceeds 38 minutes.

## 2022-12-19 NOTE — THERAPY
Speech Language Pathology  Daily Treatment     Patient Name: Gilmer Mae  Age:  82 y.o., Sex:  male  Medical Record #: [id number]  Today's Date: 12/19/2022     Precautions  Precautions: (P) Fall Risk, Swallow Precautions ( See Comments), Non Weight Bearing Right Lower Extremity  Comments: s/p R BKA, wound vac, pacer precautions    HPI: 82 y.o. male presented 11/28/2022 with encephalopathy and RLE edema/pain. Cardiac arrest on 12/5  x2 with attempted fem-pop bypass procedure. FEES 12/8, recommended LQ3/MT2 (PAS 8 on TN/PU).     Assessment    Patient seen this date for dysphagia management. RN reports patient to discharge to SNF this date. Discussed with Dr. Keen preference for repeat FEES prior to discharge. MD very agreeable as patient expresses distaste with diet for her. Patient sleeping upon arrival and required moderate verbal/tactile cues to waken. Patient continues to keep eyes closed during session but participates with verbal cueing. Patient expresses continues to have limited PO intake with distaste for current diet. Patient's son, Kevin, at bedside. Patient's HOB raised as high as possible (Low Velázquez's) d/t bed malfunction; RN aware and new bed ordered.    PO presentations of MT2 (boost). Adequate oral bolus acceptance/containment, complete and timely AP transfer. No cough/throat clear appreciated with PO. Vocal quality remained stable throughout assessment. Three swallows completed per bolus, in alignment with FEES recommendations. Provided education regarding need for repeat swallowing diagnostic r/t silent aspiration of thin liquids and pureed solids. Patient expressing dislike for scope in nose. Provided rationale as to why study is needed to upgrade diet; patient agreeable. Patient appears appropriate for FEES, presumed by finger probe to L nare.     Boost Glucose Control is able to be thickened, but has special thickening instructions: To achieve mildly thick, add one mildly thick packet to 8 oz of  "Boost Glucose Control.      Recommendations  Liquidized/mildly thick liquid diet - ENSURE ADEQUATE THICKNESS  2.  Swallowing Instructions & Precautions:   Supervision: 1:1 feeding with constant supervision  Positioning: Fully upright and midline during oral intake  Medication: crush w/ applesauce  Strategies: Small bites/sips, Slow rate of intake, Multiple swallows (x 2-3) per bite/sip , Effortful swallow  Oral Care: after meals     Following for dysphagia management.     Plan    Continue current treatment plan.    Discharge Recommendations: (P) Recommend post-acute placement for additional speech therapy services prior to discharge home     Objective       12/19/22 0951   Charge Group   SLP Swallowing Dysfunction Treatment Swallowing Dysfunction Treatment   Total Treatment Time   SLP Time Spent Yes   SLP Swallowing Dysfunction Treatment (Mins) 17   Precautions   Precautions Fall Risk;Swallow Precautions ( See Comments);Non Weight Bearing Right Lower Extremity   Vitals   O2 (LPM) 1   O2 Delivery Device Silicone Nasal Cannula   Pain 0 - 10 Group   Therapist Pain Assessment Post Activity Pain Same as Prior to Activity;0   Dysphagia    Dysphagia X   Diet / Liquid Recommendation Mildly Thick (2) - (Nectar Thick);Liquidised (3) - (Nectar Thick Full Liquid)   Nutritional Liquid Intake Rating Scale Thickened beverages (mildly thick unless otherwise specified)   Nutritional Food Intake Rating Scale Total oral diet of a single consistency   Nursing Communication Swallow Precaution Sign Posted at Head of Bed   Skilled Intervention Verbal Cueing;Compensatory Strategies   Recommended Route of Medication Administration   Medication Administration    (CRUSH IN APPLESAUCE)   Patient / Family Goals   Patient / Family Goal #1 \"that's good\" - water   Goal #1 Outcome Progressing slower than expected   Short Term Goals   Short Term Goal # 1 Patient will participate in pre-feeding trials with SLP only with no overt s/sx of aspiration "   Goal Outcome # 1 Goal met, new goal added   Short Term Goal # 1 B  Patient will consume meals of liquidized solids and mildly thick liquids with no s/sx of aspiration given min cues for safe swallow precautions.   Goal Outcome  # 1 B Progressing as expected   Short Term Goal # 2 Patient will complete BoT resistance, laryngeal elevation exercises x15 reps per session given min cues.   Goal Outcome # 2  Progressing as expected   Education Group   Education Provided Dysphagia   Dysphagia Patient Response Patient;Family;Acceptance;Explanation;Verbal Demonstration;Reinforcement Needed;Action Demonstration   Anticipated Discharge Needs   Discharge Recommendations Recommend post-acute placement for additional speech therapy services prior to discharge home   Therapy Recommendations Upon DC Dysphagia Training   Interdisciplinary Plan of Care Collaboration   IDT Collaboration with  Nursing;Family / Caregiver;Physician   Patient Position at End of Therapy In Bed;Bed Alarm On;Call Light within Reach;Tray Table within Reach;Phone within Reach;Family / Friend in Room   Collaboration Comments RN updated

## 2022-12-19 NOTE — PROGRESS NOTES
"Palliative Care Advance Care Planning Progress Note    HPI:     This is an 82-year-old male with history of PAD, aortic stenosis s/p TAVR, sick sinus syndrome s/p PPM who was admitted 11/28/22 for altered mental status and ALL and was found to have osteomyelitis of the right calcaneus.     He was initially taken to the OR on 12/5 for vascular surgery but had wide-complex tachycardia with pulseless arrest 10 minutes after induction. He received 1 minute of CPR with 1mg of epinephrine, and ROSC was obtained. He was transferred to the CICU with an ongoing epinephrine infusion and developed ventricular fibrillation. He was able to return to ROSC quickly, but EKG showed possible STEMI. He was taken to the cath lab for left heart catheterization which indicated Takotusbo syndrome.     He remained in the ICU until he was transferred to the floor on 12/8. Electrophysiology was consulted to upgrade his device from a PPM to an ICD, and he had this procedure on 12/13.    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    Palliative care has been following him during this hospitalization for goals of care/advance care planning.    Received sign out from CHASITY Cook that the patient needed to complete a POLST prior to discharge. He is discharging at 1400 to [facility].    Discussion: Franco, MS4, and I met with the patient and his son at bedside. The patient was resting with his eyes closed. We explained our purpose for stopping by. His son said that they had had a lot of activity in the past hour with patient care and discharge planning. Mr. Mae asked, \"Is that the DNR?\" We discussed what the POLST form was. We summarized his previous conversations with Joyce and asked if he would want to complete the form now. His son attempted to discuss the form as well. Throughout the conversation the patient kept his eyes closed and did not speak. We asked if he wanted us to come back later and he said " "yes.    Returned to the patient's room to discuss POLST form. He was there by himself, again resting with his eyes closed. Spoke with him about the POLST form. He stated, \"Do you want me to sign a DNR?\" I explained to him the form and its components. He then said, \"If I sign that, you take away all of my treatments.\" I explained what DNR means and that it only comes into effect in the case of his death. Again, he kept his eyes closed for the majority of the conversation. I told him that I was not there to force him to sign anything, and asked if he wanted to fill out the form at this time. He said no    Outcome: POLST form was not completed during this hospitalization.    Plan: Palliative care to continue to follow, provide support, and help facilitate decision making as clinical picture evolves.    Updated: Spoke with LISA Acosta in person. Sent a Voalte message to hospitalist Dr. Keen    Thank you for allowing Palliative Care to participate in his care. Please call our team with questions and/or additional needs.    As appropriate, Palliative Care encourages medical team to engage in further conversation, education for patient/family at bedside regarding code status, GOC/POC.    Total visit time was 15 minutes discussing and coordinating advance care planning.    Donis Garcia M.D.  [facility]  [phone]        "

## 2022-12-19 NOTE — THERAPY
Speech Language Pathology   Fiberoptic Endoscopic Evaluation of Swallow     Patient Name: Gilmer Mae  AGE:  82 y.o., SEX:  male  Medical Record #: [id number]  Today's Date: 12/19/2022     Precautions  Precautions: Fall Risk, Swallow Precautions ( See Comments)  Comments: s/p R BKA, wound vac, pacer precautions    Assessment    Current Method of Nutrition   Liquidized solids with mildly thick liquids.      Pertinent Information:  Affect/Behavior: Flat, Somnolent  Oxygen Requirements:  1 L Nasal Cannula  Feeding Tube: None  Dentition: Good  Factor(s) Affecting Performance:  Lethargy/somnolence     Discussed the risks, benefits, and alternatives of the FEES procedure. Patient/family acknowledged and agreed to proceed.      Assessment  Flexible Endoscopic Evaluation of Swallowing (FEES) completed at bedside today. The endoscope was passed transnasally via right nare to evaluate the anatomy and physiology of swallowing. Pt tolerated the procedure with no apparent distress but repeated requested removal of scope. Nares noted to be dry..     Anatomic Findings:  No space occupying lesions noted on VF anymore however, asymmetrical laryngeal closure pattern noted with sluggish movement on right side.   Vocal Fold Motion: Bilateral movement; hyperfunction noted R>L   Secretion Management: Once again excess secretions in valleculae noted. Cleared with cleared w/ ice chips and liquid trials.  PO trials: ice chips, thin liquids, mildly thick liquids, liquidized, puree, soft & bite sized      Consistency PAS Score Timing Comments   Ice Chips 1 N/A    Thin Liquid 8 During swallow (inferred) PAS 7- with cup sips/moderate aspiration.      Mildly Thick Liquid 3 Post swallow    Liquidized 3 Pre swallow    Puree 7 During swallow (inferred) Throat clear   Soft & Bite Sized 1 N/A Moderate vallecular resiude      Penetration-Aspiration Scale (PAS)  1     No contrast enters airway  3     Contrast enters the airway, remains above the vocal  folds, and is not ejected from the airway (is seen in the airway after the swallow).  7     Contrast enters the airway, crosses the plane of the vocal folds, and is not ejected from the airway despite effort.  8     Contrast enters the airway, crosses the plane of the vocal folds, is not ejected from the airway and there is no response to aspiration.    Oral phase:  Intact labial seal with cues. Pt with open mouth posture at rest and upon initial introduction of bolus. No anterior bolus loss appreciated. Suspected slow ineffetive mastication as evidenced by a nearly whole piece of soft solids entering pharynx after >60 seconds of mastication.     Pharyngeal phase:  Weak base of tongue retraction, incomplete epiglottic inversion and pharyngeal constriction was inadequate resulting in consistent high penetration before the swallow and moderate pharyngeal residue after the initial swallow. Inadequate laryngeal vestibule closure noted with airway invasion of thin liquids and purees. Inconsistent response to aspiration throughout study. Response to aspiration was ineffective in clearing aspirate. Delayed cued clearing swallows minimized residue but did not completely eliminate residue.     Clinical Impressions  The pt presents with a moderate-severe oropharyngeal dysphagia, likely acute on chronic given dysphagia noted >2 weeks ago on previous FEES evalulation. Patient's overall swallow function has remained relatively similar to previous evaluation with no significant improvement in overall oropharyngeal swallow function, likely compounded by deconditioning from acute illness. Patient with continued aspiration on advanced diet textures. Education provided to patient, however, ongoing reinforcement needed given inability of patient to recall education at end of study. Appreciate Palliative care assistance with further determining goals of care.     Recommendations  Continue Liquidized mildly thick liquids given severity  "of oropharyngeal dysphagia.   2.  Swallowing Instructions & Precautions:   Supervision: 1:1 feeding with constant supervision  Positioning: Fully upright and midline during oral intake  Medication: Crush with applesauce  Strategies: Small bites/sips, Multiple swallows (x 2-3) per bite/sip , Effortful swallow  Oral Care: Q4h      Plan    Recommend Speech Therapy 3 times per week until therapy goals are met for the following treatments:  Dysphagia Training.    Discharge Recommendations: Recommend post-acute placement for additional speech therapy services prior to discharge home    Objective       12/19/22 1148   Vitals   O2 (LPM) 1   O2 Delivery Device Silicone Nasal Cannula   Pain 0 - 10 Group   Therapist Pain Assessment Nurse Notified;Post Activity Pain Same as Prior to Activity   Prior Living Situation   Prior Services None   Housing / Facility 2 Story House   Lives with - Patient's Self Care Capacity Spouse   Dysphagia Rating   Nutritional Liquid Intake Rating Scale Thickened beverages (mildly thick unless otherwise specified)   Nutritional Food Intake Rating Scale Total oral diet of a single consistency   Evaluation Recommended Techniques   Swallow Techniques Yes   Patient / Family Goals   Patient / Family Goal #1 \"that's good\" - water   Goal #1 Outcome Progressing slower than expected   Short Term Goals   Short Term Goal # 1 B  Patient will consume meals of liquidized solids and mildly thick liquids with no s/sx of aspiration given min cues for safe swallow precautions.   Short Term Goal # 2 Patient will complete BoT resistance, laryngeal elevation exercises x15 reps per session given min cues.   Education Group   Education Provided Dysphagia   Dysphagia Patient Response Patient;Family;Acceptance;Explanation;Verbal Demonstration;Reinforcement Needed;Action Demonstration     "

## 2022-12-19 NOTE — DISCHARGE PLANNING
DC Transport Scheduled    Received request at: 12/19/2022 at 0940    Transport Company Scheduled:  [employer]  Spoke with ALESHIA at [facility] to schedule transport.  Scheduled Date: 12/19/2022  Scheduled Time: 1400    Destination: [facility] AT [address] [place]ES[place]    Notified care team of scheduled transport via Voalte.     If there are any changes needed to the DC transportation scheduled, please contact [facility] at ext. [phone] between the hours of 8438-4193 Mon-Fri. If outside those hours, contact the ED Case Manager at ext. [phone].     Pt  Stated he started with a dry cough yesterday and today started with a fever of 100 5  Pt  Is not sob at this time   Pt  Has not taken anything for his fever and took temp while I was speaking with him   Pt  Temp at this time is 101 5  Pt  Advised to take tylenol for temp and monitor symptoms  Pt  Deny any travel outside the  but stated they just drove back from [facility] a few days ago  Pt  Wife seemed very concerned for the virus and I asked if they felt pt  Wanted to go to the ED  To be tested   Pt  Stated not at this time but will monitor symptoms and call back if needed

## 2022-12-19 NOTE — PROGRESS NOTES
Assumed care of patient Ya LISA. Pt is A+O x3. No chest pain or SOB. No active bleeding noted. Informed of safety and call system. rhythm is normal sinus rhythm.  Planned to transfer to SNF today. Family at bedside.

## 2022-12-29 PROBLEM — D64.9 ANEMIA: Status: ACTIVE | Noted: 2022-01-01

## 2022-12-30 PROBLEM — L89.153 STAGE III PRESSURE ULCER OF SACRAL REGION (HCC): Status: ACTIVE | Noted: 2022-01-01

## 2022-12-30 PROBLEM — N39.0 ACUTE UTI: Status: ACTIVE | Noted: 2022-01-01

## 2022-12-30 PROBLEM — I95.9 HYPOTENSION: Status: ACTIVE | Noted: 2022-01-01

## 2022-12-30 PROBLEM — Z78.9 FULL CODE STATUS: Status: ACTIVE | Noted: 2022-01-01

## 2022-12-30 PROBLEM — L89.152 PRESSURE INJURY OF SACRAL REGION, STAGE 2 (HCC): Status: ACTIVE | Noted: 2022-01-01

## 2022-12-30 NOTE — CARE PLAN
The patient is Watcher - Medium risk of patient condition declining or worsening    Shift Goals  Clinical Goals: Pt will maintain stable BP and improved hgb level  Patient Goals: Rest  Family Goals: MAYA    Progress made toward(s) clinical / shift goals: Hgb stable after 1uPRBC. BP's 100's.     Problem: Knowledge Deficit - Standard  Goal: Patient and family/care givers will demonstrate understanding of plan of care, disease process/condition, diagnostic tests and medications  Outcome: Progressing     Problem: Pain - Standard  Goal: Alleviation of pain or a reduction in pain to the patient’s comfort goal  Outcome: Progressing     Problem: Fall Risk  Goal: Patient will remain free from falls  Outcome: Progressing     Problem: Hemodynamics  Goal: Patient's hemodynamics, fluid balance and neurologic status will be stable or improve  Outcome: Progressing     Problem: Respiratory  Goal: Patient will achieve/maintain optimum respiratory ventilation and gas exchange  Outcome: Progressing     Problem: Risk for Aspiration  Goal: Patient's risk for aspiration will be absent or decrease  Outcome: Progressing     Problem: Urinary Elimination  Goal: Establish and maintain regular urinary output  Outcome: Progressing       Patient is not progressing towards the following goals:

## 2022-12-30 NOTE — ASSESSMENT & PLAN NOTE
-Present on admission.  Wound does not seem to be infected on admission.  Frequent repositioning, wound care.

## 2022-12-30 NOTE — ASSESSMENT & PLAN NOTE
-On admission initial hemoglobin is 6.4.   Given one unit in the ED and repeat Hgb >10 which would seem a disproporionate climb given that he also was bolused fluids  Was guaiac + in the ED  Cont to follow H&H closely  PO PPI  Has had no further evidence of GI bleed

## 2022-12-30 NOTE — ED PROVIDER NOTES
ED Provider Note        CHIEF COMPLAINT  Chief Complaint   Patient presents with    Fatigue     Increase in fatigue over the past 2 days.     Hypotension     86/60 upon EMS arrival. 75/51 upon arrival to hospital.        EXTERNAL RECORDS REVIEWED  Select: Inpatient Notes discharge summary 12/19/2022    HPI  LIMITATION TO HISTORY   Select: Altered mental status / Confusion  OUTSIDE HISTORIAN(S):  Select: Spoke to the nurse practitioner at [facility] earlier today, she was requesting transfer to the ER for PEG tube.    Gilmer Mae is a 82 y.o. male who presents for evaluation of hypotension.  This patient was discharged to the hospital 10 days ago and has been in an acute rehab facility.  During his hospitalization earlier this month he underwent amputation of the right leg, had 2 cardiac arrests and ultimately had an AICD placed.  He has been at the rehab facility for 10 days now and earlier see the phone call from the nurse practitioner there requesting transfer for PEG tube placement.  Apparently has not been eating or drinking much.  The patient arrives several hours after that by EMS with the finding of hypotension.  The patient is very sleepy and is unable to provide meaningful history at this time.    REVIEW OF SYSTEMS  See HPI for further details. All other systems are negative.     PAST MEDICAL HISTORY   has a past medical history of Chronic pain, Hyperlipidemia, Hypertension, and Pacemaker.    SURGICAL HISTORY   has a past surgical history that includes pacemaker insertion and knee amputation below (Right, 12/14/2022).    FAMILY HISTORY  No family history on file.    SOCIAL HISTORY  Social History     Tobacco Use    Smoking status: Never     Passive exposure: Never    Smokeless tobacco: Not on file   Vaping Use    Vaping Use: Never used   Substance and Sexual Activity    Alcohol use: Not on file    Drug use: Not on file    Sexual activity: Not on file       CURRENT MEDICATIONS  Home Medications    **Home  "medications have not yet been reviewed for this encounter**         ALLERGIES  No Known Allergies    PHYSICAL EXAM  VITAL SIGNS: BP (!) 75/51   Pulse 67   Temp 36.4 °C (97.5 °F) (Temporal)   Resp 16   Ht 1.727 m (5' 7.99\")   Wt 79.2 kg (174 lb 9.6 oz)   SpO2 91%   BMI 26.56 kg/m²    Constitutional: Sleepy, arousable quickly falls back asleep, ill-appearing, elderly  HENT: Normocephalic, no obvious evidence of acute trauma.  Eyes: No scleral icterus. Normal conjunctiva   Neck: Comfortable movement without any obvious restriction in the range of motion.  Cardiovascular: Upon ascultation I appreciate a regular heart rhythm and a normal rate.   Thorax & Lungs: Fresh AICD site left chest is nonerythematous.  Not obviously tender.  Lungs are clear bilaterally.  Abdomen: The abdomen is not visibly distended. Upon palpation, I find it to be without tenderness.  No mass appreciated.  Rectal: Brown stool, heme positive  Skin: The exposed portions of skin reveal no obvious rash or other abnormalities.  Extremities/Musculoskeletal: Fresh BKA on the right, stapled incision is intact, no surrounding erythema or drainage    DIAGNOSTIC STUDIES / PROCEDURES    LABS  Results for orders placed or performed during the hospital encounter of 12/29/22   Lactic acid (lactate)   Result Value Ref Range    Lactic Acid 1.3 0.5 - 2.0 mmol/L   CBC With Differential   Result Value Ref Range    WBC 14.8 (H) 4.8 - 10.8 K/uL    RBC 1.86 (L) 4.70 - 6.10 M/uL    Hemoglobin 6.4 (L) 14.0 - 18.0 g/dL    Hematocrit 20.5 (L) 42.0 - 52.0 %    .2 (H) 81.4 - 97.8 fL    MCH 34.4 (H) 27.0 - 33.0 pg    MCHC 31.2 (L) 33.7 - 35.3 g/dL    RDW 70.9 (H) 35.9 - 50.0 fL    Platelet Count 161 (L) 164 - 446 K/uL    MPV 10.5 9.0 - 12.9 fL    Neutrophils-Polys 93.10 (H) 44.00 - 72.00 %    Lymphocytes 5.20 (L) 22.00 - 41.00 %    Monocytes 1.70 0.00 - 13.40 %    Eosinophils 0.00 0.00 - 6.90 %    Basophils 0.00 0.00 - 1.80 %    Nucleated RBC 0.00 /100 WBC    " Neutrophils (Absolute) 13.78 (H) 1.82 - 7.42 K/uL    Lymphs (Absolute) 0.77 (L) 1.00 - 4.80 K/uL    Monos (Absolute) 0.25 0.00 - 0.85 K/uL    Eos (Absolute) 0.00 0.00 - 0.51 K/uL    Baso (Absolute) 0.00 0.00 - 0.12 K/uL    NRBC (Absolute) 0.00 K/uL    Anisocytosis 2+ (A)     Macrocytosis 2+ (A)    Comp Metabolic Panel   Result Value Ref Range    Sodium 147 (H) 135 - 145 mmol/L    Potassium 3.0 (L) 3.6 - 5.5 mmol/L    Chloride 108 96 - 112 mmol/L    Co2 29 20 - 33 mmol/L    Anion Gap 10.0 7.0 - 16.0    Glucose 119 (H) 65 - 99 mg/dL    Bun 23 (H) 8 - 22 mg/dL    Creatinine 0.71 0.50 - 1.40 mg/dL    Calcium 7.6 (L) 8.5 - 10.5 mg/dL    AST(SGOT) 29 12 - 45 U/L    ALT(SGPT) 20 2 - 50 U/L    Alkaline Phosphatase 114 (H) 30 - 99 U/L    Total Bilirubin 0.7 0.1 - 1.5 mg/dL    Albumin 2.2 (L) 3.2 - 4.9 g/dL    Total Protein 5.0 (L) 6.0 - 8.2 g/dL    Globulin 2.8 1.9 - 3.5 g/dL    A-G Ratio 0.8 g/dL   Urinalysis    Specimen: Urine   Result Value Ref Range    Color Yellow     Character Clear     Specific Gravity 1.018 <1.035    Ph 5.5 5.0 - 8.0    Glucose Negative Negative mg/dL    Ketones 15 (A) Negative mg/dL    Protein Negative Negative mg/dL    Bilirubin Negative Negative    Urobilinogen, Urine 1.0 Negative    Nitrite Positive (A) Negative    Leukocyte Esterase Small (A) Negative    Occult Blood Trace (A) Negative    Micro Urine Req Microscopic    ESTIMATED GFR   Result Value Ref Range    GFR (CKD-EPI) 92 >60 mL/min/1.73 m 2   CORRECTED CALCIUM   Result Value Ref Range    Correct Calcium 9.0 8.5 - 10.5 mg/dL   DIFFERENTIAL MANUAL   Result Value Ref Range    Manual Diff Status PERFORMED    PERIPHERAL SMEAR REVIEW   Result Value Ref Range    Peripheral Smear Review see below    PLATELET ESTIMATE   Result Value Ref Range    Plt Estimation Normal    MORPHOLOGY   Result Value Ref Range    RBC Morphology Present    URINE MICROSCOPIC (W/UA)   Result Value Ref Range    WBC 10-20 (A) /hpf    RBC 0-2 (A) /hpf    Bacteria Many (A)  None /hpf    Epithelial Cells Negative /hpf    Hyaline Cast 11-20 (A) /lpf   APTT   Result Value Ref Range    APTT 36.6 (H) 24.7 - 36.0 sec   Prothrombin Time   Result Value Ref Range    PT 14.7 (H) 12.0 - 14.6 sec    INR 1.16 (H) 0.87 - 1.13   EKG   Result Value Ref Range    Report       [facility] Emergency Dept.    Test Date:  2022  Pt Name:    ZOE SARMIENTO                 Department: ER  MRN:        [id number]                      Room:        03  Gender:     Male                         Technician: [id number]  :        [id number]                   Requested By:ER TRIAGE PROTOCOL  Order #:    [id number]                    Reading MD: CHAYO SALDANA MD    Measurements  Intervals                                Axis  Rate:       69                           P:          -75  WY:         171                          QRS:        -66  QRSD:       174                          T:          133  QT:         553  QTc:        593    Interpretive Statements  PACED.  Electronically Signed On 2022 19:34:25 PST by CHAYO SALDANA MD          RADIOLOGY  I have independently interpreted the diagnostic imaging associated with this visit and am waiting the final reading from the radiologist.   DX-CHEST-PORTABLE (1 VIEW)   Final Result      1.  Satisfactory appearance of the right IJ catheter with no pneumothorax visualized.   2.  Patchy lower lobe parenchymal opacities could be due to atelectasis or pneumonia or edema.      DX-CHEST-PORTABLE (1 VIEW)   Final Result         Unchanged opacity in the left lower lobe, atelectasis or consolidation              Central Line Placement Procedure Note  Indication: centrally administered medications    Consent: The patient provided verbal consent for this procedure.    Procedure: The patient was positioned appropriately and the skin over the right internal jugular vein was prepped with chlorhexidine and draped in a sterile fashion. Local anesthesia was  obtained by infiltration using 1% Lidocaine without epinephrine.  A large bore needle was used to identify the vein.  A guide wire was then inserted into the vein through the needle. A triple lumen catheter was then inserted into the vessel over the guide wire using the Seldinger technique.  All ports showed good, free flowing blood return and were flushed with saline solution.  The catheter was then securely fastened to the skin with sutures and covered with a sterile dressing.  A post procedure X-ray was ordered and showed good line position.    The patient tolerated the procedure well.    Complications: None      COURSE & MEDICAL DECISION MAKING  Pertinent Labs & Imaging studies reviewed. (See chart for details)    ED Observation Status? NO.    ASSESSMENT AND PLAN  This is an ill-appearing 82-year-old hypotensive patient sent by an acute rehab facility.  Recent AICD status postcardiac arrest with recent BKA.  Per report from the rehab facility has been eating and drinking not enough over the past several days, initially they wanted to send him here to have a PEG tube placed, few hours later he arrives hypotensive.  Patient does not offer significant history.  Septic work-up has been initiated by nursing staff, IV fluids will be infused.  After a liter of IV fluids and his blood pressure is not significantly improved a central line for vasopressors will be done ------ after liter of IV fluids the patient's systolic blood pressure remains in the 70s.  Central line placed, Levophed has been started peripherally and will be converted to the central line once confirmed via x-ray.  The patient's blood work reveals significant anemia with a hemoglobin of 6.4.  During his hospitalization it had trended down to right around 7.  Brown stool that is heme positive.  Initiate a blood transfusion given his hypotension.  Additionally, he does have a leukocytosis, will start some broad-spectrum antibiotics although I do not see  any obvious infections with either surgical site.  He does seem to have a UTI.  I consulted Dr. Sanchez, cardiologist who agrees with Levophed as a choice of pressor in the situation.  I will consult the intensivist for admission in ICU versus stepdown.    CRITICAL CARE  The very real possibilty of a deterioration of this patient's condition required the highest level of my preparedness for sudden, emergent intervention.  I provided critical care services, which included medication orders, frequent reevaluations of the patient's condition and response to treatment, ordering and reviewing test results, and discussing the case with various consultants.  The critical care time associated with the care of the patient was 39 minutes. Review chart for interventions. This time is exclusive of any other billable procedures.     FINAL IMPRESSION  1. Other specified hypotension    2. Acute cystitis without hematuria    3. Anemia, unspecified type    4. Hematochezia           Electronically signed by: Dawson Small M.D., 12/29/2022 5:48 PM

## 2022-12-30 NOTE — ED NOTES
Pt transported off unit with ICU RN. Pt awake and breathing with even unlabored respirations on 2L NC with blood transfusing at time of transfer. No S/S of transfusion reaction.

## 2022-12-30 NOTE — ED TRIAGE NOTES
"Chief Complaint   Patient presents with    Fatigue     Increase in fatigue over the past 2 days.     Hypotension     86/60 upon EMS arrival. 75/51 upon arrival to hospital.      Pt BIB EMS from [facility]. 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 Per staff at [facility], pt has been \"less engaged\" over the past 2 days and pt report increase in fatigue. Pt answers all orientation questions appropriately. Upon EMS arrival, pt was 86/60 and saturating 85% on his baseline 1L. EMS placed pt on 2L and administered approx. 300 cc fluids. BP increased to 93/60. Pt arrives saturating above 90% on RA. BP 75/51.     Sepsis protocol ordered.     BP (!) 75/51   Pulse 67   Temp 36.4 °C (97.5 °F) (Temporal)   Resp 16   Ht 1.727 m (5' 7.99\")   Wt 79.2 kg (174 lb 9.6 oz)   SpO2 91%   BMI 26.56 kg/m²     "

## 2022-12-30 NOTE — ASSESSMENT & PLAN NOTE
Pt had been DNR/I on previous admissions  He tells me that his wifes only income is his correction which will go away when he dies he therefor wants to be Full Code

## 2022-12-30 NOTE — ASSESSMENT & PLAN NOTE
-Inpatient status to IMCU.  -Hypotension likely multifactorial: Hypovolemia, infection and possibly also cardiogenic.  -He was on norepinephrine infusion in the ED however this is discontinued now the patient is getting blood transfusion.  -Central line was placed in the ED.  -He has UTI but no other sirs criteria, therefore not septic on admission.  -Initial hemoglobin was 6.4 and had positive guaiac test.  Resolved post IVFs and PRBCs  Follow cultures

## 2022-12-30 NOTE — ED NOTES
Per EPR, intensivist would levo gtt stopped & see how BP reacts. Per EPR, stop gtt for now. Levo gtt stopped as per MAR.

## 2022-12-30 NOTE — ED NOTES
Phone report to LISA Rodriguez. All questions answered. RN notified blood transfusion is being initiated and pt will be ready for transport in 15 minutes.

## 2022-12-30 NOTE — ASSESSMENT & PLAN NOTE
"-Patient has been intermittently DNR/DNI and full code.  There is no POLST form signed.  I had a long conversation with him about advance care planning and goals of care.  He reports that he has been  for 60 years and \"want to fight for alive his long as he can, so I can spend time with my wife \".  Patient will remain a full code on admission.  Advanced care planning including the explanation and discussion of advanced directives such as standard forms by physician: First 30 minutes face-to-face with the patient.    "

## 2022-12-30 NOTE — ASSESSMENT & PLAN NOTE
Pt is not taking po consistently so started on LR  Na now trending down  Pt still clinically dry  Repeat BMP in am

## 2022-12-30 NOTE — DIETARY
"Nutrition Services: Day 1 of admit.  Gilmer Mae is a 82 y.o. male with admitting DX of Anemia.    RD alerted to pt with the following High-risk Hospital Problems:  Stage III pressure ulcer of sacral region (HCC) 12/30  Severe protein-calorie malnutrition (HCC) 12/15    Assessment:  Height: 172.7 cm (5' 7.99\")  Weight: 62.6 kg (138 lb 0.1 oz)  Body mass index is 20.99 kg/m²., BMI classification: Normal (low-end).    Diet/Intake:   Diet: Level 3 - Liquidised    Liquid level Level 2 - Mildly Thick    Second Modifier: (optional) Cardiac    No PO intake per ADLs.    Evaluation:   Information obtained from chart prior to consult:  12/29 Pt BIB EMS from [facility]. 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 Per staff at [facility], pt has been \"less engaged\" over the past 2 days and pt report increase in fatigue. Pt answers all orientation questions appropriately. Upon EMS arrival, pt was 86/60 and saturating 85% on his baseline 1L. EMS placed pt on 2L and administered approx. 300 cc fluids. BP increased to 93/60. Pt arrives saturating above 90% on RA. BP 75/51. Sepsis protocol ordered.  ED note mentions something about possible PEG-tube placement and that pt has not been eating/drinking well.  Current weight 62.6 kg (12/29).  Pt was last admitted 11/28-12/19.  Weight on initial admit day was 67 kg (11/28). Pt then had BKA 12/14, and post-op weight ws 74 kg (on 12/15). Weight on D/c was 79.2 kg (from 12/18). These weights obviously don't make sense. If we take the expected weight lost from BKA (5.9%) off of the admit weight, then pt should have weighed 63 kg post-op, which is close to current weight.  Agree with MD Dx of severe PCM from 12/15 based on 12.5% weight loss in <4 months (from initial RD consult 11/29) and inadequate nutrition x4 weeks (3 weeks during admission and 1 week PTA-- this was well-documented in Dietary notes throughout last admission).  Of note, " pt was on a liquidised diet per SLP on last admission. Communicated this with MD and RN this morning; diet later changed diet to L3/L2.  Pt was also receiving Boost VHC supplements during last admit.  Visited pt's room this afternoon. He was sound asleep with oxymask in place.   Nutrition Focused Physical Exam (NFPE) revealed severe muscle wasting of the temporalis muscle, moderate muscle wasting of the clavicle region, and 3+ pitting edema of LUE.    Malnutrition Risk: Pt continues to meet ASPEN criteria for severe malnutrition based on poor PO intake and weight loss from last admission, which carries over to this admission, plus severe muscle wasting and moderately-severe pitting edema per today's NFPE.    Recommendations/Plan:  PO diet as appropriate/tolerated. Will add Boost supplements.  Encourage/assist with PO intake.  Document all PO intake in ADLs.   Monitor weight.  Consult RD for TF assessment as needed.    RD following.

## 2022-12-30 NOTE — PROGRESS NOTES
"Hospital Medicine Daily Progress Note    Date of Service  12/30/2022    Chief Complaint  Gilmer Mae is a 82 y.o. 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  Came to us with hypotension from Rehab where he had been sent after a R BKA.  During that surgery had a cardiac arrest during induction requiring CPR.  Went to ICU where he had a second VF arrest.  LHC showing Takotsubo physiology.  ICD placer 12/13.  On this admission in the ED  Hgb 6.4, Na 147, guaiac +, Nit + UA, infiltrate noted on CXR.  CVC placed, given one unit PRBCs and admitted to the IMCU with Dx of sepsis, UTI, anemia    Interval Problem Update  Pt c/o dry mouth but otherwise feels \"OK\".  Notes phantom limb pain in his R leg seems less today.      Sinus/paced 60s  -130s: off levo this am  3 LNC  AFebrile    I have discussed this patient's plan of care and discharge plan at IDT rounds today with Case Management, Nursing, Nursing leadership, and other members of the IDT team.    Consultants/Specialty      Code Status  Full Code    Disposition  Patient is not medically cleared for discharge.   Anticipate discharge to to an inpatient rehabilitation hospital.  I have placed the appropriate orders for post-discharge needs.    Review of Systems  ROS     Physical Exam  Temp:  [35.9 °C (96.7 °F)-36.7 °C (98 °F)] 36.7 °C (98 °F)  Pulse:  [60-69] 69  Resp:  [10-20] 12  BP: ()/(44-64) 99/56  SpO2:  [89 %-99 %] 95 %    Physical Exam  Constitutional:       General: He is not in acute distress.     Appearance: He is well-developed. He is not diaphoretic.   HENT:      Head: Normocephalic and atraumatic.      Mouth/Throat:      Mouth: Mucous membranes are dry.   Eyes:      Conjunctiva/sclera: Conjunctivae normal.   Neck:      Vascular: No JVD.   Cardiovascular:      Rate and Rhythm: Normal rate.      Heart sounds: Murmur heard.     No gallop. "   Pulmonary:      Effort: Pulmonary effort is normal. No respiratory distress.      Breath sounds: No stridor. No wheezing or rales.   Abdominal:      Palpations: Abdomen is soft.      Tenderness: There is no abdominal tenderness. There is no guarding or rebound.   Musculoskeletal:         General: No tenderness.      Left lower leg: No edema.      Comments: R BKA wound healing well, staples in place, margins well approximated   Skin:     General: Skin is warm and dry.      Capillary Refill: Capillary refill takes less than 2 seconds.      Coloration: Skin is not jaundiced or pale.      Findings: No rash.   Neurological:      General: No focal deficit present.      Mental Status: He is oriented to person, place, and time.   Psychiatric:         Mood and Affect: Mood normal.         Behavior: Behavior normal.         Thought Content: Thought content normal.       Fluids    Intake/Output Summary (Last 24 hours) at 12/30/2022 0818  Last data filed at 12/30/2022 0554  Gross per 24 hour   Intake 1031.24 ml   Output 275 ml   Net 756.24 ml       Laboratory  Recent Labs     12/29/22 1707 12/29/22 2331 12/30/22  0532   WBC 14.8* 8.7  --    RBC 1.86* 3.20*  --    HEMOGLOBIN 6.4* 10.9* 10.6*   HEMATOCRIT 20.5* 35.1* 33.4*   .2* 109.7*  --    MCH 34.4* 34.1*  --    MCHC 31.2* 31.1*  --    RDW 70.9* 71.8*  --    PLATELETCT 161* 105*  --    MPV 10.5 10.6  --      Recent Labs     12/29/22 1707 12/29/22  2331 12/30/22  0532   SODIUM 147* 147* 147*   POTASSIUM 3.0* 3.0* 3.3*   CHLORIDE 108 111 111   CO2 29 28 27   GLUCOSE 119* 137* 108*   BUN 23* 22 22   CREATININE 0.71 0.80 0.73   CALCIUM 7.6* 7.3* 7.4*     Recent Labs     12/29/22 1707   APTT 36.6*   INR 1.16*               Imaging  DX-CHEST-PORTABLE (1 VIEW)   Final Result      1.  Satisfactory appearance of the right IJ catheter with no pneumothorax visualized.   2.  Patchy lower lobe parenchymal opacities could be due to atelectasis or pneumonia or edema.     "  DX-CHEST-PORTABLE (1 VIEW)   Final Result         Unchanged opacity in the left lower lobe, atelectasis or consolidation           Assessment/Plan  * Hypotension  Assessment & Plan  -Inpatient status to CU.  -Hypotension likely multifactorial: Hypovolemia, infection and possibly also cardiogenic.  -He was on norepinephrine infusion in the ED however this is discontinued now the patient is getting blood transfusion.  -Central line was placed in the ED.  -He has UTI but no other sirs criteria, therefore not septic on admission.  -Initial hemoglobin was 6.4 and had positive guaiac test.  Resolved post IVFs and PRBCs  Follow cultures    Full code status  Assessment & Plan  Pt had been DNR/I on previous admissions  He tells me that his wifes only income is his [employer] which will go away when he dies he therefor wants to be Full Code    Acute UTI  Assessment & Plan  -He has elevated WBC of 14.8 but no other sirs criteria on admission.  -pt clearly dry on presentation with resolution of his hypotension after IVFs which speaks against septic shock  Follow cultures    Stage III pressure ulcer of sacral region (HCC)  Assessment & Plan  -Present on admission.  Wound does not seem to be infected on admission.  Frequent repositioning, wound care.    Severe protein-calorie malnutrition (HCC)- (present on admission)  Assessment & Plan  -Complex recent hospitalization and per report, poor oral week acute rehab.    Advance care planning- (present on admission)  Assessment & Plan  -Patient has been intermittently DNR/DNI and full code.  There is no POLST form signed.  I had a long conversation with him about advance care planning and goals of care.  He reports that he has been  for 60 years and \"want to fight for alive his long as he can, so I can spend time with my wife \".  Patient will remain a full code on admission.  Advanced care planning including the explanation and discussion of advanced directives such as " standard forms by physician: First 30 minutes face-to-face with the patient.      Hypernatremia- (present on admission)  Assessment & Plan  Repeat 147 however good UOP and given Hx of CHF will hold off on further IVFs and encourage po  If still high tomorrow will address with IVFs    Thrombocytopenia (HCC)- (present on admission)  Assessment & Plan  -On admission platelets are 161,000.  Monitor CBC in the morning.    Hypokalemia- (present on admission)  Assessment & Plan  Repeat remains low: giving 80meq po in divided doses   Repeat lab in am and check Mg as well    Congestive heart failure (CHF) (HCC)- (present on admission)  Assessment & Plan  LVEF 40% earlier this month  On BB and ACEI as outpt currently on hold due to hypotension: resume as able    History of seizures- (present on admission)  Assessment & Plan  -Continue Keppra    Macrocytic anemia- (present on admission)  Assessment & Plan  -On admission initial hemoglobin is 6.4.   Given one unit in the ED and repeat Hgb >10 which would seem a disproporionate climb given that he also was bolused fluids  Was guaiac + in the ED  Cont to follow H&H closely  PO PPI  If he demonstatrates any further signs of acute bleeding will consult GI    Hyperlipidemia- (present on admission)  Assessment & Plan  -On atorvastatin.    Essential hypertension- (present on admission)  Assessment & Plan  As outpt on lisinopril 10, carvdelol 12.5  Holding at present due to hypotension: resume as appropriate with BB first given Hx of dysrythmia         VTE prophylaxis: SCDs/TEDs    I have performed a physical exam and reviewed and updated ROS and Plan today (12/30/2022). In review of yesterday's note (12/29/2022), there are no changes except as documented above.

## 2022-12-30 NOTE — H&P
Hospital Medicine History & Physical Note    Date of Service  12/29/2022    Primary Care Physician  Judah Braga M.D.    Consultants  critical care    Specialist Names: I discussed this case with Dr. Delgadillo    Code Status  Full Code: I addressed in detail CODE STATUS on admission.  See assessment and plan for more details.    Chief Complaint  Chief Complaint   Patient presents with    Fatigue     Increase in fatigue over the past 2 days.     Hypotension     86/60 upon EMS arrival. 75/51 upon arrival to hospital.        History of Presenting Illness  Gilmer Mae is a 82 y.o. male, who is coming from acute rehabilitation facility to the ED on 12/29/2022 with concerns of hypotension.  He was discharged from the hospital 10 days ago.  Patient reports feeling fatigued and overall weak but denies having any pain at this time.  There is also reported decreased on oral intake and overall generalized weakness.    Complex medical history with most recent hospitalization 11/28-12/14: Had severe PAD with ischemic ulcer and concern for underlying osteomyelitis of his right foot.  On 12/5 was taken to the OR to undergo RLE revascularization attempt in order to avoid amputation, however while undergoing anesthesia induction for procedure had cardiac arrest.  Successfully resuscitated with CPR and epinephrine started on epinephrine infusion and taken to [facility] where shortly after arriving to the floor had a V. fib and second cardiac arrest requiring CPR.  EKG was concerning for possible STEMI, taken to the Cath Lab and found to have Takotsubo syndrome.  Transferred to the floor on 12/8.  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  Followed by palliative care during hospitalization also has h/o seizures on Keppra, hypertension, hyperlipidemia, severe aortic stenosis status post TAVR (8/2022 [facility]).    ER COURSE:  -Initial blood pressure on arrival 75/51.  Afebrile  -Initial  hemoglobin 6.4 with elevated WBC 14.8.  -Sodium 147, potassium 3.0.  -Lower lobe opacity seen on chest x-ray: Pneumonia versus atelectasis versus edema and UA positive for UTI, empirically started on Rocephin  -Central line (Right IJ) was placed in the emergency department and started on norepinephrine.  -At the time of my evaluation, PRBC blood transfusion is just a started.  -IV fluids: Received a total of 1.5 L of NS.  -20 mEq of potassium chloride given    I discussed the plan of care with patient.    Review of Systems  Review of Systems   Constitutional:  Positive for malaise/fatigue and weight loss. Negative for fever.   HENT:  Negative for congestion and sore throat.    Eyes:  Negative for blurred vision and double vision.   Respiratory:  Negative for cough and shortness of breath.    Cardiovascular:  Negative for chest pain and palpitations.   Gastrointestinal:  Negative for nausea and vomiting.   Genitourinary:  Negative for dysuria and urgency.   Musculoskeletal:  Negative for myalgias and neck pain.   Skin:  Negative for itching and rash.   Neurological:  Positive for weakness (Generalized). Negative for dizziness and headaches.   Endo/Heme/Allergies:  Does not bruise/bleed easily.   Psychiatric/Behavioral:  Negative for depression. The patient does not have insomnia.      Past Medical History   has a past medical history of Chronic pain, Hyperlipidemia, Hypertension, and Pacemaker.    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     Family History  Reviewed and not pertinent  Family history reviewed with patient. There is no family history that is pertinent to the chief complaint.     Social History   reports that he has never smoked. He has never been exposed to tobacco smoke. He does not have any smokeless tobacco history on file.    Allergies  No Known Allergies    Medications  Prior to Admission Medications    Prescriptions Last Dose Informant Patient Reported? Taking?   acetaminophen (TYLENOL) 325 MG Tab 12/27/2022 at 0557 MAR from Other Facility Yes Yes   Sig: Take 650 mg by mouth every four hours as needed for Mild Pain (Fever >100).   amiodarone (CORDARONE) 200 MG Tab 12/29/2022 at 0800 MAR from Other Facility No No   Sig: Take 1 Tablet by mouth every day.   aspirin EC (ECOTRIN) 81 MG Tablet Delayed Response 12/29/2022 at 0800 MAR from Other Facility Yes No   Sig: Take 81 mg by mouth every day.   atorvastatin (LIPITOR) 80 MG tablet 12/28/2022 at 2000 MAR from Other Facility Yes No   Sig: Take 80 mg by mouth every day.   carvedilol (COREG) 12.5 MG Tab 12/29/2022 at 0800 MAR from Other Facility No No   Sig: Take 1 Tablet by mouth 2 times a day with meals.   furosemide (LASIX) 20 MG Tab 12/29/2022 at 0800 MAR from Other Facility Yes Yes   Sig: Take 20 mg by mouth 2 times a day.   gabapentin (NEURONTIN) 100 MG Cap 12/29/2022 at 0800 MAR from Other Facility Yes Yes   Sig: Take 100 mg by mouth 2 times a day.   levETIRAcetam (KEPPRA) 500 MG Tab 12/29/2022 at 0800 MAR from Other Facility No No   Sig: Take 1 Tab by mouth 2 Times a Day.   lisinopril (PRINIVIL) 10 MG Tab 12/29/2022 at 0800 MAR from Other Facility No No   Sig: Take 1 Tablet by mouth every day.   mirtazapine (REMERON) 15 MG Tab Not started at Not started MAR from Other Facility Yes Yes   Sig: Take 15 mg by mouth every evening. 14 day course for appetite stimulant   nystatin (MYCOSTATIN) 480396 UNIT/ML Suspension 12/26/2022 at 1600 MAR from Other Facility Yes Yes   Sig: Take 500,000 Units by mouth 4 times a day. 5 day course   oxyCODONE CR (OXYCONTIN) 10 MG Tablet Extended Release 12 hour Abuse-Deterrent 12/29/2022 at 0800 MAR from Other Facility Yes Yes   Sig: Take 10 mg by mouth every 12 hours as needed (Moderate to Severe Pain).   oxyCODONE immediate release (ROXICODONE) 10 MG immediate release tablet 12/21/2022 at 1329 MAR from Other Facility Yes Yes   Sig:  Take 10 mg by mouth every four hours as needed for Moderate Pain.   therapeutic multivitamin-minerals (THERAGRAN-M) Tab 12/29/2022 at 0800 MAR from Other Facility Yes Yes   Sig: Take 1 Tablet by mouth every day.      Facility-Administered Medications: None       Physical Exam  Temp:  [35.9 °C (96.7 °F)-36.4 °C (97.5 °F)] 36 °C (96.8 °F)  Pulse:  [60-67] 60  Resp:  [12-20] 13  BP: ()/(44-64) 98/54  SpO2:  [91 %-99 %] 94 %  Blood Pressure : 98/54   Temperature: 36 °C (96.8 °F)   Pulse: 60   Respiration: 13   Pulse Oximetry: 94 %       Physical Exam  Constitutional:       Appearance: Normal appearance.      Comments: Somnolent but appropriately answering questions   HENT:      Head: Normocephalic and atraumatic.      Nose: Nose normal.      Mouth/Throat:      Mouth: Mucous membranes are moist.      Pharynx: Oropharynx is clear.   Eyes:      Extraocular Movements: Extraocular movements intact.      Pupils: Pupils are equal, round, and reactive to light.   Cardiovascular:      Rate and Rhythm: Normal rate and regular rhythm.      Pulses: Normal pulses.      Heart sounds: Normal heart sounds.   Pulmonary:      Effort: Pulmonary effort is normal.      Breath sounds: Normal breath sounds.   Abdominal:      General: Abdomen is flat. Bowel sounds are normal.      Palpations: Abdomen is soft.   Musculoskeletal:      Cervical back: Normal range of motion and neck supple.      Comments: Right BKA.  No discharge or erythema on stump.  Small stage III sacral decubitus ulcer without significant surrounding erythema.   Skin:     General: Skin is warm and dry.   Neurological:      General: No focal deficit present.      Mental Status: He is oriented to person, place, and time.   Psychiatric:         Mood and Affect: Mood normal.         Behavior: Behavior normal.       Laboratory:  Recent Labs     12/29/22  1707   WBC 14.8*   RBC 1.86*   HEMOGLOBIN 6.4*   HEMATOCRIT 20.5*   .2*   MCH 34.4*   MCHC 31.2*   RDW 70.9*    PLATELETCT 161*   MPV 10.5     Recent Labs     12/29/22  1707   SODIUM 147*   POTASSIUM 3.0*   CHLORIDE 108   CO2 29   GLUCOSE 119*   BUN 23*   CREATININE 0.71   CALCIUM 7.6*     Recent Labs     12/29/22  1707   ALTSGPT 20   ASTSGOT 29   ALKPHOSPHAT 114*   TBILIRUBIN 0.7   GLUCOSE 119*     Recent Labs     12/29/22  1707   APTT 36.6*   INR 1.16*     No results for input(s): NTPROBNP in the last 72 hours.      No results for input(s): TROPONINT in the last 72 hours.    Imaging:  DX-CHEST-PORTABLE (1 VIEW)   Final Result      1.  Satisfactory appearance of the right IJ catheter with no pneumothorax visualized.   2.  Patchy lower lobe parenchymal opacities could be due to atelectasis or pneumonia or edema.      DX-CHEST-PORTABLE (1 VIEW)   Final Result         Unchanged opacity in the left lower lobe, atelectasis or consolidation          EKG:  My impression is: Paced rhythm at 69 bpm.    Assessment/Plan:  Justification for Admission Status  I anticipate this patient will require at least two midnights for appropriate medical management, necessitating inpatient admission because 82-year-old male coming from acute rehab with hypotension.  I suspect this is multifactorial, hypovolemia, infection and cardiogenic not excluded.  Recent complex hospitalization with multiple comorbid conditions      * Hypotension  Assessment & Plan  -Inpatient status to IMCU.  -Hypotension likely multifactorial: Hypovolemia, infection and possibly also cardiogenic.  -He was on norepinephrine infusion in the ED however this is discontinued now the patient is getting blood transfusion.  -Central line was placed in the ED.  -He has UTI but no other sirs criteria, therefore not septic on admission.  -Initial hemoglobin was 6.4 and had positive guaiac test.  -Recent admission and cardiac arrest x2 and Takotsubo syndrome.  -Closely monitor on IMCU for the night hopefully will be able to keep him off pressors.  He has a central line that was placed  "by ERP on the right IJ.    Acute UTI  Assessment & Plan  -He has elevated WBC of 14.8 but no other sirs criteria on admission.  -Hypotension could be also secondary to anemia cardiogenic cause not excluded either.  -Patient was started on Rocephin.  I added a procalcitonin.  Closely follow-up.    Macrocytic anemia- (present on admission)  Assessment & Plan  -On admission initial hemoglobin is 6.4.  He had a positive guaiac test done in the emergency department and currently receiving 1 unit PRBC blood transfusion.  -We will carefully monitor hemodynamic status as he may become fluid overloaded and need Lasix later.    Hypernatremia- (present on admission)  Assessment & Plan  -Sodium is 147 on admission.  Patient received IV fluids with normal saline in the ED.  Monitored chemistry panel    Hypokalemia- (present on admission)  Assessment & Plan  -Potassium 2.0 on admission.  Replace electrolytes as needed.  He received 20 mill equivalents of potassium replacement in the ED    Stage III pressure ulcer of sacral region (HCC)  Assessment & Plan  -Present on admission.  Wound does not seem to be infected on admission.  Frequent repositioning, wound care.    Severe protein-calorie malnutrition (HCC)- (present on admission)  Assessment & Plan  -Complex recent hospitalization and per report, poor oral week acute rehab.    Advance care planning- (present on admission)  Assessment & Plan  -Patient has been intermittently DNR/DNI and full code.  There is no POLST form signed.  I had a long conversation with him about advance care planning and goals of care.  He reports that he has been  for 60 years and \"want to fight for alive his long as he can, so I can spend time with my wife \".  Patient will remain a full code on admission.  Advanced care planning including the explanation and discussion of advanced directives such as standard forms by physician: First 30 minutes face-to-face with the patient.      Thrombocytopenia " (HCC)- (present on admission)  Assessment & Plan  -On admission platelets are 161,000.  Monitor CBC in the morning.    History of seizures- (present on admission)  Assessment & Plan  -Continue Keppra    Hyperlipidemia- (present on admission)  Assessment & Plan  -On atorvastatin.      The patient remains critically ill.  Critical care time =57  minutes in directly providing and coordinating critical care and extensive data review.  No time overlap and excludes procedures.  VTE prophylaxis: SCDs/TEDs

## 2022-12-30 NOTE — PROGRESS NOTES
4 Eyes Skin Assessment Completed by LISA Rodriguez and LISA Nichole.    Head WDL  Ears WDL  Nose WDL  Mouth WDL, Dry  Neck WDL, RIJ central line present  Breast/Chest Scar, Left chest wall surgical site from recent AICD placement. Steri strips in place area CDI  Shoulder Blades WDL  Spine WDL  (R) Arm/Elbow/Hand WDL  (L) Arm/Elbow/Hand Swelling and Edema  Abdomen WDL  Groin Scar and Incision  Scrotum/Coccyx/Buttocks Open pressure injury present with slough in bed.  (R) Leg Incision Recent AKA, dressing in place, CDI  (L) Leg WDL  (R) Heel/Foot/Toe N/A  (L) Heel/Foot/Toe Redness and Bruising, Non blanching. Heel mepilex placed. Pillow underneet heel      Devices In Places Tele Box, Blood Pressure Cuff, Pulse Ox, Kimball, SCD's, Central Line, and Nasal Cannula      Interventions In Place NC W/Ear Foams, Heel Mepilex, Sacral Mepilex, Pillows, Q2 Turns, Low Air Loss Mattress, Barrier Cream, Heels Loaded W/Pillows, and Pressure Redistribution Mattress    Possible Skin Injury Yes    Pictures Uploaded Into Epic Yes  Wound Consult Placed Yes  RN Wound Prevention Protocol Ordered Yes    Left heel     Coccyx/Sacral     Right AKA     Left chest wall     Right Groin

## 2022-12-30 NOTE — ASSESSMENT & PLAN NOTE
-He has elevated WBC of 14.8 but no other sirs criteria on admission.  Urine cultures + Klebsiella Oxytoca and Enterobacter Cloacae sensitivities pending  Blood cultures neg  Cont Rocephin

## 2022-12-30 NOTE — ED NOTES
Pt is A&Ox4. Pt signed consent and consent placed in chart. Blood transfusion to be initiated once COD has resulted.

## 2022-12-31 NOTE — PROGRESS NOTES
Monitor tech notified me at this time that pt went into a BBB at 0711. Dr. Seaman was updated and ordered a stat EKG.

## 2022-12-31 NOTE — ASSESSMENT & PLAN NOTE
LVEF 40% earlier this month  On BB and ACEI as outpt currently on hold due to hypotension: resume as able

## 2022-12-31 NOTE — CARE PLAN
The patient is Stable - Low risk of patient condition declining or worsening    Shift Goals  Clinical Goals: Pt will remain hemodynamically stable during shift  Patient Goals: rest  Family Goals: MAYA    Progress made toward(s) clinical / shift goals:  BP stabilized, VS taken Q2 hours, pt on continuous cardiac monitoring. Daily labs drawn.        Problem: Knowledge Deficit - Standard  Goal: Patient and family/care givers will demonstrate understanding of plan of care, disease process/condition, diagnostic tests and medications  Outcome: Progressing  Note: Pt educated regarding plan of care and medications. All questions answered.       Problem: Pain - Standard  Goal: Alleviation of pain or a reduction in pain to the patient’s comfort goal  Outcome: Progressing  Note: Pt assessed for pain regularly and medicated PRN per MAR.

## 2022-12-31 NOTE — CARE PLAN
The patient is Stable - Low risk of patient condition declining or worsening    Shift Goals  Clinical Goals: Pt will maintain stable BP and improved hgb level  Patient Goals: Rest  Family Goals: MAYA    Progress made toward(s) clinical / shift goals:        Patient is not progressing towards the following goals:

## 2022-12-31 NOTE — PROGRESS NOTES
"Hospital Medicine Daily Progress Note    Date of Service  12/31/2022    Chief Complaint  Gilmer Mae is a 82 y.o. 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  Came to us with hypotension from Rehab where he had been sent after a R BKA.  During that surgery had a cardiac arrest during induction requiring CPR.  Went to ICU where he had a second VF arrest.  LHC showing Takotsubo physiology.  ICD placer 12/13.  On this admission in the ED  Hgb 6.4, Na 147, guaiac +, Nit + UA, infiltrate noted on CXR.  CVC placed, given one unit PRBCs and admitted to the IMCU with Dx of sepsis, UTI, anemia    Interval Problem Update  Pt c/o dry mouth but otherwise feels \"OK\".  Notes phantom limb pain in his R leg seems less today.    Overnight on 60 L/100% FiO2.  Titrated down to 40 L/50% during the am    Sinus/paced 60-70s    3 LNC  AFebrile    I have discussed this patient's plan of care and discharge plan at IDT rounds today with Case Management, Nursing, Nursing leadership, and other members of the IDT team.    Consultants/Specialty      Code Status  Full Code    Disposition  Patient is not medically cleared for discharge.   Anticipate discharge to to an inpatient rehabilitation hospital.  I have placed the appropriate orders for post-discharge needs.    Review of Systems  Review of Systems   Constitutional:  Negative for chills and fever.   Eyes:  Negative for blurred vision and double vision.   Respiratory:  Positive for cough. Negative for shortness of breath.    Cardiovascular:  Negative for chest pain, palpitations and leg swelling.   Gastrointestinal:  Negative for abdominal pain, diarrhea, nausea and vomiting.   Genitourinary:  Negative for dysuria and urgency.   Musculoskeletal:  Negative for back pain.   Skin:  Negative for rash.   Neurological:  Positive for weakness. Negative for dizziness, loss of consciousness " and headaches.      Physical Exam  Temp:  [36.7 °C (98 °F)] 36.7 °C (98 °F)  Pulse:  [64-74] 70  Resp:  [12-21] 18  BP: ()/(56-82) 161/82  SpO2:  [87 %-95 %] 94 %    Physical Exam  Constitutional:       General: He is not in acute distress.     Appearance: He is well-developed. He is not diaphoretic.   HENT:      Head: Normocephalic and atraumatic.      Mouth/Throat:      Mouth: Mucous membranes are dry.   Eyes:      Conjunctiva/sclera: Conjunctivae normal.   Neck:      Vascular: No JVD.   Cardiovascular:      Rate and Rhythm: Normal rate.      Heart sounds: Murmur heard.     No gallop.   Pulmonary:      Effort: Pulmonary effort is normal. No respiratory distress.      Breath sounds: No stridor. Rales present. No wheezing.   Abdominal:      Palpations: Abdomen is soft.      Tenderness: There is no abdominal tenderness. There is no guarding or rebound.   Musculoskeletal:         General: No tenderness.      Left lower leg: No edema.      Comments: R BKA wound healing well, staples in place, margins well approximated   Skin:     General: Skin is warm and dry.      Capillary Refill: Capillary refill takes less than 2 seconds.      Coloration: Skin is not jaundiced or pale.      Findings: No rash.   Neurological:      General: No focal deficit present.      Mental Status: He is oriented to person, place, and time.   Psychiatric:         Mood and Affect: Mood normal.         Behavior: Behavior normal.         Thought Content: Thought content normal.       Fluids    Intake/Output Summary (Last 24 hours) at 12/31/2022 0724  Last data filed at 12/30/2022 1400  Gross per 24 hour   Intake 75 ml   Output --   Net 75 ml         Laboratory  Recent Labs     12/29/22  1707 12/29/22  2331 12/30/22  0532 12/31/22  0600   WBC 14.8* 8.7  --   --    RBC 1.86* 3.20*  --   --    HEMOGLOBIN 6.4* 10.9* 10.6* 11.7*   HEMATOCRIT 20.5* 35.1* 33.4* 36.6*   .2* 109.7*  --   --    MCH 34.4* 34.1*  --   --    MCHC 31.2* 31.1*  --    --    RDW 70.9* 71.8*  --   --    PLATELETCT 161* 105*  --   --    MPV 10.5 10.6  --   --        Recent Labs     12/29/22  1707 12/29/22  2331 12/30/22  0532   SODIUM 147* 147* 147*   POTASSIUM 3.0* 3.0* 3.3*   CHLORIDE 108 111 111   CO2 29 28 27   GLUCOSE 119* 137* 108*   BUN 23* 22 22   CREATININE 0.71 0.80 0.73   CALCIUM 7.6* 7.3* 7.4*       Recent Labs     12/29/22 1707   APTT 36.6*   INR 1.16*                 Imaging  DX-CHEST-PORTABLE (1 VIEW)   Final Result      1.  Satisfactory appearance of the right IJ catheter with no pneumothorax visualized.   2.  Patchy lower lobe parenchymal opacities could be due to atelectasis or pneumonia or edema.      DX-CHEST-PORTABLE (1 VIEW)   Final Result         Unchanged opacity in the left lower lobe, atelectasis or consolidation             Assessment/Plan  * Hypotension  Assessment & Plan  -Inpatient status to [facility].  -Hypotension likely multifactorial: Hypovolemia, infection and possibly also cardiogenic.  -He was on norepinephrine infusion in the ED however this is discontinued now the patient is getting blood transfusion.  -Central line was placed in the ED.  -He has UTI but no other sirs criteria, therefore not septic on admission.  -Initial hemoglobin was 6.4 and had positive guaiac test.  Resolved post IVFs and PRBCs  Follow cultures    Full code status  Assessment & Plan  Pt had been DNR/I on previous admissions  He tells me that his wifes only income is his senior care which will go away when he dies he therefor wants to be Full Code    Acute UTI  Assessment & Plan  -He has elevated WBC of 14.8 but no other sirs criteria on admission.  -pt clearly dry on presentation with resolution of his hypotension after IVFs which speaks against septic shock  Follow cultures    Stage III pressure ulcer of sacral region (HCC)  Assessment & Plan  -Present on admission.  Wound does not seem to be infected on admission.  Frequent repositioning, wound care.    Severe protein-calorie  "malnutrition (HCC)- (present on admission)  Assessment & Plan  -Complex recent hospitalization and per report, poor oral week acute rehab.    Advance care planning- (present on admission)  Assessment & Plan  -Patient has been intermittently DNR/DNI and full code.  There is no POLST form signed.  I had a long conversation with him about advance care planning and goals of care.  He reports that he has been  for 60 years and \"want to fight for alive his long as he can, so I can spend time with my wife \".  Patient will remain a full code on admission.  Advanced care planning including the explanation and discussion of advanced directives such as standard forms by physician: First 30 minutes face-to-face with the patient.      Hypernatremia- (present on admission)  Assessment & Plan  Repeat 147 however good UOP and given Hx of CHF will hold off on further IVFs and encourage po  If still high tomorrow will address with IVFs    Thrombocytopenia (HCC)- (present on admission)  Assessment & Plan  -On admission platelets are 161,000.  Monitor CBC in the morning.    Hypokalemia- (present on admission)  Assessment & Plan  Repeat remains low: giving 80meq po in divided doses   Repeat lab in am and check Mg as well    Congestive heart failure (CHF) (HCC)- (present on admission)  Assessment & Plan  LVEF 40% earlier this month  On BB and ACEI as outpt currently on hold due to hypotension: resume as able    History of seizures- (present on admission)  Assessment & Plan  -Continue Keppra    Macrocytic anemia- (present on admission)  Assessment & Plan  -On admission initial hemoglobin is 6.4.   Given one unit in the ED and repeat Hgb >10 which would seem a disproporionate climb given that he also was bolused fluids  Was guaiac + in the ED  Cont to follow H&H closely  PO PPI  If he demonstatrates any further signs of acute bleeding will consult GI    Hyperlipidemia- (present on admission)  Assessment & Plan  -On " atorvastatin.    Essential hypertension- (present on admission)  Assessment & Plan  As outpt on lisinopril 10, carvdelol 12.5  Holding at present due to hypotension: resume as appropriate with BB first given Hx of dysrythmia         VTE prophylaxis: SCDs/TEDs    I have performed a physical exam and reviewed and updated ROS and Plan today (12/31/2022). In review of yesterday's note (12/30/2022), there are no changes except as documented above.

## 2022-12-31 NOTE — ASSESSMENT & PLAN NOTE
As outpt on lisinopril 10, carvdelol 12.5  Pressures have slowly climbed and now around SBP of 120   Cont to hold BP meds for now

## 2023-01-01 ENCOUNTER — APPOINTMENT (OUTPATIENT)
Dept: RADIOLOGY | Facility: MEDICAL CENTER | Age: 83
DRG: 193 | End: 2023-01-01
Attending: EMERGENCY MEDICINE
Payer: MEDICARE

## 2023-01-01 ENCOUNTER — APPOINTMENT (OUTPATIENT)
Dept: RADIOLOGY | Facility: MEDICAL CENTER | Age: 83
DRG: 871 | End: 2023-01-01
Attending: HOSPITALIST
Payer: MEDICARE

## 2023-01-01 ENCOUNTER — APPOINTMENT (OUTPATIENT)
Dept: RADIOLOGY | Facility: MEDICAL CENTER | Age: 83
DRG: 193 | End: 2023-01-01
Attending: STUDENT IN AN ORGANIZED HEALTH CARE EDUCATION/TRAINING PROGRAM
Payer: MEDICARE

## 2023-01-01 ENCOUNTER — TELEPHONE (OUTPATIENT)
Dept: VASCULAR SURGERY | Facility: MEDICAL CENTER | Age: 83
End: 2023-01-01
Payer: MEDICARE

## 2023-01-01 ENCOUNTER — HOSPITAL ENCOUNTER (INPATIENT)
Facility: MEDICAL CENTER | Age: 83
LOS: 7 days | DRG: 193 | End: 2023-01-17
Attending: EMERGENCY MEDICINE | Admitting: INTERNAL MEDICINE
Payer: MEDICARE

## 2023-01-01 VITALS
HEART RATE: 77 BPM | OXYGEN SATURATION: 94 % | HEIGHT: 68 IN | BODY MASS INDEX: 26.13 KG/M2 | WEIGHT: 172.4 LBS | SYSTOLIC BLOOD PRESSURE: 147 MMHG | RESPIRATION RATE: 17 BRPM | TEMPERATURE: 98.2 F | DIASTOLIC BLOOD PRESSURE: 82 MMHG

## 2023-01-01 VITALS
SYSTOLIC BLOOD PRESSURE: 138 MMHG | DIASTOLIC BLOOD PRESSURE: 74 MMHG | WEIGHT: 167.99 LBS | HEART RATE: 74 BPM | RESPIRATION RATE: 18 BRPM | BODY MASS INDEX: 25.46 KG/M2 | OXYGEN SATURATION: 95 % | HEIGHT: 68 IN | TEMPERATURE: 98.1 F

## 2023-01-01 DIAGNOSIS — E43 SEVERE PROTEIN-CALORIE MALNUTRITION (HCC): ICD-10-CM

## 2023-01-01 DIAGNOSIS — I82.B12: ICD-10-CM

## 2023-01-01 DIAGNOSIS — Z86.69 HISTORY OF BELL'S PALSY: ICD-10-CM

## 2023-01-01 DIAGNOSIS — Z95.2 HISTORY OF TRANSCATHETER AORTIC VALVE REPLACEMENT (TAVR): ICD-10-CM

## 2023-01-01 DIAGNOSIS — M86.9 OSTEOMYELITIS OF RIGHT LOWER EXTREMITY (HCC): ICD-10-CM

## 2023-01-01 DIAGNOSIS — R47.9 DIFFICULTY SPEAKING: ICD-10-CM

## 2023-01-01 DIAGNOSIS — Z89.511 HISTORY OF RIGHT BELOW KNEE AMPUTATION (HCC): ICD-10-CM

## 2023-01-01 DIAGNOSIS — J18.9 PNEUMONIA DUE TO INFECTIOUS ORGANISM, UNSPECIFIED LATERALITY, UNSPECIFIED PART OF LUNG: ICD-10-CM

## 2023-01-01 DIAGNOSIS — I46.9 CARDIAC ARREST (HCC): ICD-10-CM

## 2023-01-01 DIAGNOSIS — I95.89 HYPOTENSION DUE TO HYPOVOLEMIA: ICD-10-CM

## 2023-01-01 DIAGNOSIS — J18.9 HCAP (HEALTHCARE-ASSOCIATED PNEUMONIA): ICD-10-CM

## 2023-01-01 DIAGNOSIS — G51.0 BELL'S PALSY: ICD-10-CM

## 2023-01-01 DIAGNOSIS — G93.40 ACUTE ENCEPHALOPATHY: ICD-10-CM

## 2023-01-01 DIAGNOSIS — J96.01 ACUTE RESPIRATORY FAILURE WITH HYPOXIA (HCC): ICD-10-CM

## 2023-01-01 DIAGNOSIS — E86.1 HYPOTENSION DUE TO HYPOVOLEMIA: ICD-10-CM

## 2023-01-01 DIAGNOSIS — G45.9 TIA (TRANSIENT ISCHEMIC ATTACK): ICD-10-CM

## 2023-01-01 LAB
ALBUMIN SERPL BCP-MCNC: 2 G/DL (ref 3.2–4.9)
ALBUMIN SERPL BCP-MCNC: 2.2 G/DL (ref 3.2–4.9)
ALBUMIN SERPL BCP-MCNC: 2.4 G/DL (ref 3.2–4.9)
ALBUMIN/GLOB SERPL: 0.9 G/DL
ALBUMIN/GLOB SERPL: 0.9 G/DL
ALBUMIN/GLOB SERPL: 1.1 G/DL
ALP SERPL-CCNC: 107 U/L (ref 30–99)
ALP SERPL-CCNC: 95 U/L (ref 30–99)
ALP SERPL-CCNC: 98 U/L (ref 30–99)
ALT SERPL-CCNC: 21 U/L (ref 2–50)
ALT SERPL-CCNC: 26 U/L (ref 2–50)
ALT SERPL-CCNC: 27 U/L (ref 2–50)
AMMONIA PLAS-SCNC: 12 UMOL/L (ref 11–45)
ANION GAP SERPL CALC-SCNC: 5 MMOL/L (ref 7–16)
ANION GAP SERPL CALC-SCNC: 6 MMOL/L (ref 7–16)
ANION GAP SERPL CALC-SCNC: 7 MMOL/L (ref 7–16)
ANION GAP SERPL CALC-SCNC: 7 MMOL/L (ref 7–16)
ANION GAP SERPL CALC-SCNC: 8 MMOL/L (ref 7–16)
ANION GAP SERPL CALC-SCNC: 9 MMOL/L (ref 7–16)
ANISOCYTOSIS BLD QL SMEAR: ABNORMAL
ANISOCYTOSIS BLD QL SMEAR: ABNORMAL
APPEARANCE UR: ABNORMAL
AST SERPL-CCNC: 39 U/L (ref 12–45)
AST SERPL-CCNC: 43 U/L (ref 12–45)
AST SERPL-CCNC: 45 U/L (ref 12–45)
BACTERIA #/AREA URNS HPF: NEGATIVE /HPF
BACTERIA BLD CULT: NORMAL
BACTERIA UR CULT: ABNORMAL
BACTERIA UR CULT: NORMAL
BASE EXCESS BLDA CALC-SCNC: 4 MMOL/L (ref -4–3)
BASOPHILS # BLD AUTO: 0.4 % (ref 0–1.8)
BASOPHILS # BLD AUTO: 0.5 % (ref 0–1.8)
BASOPHILS # BLD AUTO: 0.6 % (ref 0–1.8)
BASOPHILS # BLD AUTO: 0.6 % (ref 0–1.8)
BASOPHILS # BLD: 0.03 K/UL (ref 0–0.12)
BILIRUB SERPL-MCNC: 0.4 MG/DL (ref 0.1–1.5)
BILIRUB SERPL-MCNC: 0.5 MG/DL (ref 0.1–1.5)
BILIRUB SERPL-MCNC: 0.5 MG/DL (ref 0.1–1.5)
BILIRUB UR QL STRIP.AUTO: NEGATIVE
BODY TEMPERATURE: 36.1 CENTIGRADE
BUN SERPL-MCNC: 10 MG/DL (ref 8–22)
BUN SERPL-MCNC: 11 MG/DL (ref 8–22)
BUN SERPL-MCNC: 12 MG/DL (ref 8–22)
BUN SERPL-MCNC: 12 MG/DL (ref 8–22)
BUN SERPL-MCNC: 14 MG/DL (ref 8–22)
BUN SERPL-MCNC: 14 MG/DL (ref 8–22)
BUN SERPL-MCNC: 15 MG/DL (ref 8–22)
BUN SERPL-MCNC: 15 MG/DL (ref 8–22)
BUN SERPL-MCNC: 18 MG/DL (ref 8–22)
BUN SERPL-MCNC: 23 MG/DL (ref 8–22)
BURR CELLS BLD QL SMEAR: NORMAL
CALCIUM ALBUM COR SERPL-MCNC: 8.8 MG/DL (ref 8.5–10.5)
CALCIUM ALBUM COR SERPL-MCNC: 8.9 MG/DL (ref 8.5–10.5)
CALCIUM ALBUM COR SERPL-MCNC: 9.1 MG/DL (ref 8.5–10.5)
CALCIUM SERPL-MCNC: 7.3 MG/DL (ref 8.5–10.5)
CALCIUM SERPL-MCNC: 7.3 MG/DL (ref 8.5–10.5)
CALCIUM SERPL-MCNC: 7.4 MG/DL (ref 8.5–10.5)
CALCIUM SERPL-MCNC: 7.5 MG/DL (ref 8.5–10.5)
CALCIUM SERPL-MCNC: 7.5 MG/DL (ref 8.5–10.5)
CALCIUM SERPL-MCNC: 7.8 MG/DL (ref 8.5–10.5)
CALCIUM SERPL-MCNC: 7.8 MG/DL (ref 8.5–10.5)
CALCIUM SERPL-MCNC: 7.9 MG/DL (ref 8.5–10.5)
CALCIUM SERPL-MCNC: 8 MG/DL (ref 8.5–10.5)
CALCIUM SERPL-MCNC: 8.1 MG/DL (ref 8.5–10.5)
CHLORIDE SERPL-SCNC: 105 MMOL/L (ref 96–112)
CHLORIDE SERPL-SCNC: 106 MMOL/L (ref 96–112)
CHLORIDE SERPL-SCNC: 106 MMOL/L (ref 96–112)
CHLORIDE SERPL-SCNC: 110 MMOL/L (ref 96–112)
CHLORIDE SERPL-SCNC: 110 MMOL/L (ref 96–112)
CHLORIDE SERPL-SCNC: 112 MMOL/L (ref 96–112)
CHLORIDE SERPL-SCNC: 113 MMOL/L (ref 96–112)
CHLORIDE SERPL-SCNC: 113 MMOL/L (ref 96–112)
CO2 SERPL-SCNC: 25 MMOL/L (ref 20–33)
CO2 SERPL-SCNC: 28 MMOL/L (ref 20–33)
CO2 SERPL-SCNC: 29 MMOL/L (ref 20–33)
CO2 SERPL-SCNC: 30 MMOL/L (ref 20–33)
COLOR UR: YELLOW
COMMENT 1642: NORMAL
COMMENT 1642: NORMAL
CREAT SERPL-MCNC: 0.51 MG/DL (ref 0.5–1.4)
CREAT SERPL-MCNC: 0.51 MG/DL (ref 0.5–1.4)
CREAT SERPL-MCNC: 0.52 MG/DL (ref 0.5–1.4)
CREAT SERPL-MCNC: 0.55 MG/DL (ref 0.5–1.4)
CREAT SERPL-MCNC: 0.56 MG/DL (ref 0.5–1.4)
CREAT SERPL-MCNC: 0.56 MG/DL (ref 0.5–1.4)
CREAT SERPL-MCNC: 0.62 MG/DL (ref 0.5–1.4)
CREAT SERPL-MCNC: 0.62 MG/DL (ref 0.5–1.4)
CREAT SERPL-MCNC: 0.64 MG/DL (ref 0.5–1.4)
CREAT SERPL-MCNC: 0.73 MG/DL (ref 0.5–1.4)
EKG IMPRESSION: NORMAL
EOSINOPHIL # BLD AUTO: 0.05 K/UL (ref 0–0.51)
EOSINOPHIL # BLD AUTO: 0.09 K/UL (ref 0–0.51)
EOSINOPHIL # BLD AUTO: 0.11 K/UL (ref 0–0.51)
EOSINOPHIL # BLD AUTO: 0.13 K/UL (ref 0–0.51)
EOSINOPHIL NFR BLD: 0.9 % (ref 0–6.9)
EOSINOPHIL NFR BLD: 1.2 % (ref 0–6.9)
EOSINOPHIL NFR BLD: 2.1 % (ref 0–6.9)
EOSINOPHIL NFR BLD: 2.4 % (ref 0–6.9)
EPI CELLS #/AREA URNS HPF: ABNORMAL /HPF
ERYTHROCYTE [DISTWIDTH] IN BLOOD BY AUTOMATED COUNT: 63.2 FL (ref 35.9–50)
ERYTHROCYTE [DISTWIDTH] IN BLOOD BY AUTOMATED COUNT: 63.2 FL (ref 35.9–50)
ERYTHROCYTE [DISTWIDTH] IN BLOOD BY AUTOMATED COUNT: 63.3 FL (ref 35.9–50)
ERYTHROCYTE [DISTWIDTH] IN BLOOD BY AUTOMATED COUNT: 65.5 FL (ref 35.9–50)
ERYTHROCYTE [DISTWIDTH] IN BLOOD BY AUTOMATED COUNT: 65.5 FL (ref 35.9–50)
ERYTHROCYTE [DISTWIDTH] IN BLOOD BY AUTOMATED COUNT: 66.8 FL (ref 35.9–50)
ERYTHROCYTE [DISTWIDTH] IN BLOOD BY AUTOMATED COUNT: 68.4 FL (ref 35.9–50)
ERYTHROCYTE [DISTWIDTH] IN BLOOD BY AUTOMATED COUNT: 70 FL (ref 35.9–50)
ETHANOL BLD-MCNC: <10.1 MG/DL
FLUAV RNA SPEC QL NAA+PROBE: NEGATIVE
FLUBV RNA SPEC QL NAA+PROBE: NEGATIVE
GFR SERPLBLD CREATININE-BSD FMLA CKD-EPI: 100 ML/MIN/1.73 M 2
GFR SERPLBLD CREATININE-BSD FMLA CKD-EPI: 101 ML/MIN/1.73 M 2
GFR SERPLBLD CREATININE-BSD FMLA CKD-EPI: 102 ML/MIN/1.73 M 2
GFR SERPLBLD CREATININE-BSD FMLA CKD-EPI: 102 ML/MIN/1.73 M 2
GFR SERPLBLD CREATININE-BSD FMLA CKD-EPI: 92 ML/MIN/1.73 M 2
GFR SERPLBLD CREATININE-BSD FMLA CKD-EPI: 95 ML/MIN/1.73 M 2
GFR SERPLBLD CREATININE-BSD FMLA CKD-EPI: 96 ML/MIN/1.73 M 2
GFR SERPLBLD CREATININE-BSD FMLA CKD-EPI: 96 ML/MIN/1.73 M 2
GFR SERPLBLD CREATININE-BSD FMLA CKD-EPI: 99 ML/MIN/1.73 M 2
GFR SERPLBLD CREATININE-BSD FMLA CKD-EPI: 99 ML/MIN/1.73 M 2
GLOBULIN SER CALC-MCNC: 2.1 G/DL (ref 1.9–3.5)
GLOBULIN SER CALC-MCNC: 2.2 G/DL (ref 1.9–3.5)
GLOBULIN SER CALC-MCNC: 2.4 G/DL (ref 1.9–3.5)
GLUCOSE SERPL-MCNC: 101 MG/DL (ref 65–99)
GLUCOSE SERPL-MCNC: 111 MG/DL (ref 65–99)
GLUCOSE SERPL-MCNC: 112 MG/DL (ref 65–99)
GLUCOSE SERPL-MCNC: 81 MG/DL (ref 65–99)
GLUCOSE SERPL-MCNC: 90 MG/DL (ref 65–99)
GLUCOSE SERPL-MCNC: 92 MG/DL (ref 65–99)
GLUCOSE SERPL-MCNC: 92 MG/DL (ref 65–99)
GLUCOSE SERPL-MCNC: 97 MG/DL (ref 65–99)
GLUCOSE SERPL-MCNC: 97 MG/DL (ref 65–99)
GLUCOSE SERPL-MCNC: 98 MG/DL (ref 65–99)
GLUCOSE UR STRIP.AUTO-MCNC: NEGATIVE MG/DL
HCO3 BLDA-SCNC: 28 MMOL/L (ref 17–25)
HCT VFR BLD AUTO: 31.5 % (ref 42–52)
HCT VFR BLD AUTO: 31.8 % (ref 42–52)
HCT VFR BLD AUTO: 32.6 % (ref 42–52)
HCT VFR BLD AUTO: 33.8 % (ref 42–52)
HCT VFR BLD AUTO: 34 % (ref 42–52)
HCT VFR BLD AUTO: 34.4 % (ref 42–52)
HCT VFR BLD AUTO: 34.6 % (ref 42–52)
HCT VFR BLD AUTO: 36.6 % (ref 42–52)
HGB BLD-MCNC: 10.2 G/DL (ref 14–18)
HGB BLD-MCNC: 10.5 G/DL (ref 14–18)
HGB BLD-MCNC: 10.6 G/DL (ref 14–18)
HGB BLD-MCNC: 10.7 G/DL (ref 14–18)
HGB BLD-MCNC: 10.7 G/DL (ref 14–18)
HGB BLD-MCNC: 11 G/DL (ref 14–18)
HGB BLD-MCNC: 11.6 G/DL (ref 14–18)
HGB BLD-MCNC: 9.9 G/DL (ref 14–18)
HYALINE CASTS #/AREA URNS LPF: ABNORMAL /LPF
IMM GRANULOCYTES # BLD AUTO: 0.03 K/UL (ref 0–0.11)
IMM GRANULOCYTES # BLD AUTO: 0.03 K/UL (ref 0–0.11)
IMM GRANULOCYTES # BLD AUTO: 0.06 K/UL (ref 0–0.11)
IMM GRANULOCYTES # BLD AUTO: 0.2 K/UL (ref 0–0.11)
IMM GRANULOCYTES NFR BLD AUTO: 0.5 % (ref 0–0.9)
IMM GRANULOCYTES NFR BLD AUTO: 0.6 % (ref 0–0.9)
IMM GRANULOCYTES NFR BLD AUTO: 1.1 % (ref 0–0.9)
IMM GRANULOCYTES NFR BLD AUTO: 2.6 % (ref 0–0.9)
INHALED O2 FLOW RATE: 2 L/MIN (ref 2–10)
KETONES UR STRIP.AUTO-MCNC: ABNORMAL MG/DL
LACTATE SERPL-SCNC: 1.1 MMOL/L (ref 0.5–2)
LEUKOCYTE ESTERASE UR QL STRIP.AUTO: ABNORMAL
LYMPHOCYTES # BLD AUTO: 0.94 K/UL (ref 1–4.8)
LYMPHOCYTES # BLD AUTO: 0.96 K/UL (ref 1–4.8)
LYMPHOCYTES # BLD AUTO: 1.21 K/UL (ref 1–4.8)
LYMPHOCYTES # BLD AUTO: 1.27 K/UL (ref 1–4.8)
LYMPHOCYTES NFR BLD: 15.8 % (ref 22–41)
LYMPHOCYTES NFR BLD: 18 % (ref 22–41)
LYMPHOCYTES NFR BLD: 18.2 % (ref 22–41)
LYMPHOCYTES NFR BLD: 21.8 % (ref 22–41)
MACROCYTES BLD QL SMEAR: ABNORMAL
MACROCYTES BLD QL SMEAR: ABNORMAL
MAGNESIUM SERPL-MCNC: 1.9 MG/DL (ref 1.5–2.5)
MAGNESIUM SERPL-MCNC: 1.9 MG/DL (ref 1.5–2.5)
MAGNESIUM SERPL-MCNC: 2 MG/DL (ref 1.5–2.5)
MCH RBC QN AUTO: 33.1 PG (ref 27–33)
MCH RBC QN AUTO: 33.3 PG (ref 27–33)
MCH RBC QN AUTO: 33.6 PG (ref 27–33)
MCH RBC QN AUTO: 33.8 PG (ref 27–33)
MCH RBC QN AUTO: 33.9 PG (ref 27–33)
MCH RBC QN AUTO: 34.1 PG (ref 27–33)
MCH RBC QN AUTO: 34.1 PG (ref 27–33)
MCH RBC QN AUTO: 34.2 PG (ref 27–33)
MCHC RBC AUTO-ENTMCNC: 30.9 G/DL (ref 33.7–35.3)
MCHC RBC AUTO-ENTMCNC: 31.1 G/DL (ref 33.7–35.3)
MCHC RBC AUTO-ENTMCNC: 31.4 G/DL (ref 33.7–35.3)
MCHC RBC AUTO-ENTMCNC: 31.7 G/DL (ref 33.7–35.3)
MCHC RBC AUTO-ENTMCNC: 31.7 G/DL (ref 33.7–35.3)
MCHC RBC AUTO-ENTMCNC: 31.8 G/DL (ref 33.7–35.3)
MCHC RBC AUTO-ENTMCNC: 32.1 G/DL (ref 33.7–35.3)
MCHC RBC AUTO-ENTMCNC: 32.5 G/DL (ref 33.7–35.3)
MCV RBC AUTO: 104.8 FL (ref 81.4–97.8)
MCV RBC AUTO: 105.3 FL (ref 81.4–97.8)
MCV RBC AUTO: 105.6 FL (ref 81.4–97.8)
MCV RBC AUTO: 107.1 FL (ref 81.4–97.8)
MCV RBC AUTO: 107.3 FL (ref 81.4–97.8)
MCV RBC AUTO: 107.5 FL (ref 81.4–97.8)
MCV RBC AUTO: 108 FL (ref 81.4–97.8)
MCV RBC AUTO: 108.2 FL (ref 81.4–97.8)
MICRO URNS: ABNORMAL
MONOCYTES # BLD AUTO: 0.36 K/UL (ref 0–0.85)
MONOCYTES # BLD AUTO: 0.39 K/UL (ref 0–0.85)
MONOCYTES # BLD AUTO: 0.44 K/UL (ref 0–0.85)
MONOCYTES # BLD AUTO: 0.53 K/UL (ref 0–0.85)
MONOCYTES NFR BLD AUTO: 6.7 % (ref 0–13.4)
MONOCYTES NFR BLD AUTO: 6.9 % (ref 0–13.4)
MONOCYTES NFR BLD AUTO: 7.5 % (ref 0–13.4)
MONOCYTES NFR BLD AUTO: 7.6 % (ref 0–13.4)
MORPHOLOGY BLD-IMP: NORMAL
MORPHOLOGY BLD-IMP: NORMAL
NEUTROPHILS # BLD AUTO: 3.67 K/UL (ref 1.82–7.42)
NEUTROPHILS # BLD AUTO: 3.8 K/UL (ref 1.82–7.42)
NEUTROPHILS # BLD AUTO: 4 K/UL (ref 1.82–7.42)
NEUTROPHILS # BLD AUTO: 5.59 K/UL (ref 1.82–7.42)
NEUTROPHILS NFR BLD: 68.7 % (ref 44–72)
NEUTROPHILS NFR BLD: 71 % (ref 44–72)
NEUTROPHILS NFR BLD: 71.2 % (ref 44–72)
NEUTROPHILS NFR BLD: 73.1 % (ref 44–72)
NITRITE UR QL STRIP.AUTO: NEGATIVE
NRBC # BLD AUTO: 0 K/UL
NRBC BLD-RTO: 0 /100 WBC
NT-PROBNP SERPL IA-MCNC: 7002 PG/ML (ref 0–125)
OVALOCYTES BLD QL SMEAR: NORMAL
PCO2 BLDA: 41 MMHG (ref 26–37)
PCO2 TEMP ADJ BLDA: 39.4 MMHG (ref 26–37)
PH BLDA: 7.45 [PH] (ref 7.4–7.5)
PH TEMP ADJ BLDA: 7.46 [PH] (ref 7.4–7.5)
PH UR STRIP.AUTO: 5 [PH] (ref 5–8)
PHOSPHATE SERPL-MCNC: 2.2 MG/DL (ref 2.5–4.5)
PHOSPHATE SERPL-MCNC: 2.4 MG/DL (ref 2.5–4.5)
PHOSPHATE SERPL-MCNC: 2.5 MG/DL (ref 2.5–4.5)
PLATELET # BLD AUTO: 103 K/UL (ref 164–446)
PLATELET # BLD AUTO: 104 K/UL (ref 164–446)
PLATELET # BLD AUTO: 111 K/UL (ref 164–446)
PLATELET # BLD AUTO: 116 K/UL (ref 164–446)
PLATELET # BLD AUTO: 136 K/UL (ref 164–446)
PLATELET # BLD AUTO: 151 K/UL (ref 164–446)
PLATELET # BLD AUTO: 161 K/UL (ref 164–446)
PLATELET # BLD AUTO: 83 K/UL (ref 164–446)
PLATELET BLD QL SMEAR: NORMAL
PLATELET BLD QL SMEAR: NORMAL
PMV BLD AUTO: 10.3 FL (ref 9–12.9)
PMV BLD AUTO: 10.3 FL (ref 9–12.9)
PMV BLD AUTO: 10.5 FL (ref 9–12.9)
PMV BLD AUTO: 10.6 FL (ref 9–12.9)
PMV BLD AUTO: 10.7 FL (ref 9–12.9)
PMV BLD AUTO: 10.7 FL (ref 9–12.9)
PMV BLD AUTO: 11 FL (ref 9–12.9)
PMV BLD AUTO: 11.2 FL (ref 9–12.9)
PO2 BLDA: 93.9 MMHG (ref 64–87)
PO2 TEMP ADJ BLDA: 88.8 MMHG (ref 64–87)
POIKILOCYTOSIS BLD QL SMEAR: NORMAL
POTASSIUM SERPL-SCNC: 3 MMOL/L (ref 3.6–5.5)
POTASSIUM SERPL-SCNC: 3.3 MMOL/L (ref 3.6–5.5)
POTASSIUM SERPL-SCNC: 3.5 MMOL/L (ref 3.6–5.5)
POTASSIUM SERPL-SCNC: 3.6 MMOL/L (ref 3.6–5.5)
POTASSIUM SERPL-SCNC: 3.6 MMOL/L (ref 3.6–5.5)
POTASSIUM SERPL-SCNC: 3.7 MMOL/L (ref 3.6–5.5)
POTASSIUM SERPL-SCNC: 4.1 MMOL/L (ref 3.6–5.5)
POTASSIUM SERPL-SCNC: 4.3 MMOL/L (ref 3.6–5.5)
PROCALCITONIN SERPL-MCNC: 0.07 NG/ML
PROCALCITONIN SERPL-MCNC: 0.07 NG/ML
PROT SERPL-MCNC: 4.2 G/DL (ref 6–8.2)
PROT SERPL-MCNC: 4.5 G/DL (ref 6–8.2)
PROT SERPL-MCNC: 4.6 G/DL (ref 6–8.2)
PROT UR QL STRIP: 30 MG/DL
RBC # BLD AUTO: 2.93 M/UL (ref 4.7–6.1)
RBC # BLD AUTO: 3.01 M/UL (ref 4.7–6.1)
RBC # BLD AUTO: 3.11 M/UL (ref 4.7–6.1)
RBC # BLD AUTO: 3.17 M/UL (ref 4.7–6.1)
RBC # BLD AUTO: 3.18 M/UL (ref 4.7–6.1)
RBC # BLD AUTO: 3.21 M/UL (ref 4.7–6.1)
RBC # BLD AUTO: 3.23 M/UL (ref 4.7–6.1)
RBC # BLD AUTO: 3.39 M/UL (ref 4.7–6.1)
RBC # URNS HPF: >150 /HPF
RBC BLD AUTO: PRESENT
RBC BLD AUTO: PRESENT
RBC UR QL AUTO: ABNORMAL
RSV RNA SPEC QL NAA+PROBE: NEGATIVE
SAO2 % BLDA: 96.2 % (ref 93–99)
SARS-COV+SARS-COV-2 AG RESP QL IA.RAPID: NOTDETECTED
SARS-COV-2 RNA RESP QL NAA+PROBE: NOTDETECTED
SIGNIFICANT IND 70042: ABNORMAL
SIGNIFICANT IND 70042: NORMAL
SITE SITE: ABNORMAL
SITE SITE: NORMAL
SODIUM SERPL-SCNC: 142 MMOL/L (ref 135–145)
SODIUM SERPL-SCNC: 142 MMOL/L (ref 135–145)
SODIUM SERPL-SCNC: 143 MMOL/L (ref 135–145)
SODIUM SERPL-SCNC: 143 MMOL/L (ref 135–145)
SODIUM SERPL-SCNC: 144 MMOL/L (ref 135–145)
SODIUM SERPL-SCNC: 146 MMOL/L (ref 135–145)
SODIUM SERPL-SCNC: 146 MMOL/L (ref 135–145)
SODIUM SERPL-SCNC: 147 MMOL/L (ref 135–145)
SODIUM SERPL-SCNC: 149 MMOL/L (ref 135–145)
SODIUM SERPL-SCNC: 149 MMOL/L (ref 135–145)
SOURCE SOURCE: ABNORMAL
SOURCE SOURCE: NORMAL
SP GR UR STRIP.AUTO: 1.02
SPECIMEN SOURCE: NORMAL
SPECIMEN SOURCE: NORMAL
TRANS CELLS #/AREA URNS HPF: ABNORMAL /HPF
URATE CRY #/AREA URNS HPF: POSITIVE /HPF
UROBILINOGEN UR STRIP.AUTO-MCNC: 0.2 MG/DL
WBC # BLD AUTO: 5.2 K/UL (ref 4.8–10.8)
WBC # BLD AUTO: 5.3 K/UL (ref 4.8–10.8)
WBC # BLD AUTO: 5.5 K/UL (ref 4.8–10.8)
WBC # BLD AUTO: 5.8 K/UL (ref 4.8–10.8)
WBC # BLD AUTO: 7.6 K/UL (ref 4.8–10.8)
WBC # BLD AUTO: 7.7 K/UL (ref 4.8–10.8)
WBC # BLD AUTO: 8.2 K/UL (ref 4.8–10.8)
WBC # BLD AUTO: 8.6 K/UL (ref 4.8–10.8)
WBC #/AREA URNS HPF: ABNORMAL /HPF

## 2023-01-01 PROCEDURE — A9270 NON-COVERED ITEM OR SERVICE: HCPCS | Performed by: INTERNAL MEDICINE

## 2023-01-01 PROCEDURE — 94640 AIRWAY INHALATION TREATMENT: CPT

## 2023-01-01 PROCEDURE — 97535 SELF CARE MNGMENT TRAINING: CPT

## 2023-01-01 PROCEDURE — 84145 PROCALCITONIN (PCT): CPT

## 2023-01-01 PROCEDURE — 80053 COMPREHEN METABOLIC PANEL: CPT

## 2023-01-01 PROCEDURE — 80048 BASIC METABOLIC PNL TOTAL CA: CPT

## 2023-01-01 PROCEDURE — 99233 SBSQ HOSP IP/OBS HIGH 50: CPT | Performed by: HOSPITALIST

## 2023-01-01 PROCEDURE — 700102 HCHG RX REV CODE 250 W/ 637 OVERRIDE(OP): Performed by: HOSPITALIST

## 2023-01-01 PROCEDURE — A9270 NON-COVERED ITEM OR SERVICE: HCPCS | Performed by: HOSPITALIST

## 2023-01-01 PROCEDURE — 82140 ASSAY OF AMMONIA: CPT

## 2023-01-01 PROCEDURE — 99233 SBSQ HOSP IP/OBS HIGH 50: CPT | Mod: 25 | Performed by: STUDENT IN AN ORGANIZED HEALTH CARE EDUCATION/TRAINING PROGRAM

## 2023-01-01 PROCEDURE — 99285 EMERGENCY DEPT VISIT HI MDM: CPT

## 2023-01-01 PROCEDURE — 93010 ELECTROCARDIOGRAM REPORT: CPT | Performed by: INTERNAL MEDICINE

## 2023-01-01 PROCEDURE — 94760 N-INVAS EAR/PLS OXIMETRY 1: CPT

## 2023-01-01 PROCEDURE — 99233 SBSQ HOSP IP/OBS HIGH 50: CPT | Performed by: STUDENT IN AN ORGANIZED HEALTH CARE EDUCATION/TRAINING PROGRAM

## 2023-01-01 PROCEDURE — 96367 TX/PROPH/DG ADDL SEQ IV INF: CPT

## 2023-01-01 PROCEDURE — 36415 COLL VENOUS BLD VENIPUNCTURE: CPT

## 2023-01-01 PROCEDURE — 93971 EXTREMITY STUDY: CPT | Mod: LT

## 2023-01-01 PROCEDURE — 700111 HCHG RX REV CODE 636 W/ 250 OVERRIDE (IP): Performed by: HOSPITALIST

## 2023-01-01 PROCEDURE — 700102 HCHG RX REV CODE 250 W/ 637 OVERRIDE(OP): Performed by: INTERNAL MEDICINE

## 2023-01-01 PROCEDURE — 700105 HCHG RX REV CODE 258: Performed by: INTERNAL MEDICINE

## 2023-01-01 PROCEDURE — 96366 THER/PROPH/DIAG IV INF ADDON: CPT

## 2023-01-01 PROCEDURE — 83735 ASSAY OF MAGNESIUM: CPT

## 2023-01-01 PROCEDURE — 97597 DBRDMT OPN WND 1ST 20 CM/<: CPT

## 2023-01-01 PROCEDURE — 97166 OT EVAL MOD COMPLEX 45 MIN: CPT

## 2023-01-01 PROCEDURE — 97602 WOUND(S) CARE NON-SELECTIVE: CPT

## 2023-01-01 PROCEDURE — 700105 HCHG RX REV CODE 258: Performed by: HOSPITALIST

## 2023-01-01 PROCEDURE — 770020 HCHG ROOM/CARE - TELE (206)

## 2023-01-01 PROCEDURE — 85025 COMPLETE CBC W/AUTO DIFF WBC: CPT

## 2023-01-01 PROCEDURE — A9270 NON-COVERED ITEM OR SERVICE: HCPCS | Performed by: STUDENT IN AN ORGANIZED HEALTH CARE EDUCATION/TRAINING PROGRAM

## 2023-01-01 PROCEDURE — 84100 ASSAY OF PHOSPHORUS: CPT

## 2023-01-01 PROCEDURE — 97162 PT EVAL MOD COMPLEX 30 MIN: CPT

## 2023-01-01 PROCEDURE — A9270 NON-COVERED ITEM OR SERVICE: HCPCS

## 2023-01-01 PROCEDURE — 700111 HCHG RX REV CODE 636 W/ 250 OVERRIDE (IP): Performed by: STUDENT IN AN ORGANIZED HEALTH CARE EDUCATION/TRAINING PROGRAM

## 2023-01-01 PROCEDURE — 87086 URINE CULTURE/COLONY COUNT: CPT

## 2023-01-01 PROCEDURE — 94669 MECHANICAL CHEST WALL OSCILL: CPT

## 2023-01-01 PROCEDURE — 700102 HCHG RX REV CODE 250 W/ 637 OVERRIDE(OP): Performed by: STUDENT IN AN ORGANIZED HEALTH CARE EDUCATION/TRAINING PROGRAM

## 2023-01-01 PROCEDURE — 97530 THERAPEUTIC ACTIVITIES: CPT

## 2023-01-01 PROCEDURE — 0241U HCHG SARS-COV-2 COVID-19 NFCT DS RESP RNA 4 TRGT MIC: CPT

## 2023-01-01 PROCEDURE — 99239 HOSP IP/OBS DSCHRG MGMT >30: CPT | Performed by: STUDENT IN AN ORGANIZED HEALTH CARE EDUCATION/TRAINING PROGRAM

## 2023-01-01 PROCEDURE — 92610 EVALUATE SWALLOWING FUNCTION: CPT

## 2023-01-01 PROCEDURE — 99232 SBSQ HOSP IP/OBS MODERATE 35: CPT | Performed by: STUDENT IN AN ORGANIZED HEALTH CARE EDUCATION/TRAINING PROGRAM

## 2023-01-01 PROCEDURE — 700105 HCHG RX REV CODE 258: Performed by: STUDENT IN AN ORGANIZED HEALTH CARE EDUCATION/TRAINING PROGRAM

## 2023-01-01 PROCEDURE — 700111 HCHG RX REV CODE 636 W/ 250 OVERRIDE (IP): Performed by: INTERNAL MEDICINE

## 2023-01-01 PROCEDURE — 99223 1ST HOSP IP/OBS HIGH 75: CPT | Mod: AI | Performed by: INTERNAL MEDICINE

## 2023-01-01 PROCEDURE — 700101 HCHG RX REV CODE 250: Performed by: HOSPITALIST

## 2023-01-01 PROCEDURE — 85027 COMPLETE CBC AUTOMATED: CPT

## 2023-01-01 PROCEDURE — 82803 BLOOD GASES ANY COMBINATION: CPT

## 2023-01-01 PROCEDURE — 70450 CT HEAD/BRAIN W/O DYE: CPT

## 2023-01-01 PROCEDURE — 700111 HCHG RX REV CODE 636 W/ 250 OVERRIDE (IP): Performed by: EMERGENCY MEDICINE

## 2023-01-01 PROCEDURE — 51798 US URINE CAPACITY MEASURE: CPT

## 2023-01-01 PROCEDURE — 83605 ASSAY OF LACTIC ACID: CPT

## 2023-01-01 PROCEDURE — 71045 X-RAY EXAM CHEST 1 VIEW: CPT

## 2023-01-01 PROCEDURE — 770001 HCHG ROOM/CARE - MED/SURG/GYN PRIV*

## 2023-01-01 PROCEDURE — 81001 URINALYSIS AUTO W/SCOPE: CPT

## 2023-01-01 PROCEDURE — 87040 BLOOD CULTURE FOR BACTERIA: CPT | Mod: 91

## 2023-01-01 PROCEDURE — 87426 SARSCOV CORONAVIRUS AG IA: CPT

## 2023-01-01 PROCEDURE — 82077 ASSAY SPEC XCP UR&BREATH IA: CPT

## 2023-01-01 PROCEDURE — C9803 HOPD COVID-19 SPEC COLLECT: HCPCS | Performed by: EMERGENCY MEDICINE

## 2023-01-01 PROCEDURE — 92526 ORAL FUNCTION THERAPY: CPT

## 2023-01-01 PROCEDURE — 96365 THER/PROPH/DIAG IV INF INIT: CPT

## 2023-01-01 PROCEDURE — 83880 ASSAY OF NATRIURETIC PEPTIDE: CPT

## 2023-01-01 PROCEDURE — 700102 HCHG RX REV CODE 250 W/ 637 OVERRIDE(OP)

## 2023-01-01 PROCEDURE — 700105 HCHG RX REV CODE 258: Performed by: EMERGENCY MEDICINE

## 2023-01-01 PROCEDURE — 99497 ADVNCD CARE PLAN 30 MIN: CPT | Performed by: STUDENT IN AN ORGANIZED HEALTH CARE EDUCATION/TRAINING PROGRAM

## 2023-01-01 RX ORDER — CLOTRIMAZOLE 10 MG/1
10 LOZENGE ORAL; TOPICAL 3 TIMES DAILY
COMMUNITY
Start: 2023-01-01

## 2023-01-01 RX ORDER — LINEZOLID 600 MG/1
600 TABLET, FILM COATED ORAL EVERY 12 HOURS
Status: DISCONTINUED | OUTPATIENT
Start: 2023-01-01 | End: 2023-01-01

## 2023-01-01 RX ORDER — SODIUM CHLORIDE, SODIUM LACTATE, POTASSIUM CHLORIDE, CALCIUM CHLORIDE 600; 310; 30; 20 MG/100ML; MG/100ML; MG/100ML; MG/100ML
INJECTION, SOLUTION INTRAVENOUS CONTINUOUS
Status: DISCONTINUED | OUTPATIENT
Start: 2023-01-01 | End: 2023-01-01 | Stop reason: HOSPADM

## 2023-01-01 RX ORDER — LISINOPRIL 2.5 MG/1
2.5 TABLET ORAL DAILY
COMMUNITY

## 2023-01-01 RX ORDER — GABAPENTIN 100 MG/1
100 CAPSULE ORAL 2 TIMES DAILY
Status: DISCONTINUED | OUTPATIENT
Start: 2023-01-01 | End: 2023-01-01 | Stop reason: HOSPADM

## 2023-01-01 RX ORDER — ATORVASTATIN CALCIUM 80 MG/1
80 TABLET, FILM COATED ORAL DAILY
Status: DISCONTINUED | OUTPATIENT
Start: 2023-01-01 | End: 2023-01-01 | Stop reason: HOSPADM

## 2023-01-01 RX ORDER — SODIUM CHLORIDE, SODIUM LACTATE, POTASSIUM CHLORIDE, CALCIUM CHLORIDE 600; 310; 30; 20 MG/100ML; MG/100ML; MG/100ML; MG/100ML
INJECTION, SOLUTION INTRAVENOUS CONTINUOUS
Status: ACTIVE | OUTPATIENT
Start: 2023-01-01 | End: 2023-01-01

## 2023-01-01 RX ORDER — BISACODYL 10 MG
10 SUPPOSITORY, RECTAL RECTAL
Status: DISCONTINUED | OUTPATIENT
Start: 2023-01-01 | End: 2023-01-01 | Stop reason: HOSPADM

## 2023-01-01 RX ORDER — OXYCODONE HCL 10 MG/1
10 TABLET, FILM COATED, EXTENDED RELEASE ORAL EVERY 12 HOURS PRN
Status: DISCONTINUED | OUTPATIENT
Start: 2023-01-01 | End: 2023-01-01 | Stop reason: HOSPADM

## 2023-01-01 RX ORDER — SODIUM CHLORIDE, SODIUM LACTATE, POTASSIUM CHLORIDE, AND CALCIUM CHLORIDE .6; .31; .03; .02 G/100ML; G/100ML; G/100ML; G/100ML
500 INJECTION, SOLUTION INTRAVENOUS ONCE
Status: COMPLETED | OUTPATIENT
Start: 2023-01-01 | End: 2023-01-01

## 2023-01-01 RX ORDER — AMOXICILLIN 250 MG
2 CAPSULE ORAL 2 TIMES DAILY
Status: DISCONTINUED | OUTPATIENT
Start: 2023-01-01 | End: 2023-01-01 | Stop reason: HOSPADM

## 2023-01-01 RX ORDER — LEVETIRACETAM 500 MG/1
500 TABLET ORAL 2 TIMES DAILY
Status: DISCONTINUED | OUTPATIENT
Start: 2023-01-01 | End: 2023-01-01 | Stop reason: HOSPADM

## 2023-01-01 RX ORDER — POTASSIUM CHLORIDE 20 MEQ/1
40 TABLET, EXTENDED RELEASE ORAL ONCE
Status: DISCONTINUED | OUTPATIENT
Start: 2023-01-01 | End: 2023-01-01

## 2023-01-01 RX ORDER — ENOXAPARIN SODIUM 100 MG/ML
40 INJECTION SUBCUTANEOUS DAILY
Status: DISCONTINUED | OUTPATIENT
Start: 2023-01-01 | End: 2023-01-01

## 2023-01-01 RX ORDER — LISINOPRIL 5 MG/1
2.5 TABLET ORAL DAILY
Status: DISCONTINUED | OUTPATIENT
Start: 2023-01-01 | End: 2023-01-01 | Stop reason: HOSPADM

## 2023-01-01 RX ORDER — OXYCODONE HYDROCHLORIDE 5 MG/1
5 TABLET ORAL EVERY 8 HOURS PRN
COMMUNITY

## 2023-01-01 RX ORDER — OXYCODONE HYDROCHLORIDE 5 MG/1
5 TABLET ORAL EVERY 8 HOURS PRN
Qty: 9 TABLET | Refills: 0 | Status: SHIPPED | OUTPATIENT
Start: 2023-01-01 | End: 2023-01-01

## 2023-01-01 RX ORDER — AMIODARONE HYDROCHLORIDE 200 MG/1
200 TABLET ORAL DAILY
Status: DISCONTINUED | OUTPATIENT
Start: 2023-01-01 | End: 2023-01-01 | Stop reason: HOSPADM

## 2023-01-01 RX ORDER — POTASSIUM CHLORIDE 20 MEQ/1
40 TABLET, EXTENDED RELEASE ORAL 2 TIMES DAILY
Status: COMPLETED | OUTPATIENT
Start: 2023-01-01 | End: 2023-01-01

## 2023-01-01 RX ORDER — FUROSEMIDE 10 MG/ML
80 INJECTION INTRAMUSCULAR; INTRAVENOUS ONCE
Status: COMPLETED | OUTPATIENT
Start: 2023-01-01 | End: 2023-01-01

## 2023-01-01 RX ORDER — POLYETHYLENE GLYCOL 3350 17 G/17G
1 POWDER, FOR SOLUTION ORAL
Status: DISCONTINUED | OUTPATIENT
Start: 2023-01-01 | End: 2023-01-01 | Stop reason: HOSPADM

## 2023-01-01 RX ORDER — SODIUM CHLORIDE, SODIUM LACTATE, POTASSIUM CHLORIDE, CALCIUM CHLORIDE 600; 310; 30; 20 MG/100ML; MG/100ML; MG/100ML; MG/100ML
INJECTION, SOLUTION INTRAVENOUS CONTINUOUS
Status: DISCONTINUED | OUTPATIENT
Start: 2023-01-01 | End: 2023-01-01

## 2023-01-01 RX ORDER — POTASSIUM CHLORIDE 20 MEQ/1
40 TABLET, EXTENDED RELEASE ORAL ONCE
Status: COMPLETED | OUTPATIENT
Start: 2023-01-01 | End: 2023-01-01

## 2023-01-01 RX ORDER — POTASSIUM CHLORIDE 20 MEQ/1
20 TABLET, EXTENDED RELEASE ORAL ONCE
Status: COMPLETED | OUTPATIENT
Start: 2023-01-01 | End: 2023-01-01

## 2023-01-01 RX ORDER — ENOXAPARIN SODIUM 100 MG/ML
1 INJECTION SUBCUTANEOUS EVERY 12 HOURS
Status: DISCONTINUED | OUTPATIENT
Start: 2023-01-01 | End: 2023-01-01

## 2023-01-01 RX ORDER — SODIUM CHLORIDE 9 MG/ML
INJECTION, SOLUTION INTRAVENOUS CONTINUOUS
Status: DISCONTINUED | OUTPATIENT
Start: 2023-01-01 | End: 2023-01-01

## 2023-01-01 RX ADMIN — CEFTRIAXONE SODIUM 1000 MG: 10 INJECTION, POWDER, FOR SOLUTION INTRAVENOUS at 21:20

## 2023-01-01 RX ADMIN — POTASSIUM CHLORIDE 40 MEQ: 1500 TABLET, EXTENDED RELEASE ORAL at 06:02

## 2023-01-01 RX ADMIN — ATORVASTATIN CALCIUM 80 MG: 80 TABLET, FILM COATED ORAL at 20:01

## 2023-01-01 RX ADMIN — GABAPENTIN 100 MG: 100 CAPSULE ORAL at 17:52

## 2023-01-01 RX ADMIN — GABAPENTIN 100 MG: 100 CAPSULE ORAL at 17:30

## 2023-01-01 RX ADMIN — CEFEPIME 2 G: 2 INJECTION, POWDER, FOR SOLUTION INTRAVENOUS at 17:41

## 2023-01-01 RX ADMIN — LINEZOLID 600 MG: 600 TABLET, FILM COATED ORAL at 05:21

## 2023-01-01 RX ADMIN — GABAPENTIN 100 MG: 100 CAPSULE ORAL at 06:03

## 2023-01-01 RX ADMIN — GABAPENTIN 100 MG: 100 CAPSULE ORAL at 05:12

## 2023-01-01 RX ADMIN — CEFEPIME 2 G: 2 INJECTION, POWDER, FOR SOLUTION INTRAVENOUS at 17:43

## 2023-01-01 RX ADMIN — CEFEPIME 2 G: 2 INJECTION, POWDER, FOR SOLUTION INTRAVENOUS at 05:20

## 2023-01-01 RX ADMIN — DOCUSATE SODIUM 50 MG AND SENNOSIDES 8.6 MG 2 TABLET: 8.6; 5 TABLET, FILM COATED ORAL at 17:30

## 2023-01-01 RX ADMIN — GABAPENTIN 100 MG: 100 CAPSULE ORAL at 17:07

## 2023-01-01 RX ADMIN — SODIUM CHLORIDE, POTASSIUM CHLORIDE, SODIUM LACTATE AND CALCIUM CHLORIDE: 600; 310; 30; 20 INJECTION, SOLUTION INTRAVENOUS at 21:22

## 2023-01-01 RX ADMIN — DOCUSATE SODIUM 50 MG AND SENNOSIDES 8.6 MG 2 TABLET: 8.6; 5 TABLET, FILM COATED ORAL at 16:53

## 2023-01-01 RX ADMIN — GABAPENTIN 100 MG: 100 CAPSULE ORAL at 16:53

## 2023-01-01 RX ADMIN — SODIUM CHLORIDE, POTASSIUM CHLORIDE, SODIUM LACTATE AND CALCIUM CHLORIDE: 600; 310; 30; 20 INJECTION, SOLUTION INTRAVENOUS at 09:14

## 2023-01-01 RX ADMIN — AMIODARONE HYDROCHLORIDE 200 MG: 200 TABLET ORAL at 06:03

## 2023-01-01 RX ADMIN — LEVETIRACETAM 500 MG: 500 TABLET, FILM COATED ORAL at 05:21

## 2023-01-01 RX ADMIN — AZITHROMYCIN MONOHYDRATE 500 MG: 500 INJECTION, POWDER, LYOPHILIZED, FOR SOLUTION INTRAVENOUS at 04:58

## 2023-01-01 RX ADMIN — ENOXAPARIN SODIUM 60 MG: 60 INJECTION SUBCUTANEOUS at 04:58

## 2023-01-01 RX ADMIN — ATORVASTATIN CALCIUM 80 MG: 80 TABLET, FILM COATED ORAL at 17:59

## 2023-01-01 RX ADMIN — POTASSIUM CHLORIDE 20 MEQ: 1500 TABLET, EXTENDED RELEASE ORAL at 09:52

## 2023-01-01 RX ADMIN — VANCOMYCIN HYDROCHLORIDE 2000 MG: 500 INJECTION, POWDER, LYOPHILIZED, FOR SOLUTION INTRAVENOUS at 22:27

## 2023-01-01 RX ADMIN — APIXABAN 5 MG: 5 TABLET, FILM COATED ORAL at 16:53

## 2023-01-01 RX ADMIN — POTASSIUM CHLORIDE 40 MEQ: 1500 TABLET, EXTENDED RELEASE ORAL at 12:12

## 2023-01-01 RX ADMIN — POTASSIUM CHLORIDE 40 MEQ: 1500 TABLET, EXTENDED RELEASE ORAL at 17:29

## 2023-01-01 RX ADMIN — ATORVASTATIN CALCIUM 80 MG: 80 TABLET, FILM COATED ORAL at 17:07

## 2023-01-01 RX ADMIN — APIXABAN 5 MG: 5 TABLET, FILM COATED ORAL at 05:38

## 2023-01-01 RX ADMIN — LEVETIRACETAM 500 MG: 500 TABLET, FILM COATED ORAL at 05:37

## 2023-01-01 RX ADMIN — GABAPENTIN 100 MG: 100 CAPSULE ORAL at 17:59

## 2023-01-01 RX ADMIN — LEVETIRACETAM 500 MG: 500 TABLET, FILM COATED ORAL at 17:52

## 2023-01-01 RX ADMIN — LEVETIRACETAM 500 MG: 500 TABLET, FILM COATED ORAL at 17:39

## 2023-01-01 RX ADMIN — ATORVASTATIN CALCIUM 80 MG: 80 TABLET, FILM COATED ORAL at 18:22

## 2023-01-01 RX ADMIN — AMIODARONE HYDROCHLORIDE 200 MG: 200 TABLET ORAL at 05:25

## 2023-01-01 RX ADMIN — ATORVASTATIN CALCIUM 80 MG: 80 TABLET, FILM COATED ORAL at 22:02

## 2023-01-01 RX ADMIN — ENOXAPARIN SODIUM 60 MG: 60 INJECTION SUBCUTANEOUS at 16:29

## 2023-01-01 RX ADMIN — ENOXAPARIN SODIUM 60 MG: 60 INJECTION SUBCUTANEOUS at 17:59

## 2023-01-01 RX ADMIN — GABAPENTIN 100 MG: 100 CAPSULE ORAL at 05:37

## 2023-01-01 RX ADMIN — LEVETIRACETAM 500 MG: 500 TABLET, FILM COATED ORAL at 05:25

## 2023-01-01 RX ADMIN — LEVETIRACETAM 500 MG: 500 TABLET, FILM COATED ORAL at 18:22

## 2023-01-01 RX ADMIN — OXYCODONE HYDROCHLORIDE 10 MG: 10 TABLET, FILM COATED, EXTENDED RELEASE ORAL at 12:14

## 2023-01-01 RX ADMIN — ENOXAPARIN SODIUM 60 MG: 60 INJECTION SUBCUTANEOUS at 06:03

## 2023-01-01 RX ADMIN — CEFTRIAXONE SODIUM 1000 MG: 10 INJECTION, POWDER, FOR SOLUTION INTRAVENOUS at 22:27

## 2023-01-01 RX ADMIN — LEVETIRACETAM 500 MG: 500 TABLET, FILM COATED ORAL at 17:07

## 2023-01-01 RX ADMIN — DOCUSATE SODIUM 50 MG AND SENNOSIDES 8.6 MG 2 TABLET: 8.6; 5 TABLET, FILM COATED ORAL at 05:48

## 2023-01-01 RX ADMIN — LEVETIRACETAM 500 MG: 500 TABLET, FILM COATED ORAL at 05:35

## 2023-01-01 RX ADMIN — DOCUSATE SODIUM 50 MG AND SENNOSIDES 8.6 MG 2 TABLET: 8.6; 5 TABLET, FILM COATED ORAL at 05:38

## 2023-01-01 RX ADMIN — LEVETIRACETAM 500 MG: 500 TABLET, FILM COATED ORAL at 05:47

## 2023-01-01 RX ADMIN — APIXABAN 5 MG: 5 TABLET, FILM COATED ORAL at 16:20

## 2023-01-01 RX ADMIN — APIXABAN 5 MG: 5 TABLET, FILM COATED ORAL at 17:38

## 2023-01-01 RX ADMIN — AMIODARONE HYDROCHLORIDE 200 MG: 200 TABLET ORAL at 05:21

## 2023-01-01 RX ADMIN — SODIUM CHLORIDE, POTASSIUM CHLORIDE, SODIUM LACTATE AND CALCIUM CHLORIDE 500 ML: 600; 310; 30; 20 INJECTION, SOLUTION INTRAVENOUS at 11:49

## 2023-01-01 RX ADMIN — GABAPENTIN 100 MG: 100 CAPSULE ORAL at 05:35

## 2023-01-01 RX ADMIN — LISINOPRIL 2.5 MG: 5 TABLET ORAL at 05:37

## 2023-01-01 RX ADMIN — SODIUM CHLORIDE, POTASSIUM CHLORIDE, SODIUM LACTATE AND CALCIUM CHLORIDE: 600; 310; 30; 20 INJECTION, SOLUTION INTRAVENOUS at 09:15

## 2023-01-01 RX ADMIN — LEVETIRACETAM 500 MG: 500 TABLET, FILM COATED ORAL at 05:12

## 2023-01-01 RX ADMIN — AMIODARONE HYDROCHLORIDE 200 MG: 200 TABLET ORAL at 05:32

## 2023-01-01 RX ADMIN — ATORVASTATIN CALCIUM 80 MG: 80 TABLET, FILM COATED ORAL at 22:00

## 2023-01-01 RX ADMIN — SODIUM CHLORIDE, POTASSIUM CHLORIDE, SODIUM LACTATE AND CALCIUM CHLORIDE: 600; 310; 30; 20 INJECTION, SOLUTION INTRAVENOUS at 19:20

## 2023-01-01 RX ADMIN — ATORVASTATIN CALCIUM 80 MG: 80 TABLET, FILM COATED ORAL at 21:47

## 2023-01-01 RX ADMIN — FUROSEMIDE 80 MG: 10 INJECTION, SOLUTION INTRAMUSCULAR; INTRAVENOUS at 12:50

## 2023-01-01 RX ADMIN — LEVETIRACETAM 500 MG: 500 TABLET, FILM COATED ORAL at 05:33

## 2023-01-01 RX ADMIN — DOCUSATE SODIUM 50 MG AND SENNOSIDES 8.6 MG 2 TABLET: 8.6; 5 TABLET, FILM COATED ORAL at 05:25

## 2023-01-01 RX ADMIN — SODIUM CHLORIDE, POTASSIUM CHLORIDE, SODIUM LACTATE AND CALCIUM CHLORIDE: 600; 310; 30; 20 INJECTION, SOLUTION INTRAVENOUS at 02:23

## 2023-01-01 RX ADMIN — DOCUSATE SODIUM 50 MG AND SENNOSIDES 8.6 MG 2 TABLET: 8.6; 5 TABLET, FILM COATED ORAL at 04:58

## 2023-01-01 RX ADMIN — GABAPENTIN 100 MG: 100 CAPSULE ORAL at 16:20

## 2023-01-01 RX ADMIN — AMIODARONE HYDROCHLORIDE 200 MG: 200 TABLET ORAL at 05:13

## 2023-01-01 RX ADMIN — APIXABAN 5 MG: 5 TABLET, FILM COATED ORAL at 05:35

## 2023-01-01 RX ADMIN — LISINOPRIL 2.5 MG: 5 TABLET ORAL at 08:28

## 2023-01-01 RX ADMIN — APIXABAN 5 MG: 5 TABLET, FILM COATED ORAL at 05:32

## 2023-01-01 RX ADMIN — DOCUSATE SODIUM 50 MG AND SENNOSIDES 8.6 MG 2 TABLET: 8.6; 5 TABLET, FILM COATED ORAL at 06:03

## 2023-01-01 RX ADMIN — LEVETIRACETAM 500 MG: 500 TABLET, FILM COATED ORAL at 17:30

## 2023-01-01 RX ADMIN — APIXABAN 10 MG: 5 TABLET, FILM COATED ORAL at 18:22

## 2023-01-01 RX ADMIN — SODIUM CHLORIDE: 9 INJECTION, SOLUTION INTRAVENOUS at 22:29

## 2023-01-01 RX ADMIN — LEVETIRACETAM 500 MG: 500 TABLET, FILM COATED ORAL at 17:59

## 2023-01-01 RX ADMIN — AMIODARONE HYDROCHLORIDE 200 MG: 200 TABLET ORAL at 05:38

## 2023-01-01 RX ADMIN — APIXABAN 10 MG: 5 TABLET, FILM COATED ORAL at 05:46

## 2023-01-01 RX ADMIN — DOCUSATE SODIUM 50 MG AND SENNOSIDES 8.6 MG 2 TABLET: 8.6; 5 TABLET, FILM COATED ORAL at 17:38

## 2023-01-01 RX ADMIN — DOCUSATE SODIUM 50 MG AND SENNOSIDES 8.6 MG 2 TABLET: 8.6; 5 TABLET, FILM COATED ORAL at 17:06

## 2023-01-01 RX ADMIN — ATORVASTATIN CALCIUM 80 MG: 80 TABLET, FILM COATED ORAL at 21:57

## 2023-01-01 RX ADMIN — LINEZOLID 600 MG: 600 TABLET, FILM COATED ORAL at 17:39

## 2023-01-01 RX ADMIN — LEVETIRACETAM 500 MG: 500 TABLET, FILM COATED ORAL at 06:03

## 2023-01-01 RX ADMIN — LEVETIRACETAM 500 MG: 500 TABLET, FILM COATED ORAL at 04:57

## 2023-01-01 RX ADMIN — APIXABAN 5 MG: 5 TABLET, FILM COATED ORAL at 17:29

## 2023-01-01 RX ADMIN — GABAPENTIN 100 MG: 100 CAPSULE ORAL at 17:38

## 2023-01-01 RX ADMIN — AMIODARONE HYDROCHLORIDE 200 MG: 200 TABLET ORAL at 04:58

## 2023-01-01 RX ADMIN — GABAPENTIN 100 MG: 100 CAPSULE ORAL at 05:46

## 2023-01-01 RX ADMIN — DOCUSATE SODIUM 50 MG AND SENNOSIDES 8.6 MG 2 TABLET: 8.6; 5 TABLET, FILM COATED ORAL at 17:59

## 2023-01-01 RX ADMIN — GABAPENTIN 100 MG: 100 CAPSULE ORAL at 05:25

## 2023-01-01 RX ADMIN — DOCUSATE SODIUM 50 MG AND SENNOSIDES 8.6 MG 2 TABLET: 8.6; 5 TABLET, FILM COATED ORAL at 17:52

## 2023-01-01 RX ADMIN — SODIUM CHLORIDE, POTASSIUM CHLORIDE, SODIUM LACTATE AND CALCIUM CHLORIDE: 600; 310; 30; 20 INJECTION, SOLUTION INTRAVENOUS at 12:30

## 2023-01-01 RX ADMIN — GABAPENTIN 100 MG: 100 CAPSULE ORAL at 05:21

## 2023-01-01 RX ADMIN — GABAPENTIN 100 MG: 100 CAPSULE ORAL at 18:22

## 2023-01-01 RX ADMIN — DOCUSATE SODIUM 50 MG AND SENNOSIDES 8.6 MG 2 TABLET: 8.6; 5 TABLET, FILM COATED ORAL at 05:21

## 2023-01-01 RX ADMIN — GABAPENTIN 100 MG: 100 CAPSULE ORAL at 04:57

## 2023-01-01 RX ADMIN — POTASSIUM PHOSPHATE, MONOBASIC AND POTASSIUM PHOSPHATE, DIBASIC 30 MMOL: 224; 236 INJECTION, SOLUTION, CONCENTRATE INTRAVENOUS at 09:50

## 2023-01-01 RX ADMIN — PIPERACILLIN AND TAZOBACTAM 4.5 G: 4; .5 INJECTION, POWDER, LYOPHILIZED, FOR SOLUTION INTRAVENOUS; PARENTERAL at 21:39

## 2023-01-01 RX ADMIN — CEFEPIME 2 G: 2 INJECTION, POWDER, FOR SOLUTION INTRAVENOUS at 05:34

## 2023-01-01 RX ADMIN — GABAPENTIN 100 MG: 100 CAPSULE ORAL at 05:33

## 2023-01-01 RX ADMIN — AZITHROMYCIN MONOHYDRATE 500 MG: 500 INJECTION, POWDER, LYOPHILIZED, FOR SOLUTION INTRAVENOUS at 05:13

## 2023-01-01 RX ADMIN — LISINOPRIL 2.5 MG: 5 TABLET ORAL at 05:33

## 2023-01-01 RX ADMIN — CEFTRIAXONE SODIUM 1000 MG: 10 INJECTION, POWDER, FOR SOLUTION INTRAVENOUS at 22:13

## 2023-01-01 RX ADMIN — APIXABAN 5 MG: 5 TABLET, FILM COATED ORAL at 05:21

## 2023-01-01 RX ADMIN — AMIODARONE HYDROCHLORIDE 200 MG: 200 TABLET ORAL at 05:35

## 2023-01-01 RX ADMIN — OXYCODONE HYDROCHLORIDE 10 MG: 10 TABLET, FILM COATED, EXTENDED RELEASE ORAL at 21:57

## 2023-01-01 RX ADMIN — APIXABAN 5 MG: 5 TABLET, FILM COATED ORAL at 17:52

## 2023-01-01 RX ADMIN — LEVETIRACETAM 500 MG: 500 TABLET, FILM COATED ORAL at 16:20

## 2023-01-01 RX ADMIN — AMIODARONE HYDROCHLORIDE 200 MG: 200 TABLET ORAL at 05:48

## 2023-01-01 RX ADMIN — CEFEPIME 2 G: 2 INJECTION, POWDER, FOR SOLUTION INTRAVENOUS at 05:21

## 2023-01-01 RX ADMIN — APIXABAN 5 MG: 5 TABLET, FILM COATED ORAL at 05:25

## 2023-01-01 RX ADMIN — LEVETIRACETAM 500 MG: 500 TABLET, FILM COATED ORAL at 16:53

## 2023-01-01 RX ADMIN — AZITHROMYCIN MONOHYDRATE 500 MG: 500 INJECTION, POWDER, LYOPHILIZED, FOR SOLUTION INTRAVENOUS at 06:05

## 2023-01-01 ASSESSMENT — FIBROSIS 4 INDEX
FIB4 SCORE: 4.51
FIB4 SCORE: 4.47
FIB4 SCORE: 6.25
FIB4 SCORE: 6.25
FIB4 SCORE: 4.99
FIB4 SCORE: 4.51
FIB4 SCORE: 4.99
FIB4 SCORE: 5.01
FIB4 SCORE: 6.24
FIB4 SCORE: 4.51

## 2023-01-01 ASSESSMENT — COGNITIVE AND FUNCTIONAL STATUS - GENERAL
DAILY ACTIVITIY SCORE: 7
TURNING FROM BACK TO SIDE WHILE IN FLAT BAD: UNABLE
CLIMB 3 TO 5 STEPS WITH RAILING: TOTAL
SUGGESTED CMS G CODE MODIFIER DAILY ACTIVITY: CM
MOBILITY SCORE: 6
CLIMB 3 TO 5 STEPS WITH RAILING: TOTAL
WALKING IN HOSPITAL ROOM: TOTAL
MOBILITY SCORE: 7
STANDING UP FROM CHAIR USING ARMS: A LOT
MOVING TO AND FROM BED TO CHAIR: UNABLE
EATING MEALS: A LOT
PERSONAL GROOMING: TOTAL
MOVING FROM LYING ON BACK TO SITTING ON SIDE OF FLAT BED: UNABLE
DAILY ACTIVITIY SCORE: 10
TURNING FROM BACK TO SIDE WHILE IN FLAT BAD: UNABLE
STANDING UP FROM CHAIR USING ARMS: TOTAL
CLIMB 3 TO 5 STEPS WITH RAILING: TOTAL
WALKING IN HOSPITAL ROOM: TOTAL
DRESSING REGULAR LOWER BODY CLOTHING: TOTAL
DRESSING REGULAR LOWER BODY CLOTHING: TOTAL
TURNING FROM BACK TO SIDE WHILE IN FLAT BAD: UNABLE
HELP NEEDED FOR BATHING: A LOT
SUGGESTED CMS G CODE MODIFIER MOBILITY: CN
SUGGESTED CMS G CODE MODIFIER MOBILITY: CM
TOILETING: TOTAL
PERSONAL GROOMING: A LOT
STANDING UP FROM CHAIR USING ARMS: TOTAL
SUGGESTED CMS G CODE MODIFIER DAILY ACTIVITY: CL
PERSONAL GROOMING: A LOT
MOVING TO AND FROM BED TO CHAIR: UNABLE
MOVING FROM LYING ON BACK TO SITTING ON SIDE OF FLAT BED: UNABLE
SUGGESTED CMS G CODE MODIFIER MOBILITY: CN
MOVING TO AND FROM BED TO CHAIR: UNABLE
HELP NEEDED FOR BATHING: TOTAL
TURNING FROM BACK TO SIDE WHILE IN FLAT BAD: UNABLE
CLIMB 3 TO 5 STEPS WITH RAILING: TOTAL
MOBILITY SCORE: 6
TOILETING: TOTAL
STANDING UP FROM CHAIR USING ARMS: A LOT
MOVING FROM LYING ON BACK TO SITTING ON SIDE OF FLAT BED: UNABLE
SUGGESTED CMS G CODE MODIFIER DAILY ACTIVITY: CL
TURNING FROM BACK TO SIDE WHILE IN FLAT BAD: UNABLE
MOBILITY SCORE: 7
DRESSING REGULAR LOWER BODY CLOTHING: A LOT
DAILY ACTIVITIY SCORE: 9
SUGGESTED CMS G CODE MODIFIER MOBILITY: CM
EATING MEALS: A LOT
MOVING FROM LYING ON BACK TO SITTING ON SIDE OF FLAT BED: UNABLE
SUGGESTED CMS G CODE MODIFIER DAILY ACTIVITY: CL
HELP NEEDED FOR BATHING: TOTAL
DRESSING REGULAR UPPER BODY CLOTHING: TOTAL
DRESSING REGULAR LOWER BODY CLOTHING: TOTAL
EATING MEALS: A LOT
WALKING IN HOSPITAL ROOM: TOTAL
SUGGESTED CMS G CODE MODIFIER MOBILITY: CN
MOBILITY SCORE: 6
MOVING TO AND FROM BED TO CHAIR: UNABLE
WALKING IN HOSPITAL ROOM: TOTAL
TOILETING: TOTAL
TOILETING: TOTAL
EATING MEALS: A LITTLE
PERSONAL GROOMING: A LOT
DRESSING REGULAR UPPER BODY CLOTHING: A LOT
STANDING UP FROM CHAIR USING ARMS: TOTAL
CLIMB 3 TO 5 STEPS WITH RAILING: TOTAL
HELP NEEDED FOR BATHING: TOTAL
MOVING FROM LYING ON BACK TO SITTING ON SIDE OF FLAT BED: UNABLE
DAILY ACTIVITIY SCORE: 11
MOVING TO AND FROM BED TO CHAIR: UNABLE
WALKING IN HOSPITAL ROOM: TOTAL

## 2023-01-01 ASSESSMENT — ENCOUNTER SYMPTOMS
FEVER: 0
FEVER: 0
NAUSEA: 0
CHILLS: 0
PALPITATIONS: 0
NAUSEA: 0
CHILLS: 0
SHORTNESS OF BREATH: 1
DIARRHEA: 0
DIZZINESS: 0
BACK PAIN: 0
WEAKNESS: 1
VOMITING: 0
COUGH: 1
ABDOMINAL PAIN: 0
HEADACHES: 0
PALPITATIONS: 0
PALPITATIONS: 0
DOUBLE VISION: 0
NAUSEA: 0
SHORTNESS OF BREATH: 1
VOMITING: 0
COUGH: 1
VOMITING: 0
LOSS OF CONSCIOUSNESS: 0
CHILLS: 0
HEADACHES: 0
CHILLS: 0
COUGH: 1
BACK PAIN: 0
HEADACHES: 0
ABDOMINAL PAIN: 0
NAUSEA: 0
SPUTUM PRODUCTION: 1
SPUTUM PRODUCTION: 1
FEVER: 0
FEVER: 0
SHORTNESS OF BREATH: 0
NAUSEA: 0
ABDOMINAL PAIN: 0
FEVER: 0
VOMITING: 0
COUGH: 1
DOUBLE VISION: 0
SHORTNESS OF BREATH: 0
COUGH: 1
BLURRED VISION: 0
NAUSEA: 0
DIZZINESS: 0
ABDOMINAL PAIN: 0
SHORTNESS OF BREATH: 0
DIARRHEA: 0
ABDOMINAL PAIN: 0
BACK PAIN: 0
FEVER: 0
SHORTNESS OF BREATH: 1
COUGH: 1
DIZZINESS: 0
CHILLS: 0
PALPITATIONS: 0
ABDOMINAL PAIN: 0
BLURRED VISION: 0
SPUTUM PRODUCTION: 1
ABDOMINAL PAIN: 0
NAUSEA: 0
DOUBLE VISION: 0
DIARRHEA: 0
SHORTNESS OF BREATH: 1
CHILLS: 0
COUGH: 1
VOMITING: 0
VOMITING: 0
PALPITATIONS: 0
FEVER: 0
PALPITATIONS: 0
CHILLS: 0
NAUSEA: 0
SPUTUM PRODUCTION: 1
COUGH: 1
VOMITING: 0
VOMITING: 0
SPUTUM PRODUCTION: 1
PALPITATIONS: 0
ABDOMINAL PAIN: 0
SHORTNESS OF BREATH: 1
LOSS OF CONSCIOUSNESS: 0
BLURRED VISION: 0
PALPITATIONS: 0
WEAKNESS: 1
WEAKNESS: 1
LOSS OF CONSCIOUSNESS: 0
FEVER: 0
CHILLS: 0

## 2023-01-01 ASSESSMENT — LIFESTYLE VARIABLES
DO YOU DRINK ALCOHOL: NO
TOTAL SCORE: 0
EVER FELT BAD OR GUILTY ABOUT YOUR DRINKING: NO
HOW MANY TIMES IN THE PAST YEAR HAVE YOU HAD 5 OR MORE DRINKS IN A DAY: 0
AVERAGE NUMBER OF DAYS PER WEEK YOU HAVE A DRINK CONTAINING ALCOHOL: 0
HAVE YOU EVER FELT YOU SHOULD CUT DOWN ON YOUR DRINKING: NO
ON A TYPICAL DAY WHEN YOU DRINK ALCOHOL HOW MANY DRINKS DO YOU HAVE: 0
TOTAL SCORE: 0
HAVE PEOPLE ANNOYED YOU BY CRITICIZING YOUR DRINKING: NO
DOES PATIENT WANT TO STOP DRINKING: NO
TOTAL SCORE: 0
CONSUMPTION TOTAL: NEGATIVE
ALCOHOL_USE: NO
EVER HAD A DRINK FIRST THING IN THE MORNING TO STEADY YOUR NERVES TO GET RID OF A HANGOVER: NO

## 2023-01-01 ASSESSMENT — PAIN DESCRIPTION - PAIN TYPE
TYPE: PHANTOM PAIN;SURGICAL PAIN
TYPE: ACUTE PAIN
TYPE: ACUTE PAIN;SURGICAL PAIN
TYPE: ACUTE PAIN

## 2023-01-01 ASSESSMENT — PATIENT HEALTH QUESTIONNAIRE - PHQ9
1. LITTLE INTEREST OR PLEASURE IN DOING THINGS: NOT AT ALL
SUM OF ALL RESPONSES TO PHQ9 QUESTIONS 1 AND 2: 0
1. LITTLE INTEREST OR PLEASURE IN DOING THINGS: NOT AT ALL
2. FEELING DOWN, DEPRESSED, IRRITABLE, OR HOPELESS: NOT AT ALL
SUM OF ALL RESPONSES TO PHQ9 QUESTIONS 1 AND 2: 0
1. LITTLE INTEREST OR PLEASURE IN DOING THINGS: NOT AT ALL
SUM OF ALL RESPONSES TO PHQ9 QUESTIONS 1 AND 2: 0
2. FEELING DOWN, DEPRESSED, IRRITABLE, OR HOPELESS: NOT AT ALL

## 2023-01-01 ASSESSMENT — GAIT ASSESSMENTS
GAIT LEVEL OF ASSIST: UNABLE TO PARTICIPATE

## 2023-01-01 ASSESSMENT — ACTIVITIES OF DAILY LIVING (ADL)
TOILETING: REQUIRES ASSIST
TOILETING: REQUIRES ASSIST

## 2023-01-01 ASSESSMENT — PAIN SCALES - WONG BAKER: WONGBAKER_NUMERICALRESPONSE: HURTS A WHOLE LOT

## 2023-01-01 NOTE — PROGRESS NOTES
"Hospital Medicine Daily Progress Note    Date of Service  1/1/2023    Chief Complaint  Gilmer Mae is a 82 y.o. 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  Came to us with hypotension from Rehab where he had been sent after a R BKA.  During that surgery had a cardiac arrest during induction requiring CPR.  Went to ICU where he had a second VF arrest.  LHC showing Takotsubo physiology.  ICD placer 12/13.  On this admission in the ED  Hgb 6.4, Na 147, guaiac +, Nit + UA, infiltrate noted on CXR.  CVC placed, given one unit PRBCs and admitted to the IMCU with Dx of sepsis, UTI, anemia    Interval Problem Update  Pt states \"I feel good I guess\"  No other complaints  States he saw a bat in his room this am, doesn't see it now; RN notes no bat    Spoke with pt's NIKKIE Yeung who is his daughter by phone in [place].  Reviewed all current medical issues.  Confirmed he is FULL CODE x 20mins  Spoke with son Gilmer at the bedside x 25mins    Sinus/paced 70s  SBP 130s  HFNC 30L/40%FiO2  AFebrile    I have discussed this patient's plan of care and discharge plan at IDT rounds today with Case Management, Nursing, Nursing leadership, and other members of the IDT team.    Consultants/Specialty      Code Status  Full Code    Disposition  Patient is not medically cleared for discharge.   Anticipate discharge to to an inpatient rehabilitation hospital.  I have placed the appropriate orders for post-discharge needs.    Review of Systems  Review of Systems   Constitutional:  Negative for chills and fever.   Eyes:  Negative for blurred vision and double vision.   Respiratory:  Positive for cough. Negative for shortness of breath.    Cardiovascular:  Negative for chest pain, palpitations and leg swelling.   Gastrointestinal:  Negative for abdominal pain, diarrhea, nausea and vomiting.   Genitourinary:  Negative for dysuria and urgency. "   Musculoskeletal:  Negative for back pain.   Neurological:  Positive for weakness. Negative for dizziness, loss of consciousness and headaches.      Physical Exam  Temp:  [36.3 °C (97.3 °F)-36.7 °C (98 °F)] 36.7 °C (98 °F)  Pulse:  [66-77] 66  Resp:  [11-28] 16  BP: (116-149)/(64-89) 134/73  SpO2:  [90 %-99 %] 96 %    Physical Exam  Constitutional:       General: He is not in acute distress.     Appearance: He is well-developed. He is not diaphoretic.   HENT:      Head: Normocephalic and atraumatic.      Mouth/Throat:      Mouth: Mucous membranes are dry.   Eyes:      General: No scleral icterus.     Conjunctiva/sclera: Conjunctivae normal.   Neck:      Vascular: No JVD.   Cardiovascular:      Rate and Rhythm: Normal rate.      Heart sounds: Murmur heard.     No gallop.   Pulmonary:      Effort: Pulmonary effort is normal. No respiratory distress.      Breath sounds: No stridor. Rales present. No wheezing.   Abdominal:      General: Abdomen is flat.      Palpations: Abdomen is soft.      Tenderness: There is no abdominal tenderness. There is no guarding or rebound.   Musculoskeletal:         General: No tenderness.      Left lower leg: No edema.      Comments: R BKA wound healing well, staples in place, margins well approximated  L arm edematous, good pulses, hand with normal cap refill, warm and pink   Skin:     General: Skin is warm and dry.      Capillary Refill: Capillary refill takes less than 2 seconds.      Coloration: Skin is not jaundiced or pale.      Findings: No rash.   Neurological:      General: No focal deficit present.      Mental Status: He is oriented to person, place, and time.   Psychiatric:         Attention and Perception: He perceives visual hallucinations.         Mood and Affect: Mood normal.         Behavior: Behavior normal.         Thought Content: Thought content normal.       Fluids    Intake/Output Summary (Last 24 hours) at 1/1/2023 0658  Last data filed at 1/1/2023 0600  Gross per  24 hour   Intake 250 ml   Output 1030 ml   Net -780 ml         Laboratory  Recent Labs     12/29/22  1707 12/29/22  2331 12/30/22  0532 12/31/22  0600 12/31/22  1233 01/01/23  0500   WBC 14.8* 8.7  --   --   --  8.6   RBC 1.86* 3.20*  --   --   --  3.39*   HEMOGLOBIN 6.4* 10.9*   < > 11.7* 11.8* 11.6*   HEMATOCRIT 20.5* 35.1*   < > 36.6* 37.4* 36.6*   .2* 109.7*  --   --   --  108.0*   MCH 34.4* 34.1*  --   --   --  34.2*   MCHC 31.2* 31.1*  --   --   --  31.7*   RDW 70.9* 71.8*  --   --   --  70.0*   PLATELETCT 161* 105*  --   --   --  116*   MPV 10.5 10.6  --   --   --  10.7    < > = values in this interval not displayed.       Recent Labs     12/30/22  0532 12/31/22  0931 01/01/23  0500   SODIUM 147* 148* 149*   POTASSIUM 3.3* 3.9 3.6   CHLORIDE 111 113* 112   CO2 27 26 28   GLUCOSE 108* 133* 111*   BUN 22 21 23*   CREATININE 0.73 0.67 0.73   CALCIUM 7.4* 7.9* 8.1*       Recent Labs     12/29/22  1707   APTT 36.6*   INR 1.16*                 Imaging  CT-CTA CHEST PULMONARY ARTERY W/ RECONS   Final Result      1.  There is no CT evidence of acute pulmonary embolism.   2.  There is multifocal airspace disease most predominant in the lower lobes consistent with multifocal pneumonia.   3.  Enlarged heart, particularly the left ventricle.   4.  There is a prosthetic aortic valve.   5.  There is trace amount of dependent pleural fluid.   6.  There is mild left body wall edema.            DX-CHEST-PORTABLE (1 VIEW)   Final Result      Bibasilar opacities, mildly increased on the left, atelectasis, pneumonitis or asymmetric edema.      DX-CHEST-PORTABLE (1 VIEW)   Final Result      1.  Satisfactory appearance of the right IJ catheter with no pneumothorax visualized.   2.  Patchy lower lobe parenchymal opacities could be due to atelectasis or pneumonia or edema.      DX-CHEST-PORTABLE (1 VIEW)   Final Result         Unchanged opacity in the left lower lobe, atelectasis or consolidation      US-EXTREMITY VENOUS  "UPPER UNILAT LEFT    (Results Pending)          Assessment/Plan  * Hypotension  Assessment & Plan  -Inpatient status to [facility].  -Hypotension likely multifactorial: Hypovolemia, infection and possibly also cardiogenic.  -He was on norepinephrine infusion in the ED however this is discontinued now the patient is getting blood transfusion.  -Central line was placed in the ED.  -He has UTI but no other sirs criteria, therefore not septic on admission.  -Initial hemoglobin was 6.4 and had positive guaiac test.  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  Wound does not seem to be infected on admission.  Frequent repositioning, wound care.    Severe protein-calorie malnutrition (HCC)- (present on admission)  Assessment & Plan  -Complex recent hospitalization and per report, poor oral week acute rehab.    Advance care planning- (present on admission)  Assessment & Plan  -Patient has been intermittently DNR/DNI and full code.  There is no POLST form signed.  I had a long conversation with him about advance care planning and goals of care.  He reports that he has been  for 60 years and \"want to fight for alive his long as he " "can, so I can spend time with my wife \".  Patient will remain a full code on admission.  Advanced care planning including the explanation and discussion of advanced directives such as standard forms by physician: First 30 minutes face-to-face with the patient.      Hypernatremia- (present on admission)  Assessment & Plan  Pt is not taking po consistently so will start maintenance IVFs  Repeat BMP in am    Thrombocytopenia (HCC)- (present on admission)  Assessment & Plan  -On admission platelets are 161,000.  Monitor CBC in the morning.    Hypokalemia- (present on admission)  Assessment & Plan  Repeat remains low: giving 80meq po in divided doses   Repeat lab in am and check Mg as well    Acute respiratory failure with hypoxia (HCC)  Assessment & Plan  CTA PE neg for PE but did show B LL consolidation consistent with pneumonia  Clinically is hypovolemic; does not look like CHF  O2 demand has decreased overnight  Cont O2/RT protocols  Treating pneumonia  Follow cultures  mobilize    Congestive heart failure (CHF) ([facility])- (present on admission)  Assessment & Plan  LVEF 40% earlier this month  On BB and ACEI as outpt currently on hold due to hypotension: resume as able    History of seizures- (present on admission)  Assessment & Plan  -Continue Keppra    Macrocytic anemia- (present on admission)  Assessment & Plan  -On admission initial hemoglobin is 6.4.   Given one unit in the ED and repeat Hgb >10 which would seem a disproporionate climb given that he also was bolused fluids  Was guaiac + in the ED  Cont to follow H&H closely  PO PPI  Has had no further evidence of GI bleed    Hyperlipidemia- (present on admission)  Assessment & Plan  -On atorvastatin.    Essential hypertension- (present on admission)  Assessment & Plan  As outpt on lisinopril 10, carvdelol 12.5  Pressures have slowly climbed and now around SBP of 120   Cont to hold BP meds for now         VTE prophylaxis: SCDs/TEDs    I have performed a physical exam " and reviewed and updated ROS and Plan today (1/1/2023). In review of yesterday's note (12/31/2022), there are no changes except as documented above.

## 2023-01-01 NOTE — ASSESSMENT & PLAN NOTE
CTA PE neg for PE but did show B LL consolidation consistent with pneumonia  Clinically is hypovolemic; does not look like CHF  O2 demand trending down now on 1 LNC  Cont O2/RT protocols  Treating pneumonia  Follow cultures  mobilize

## 2023-01-01 NOTE — CARE PLAN
The patient is Watcher - Medium risk of patient condition declining or worsening    Shift Goals  Clinical Goals: monitor airway; monitor hemodynamics  Patient Goals: rest  Family Goals: N/A    Progress made toward(s) clinical / shift goals:  Pt is on HFNC on 30 L at 40%, SPO2% is 97% at this time      Problem: Knowledge Deficit - Standard  Goal: Patient and family/care givers will demonstrate understanding of plan of care, disease process/condition, diagnostic tests and medications  Outcome: Progressing  Note: Pt educated regarding plan of care and medications. All questions answered.       Problem: Pain - Standard  Goal: Alleviation of pain or a reduction in pain to the patient’s comfort goal  Outcome: Progressing  Note: Pt assessed for pain regularly and medicated PRN per MAR.       Problem: Skin Integrity  Goal: Skin integrity is maintained or improved  Outcome: Progressing  Note: Pt turned and positioned every two hours. Incontinence care provided and barrier paste applied as needed.       Problem: Fall Risk  Goal: Patient will remain free from falls  Outcome: Progressing  Note: Fall precautions in place. Bed in lowest position. Non-skid socks in place. Personal possessions within reach. Mobility sign on door. Bed-alarm on. Call light within reach. Pt educated regarding fall prevention and states understanding.

## 2023-01-01 NOTE — PROGRESS NOTES
Dr. Bah w\as updated regarding low UOP for pt. Pt has only had 100 ml output via nava catheter during shift. 500 ml bolus of LR was ordered

## 2023-01-01 NOTE — DIETARY
Nutrition services: Day 3 of admit.  Gilmer Mae is an 82 y.o. male with admitting DX of anemia.    Consult received for failure to thrive.  RD visited pt in room 12/30 to address severe protein-calorie malnutrition and stage 3 pressure injury to sacral region.  RD added Boost Very High Calorie oral nutrition supplement TID to bolster intake and optimize nutrition.  Pt continues on a L3/L2 diet with cardiac modification. The last meal documented was on 12/30 (0-<25% at that time).  Pt was diagnosed with malnutrition 12/30.    Recommendations/Plan:  Continue Boost VHC TID.  Encourage intake of meals and supplements.  Document intake of all meals and supplements as % taken in ADLs to provide interdisciplinary communication across all shifts.   Monitor weight.  Nutrition rep will continue to see patient for ongoing meal and snack preferences.     RD will continue to follow.

## 2023-01-01 NOTE — ASSESSMENT & PLAN NOTE
Noted on US 1/1/23  dificult situation as was guaiac + in ED (light brown stool) and Hgb low on presentation.    Has had a stable/climbing H&H since  Pt states he's never had a colonoscopy  Started on enoxaparin 1mg/kg  Cont to follow H&H closely  Hold off on po antiocoagulation until it is clear he is tolerating well

## 2023-01-01 NOTE — CARE PLAN
The patient is Watcher - Medium risk of patient condition declining or worsening    Shift Goals  Clinical Goals: monitor airway; monitor hemodynamics  Patient Goals: rest  Family Goals: N/A    Progress made toward(s) clinical / shift goals:    Problem: Knowledge Deficit - Standard  Goal: Patient and family/care givers will demonstrate understanding of plan of care, disease process/condition, diagnostic tests and medications  Outcome: Progressing     Problem: Pain - Standard  Goal: Alleviation of pain or a reduction in pain to the patient’s comfort goal  Outcome: Progressing     Problem: Skin Integrity  Goal: Skin integrity is maintained or improved  Outcome: Progressing     Problem: Fall Risk  Goal: Patient will remain free from falls  Outcome: Progressing     Problem: Psychosocial  Goal: Patient's level of anxiety will decrease  Outcome: Progressing  Goal: Patient's ability to verbalize feelings about condition will improve  Outcome: Progressing  Goal: Patient's ability to re-evaluate and adapt role responsibilities will improve  Outcome: Progressing  Goal: Patient and family will demonstrate ability to cope with life altering diagnosis and/or procedure  Outcome: Progressing  Goal: Spiritual and cultural needs incorporated into hospitalization  Outcome: Progressing     Problem: Communication  Goal: The ability to communicate needs accurately and effectively will improve  Outcome: Progressing     Problem: Discharge Barriers/Planning  Goal: Patient's continuum of care needs are met  Outcome: Progressing     Problem: Hemodynamics  Goal: Patient's hemodynamics, fluid balance and neurologic status will be stable or improve  Outcome: Progressing     Problem: Respiratory  Goal: Patient will achieve/maintain optimum respiratory ventilation and gas exchange  Outcome: Progressing     Problem: Fluid Volume  Goal: Fluid volume balance will be maintained  Outcome: Progressing     Problem: Dysphagia  Goal: Dysphagia will  improve  Outcome: Progressing     Problem: Risk for Aspiration  Goal: Patient's risk for aspiration will be absent or decrease  Outcome: Progressing     Problem: Nutrition  Goal: Patient's nutritional and fluid intake will be adequate or improve  Outcome: Progressing  Goal: Enteral nutrition will be maintained or improve  Outcome: Progressing  Goal: Enteral nutrition will be maintained or improve  Outcome: Progressing     Problem: Urinary Elimination  Goal: Establish and maintain regular urinary output  Outcome: Progressing     Problem: Bowel Elimination  Goal: Establish and maintain regular bowel function  Outcome: Progressing     Problem: Mobility  Goal: Patient's capacity to carry out activities will improve  Outcome: Progressing     Problem: Wound/ / Incision Healing  Goal: Patient's wound/surgical incision will decrease in size and heals properly  Outcome: Progressing       Patient is not progressing towards the following goals:

## 2023-01-02 NOTE — CARE PLAN
The patient is Watcher - Medium risk of patient condition declining or worsening    Shift Goals  Clinical Goals: Electrolites replacemtn, monitor nutrition  Patient Goals: Rest, sleep  Family Goals: n/a      Problem: Knowledge Deficit - Standard  Goal: Patient and family/care givers will demonstrate understanding of plan of care, disease process/condition, diagnostic tests and medications  Outcome: Progressing     Problem: Pain - Standard  Goal: Alleviation of pain or a reduction in pain to the patient’s comfort goal  Outcome: Progressing  Flowsheets  Taken 1/2/2023 1029  OB Pain Intervention:   [place]   Rest   Therapeutic presence   Repositioned  Taken 1/2/2023 0800  Pain Rating Scale (NPRS): 2     Problem: Skin Integrity  Goal: Skin integrity is maintained or improved  Outcome: Progressing     Problem: Fall Risk  Goal: Patient will remain free from falls  Outcome: Progressing

## 2023-01-02 NOTE — THERAPY
Physical Therapy   Initial Evaluation     Patient Name: Gilmer Mae  Age:  82 y.o., Sex:  male  Medical Record #: [id number]  Today's Date: 1/2/2023    Precautions: Fall Risk  Comments: R BKA on 12/14    Assessment  Patient is an 82 y.o. male admitted from SNF where he discharged to on 12/19 after R BKA 12/14. Dx hypotension, UTI, L acute thrombosis subclavian vein, sacral ulcer. Pt seen for PT evaluation at this time. Pt required max A for mobility and tolerated sitting EOB < 5 minutes d/t pain and fatigue. Educated on positioning for RLE residual limb, encouraged sitting EOB daily with nursing to promote incr activity tolerance. PT will follow. Recommend return to SNF.     Plan  Treatment Plan : Bed Mobility, Gait Training, Neuro Re-Education / Balance, Self Care / Home Evaluation, Therapeutic Activities, Therapeutic Exercise  Treatment Frequency: 4 Times per Week  Duration: Until Therapy Goals Met    DC Equipment Recommendations: Unable to determine at this time  Discharge Recommendations: Recommend post-acute placement for additional physical therapy services prior to discharge home     01/02/23 0922   Prior Living Situation   Housing / Facility 1 Story House   Steps Into Home 3   Steps In Home 0   Bathroom Set up Walk In Shower;Shower Chair   Lives with -  Spouse   Comments pt was admitted from SNF, was at SNF for 10 days after last hospitalization   Prior Level of Functional Mobility   Comments pt was independent with all mobility prior to last admit, has since been at SNF and reports was not working with therapy, may be unreliable historian   Cognition    Level of Consciousness Alert   Comments pleasant and cooperative during session, is very receptive to edu though repeats questions and does not appear to internalize edu well. pt expresses desire to ambulate again and needs edu on appropriate steps to take to progress mobility prior to ambulating. pt very engaged in conversation today as compared to his last  admit last month   Other Treatments   Other Treatments Provided incr time discussed positioning and the ex for RLE, discussed need for knee ext for eventual prosthetic, edu on daily mobilization with nursing to progress activity tolerance to progress with PT   Balance Assessment   Sitting Balance (Static) Fair -   Sitting Balance (Dynamic) Poor +   Weight Shift Sitting Poor   Bed Mobility    Supine to Sit Maximal Assist   Sit to Supine Maximal Assist   Scooting Maximal Assist   Rolling Moderate Assist to Rt.   Gait Analysis   Gait Level Of Assist Unable to Participate   Weight Bearing Status R BKA   Functional Mobility   Sit to Stand Unable to Participate   Comments only tolerated sitting EOB ~3 minutes   Short Term Goals    Short Term Goal # 1 pt will complete supine <> sit with SPV in 6 visits for impoved independence   Short Term Goal # 2 pt will perform STS with min A in 6 visits to initiate transfers   Short Term Goal # 3 pt will perform SPT with min A in 6 visits to sit in WC   Short Term Goal # 4 pt will demo WC mobility with SPV in 6 visits for improved independence

## 2023-01-02 NOTE — THERAPY
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 male admitted from SNF with hypotension, recent R BKA, pt reports he was not receiving therapy during his 10 day SNF stay. Today pt tolerated only a few minutes sitting EOB, requested to lay back down immediately but encouraged to try a few minutes. Pts functional independence with ADLs is limited by decreased activity tolerance, debility, LUE swelling 2/2 acute DVT, and generalized weakness. Recommend post-acute placement with focus on rehabilitation.     Plan    Occupational Therapy Initial Treatment Plan   Treatment Interventions: Self Care / Activities of Daily Living, Adaptive Equipment, Therapeutic Exercises, Therapeutic Activity  Treatment Frequency: 4 Times per Week  Duration: Until Therapy Goals Met    DC Equipment Recommendations: Unable to determine at this time  Discharge Recommendations: Recommend post-acute placement for additional occupational therapy services prior to discharge home     Subjective    \"How much of my leg is left?\"      Objective       01/02/23 0934   Prior Living Situation   Prior Services Continuous (24 Hour) Care Giving Per Service  (admitted from SNF`)   Housing / Facility 1 Story House   Steps Into Home 3   Bathroom Set up Walk In Shower;Shower Chair   Equipment Owned Unable to Determine At This Time   Lives with - Patient's Self Care Capacity Spouse;Adult Children   Prior Level of ADL Function   Self Feeding Independent   Grooming / Hygiene Requires Assist   Bathing Requires Assist   Dressing Requires Assist   Toileting Requires Assist   Comments very debiliated d/t prolonged hospital stay and associated complications. SNF was assisting with ADLs as needed.   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   Pain 0 - 10 Group   Location Leg   Location Orientation Right   Therapist Pain Assessment Nurse Notified;Post Activity Pain Same as Prior to Activity  (phantom limb pain)   Cognition    Level of Consciousness Alert   Safety Awareness Impaired   Attention Impaired   Initiation Impaired   Comments poor insight   Strength Upper Body   Upper Body Strength  X   Comments generalized weakness, LUE very edematous d/t acute DVT   Balance Assessment   Sitting Balance (Static) Poor   Sitting Balance (Dynamic) Poor -   Weight Shift Sitting Poor   Comments supported throughout   Bed Mobility    Supine to Sit Maximal Assist   Sit to Supine Maximal Assist   Scooting Maximal Assist   Rolling Maximal Assist to Rt.;Maximum Assist to Lt.   ADL Assessment   Eating Moderate Assist   Lower Body Dressing Maximal Assist   Toileting Maximal Assist   How much help from another person does the patient currently need...   Putting on and taking off regular lower body clothing? 1   Bathing (including washing, rinsing, and drying)? 1   Toileting, which includes using a toilet, bedpan, or urinal? 1   Putting on and taking off regular upper body clothing? 2   Taking care of personal grooming such as brushing teeth? 2   Eating meals? 2   6 Clicks Daily Activity Score 9   Functional Mobility   Sit to Stand Unable to Participate   Mobility EOB only   Comments requested to lay back down immediately   Activity Tolerance   Sitting in Chair NT   Sitting Edge of Bed 7min supported   Standing NT   Short Term Goals   Short Term Goal # 1 Pt will tolerate >2min sit EOB in prep for seated ADLs   Short Term Goal # 2 Pt will demo toilet hygiene w/ min A   Short Term Goal # 3 pt will complete seated  grooming ADLs with set-up assist   Short Term Goal # 4 Pt will demo BSC txf w/ mod A   Education Group   Education Provided Role of Occupational Therapist;Activities of Daily Living   Role of Occupational Therapist Patient Response Patient;Acceptance;Explanation;Verbal Demonstration   ADL Patient Response Patient;Acceptance;Explanation;Verbal Demonstration;Action Demonstration   Occupational Therapy Initial Treatment Plan    Treatment Interventions Self Care / Activities of Daily Living;Adaptive Equipment;Therapeutic Exercises;Therapeutic Activity   Treatment Frequency 4 Times per Week   Duration Until Therapy Goals Met   Problem List   Problem List Decreased Active Daily Living Skills;Decreased Homemaking Skills;Decreased Functional Mobility;Decreased Activity Tolerance;Safety Awareness Deficits / Cognition;Impaired Postural Control / Balance   Anticipated Discharge Equipment and Recommendations   DC Equipment Recommendations Unable to determine at this time   Discharge Recommendations Recommend post-acute placement for additional occupational therapy services prior to discharge home

## 2023-01-02 NOTE — DISCHARGE PLANNING
"Care Transition Team Assessment    RN RALF spoke with patient's daughter \"Kendra.\" Role of CM explained. Demographic info verified. Per daughter, plan is for patient to return to [facility] for short term rehab, upon discharge. Patient's wife is also at this SNF. Referral sent to [facility].    Information Source  Orientation Level: Oriented X4  Information Given By: Relative  Who is responsible for making decisions for patient? : Adult child    Elopement Risk  Legal Hold: No  Ambulatory or Self Mobile in Wheelchair: No-Not an Elopement Risk  Elopement Risk: Not at Risk for Elopement    Interdisciplinary Discharge Planning  Lives with - Patient's Self Care Capacity: Spouse, Adult Children  Housing / Facility: [address] [place] [address]  Able to Return to Previous ADL's: Future Time w/Therapy  Mobility Issues: Yes  Prior Services: Continuous (24 Hour) Care Giving Per Service (admitted from SNF`)  [facility]  Assistance Needed: Yes  Durable Medical Equipment: Walker    Discharge Preparedness  What is your plan after discharge?: Skilled nursing facility  Prior Functional Level: Needs Assist with Activities of Daily Living  Difficulity with ADLs: Walking    Functional Assesment  Prior Functional Level: Needs Assist with Activities of Daily Living    Domestic Abuse  Have you ever been the victim of abuse or violence?: No    Anticipated Discharge Information  Discharge Disposition: D/T to SNF with Medicare cert in anticipation of skilled care (03)  Discharge Address: [address] [facility] [place]        "

## 2023-01-02 NOTE — CARE PLAN
The patient is Watcher - Medium risk of patient condition declining or worsening    Shift Goals  Clinical Goals: monitor hemodynamics; monitor neuro status; monitor airway; monitor nutrition  Patient Goals: rest  Family Goals: N/A    Progress made toward(s) clinical / shift goals:    Problem: Knowledge Deficit - Standard  Goal: Patient and family/care givers will demonstrate understanding of plan of care, disease process/condition, diagnostic tests and medications  Outcome: Progressing     Problem: Pain - Standard  Goal: Alleviation of pain or a reduction in pain to the patient’s comfort goal  Outcome: Progressing     Problem: Skin Integrity  Goal: Skin integrity is maintained or improved  Outcome: Progressing     Problem: Fall Risk  Goal: Patient will remain free from falls  Outcome: Progressing     Problem: Psychosocial  Goal: Patient's level of anxiety will decrease  Outcome: Progressing  Goal: Patient's ability to verbalize feelings about condition will improve  Outcome: Progressing  Goal: Patient's ability to re-evaluate and adapt role responsibilities will improve  Outcome: Progressing  Goal: Patient and family will demonstrate ability to cope with life altering diagnosis and/or procedure  Outcome: Progressing  Goal: Spiritual and cultural needs incorporated into hospitalization  Outcome: Progressing     Problem: Communication  Goal: The ability to communicate needs accurately and effectively will improve  Outcome: Progressing     Problem: Discharge Barriers/Planning  Goal: Patient's continuum of care needs are met  Outcome: Progressing     Problem: Hemodynamics  Goal: Patient's hemodynamics, fluid balance and neurologic status will be stable or improve  Outcome: Progressing     Problem: Respiratory  Goal: Patient will achieve/maintain optimum respiratory ventilation and gas exchange  Outcome: Progressing     Problem: Fluid Volume  Goal: Fluid volume balance will be maintained  Outcome: Progressing      Problem: Dysphagia  Goal: Dysphagia will improve  Outcome: Progressing     Problem: Risk for Aspiration  Goal: Patient's risk for aspiration will be absent or decrease  Outcome: Progressing     Problem: Nutrition  Goal: Patient's nutritional and fluid intake will be adequate or improve  Outcome: Progressing  Goal: Enteral nutrition will be maintained or improve  Outcome: Progressing  Goal: Enteral nutrition will be maintained or improve  Outcome: Progressing     Problem: Urinary Elimination  Goal: Establish and maintain regular urinary output  Outcome: Progressing     Problem: Bowel Elimination  Goal: Establish and maintain regular bowel function  Outcome: Progressing     Problem: Mobility  Goal: Patient's capacity to carry out activities will improve  Outcome: Progressing     Problem: Wound/ / Incision Healing  Goal: Patient's wound/surgical incision will decrease in size and heals properly  Outcome: Progressing       Patient is not progressing towards the following goals:

## 2023-01-02 NOTE — PROGRESS NOTES
[facility] Medicine Daily Progress Note    Date of Service  1/2/2023    Chief Complaint  Gilmer Mae is a 82 y.o. 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  Came to us with hypotension from Rehab where he had been sent after a R BKA.  During that surgery had a cardiac arrest during induction requiring CPR.  Went to ICU where he had a second VF arrest.  LHC showing Takotsubo physiology.  ICD placer 12/13.  On this admission in the ED  Hgb 6.4, Na 147, guaiac +, Nit + UA, infiltrate noted on CXR.  CVC placed, given one unit PRBCs and admitted to the IMCU with Dx of sepsis, UTI, anemia    Interval Problem Update  Pt states feels better today.  Not as SOB.    No other complaints  States he saw a bat in his room this am, doesn't see it now; RN notes no bat      Sinus/paced 60s  SBP 130s  1 LNC  AFebrile    I have discussed this patient's plan of care and discharge plan at IDT rounds today with Case Management, Nursing, Nursing leadership, and other members of the IDT team.    Consultants/Specialty      Code Status  Full Code    Disposition  Patient is not medically cleared for discharge.   Anticipate discharge to to an inpatient rehabilitation hospital.  I have placed the appropriate orders for post-discharge needs.    Review of Systems  Review of Systems   Constitutional:  Negative for chills and fever.   Eyes:  Negative for blurred vision and double vision.   Respiratory:  Positive for cough. Negative for shortness of breath.    Cardiovascular:  Negative for chest pain, palpitations and leg swelling.   Gastrointestinal:  Negative for abdominal pain, diarrhea, nausea and vomiting.   Genitourinary:  Negative for dysuria and urgency.   Musculoskeletal:  Negative for back pain.   Neurological:  Positive for weakness. Negative for dizziness, loss of consciousness and headaches.      Physical Exam  Temp:  [36.9 °C (98.4  °F)] 36.9 °C (98.4 °F)  Pulse:  [64-75] 67  Resp:  [16-21] 21  BP: (127-172)/() 129/69  SpO2:  [95 %-100 %] 95 %    Physical Exam  Constitutional:       General: He is not in acute distress.     Appearance: He is well-developed. He is not diaphoretic.   HENT:      Head: Normocephalic and atraumatic.      Mouth/Throat:      Mouth: Mucous membranes are dry.   Eyes:      General: No scleral icterus.     Conjunctiva/sclera: Conjunctivae normal.   Neck:      Vascular: No JVD.   Cardiovascular:      Rate and Rhythm: Normal rate.      Heart sounds: Murmur heard.     No gallop.   Pulmonary:      Effort: Pulmonary effort is normal. No respiratory distress.      Breath sounds: No stridor. Rales present. No wheezing.   Abdominal:      General: Abdomen is flat.      Palpations: Abdomen is soft.      Tenderness: There is no abdominal tenderness. There is no guarding or rebound.   Musculoskeletal:         General: No tenderness.      Left lower leg: No edema.      Comments: R BKA wound healing well, staples in place, margins well approximated  L arm edematous, good pulses, hand with normal cap refill, warm and pink   Skin:     General: Skin is warm and dry.      Capillary Refill: Capillary refill takes less than 2 seconds.      Coloration: Skin is not jaundiced or pale.      Findings: No rash.   Neurological:      General: No focal deficit present.      Mental Status: He is oriented to person, place, and time.   Psychiatric:         Attention and Perception: He perceives visual hallucinations.         Mood and Affect: Mood normal.         Behavior: Behavior normal.         Thought Content: Thought content normal.       Fluids    Intake/Output Summary (Last 24 hours) at 1/2/2023 0758  Last data filed at 1/2/2023 0517  Gross per 24 hour   Intake 1634.64 ml   Output 540 ml   Net 1094.64 ml         Laboratory  Recent Labs     12/31/22  1233 01/01/23  0500 01/02/23  0304   WBC  --  8.6 7.6   RBC  --  3.39* 3.18*   HEMOGLOBIN  11.8* 11.6* 10.7*   HEMATOCRIT 37.4* 36.6* 34.4*   MCV  --  108.0* 108.2*   MCH  --  34.2* 33.6*   MCHC  --  31.7* 31.1*   RDW  --  70.0* 68.4*   PLATELETCT  --  116* 103*   MPV  --  10.7 10.7       Recent Labs     12/31/22  0931 01/01/23  0500 01/02/23  0304   SODIUM 148* 149* 147*   POTASSIUM 3.9 3.6 3.3*   CHLORIDE 113* 112 113*   CO2 26 28 28   GLUCOSE 133* 111* 97   BUN 21 23* 18   CREATININE 0.67 0.73 0.56   CALCIUM 7.9* 8.1* 7.9*                       Imaging  US-EXTREMITY VENOUS UPPER UNILAT LEFT   Final Result      CT-CTA CHEST PULMONARY ARTERY W/ RECONS   Final Result      1.  There is no CT evidence of acute pulmonary embolism.   2.  There is multifocal airspace disease most predominant in the lower lobes consistent with multifocal pneumonia.   3.  Enlarged heart, particularly the left ventricle.   4.  There is a prosthetic aortic valve.   5.  There is trace amount of dependent pleural fluid.   6.  There is mild left body wall edema.            DX-CHEST-PORTABLE (1 VIEW)   Final Result      Bibasilar opacities, mildly increased on the left, atelectasis, pneumonitis or asymmetric edema.      DX-CHEST-PORTABLE (1 VIEW)   Final Result      1.  Satisfactory appearance of the right IJ catheter with no pneumothorax visualized.   2.  Patchy lower lobe parenchymal opacities could be due to atelectasis or pneumonia or edema.      DX-CHEST-PORTABLE (1 VIEW)   Final Result         Unchanged opacity in the left lower lobe, atelectasis or consolidation             Assessment/Plan  * Hypotension  Assessment & Plan  -Inpatient status to IMCU.  -Hypotension likely multifactorial: Hypovolemia, infection and possibly also cardiogenic.  -He was on norepinephrine infusion in the ED however this is discontinued now the patient is getting blood transfusion.  -Central line was placed in the ED.  -He has UTI but no other sirs criteria, therefore not septic on admission.  -Initial hemoglobin was 6.4 and had positive guaiac  "test.  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    Has had a stable/climbing H&H since  Pt states he's never had a colonoscopy  Started on enoxaparin 1mg/kg  Cont to follow H&H closely  Hold off on po antiocoagulation until it is clear he is tolerating well    Pneumonia  Assessment & Plan  Noted on CT chest  Blood cultures neg  MRSA swab neg eralier in Dec  Cont C3/Azithro    Full code status  Assessment & Plan  Pt had been DNR/I on previous admissions  He tells me that his wifes only income is his custodial which will go away when he dies he therefor wants to be Full Code    Acute UTI  Assessment & Plan  -He has elevated WBC of 14.8 but no other sirs criteria on admission.  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  Wound does not seem to be infected on admission.  Frequent repositioning, wound care.    Severe protein-calorie malnutrition (HCC)- (present on admission)  Assessment & Plan  -Complex recent hospitalization and per report, poor oral week acute rehab.    Advance care planning- (present on admission)  Assessment & Plan  -Patient has been intermittently DNR/DNI and full code.  There is no POLST form signed.  I had a long conversation with him about advance care planning and goals of care.  He reports that he has been  for 60 years and \"want to fight for alive his long as he can, so I can spend time with my wife \".  Patient will remain a full code on admission.  Advanced care planning including the explanation and discussion of advanced directives such as standard forms by physician: First 30 minutes face-to-face with the " patient.      Hypernatremia- (present on admission)  Assessment & Plan  Pt is not taking po consistently so started on LR  Na now trending down  Pt still clinically dry  Repeat BMP in am    Thrombocytopenia (HCC)- (present on admission)  Assessment & Plan  -On admission platelets are 161,000.  Monitor CBC in the morning.    Hypokalemia- (present on admission)  Assessment & Plan  Repeat remains low: giving 80meq po in divided doses   Repeat lab in am and check Mg as well    Acute respiratory failure with hypoxia (HCC)  Assessment & Plan  CTA PE neg for PE but did show B LL consolidation consistent with pneumonia  Clinically is hypovolemic; does not look like CHF  O2 demand trending down now on 1 LNC  Cont O2/RT protocols  Treating pneumonia  Follow cultures  mobilize    Congestive heart failure (CHF) (HCC)- (present on admission)  Assessment & Plan  LVEF 40% earlier this month  On BB and ACEI as outpt currently on hold due to hypotension: resume as able    History of seizures- (present on admission)  Assessment & Plan  -Continue Keppra    Macrocytic anemia- (present on admission)  Assessment & Plan  -On admission initial hemoglobin is 6.4.   Given one unit in the ED and repeat Hgb >10 which would seem a disproporionate climb given that he also was bolused fluids  Was guaiac + in the ED  Cont to follow H&H closely  PO PPI  Has had no further evidence of GI bleed    Hyperlipidemia- (present on admission)  Assessment & Plan  -On atorvastatin.    Essential hypertension- (present on admission)  Assessment & Plan  As outpt on lisinopril 10, carvdelol 12.5  Pressures have slowly climbed and now around SBP of 120   Cont to hold BP meds for now         VTE prophylaxis: SCDs/TEDs    I have performed a physical exam and reviewed and updated ROS and Plan today (1/2/2023). In review of yesterday's note (1/1/2023), there are no changes except as documented above.

## 2023-01-02 NOTE — WOUND TEAM
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     Hypotension     86/60 upon EMS arrival. 75/51 upon arrival to hospital.      Diagnosis: Anemia [D64.9]    Unit where seen by Wound Team: [id number]     WOUND CONSULT/FOLLOW UP RELATED TO:  R. Groin, Sacrum & L. heel     WOUND HISTORY:  Gilmer Mae is a 82 y.o. male who presents for evaluation of hypotension.  This patient was discharged to the hospital 10 days ago and has been in an acute rehab facility.  During his hospitalization earlier this month he underwent amputation of the right leg, had 2 cardiac arrests and ultimately had an AICD placed.  He has been at the rehab facility for 10 days now and earlier see the phone call from the nurse practitioner there requesting transfer for PEG tube placement.  Apparently has not been eating or drinking much.  The patient arrives several hours after that by EMS with the finding of hypotension.  The patient is very sleepy and is unable to provide meaningful history at this time.          WOUND ASSESSMENT/LDA         Wound 01/01/23 Incision Groin Right Full thickness (Active)   Wound Image    01/02/23 1300   Site Assessment Red;Yellow 01/02/23 1300   Periwound Assessment [place] 01/02/23 1300   Margins Defined  edges;Unattached edges 01/02/23 1300   Closure Secondary intention 01/02/23 1300   Drainage Amount Small 01/02/23 1300   Drainage Description Serosanguineous 01/02/23 1300   Treatments Cleansed;Site care 01/02/23 1300   Wound Cleansing Approved Wound Cleanser 01/02/23 1300   Periwound Protectant Skin Protectant Wipes to Periwound 01/02/23 1300   Dressing Cleansing/Solutions Not Applicable 01/02/23 1300   Dressing Options Hydrofiber Silver;Silicone Adhesive Foam 01/02/23 1300   Dressing Changed New 01/02/23 1300   Dressing Status Clean;Dry;Intact 01/02/23 1300   Dressing Change/Treatment Frequency Every 48 hrs, and As Needed 01/02/23 1300   NEXT Dressing Change/Treatment Date 01/04/23 01/02/23 1300   NEXT Weekly Photo (Inpatient Only) 01/09/23 01/02/23 1300   Non-staged Wound Description Full thickness 01/02/23 1300   Wound Length (cm) 1 cm 01/02/23 1300   Wound Width (cm) 0.8 cm 01/02/23 1300   Wound Depth (cm) 1 cm 01/02/23 1300   Wound Surface Area (cm^2) 0.8 cm^2 01/02/23 1300   Wound Volume (cm^3) 0.8 cm^3 01/02/23 1300   Shape [place] 01/02/23 1300   Wound Odor None 01/02/23 1300   Pulses N/A 01/02/23 1300   Exposed Structures Sutures 01/02/23 1300       Wound 12/29/22 Pressure Injury Sacrum Unstageable (Active)   Wound Image    01/02/23 1300   Site Assessment Yellow;Red 01/02/23 1300   Periwound Assessment Pink;Red 01/02/23 1300   Margins Defined edges;Unattached edges 01/02/23 1300   Closure Adhesive bandage 01/02/23 1300   Drainage Amount Scant 01/02/23 1300   Drainage Description Serous 01/02/23 1300   Treatments Autolytic Debridement;Cleansed;Site care;Offloading 01/02/23 1300   Wound Cleansing Approved Wound Cleanser 01/02/23 1300   Periwound Protectant Skin Protectant Wipes to Periwound 01/02/23 1300   Dressing Cleansing/Solutions Not Applicable 01/02/23 1300   Dressing Options Hydrocolloid Thin;Mepilex 01/02/23 1300   Dressing Changed New 01/02/23 1300   Dressing Status Clean;Dry;Intact 01/02/23 1300    Dressing Change/Treatment Frequency Every 72 hrs, and As Needed 01/02/23 1300   NEXT Dressing Change/Treatment Date 01/05/23 01/02/23 1300   NEXT Weekly Photo (Inpatient Only) 01/09/23 01/02/23 1300   WOUND NURSE ONLY - Pressure Injury Stage U 01/02/23 1300   Non-staged Wound Description Not applicable 12/13/22 1300   Wound Length (cm) 2.7 cm 12/13/22 1300   Wound Width (cm) 0.9 cm 12/13/22 1300   Wound Depth (cm) 0.1 cm 12/13/22 1300   Wound Surface Area (cm^2) 2.43 cm^2 12/13/22 1300   Wound Volume (cm^3) 0.243 cm^3 12/13/22 1300   Shape irregular 01/02/23 1300   Wound Odor None 01/02/23 1300   Pulses N/A 01/02/23 1300   Exposed Structures MAYA 01/02/23 1300   WOUND NURSE ONLY - Time Spent with Patient (mins) 60 12/13/22 1400       Wound 12/29/22 Pressure Injury Heel Left DTI (Active)   Wound Image   12/29/22 2340   Site Assessment Purple 01/02/23 1300   Periwound Assessment Pink;Dry;Red 01/02/23 1300   Margins Defined edges;Attached edges 01/02/23 1300   Closure Adhesive bandage 01/02/23 1300   Drainage Amount None 01/02/23 1300   Treatments Cleansed;Site care;Offloading 01/02/23 1300   Wound Cleansing Foam Cleanser/Washcloth 01/02/23 1300   Periwound Protectant Not Applicable 01/02/23 1300   Dressing Cleansing/Solutions Not Applicable 01/02/23 1300   Dressing Options Mepilex Heel 01/02/23 1300   Dressing Changed Changed 01/02/23 1300   Dressing Status Clean;Dry;Intact 01/02/23 1300   Dressing Change/Treatment Frequency Every 72 hrs, and As Needed 01/02/23 1300   NEXT Dressing Change/Treatment Date 01/05/23 01/02/23 1300   NEXT Weekly Photo (Inpatient Only) 01/09/23 01/02/23 1300   WOUND NURSE ONLY - Pressure Injury Stage DTPI 01/02/23 1300   Non-staged Wound Description Not applicable 01/02/23 1300   Wound Length (cm) 5 cm 01/02/23 1300   Wound Width (cm) 3 cm 01/02/23 1300   Wound Surface Area (cm^2) 15 cm^2 01/02/23 1300   Shape [place] 01/02/23 1300   Wound Odor None 01/02/23 1300   Pulses Left;1+ 01/02/23  1300   Exposed Structures MAYA 01/02/23 1300   WOUND NURSE ONLY - Time Spent with Patient (mins) 75 01/02/23 1300          Vascular:    MARGARITA:   No results found.    Lab Values:    Lab Results   Component Value Date/Time    WBC 8.6 01/01/2023 05:00 AM    RBC 3.39 (L) 01/01/2023 05:00 AM    HEMOGLOBIN 11.6 (L) 01/01/2023 05:00 AM    HEMATOCRIT 36.6 (L) 01/01/2023 05:00 AM    CREACTPROT 3.92 (H) 12/09/2022 03:17 AM    SEDRATEWES 102 (H) 12/09/2022 03:17 AM    HBA1C 5.1 12/10/2022 02:15 AM        Culture Results show:  No results found for this or any previous visit (from the past 720 hour(s)).    Pain Level/Medicated:  patient tolerated assessment denied pain       INTERVENTIONS BY WOUND TEAM:  Chart and images reviewed. Discussed with bedside RN. All areas of concern (based on picture review, LDA review and discussion with bedside RN) have been thoroughly assessed. Documentation of areas based on significant findings. This RN in to assess patient. Performed standard wound care which includes appropriate positioning, dressing removal and non-selective debridement. Pictures and measurements obtained weekly if/when required.        L. Heel  Preparation for Dressing removal: Dressing soaked with n/a  Non-selectively Debrided with:  foam cleanser and gauze.  Sharp debridement: n/a  Tiffanie wound: Cleansed with foam cleanser, Prepped with n/a  Primary Dressing: Heel mepilex  Secondary (Outer) Dressing: Heel float boot  Sacrum  Preparation for Dressing removal: Dressing soaked with n/a  Non-selectively Debrided with:  wound cleanser and gauze.  Sharp debridement: n/a  [place] wound: Cleansed with wound cleanser, Prepped with no sting  Primary Dressing: hydrocolloid thin  Secondary (Outer) Dressing: sacral mepilex  R. groin  Preparation for Dressing removal: Dressing soaked with n/a  Non-selectively Debrided with:  wound cleanser and gauze.  Sharp debridement: n/a  [place]iff[place] wound: Cleansed with wound cleanser, Prepped with no  sting  Primary Dressing: Aquacel Ag strip tucked into wound bed  Secondary (Outer) Dressing: silicone adhesive foam    Interdisciplinary consultation: Patient, Bedside RN , Annabelle MCKEON (Wound RN)    EVALUATION / RATIONALE FOR TREATMENT:  Most Recent Date:  1/123: POA DTI to L. Heel, Unstageable to sacrum.  Both require offloading with mepilex.  Applied hydrocolloid to the sacrum to assist with autolytic debridement and accelerate wound healing.  R. Groin with full thickness wound from previous re-vascular surgery.  1cm deep tucked aquacel Ag, Aquacel Ag Hydrofiber applied to manage bioburden, absorb exudate, and maintain a moist wound environment without laterally wicking exudate therefore reducing wendy-wound maceration       Goals: Steady decrease in wound area and depth weekly.    WOUND TEAM PLAN OF CARE ([X] for frequency of wound follow up,): X  Nursing to follow dressing orders written for wound care. Contact wound team if area fails to progress, deteriorates or with any questions/concerns if something comes up before next scheduled follow up (See below as to whether wound is following and frequency of wound follow up)  Dressing changes by wound team:                   Follow up 3 times weekly:                NPWT change 3 times weekly:     Follow up 1-2 times weekly:   x   Follow up Bi-Monthly:           Follow up Monthly (High Risk):                        Follow up as needed:     Other (explain):     NURSING PLAN OF CARE ORDERS (X):  Dressing changes: See Dressing Care orders: X  Skin care: See Skin Care orders: X  RN Prevention Protocol: X  Rectal tube care: See Rectal Tube Care orders:   Other orders:    RSKIN:   CURRENTLY IN PLACE (X), APPLIED THIS VISIT (A), ORDERED (O):   Q shift Luis M:  X  Q shift pressure point assessments:  X    Surface/Positioning X  Pressure redistribution mattress            Low Airloss          ICU Low Airloss X  Bariatric MARY     Waffle cushion        Waffle Overlay           Reposition q 2 hours      TAPs Turning system     Z Renato Pillow     Offloading/Redistribution A  Sacral Mepilex (Silicone dressing)   A  Heel Mepilex (Silicone dressing)    A     Heel float boots (Prevalon boot)             Float Heels off Bed with Pillows           Respiratory room air  Silicone O2 tubing         Gray Foam Ear protectors     Cannula fixation Device (Tender )          High flow offloading Clip    Elastic head band offloading device      Anchorfast                                                         Trach with Optifoam split foam             Containment/Moisture Prevention X    Rectal tube or BMS    Purwick/Condom Cath        Kimball Catheter  x  Barrier wipes           Barrier paste       Antifungal tx      Interdry        Mobilization MAYA      Up to chair        Ambulate      PT/OT      Nutrition PO      Dietician        Diabetes Education      PO     TF     TPN     NPO   # days     Other        Anticipated discharge plans: patient came from a SNF  LTACH:        SNF/Rehab:                  Home Health Care:           Outpatient Wound Center:            Self/Family Care:        Other:                  Vac Discharge Needs:   Not Applicable Pt not on a wound vac:   x    Regular Vac while inpatient, alternative dressing at DC:        Regular Vac in use and continued at DC:            Reg. Vac w/ Skin Sub/Biologic in use. Will need to be changed 2x wkly:      Veraflo Vac while inpatient, ok to transition to Regular Vac on Discharge:           Veraflo Vac while inpatient, will need to remain on Veraflo Vac upon discharge:

## 2023-01-03 NOTE — DOCUMENTATION QUERY
"                                                                         Pending sale to [facility]                                                                       Query Response Note      PATIENT:               ZOE SARMIENTO  ACCT #:                  [id number]  MRN:                     [id number]  :                      1940  ADMIT DATE:       2022 4:56 PM  DISCH DATE:          RESPONDING  PROVIDER #:        [id number]           QUERY TEXT:    A DTI of the L-Heel is documented in the Wound Team Note on  but has not been substantiated by a hospitalist. Can a clarification be provided?    Please specify if you:    NOTE:  If an appropriate response is not listed below, please respond with a new note.      The patient's Clinical Indicators include:  \"Pressure Injury Heel Left DTI\" measuring 5cm x 3cm is noted to be purple, pink, red in color with defined edges by the Wound Team on .    Treatments include: Mepilex, RD Consult, and Wound Team Consult.    Risk factors include: dx Hypotension, dx Anemia, dx Seizures, dx Severe PCM, dx UTI, dx Alkalosis, hx HTN + CSHF, and Advanced Age.    Thank you,  Andres Thakkar RN, BSN  Clinical   Connect via [employer]  Options provided:   -- Agree with Wound Care RN assessment- Deep Tissue Pressure Injury of the Left Heel is ruled in and was likely POA   -- Disagree with Wound Care RN assessment, Please specify the location, staging, and POA status of the pressure ulcer(s)   -- Other explanation, Please specify   -- Unable to determine      Query created by: Andres Thakkar on 1/3/2023 8:08 AM    RESPONSE TEXT:    Agree with Wound Care RN assessment- Deep Tissue Pressure Injury of the Left Heel is ruled in and was likely POA          Electronically signed by:  ALTAGRACIA MCNEAL MD 1/3/2023 8:44 AM              "

## 2023-01-03 NOTE — WOUND TEAM
Patient seen with Gayle wound care RN, please see her note regarding wounds.  CSWD with scissors and forcepts to remove <20cm2 of non-viable slough from wound bed.  Cleansed with NS and gauze.  Aquacel Ag hydrofiber applied over right groin wound and secured with silicone adhesive foam.

## 2023-01-03 NOTE — CARE PLAN
The patient is Watcher - Medium risk of patient condition declining or worsening    Shift Goals  Clinical Goals: hemodynamic stability  Patient Goals: rest  Family Goals: na    Progress made toward(s) clinical / shift goals:    Problem: Knowledge Deficit - Standard  Goal: Patient and family/care givers will demonstrate understanding of plan of care, disease process/condition, diagnostic tests and medications  Outcome: Progressing     Problem: Pain - Standard  Goal: Alleviation of pain or a reduction in pain to the patient’s comfort goal  Outcome: Progressing     Problem: Skin Integrity  Goal: Skin integrity is maintained or improved  Outcome: Progressing     Problem: Fall Risk  Goal: Patient will remain free from falls  Outcome: Progressing       Patient is not progressing towards the following goals:

## 2023-01-03 NOTE — PROGRESS NOTES
4 Eyes Skin Assessment Completed by LISA Alcantara and LISA Flores.    Head WDL  Ears WDL  Nose WDL  Mouth WDL  Neck WDL  Breast/Chest Redness and Blanching  Shoulder Blades Redness and Blanching  Spine Redness and Blanching  (R) Arm/Elbow/Hand WDL  (L) Arm/Elbow/Hand Swelling  Abdomen WDL  Groin Excoriation and Abrasion  Scrotum/Coccyx/Buttocks Redness and Non-Blanching  (R) Leg BKA  (L) Leg WDL  (R) Heel/Foot/Toe NA  (L) Heel/Foot/Toe Redness, Ulcer(s), and Edema          Devices In Places Tele Box and Kimball      Interventions In Place Heel Mepilex, Sacral Mepilex, and Q2 Turns    Possible Skin Injury Yes    Pictures Uploaded Into Epic Yes  Wound Consult Placed Already seen  RN Wound Prevention Protocol Ordered Yes

## 2023-01-03 NOTE — DOCUMENTATION QUERY
"                                                                         [facility]                                                                       Query Response Note      PATIENT:               ZOE SARMIENTO  ACCT #:                  [id number]  MRN:                     [id number]  :                      1940  ADMIT DATE:       2022 4:56 PM  DISCH DATE:          RESPONDING  PROVIDER #:        [id number]           QUERY TEXT:    There is conflicting documentation in the medical record. \"Admitted to the [facility] with Dx of  sepsis, UTI, anemia\" and \"He has UTI but no other sirs criteria, therefore not septic on admission\" are both documented in the Progress Notes.    Can a clarification be provided?    The patient's Clinical Indicators include:  1 SIRS Criteria POA- WBC: 14.8. T: 36.4C, HR: 67, RR: 16, BP: 75/51, and Lactic Acid: 1.0 on admission. Procalcitonin: 0.19 on . UA on admission positive for nitrites w/small leukocyte esterase, many bacteria, and hyaline cast: 11-20.    Treatments include: Rocephin, Azithromycin, LR, NS, and Levophed.    Risk factors include: dx UTI, dx PNA, dx Alkalosis, hx HTN + CHF, and Advanced Age.    Thank you,  Andres Thakkar RN, BSN  Clinical   Connect via [employer]  Options provided:   -- Sepsis is ruled out   -- Sepsis 2/2 UTI was present on admission, Please provide additional indicators and SIRS criteria to support this dx   -- Sepsis 2/2 UTI developed after admission, Please provide additional indicators and SIRS criteria to support this dx   -- Other explanation, Please specify   -- Unable to determine      Query created by: Andres Thakkar on 1/3/2023 8:03 AM    RESPONSE TEXT:    Sepsis 2/2 UTI was present on admission- sbp was less than 90          Electronically signed by:  ALTAGRACIA MCNEAL MD 1/3/2023 8:09 AM              "

## 2023-01-04 NOTE — THERAPY
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 Reviewed QS focusing on terminal extension of R knee, SLR, and proper positioning with no carryover from prior session. Max assist required for supine>sitting. Once EOB, pt tolerated ~3min focusing on seated balance and scooting and returned to supine himself stating \"that was hard.\" He declined further mobility despite encouragement. PT will cont    Plan    Physical Therapy Treatment Plan  Physical Therapy Treatment Plan: Continue Current Treatment Plan    DC Equipment Recommendations: Unable to determine at this time  Discharge Recommendations: Recommend post-acute placement for additional physical therapy services prior to discharge home      Subjective    Pt asking about questions regarding whether a prosthetic leg was in his room yet for use. Spent time explaining and educating pt on the process of getting fitted and time line as he still has staples on his residual limb incision. Explained benefits of participating in therapy prior to getting a prosthetic for strengthening and balance purposes.        01/04/23 1108   Vitals   O2 (LPM) 1   O2 Delivery Device Silicone Nasal Cannula   Cognition    Cognition / Consciousness X   Level of Consciousness Alert   Safety Awareness Impaired   Attention Impaired   Initiation Impaired   Comments poor carryover   Supine Lower Body Exercise   Supine Lower Body Exercises Yes   Comments QS focusing on terminal knee extension and SLRs   Neuro-Muscular Treatments   Neuro-Muscular Treatments Weight Shift Left;Weight Shift Right;Tapping;Tactile Cuing;Anterior weight shift;Compensatory Strategies   Comments EOB   Other Treatments   Other Treatments Provided despite encouragement and education pt only willing to sit EOB for ~2 min " "stating \"this is hard\"   Balance   Sitting Balance (Static) Poor +   Sitting Balance (Dynamic) Poor -   Weight Shift Sitting Poor   Skilled Intervention Verbal Cuing;Compensatory Strategies   Comments EOB only, refused further   Bed Mobility    Supine to Sit Maximal Assist   Sit to Supine Minimal Assist   Scooting Moderate Assist   Rolling Minimum Assist to Lt.;Minimal Assist to Rt.   Skilled Intervention Verbal Cuing;Compensatory Strategies   Comments cues for increased participation   Gait Analysis   Gait Level Of Assist Unable to Participate   Functional Mobility   Sit to Stand Unable to Participate   Mobility EOB only   Skilled Intervention Verbal Cuing;Compensatory Strategies   Patient / Family Goals    Patient / Family Goal #1 Home   Goal #1 Outcome Goal not met   Short Term Goals    Short Term Goal # 1 pt will complete supine <> sit with SPV in 6 visits for impoved independence   Goal Outcome # 1 goal not met   Short Term Goal # 2 pt will perform STS with min A in 6 visits to initiate transfers   Goal Outcome # 2 Goal not met   Short Term Goal # 3 pt will perform SPT with min A in 6 visits to sit in WC   Goal Outcome # 3 Goal not met   Short Term Goal # 4 pt will demo WC mobility with SPV in 6 visits for improved independence   Goal Outcome # 4 Goal not met   Anticipated Discharge Equipment and Recommendations   DC Equipment Recommendations Unable to determine at this time   Discharge Recommendations Recommend post-acute placement for additional physical therapy services prior to discharge home       "

## 2023-01-04 NOTE — THERAPY
"Occupational Therapy  Daily Treatment     Patient Name: Gilmer Mae  Age:  82 y.o., Sex:  male  Medical Record #: [id number]  Today's Date: 1/4/2023     Precautions  Precautions: (P) Fall Risk  Comments: (P) ANIKET STRATTON on 12/14    Assessment    Pt seen for OT tx session. Pt required max A for supine<>sit at EOB, once seated able to hold balance w/ intermittent CGA. Pt refused ADL participation sitting  EOB and then abruptly returned himself to supine stating \"I'm done\". Pt demo'd impaired balance, functional mobility and activity tolerance impacting functional independence. Will continue to follow.     Plan    Occupational Therapy Treatment Plan  O.T. Treatment Plan: (P) Continue Current Treatment Plan    DC Equipment Recommendations: (P) Unable to determine at this time  Discharge Recommendations: (P) Recommend post-acute placement for additional occupational therapy services prior to discharge home    Subjective    \"I didn't know I was sitting up now\"  \"I don't do anything for fun\"     Objective       01/04/23 1101   Treatment Charges   OT Self Care / ADL (Units) 1   Total Time Spent   OT Self Care / ADL (Minutes) 15   Precautions   Precautions Fall Risk   Comments R CHAYITO on 12/14   Pain 0 - 10 Group   Therapist Pain Assessment Post Activity Pain Same as Prior to Activity;Nurse Notified;4  (in residual limb, agreeable)   Cognition    Cognition / Consciousness WDL   Level of Consciousness Alert   Safety Awareness Impaired   Attention Impaired   Initiation Impaired   Comments unclear how much new ed pt is retaining at this time   Balance   Sitting Balance (Static) Poor +   Sitting Balance (Dynamic) Poor -   Skilled Intervention Verbal Cuing;Tactile Cuing;Facilitation   Bed Mobility    Supine to Sit Maximal Assist   Sit to Supine Supervised   Rolling Minimum Assist to Lt.;Minimal Assist to Rt.  (cues to sequence)   Skilled Intervention Verbal Cuing   Activities of Daily Living   Lower Body Dressing Total Assist   Comments pt " refused further ADL participation while seated EOB   How much help from another person does the patient currently need...   Putting on and taking off regular lower body clothing? 1   Bathing (including washing, rinsing, and drying)? 1   Toileting, which includes using a toilet, bedpan, or urinal? 1   Putting on and taking off regular upper body clothing? 2   Taking care of personal grooming such as brushing teeth? 2   Eating meals? 3   6 Clicks Daily Activity Score 10   Functional Mobility   Mobility Pt sat EOB for ~3 min then returned himself to supine abruptly   Skilled Intervention Tactile Cuing;Verbal Cuing;Facilitation   Activity Tolerance   Sitting Edge of Bed 3 min   Comments activity tolerance might be behavioral   Short Term Goals   Short Term Goal # 1 Pt will tolerate >2min sit EOB in prep for seated ADLs   Goal Outcome # 1 Goal met, new goal added   Short Term Goal # 1 B  Pt will tolerate 5 min of unsupported sitting in prep of seated ADLs   Short Term Goal # 2 Pt will demo toilet hygiene w/ min A   Goal Outcome # 2 Goal not met   Short Term Goal # 3 pt will complete seated grooming ADLs with set-up assist   Goal Outcome # 3 Goal not met   Short Term Goal # 4 Pt will demo BSC txf w/ mod A   Goal Outcome # 4 Goal not met   Education Group   Role of Occupational Therapist Patient Response Patient;Acceptance;Explanation;Verbal Demonstration   Pathology of Bedrest Patient Response Patient;Acceptance;Demonstration;Explanation;Action Demonstration;Verbal Demonstration   Occupational Therapy Treatment Plan   O.T. Treatment Plan Continue Current Treatment Plan   Anticipated Discharge Equipment and Recommendations   DC Equipment Recommendations Unable to determine at this time   Discharge Recommendations Recommend post-acute placement for additional occupational therapy services prior to discharge home   Interdisciplinary Plan of Care Collaboration   IDT Collaboration with  Nursing;Physical Therapist   Patient  Position at End of Therapy In Bed;Bed Alarm On;Call Light within Reach;Tray Table within Reach;Phone within Reach   Collaboration Comments report given   Session Information   Date / Session Number  1/4, 2 (2/4, 1/8)

## 2023-01-04 NOTE — HOSPITAL COURSE
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  Came to us with hypotension from Rehab where he had been sent after a R BKA.  During that surgery had a cardiac arrest during induction requiring CPR.  Went to ICU where he had a second VF arrest.  LHC showing Takotsubo physiology.  ICD placer 12/13.  On this admission in the ED  Hgb 6.4, Na 147, guaiac +, Nit + UA, infiltrate noted on CXR.  CVC placed, given one unit PRBCs and admitted to the IMCU with Dx of sepsis, UTI, anemia

## 2023-01-04 NOTE — PROGRESS NOTES
Hospital Medicine Daily Progress Note    Date of Service  1/3/2023    Chief Complaint  Gilmer Mae is a 82 y.o. 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  Came to us with hypotension from Rehab where he had been sent after a R BKA.  During that surgery had a cardiac arrest during induction requiring CPR.  Went to ICU where he had a second VF arrest.  LHC showing Takotsubo physiology.  ICD placer 12/13.  On this admission in the ED  Hgb 6.4, Na 147, guaiac +, Nit + UA, infiltrate noted on CXR.  CVC placed, given one unit PRBCs and admitted to the IMCU with Dx of sepsis, UTI, anemia    Interval Problem Update  Pt states feels better today.  Not as SOB.    No other complaints  States he saw a bat in his room this am, doesn't see it now; RN notes no bat      Sinus/paced 60s  SBP 130s  1/3/2023:  The patient continues to endorse improvement continue present antimicrobials.  Anticipate patient may return back to skilled nurse facility 1 to 2 days.    I have discussed this patient's plan of care and discharge plan at IDT rounds today with Case Management, Nursing, Nursing leadership, and other members of the IDT team.    Consultants/Specialty      Code Status  Full Code    Disposition  Patient is not medically cleared for discharge.   Anticipate discharge to to an inpatient rehabilitation hospital.  I have placed the appropriate orders for post-discharge needs.    Review of Systems  Review of Systems   Constitutional:  Negative for chills and fever.   Eyes:  Negative for blurred vision and double vision.   Respiratory:  Positive for cough. Negative for shortness of breath.    Cardiovascular:  Negative for chest pain, palpitations and leg swelling.   Gastrointestinal:  Negative for abdominal pain, diarrhea, nausea and vomiting.   Genitourinary:  Negative for dysuria and urgency.   Musculoskeletal:  Negative for back  pain.   Neurological:  Positive for weakness. Negative for dizziness, loss of consciousness and headaches.      Physical Exam  Temp:  [36.8 °C (98.2 °F)-37.5 °C (99.5 °F)] 36.8 °C (98.2 °F)  Pulse:  [75-89] 79  Resp:  [16-18] 18  BP: (128-164)/(77-87) 140/77  SpO2:  [89 %-96 %] 96 %    Physical Exam  Constitutional:       General: He is not in acute distress.     Appearance: He is well-developed. He is not diaphoretic.   HENT:      Head: Normocephalic and atraumatic.      Mouth/Throat:      Mouth: Mucous membranes are dry.   Eyes:      General: No scleral icterus.     Conjunctiva/sclera: Conjunctivae normal.   Neck:      Vascular: No JVD.   Cardiovascular:      Rate and Rhythm: Normal rate.      Heart sounds: Murmur heard.     No gallop.   Pulmonary:      Effort: Pulmonary effort is normal. No respiratory distress.      Breath sounds: No stridor. Rales present. No wheezing.   Abdominal:      General: Abdomen is flat.      Palpations: Abdomen is soft.      Tenderness: There is no abdominal tenderness. There is no guarding or rebound.   Musculoskeletal:         General: No tenderness.      Left lower leg: No edema.      Comments: R BKA wound healing well, staples in place, margins well approximated  L arm edematous, good pulses, hand with normal cap refill, warm and pink   Skin:     General: Skin is warm and dry.      Capillary Refill: Capillary refill takes less than 2 seconds.      Coloration: Skin is not jaundiced or pale.      Findings: No rash.   Neurological:      General: No focal deficit present.      Mental Status: He is oriented to person, place, and time.   Psychiatric:         Attention and Perception: He perceives visual hallucinations.         Mood and Affect: Mood normal.         Behavior: Behavior normal.         Thought Content: Thought content normal.       Fluids    Intake/Output Summary (Last 24 hours) at 1/3/2023 1620  Last data filed at 1/3/2023 0605  Gross per 24 hour   Intake 1610 ml   Output  250 ml   Net 1360 ml         Laboratory  Recent Labs     01/01/23  0500 01/02/23  0304 01/03/23  0352   WBC 8.6 7.6 8.2   RBC 3.39* 3.18* 3.17*   HEMOGLOBIN 11.6* 10.7* 10.5*   HEMATOCRIT 36.6* 34.4* 34.0*   .0* 108.2* 107.3*   MCH 34.2* 33.6* 33.1*   MCHC 31.7* 31.1* 30.9*   RDW 70.0* 68.4* 66.8*   PLATELETCT 116* 103* 104*   MPV 10.7 10.7 11.2       Recent Labs     01/01/23  0500 01/02/23  0304 01/03/23  0352   SODIUM 149* 147* 149*   POTASSIUM 3.6 3.3* 3.5*   CHLORIDE 112 113* 113*   CO2 28 28 29   GLUCOSE 111* 97 97   BUN 23* 18 15   CREATININE 0.73 0.56 0.62   CALCIUM 8.1* 7.9* 7.5*                       Imaging  US-EXTREMITY VENOUS UPPER UNILAT LEFT   Final Result      CT-CTA CHEST PULMONARY ARTERY W/ RECONS   Final Result      1.  There is no CT evidence of acute pulmonary embolism.   2.  There is multifocal airspace disease most predominant in the lower lobes consistent with multifocal pneumonia.   3.  Enlarged heart, particularly the left ventricle.   4.  There is a prosthetic aortic valve.   5.  There is trace amount of dependent pleural fluid.   6.  There is mild left body wall edema.            DX-CHEST-PORTABLE (1 VIEW)   Final Result      Bibasilar opacities, mildly increased on the left, atelectasis, pneumonitis or asymmetric edema.      DX-CHEST-PORTABLE (1 VIEW)   Final Result      1.  Satisfactory appearance of the right IJ catheter with no pneumothorax visualized.   2.  Patchy lower lobe parenchymal opacities could be due to atelectasis or pneumonia or edema.      DX-CHEST-PORTABLE (1 VIEW)   Final Result         Unchanged opacity in the left lower lobe, atelectasis or consolidation             Assessment/Plan  * Hypotension  Assessment & Plan  -Inpatient status to [facility].  -Hypotension likely multifactorial: Hypovolemia, infection and possibly also cardiogenic.  -He was on norepinephrine infusion in the ED however this is discontinued now the patient is getting blood transfusion.  -Central  "line was placed in the ED.  -He has UTI but no other sirs criteria, therefore not septic on admission.  -Initial hemoglobin was 6.4 and had positive guaiac test.  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    Has had a stable/climbing H&H since  Pt states he's never had a colonoscopy  Started on enoxaparin 1mg/kg  Cont to follow H&H closely  Hold off on po antiocoagulation until it is clear he is tolerating well    Pneumonia  Assessment & Plan  Noted on CT chest  Blood cultures neg  MRSA swab neg eralier in Dec  Cont C3/Azithro    Full code status  Assessment & Plan  Pt had been DNR/I on previous admissions  He tells me that his wifes only income is his group home which will go away when he dies he therefor wants to be Full Code    Acute UTI  Assessment & Plan  -He has elevated WBC of 14.8 but no other sirs criteria on admission.  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  Wound does not seem to be infected on admission.  Frequent repositioning, wound care.    Severe protein-calorie malnutrition (HCC)- (present on admission)  Assessment & Plan  -Complex recent hospitalization and per report, poor oral week acute rehab.    Advance care planning- (present on admission)  Assessment & Plan  -Patient has been intermittently DNR/DNI and full code.  There is no POLST form signed.  I had a long conversation with him about advance care planning and goals of care.  He reports that he has been  for 60 years and \"want to fight for alive his long as he can, so I can spend time with my wife \".  Patient will remain a full code on admission.  Advanced care " planning including the explanation and discussion of advanced directives such as standard forms by physician: First 30 minutes face-to-face with the patient.      Hypernatremia- (present on admission)  Assessment & Plan  Pt is not taking po consistently so started on LR  Na now trending down  Pt still clinically dry  Repeat BMP in am    Thrombocytopenia (HCC)- (present on admission)  Assessment & Plan  -On admission platelets are 161,000.  Monitor CBC in the morning.    Hypokalemia- (present on admission)  Assessment & Plan  Repeat remains low: giving 80meq po in divided doses   Repeat lab in am and check Mg as well    Acute respiratory failure with hypoxia (HCC)  Assessment & Plan  CTA PE neg for PE but did show B LL consolidation consistent with pneumonia  Clinically is hypovolemic; does not look like CHF  O2 demand trending down now on 1 LNC  Cont O2/RT protocols  Treating pneumonia  Follow cultures  mobilize    Congestive heart failure (CHF) (HCC)- (present on admission)  Assessment & Plan  LVEF 40% earlier this month  On BB and ACEI as outpt currently on hold due to hypotension: resume as able    History of seizures- (present on admission)  Assessment & Plan  -Continue Keppra    Macrocytic anemia- (present on admission)  Assessment & Plan  -On admission initial hemoglobin is 6.4.   Given one unit in the ED and repeat Hgb >10 which would seem a disproporionate climb given that he also was bolused fluids  Was guaiac + in the ED  Cont to follow H&H closely  PO PPI  Has had no further evidence of GI bleed    Hyperlipidemia- (present on admission)  Assessment & Plan  -On atorvastatin.    Essential hypertension- (present on admission)  Assessment & Plan  As outpt on lisinopril 10, carvdelol 12.5  Pressures have slowly climbed and now around SBP of 120   Cont to hold BP meds for now         VTE prophylaxis: SCDs/TEDs    I have performed a physical exam and reviewed and updated ROS and Plan today (1/3/2023). In review  of yesterday's note (1/2/2023), there are no changes except as documented above.

## 2023-01-04 NOTE — DISCHARGE PLANNING
"RN CM spoke with patient's daughter \"Kendra\". Informed her that patient was getting discharged today and will return back to [facility] (SNF). Daughter verbalized understanding. Spoke with \"Lorie\" at [facility] and she requested transport be arranged for 2pm.   "

## 2023-01-04 NOTE — DISCHARGE SUMMARY
Discharge Summary    CHIEF COMPLAINT ON ADMISSION  Chief Complaint   Patient presents with    Fatigue     Increase in fatigue over the past 2 days.     Hypotension     86/60 upon EMS arrival. 75/51 upon arrival to hospital.        Reason for Admission  ems     Admission Date  12/29/2022    CODE STATUS  Full Code    HPI & HOSPITAL COURSE  This is a 82 y.o. 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  Came to us with hypotension from Rehab where he had been sent after a R BKA.  During that surgery had a cardiac arrest during induction requiring CPR.  Went to ICU where he had a second VF arrest.  LHC showing Takotsubo physiology.  ICD placer 12/13.  On this admission in the ED  Hgb 6.4, Na 147, guaiac +, Nit + UA, infiltrate noted on CXR.  CVC placed, given one unit PRBCs and admitted to the IMCU with Dx of sepsis, UTI, anemia  Patient had positive response medical therapy during hospitalization.  Patient was diagnosed with left subclavian deep vein thrombosis was subsequently placed on heparin infusion which he tolerated well without any evidence of further bleeding.  Patient was then subsequently transition to Eliquis with 7-day loading dose 10 mg twice daily and 5 mg twice daily thereafter.  This is indicated below on medication list.  Patient tolerated this medication well without any evidence of bleeding.  With regards to patient's antimicrobial therapy patient will continue on ceftriaxone as indicated below until 1/8/2023.  Patient was tolerating diet having bowel movements and voiding without issue on day of discharge.  Therefore, he is discharged in good and stable condition to skilled nursing facility.    The patient met 2-midnight criteria for an inpatient stay at the time of discharge.    Discharge Date  1/4/2023    FOLLOW UP ITEMS POST DISCHARGE  Take all medication as prescribed  Participate in rehabilitation    DISCHARGE  DIAGNOSES  Principal Problem:    Hypotension POA: Unknown  Active Problems:    Essential hypertension POA: Yes    Hyperlipidemia POA: Yes    Macrocytic anemia POA: Yes    History of seizures POA: Yes    Congestive heart failure (CHF) (HCC) POA: Yes    Acute respiratory failure with hypoxia (HCC) POA: Unknown    Hypokalemia POA: Yes    Thrombocytopenia (HCC) POA: Yes    Hypernatremia POA: Yes    Advance care planning POA: Yes    Severe protein-calorie malnutrition (HCC) POA: Yes    Stage III pressure ulcer of sacral region (HCC) POA: Unknown    Acute UTI POA: Unknown    Full code status POA: Unknown    Pneumonia POA: Unknown    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 *      FOLLOW UP  Future Appointments   Date Time Provider Department Center   1/18/2023 12:45 PM ANGELES Kapoor [facility] None     Judah Braga M.D.  [address] [facility] [id number]  [phone]    Follow up  For P.E. follow up studies or pulmonolgy follow up referral.      MEDICATIONS ON DISCHARGE     Medication List        START taking these medications        Instructions   * apixaban 5mg Tabs  Commonly known as: ELIQUIS   Take 2 Tablets by mouth 2 times a day. Indications: DVT/PE  Dose: 10 mg     * apixaban 5mg Tabs  Start taking on: January 10, 2023  Commonly known as: ELIQUIS   Take 1 Tablet by mouth 2 times a day. Indications: DVT/PE  Dose: 5 mg     cefTRIAXone 1 g/10 mL   Commonly known as: Rocephin   Infuse 10 mL into a venous catheter every 24 hours for 4 days.  Dose: 1,000 mg           * This list has 2 medication(s) that are the same as other medications prescribed for you. Read the directions carefully, and ask your doctor or other care provider to review them with you.                CONTINUE taking these medications        Instructions   acetaminophen 325 MG Tabs  Commonly known as: Tylenol   Take 650 mg by  mouth every four hours as needed for Mild Pain (Fever >100).  Dose: 650 mg     amiodarone 200 MG Tabs  Commonly known as: Cordarone   Take 1 Tablet by mouth every day.  Dose: 200 mg     atorvastatin 80 MG tablet  Commonly known as: LIPITOR   Take 80 mg by mouth every day.  Dose: 80 mg     furosemide 20 MG Tabs  Commonly known as: LASIX   Take 20 mg by mouth 2 times a day.  Dose: 20 mg     gabapentin 100 MG Caps  Commonly known as: NEURONTIN   Take 100 mg by mouth 2 times a day.  Dose: 100 mg     levETIRAcetam 500 MG Tabs  Commonly known as: KEPPRA   Take 1 Tab by mouth 2 Times a Day.  Dose: 500 mg     * oxyCODONE immediate release 10 MG immediate release tablet  Commonly known as: ROXICODONE   Take 10 mg by mouth every four hours as needed for Moderate Pain.  Dose: 10 mg     * oxyCODONE CR 10 MG T12a  Commonly known as: OXYCONTIN   Take 10 mg by mouth every 12 hours as needed (Moderate to Severe Pain).  Dose: 10 mg     therapeutic multivitamin-minerals Tabs   Take 1 Tablet by mouth every day.  Dose: 1 Tablet           * This list has 2 medication(s) that are the same as other medications prescribed for you. Read the directions carefully, and ask your doctor or other care provider to review them with you.                STOP taking these medications      aspirin EC 81 MG Tbec  Commonly known as: ECOTRIN     carvedilol 12.5 MG Tabs  Commonly known as: COREG     lisinopril 10 MG Tabs  Commonly known as: PRINIVIL     mirtazapine 15 MG Tabs  Commonly known as: Remeron     nystatin 024203 UNIT/ML Susp  Commonly known as: MYCOSTATIN              Allergies  No Known Allergies    DIET  Orders Placed This Encounter   Procedures    Diet Order Diet: Level 3 - Liquidised; Liquid level: Level 2 - Mildly Thick; Second Modifier: (optional): Cardiac     Standing Status:   Standing     Number of Occurrences:   1     Order Specific Question:   Diet:     Answer:   Level 3 - Liquidised [26]     Order Specific Question:   Liquid level      Answer:   Level 2 - Mildly Thick     Order Specific Question:   Second Modifier: (optional)     Answer:   Cardiac [6]       ACTIVITY  As tolerated and directed by skilled nursing.  Weight bearing as tolerated    CONSULTATIONS  Critical care    PROCEDURES  None    LABORATORY  Lab Results   Component Value Date    SODIUM 146 (H) 01/04/2023    POTASSIUM 3.6 01/04/2023    CHLORIDE 112 01/04/2023    CO2 29 01/04/2023    GLUCOSE 98 01/04/2023    BUN 14 01/04/2023    CREATININE 0.62 01/04/2023        Lab Results   Component Value Date    WBC 7.7 01/04/2023    HEMOGLOBIN 9.9 (L) 01/04/2023    HEMATOCRIT 31.5 (L) 01/04/2023    PLATELETCT 83 (L) 01/04/2023      Please note that this dictation was created using voice recognition software. I have made every reasonable attempt to correct obvious errors, but I expect that there are errors of grammar and possibly context that I did not discover before finalizing the note.    Total time of the discharge process exceeds 35 minutes.

## 2023-01-04 NOTE — CARE PLAN
The patient is Watcher - Medium risk of patient condition declining or worsening    Shift Goals  Clinical Goals: hemodynamic stability  Patient Goals: rest  Family Goals: NA    Progress made toward(s) clinical / shift goals:    Problem: Knowledge Deficit - Standard  Goal: Patient and family/care givers will demonstrate understanding of plan of care, disease process/condition, diagnostic tests and medications  Outcome: Progressing     Problem: Pain - Standard  Goal: Alleviation of pain or a reduction in pain to the patient’s comfort goal  Outcome: Progressing     Problem: Skin Integrity  Goal: Skin integrity is maintained or improved  Outcome: Progressing       Patient is not progressing towards the following goals:

## 2023-01-04 NOTE — DISCHARGE PLANNING
DPA set up transport for pt to go to [facility] transport via [facility] at 1400     RN CM notified

## 2023-01-04 NOTE — HEART FAILURE PROGRAM
Patient who had EF of 30% on 12/5/22, admitted again on 12/29/22 for hypotension. Has a discharge order today. Per discharge plan notes, going to [facility].    ICD was implanted 12/13/22.     Looks like patient received blood transfusions. Per Dr. Sweet's note on 1/3/23, patient is not in acutely decompensated heart failure on this admission, respiratory failure was pneumonia.    Patient has a previously scheduled f/u with [facility] Jeff[facility] at [facility] on 1/18/22. This should work out well as it is 2 weeks from today and pt should be out of SNF prior to that.

## 2023-01-10 PROBLEM — J18.9 HCAP (HEALTHCARE-ASSOCIATED PNEUMONIA): Status: ACTIVE | Noted: 2023-01-01

## 2023-01-11 NOTE — ED NOTES
"Receiving floor notified pt ready for .    Attempted to perform swallow eval again. Pt refused stating, \"I already had dinner.\" pt educated on importance due to medication administration. Pt continues to refuse.   "

## 2023-01-11 NOTE — PROGRESS NOTES
Hospital Medicine Daily Progress Note    Date of Service  1/11/2023    Chief Complaint  Gilmer Mae is a 82 y.o. male admitted 1/10/2023 with ammonia    Hospital Course  No notes on file    Interval Problem Update  Patient reports he feels slightly better after starting the antibiotics.  He is still requiring supplemental oxygen.  Patient did report that he felt confused this morning.  He reports he felt like he was at home but quickly realized he was at the hospital.  Denies episodes like this in the past.  He reports he is back to normal at this time.  Continuing antibiotics for pneumonia.  Discontinue IV fluids since the patient has a history of heart failure and now appears euvolemic.    I have discussed this patient's plan of care and discharge plan at IDT rounds today with Case Management, Nursing, Nursing leadership, and other members of the IDT team.    Consultants/Specialty      Code Status  Full Code    Disposition  Patient is not medically cleared for discharge.   Anticipate discharge to to home with close outpatient follow-up.  I have placed the appropriate orders for post-discharge needs.    Review of Systems  Review of Systems   Constitutional:  Negative for chills and fever.   Respiratory:  Positive for cough, sputum production and shortness of breath.    Cardiovascular:  Negative for chest pain and palpitations.   Gastrointestinal:  Negative for abdominal pain, nausea and vomiting.   Genitourinary:  Negative for dysuria.   Psychiatric/Behavioral:          Patient endorsing episodes of confusion right after waking up in the morning.  Patient is aware that he be does become confused and returned back to his baseline.   All other systems reviewed and are negative.     Physical Exam  Temp:  [36.7 °C (98 °F)] 36.7 °C (98 °F)  Pulse:  [60-70] 64  Resp:  [11-18] 17  BP: (119-173)/(59-82) 131/61  SpO2:  [94 %-98 %] 97 %    Physical Exam  Vitals reviewed.   Constitutional:       General: He is not in acute  distress.     Appearance: Normal appearance. He is ill-appearing.   HENT:      Head: Normocephalic and atraumatic.   Eyes:      General: No scleral icterus.        Right eye: No discharge.         Left eye: No discharge.   Cardiovascular:      Rate and Rhythm: Normal rate and regular rhythm.      Heart sounds: Normal heart sounds.   Pulmonary:      Effort: No respiratory distress.      Breath sounds: Rales present. No wheezing.      Comments: On 2 L  Abdominal:      General: There is no distension.      Tenderness: There is no abdominal tenderness. There is no guarding.   Musculoskeletal:         General: No tenderness.      Left lower leg: No edema.      Comments: Right BKA   Skin:     Coloration: Skin is not jaundiced.   Neurological:      General: No focal deficit present.      Mental Status: He is alert and oriented to person, place, and time.      Cranial Nerves: No cranial nerve deficit.   Psychiatric:         Mood and Affect: Mood normal.         Thought Content: Thought content normal.         Judgment: Judgment normal.       Fluids  No intake or output data in the 24 hours ending 01/11/23 1446    Laboratory  Recent Labs     01/10/23  1811 01/11/23  0248   WBC 5.3 5.2   RBC 3.21* 3.01*   HEMOGLOBIN 10.7* 10.2*   HEMATOCRIT 33.8* 31.8*   .3* 105.6*   MCH 33.3* 33.9*   MCHC 31.7* 32.1*   RDW 63.2* 63.3*   PLATELETCT 161* 136*   MPV 10.3 10.3     Recent Labs     01/10/23  1811 01/11/23  0248   SODIUM 142 143   POTASSIUM 3.3* 3.3*   CHLORIDE 105 106   CO2 30 29   GLUCOSE 92 81   BUN 15 14   CREATININE 0.52 0.51   CALCIUM 7.4* 7.3*                   Imaging  CT-HEAD W/O   Final Result      1.  Diffuse atrophy and white matter changes.   2.  No acute intracranial hemorrhage or territorial infarct.   3.  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            DX-CHEST-PORTABLE (1 VIEW)   Final Result      1.  Interstitial opacities at both lung bases suggest multifocal  pneumonitis/pneumonia.   2.  Hypoinflation and probable LEFT lower lobe atelectasis.           Assessment/Plan  * HCAP (healthcare-associated pneumonia)- (present on admission)  Assessment & Plan  Patient transferred here for confusion.  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, left (HCC)- (present on admission)  Assessment & Plan  Cont on eliquis     Congestive heart failure (CHF) (HCC)- (present on admission)  Assessment & Plan  Cont outpatient regimen   No exacerbation     History of seizures- (present on admission)  Assessment & Plan  Cont outpatient regimen     Acute encephalopathy- (present on admission)  Assessment & Plan  Possible related to infection  Will start abx will monitor his mentation     Essential hypertension- (present on admission)  Assessment & Plan  Cont outpatient regimen          VTE prophylaxis: therapeutic anticoagulation with Eliquis    I have performed a physical exam and reviewed and updated ROS and Plan today (1/11/2023). In review of yesterday's note (1/10/2023), there are no changes except as documented above.

## 2023-01-11 NOTE — DOCUMENTATION QUERY
"                                                                         [facility]                                                                       Query Response Note      PATIENT:               ZOE SARMIENTO  ACCT #:                  [id number]  MRN:                     [id number]  :                      1940  ADMIT DATE:       1/10/2023 5:46 PM  DISCH DATE:          RESPONDING  PROVIDER #:        [id number]           QUERY TEXT:    Encephalopathy is documented in the Medical Record. Please specify type.    NOTE:  If an appropriate response is not listed below, please respond with a new note.    Note: If you agree with a diagnosis listed above, please remember to include it in your concurrent daily documentation and onto the Discharge Summary.      The patient's Clinical Indicators include:  82 year old female admitted with HCAP.    01/10 Ziu \"Acute encephalopathy, Possible related to infection\"    Risk factors: recent admission, PNA, CHF  Treatment: labs, ABX, CT, CXR,     If you have any questions please contact:  Julisa Hope RN CDI [facility]  [email or web address]  Julisa Hope [address] [employer]  Options provided:   -- Metabolic encephalopathy   -- Other explanation, please specify   -- Unable to determine      Query created by: Julisa Hope on 2023 12:10 PM    RESPONSE TEXT:    Metabolic encephalopathy       QUERY TEXT:    The diagnosis of acute hypoxic respiratory failure is documented in the medical record. The patient is on 2L O2 today per the RN flowsheets and saturating 96-98%. and arrived on 2L with oxygen saturations of 96%. The H&P notes the paint denies shortness of breath. Is acute hypoxic respiratory failure an accurate diagnosis for this admission?    Note: If you agree with a diagnosis listed above, please remember to include it in your concurrent daily documentation and onto the Discharge Summary.        The patient's Clinical Indicators include:  82 year old male " "admitted with HCAP.    01/10 H&P \"  He has no complaints at this time he apparently is on 2 L nasal cannula at baseline currently denies any chest pain or shortness of breath\"  \" The need is secondary to acute on chronic respiratory failure \"    Risk factors: HCAP, CHF, HTN  Treatment: Labs, CXR, CT, O2    If you have any questions please contact:  Julisa Hope RN CDI [facility]  [email or web address]  Julisa Hope [address] [employer]  Options provided:   -- No, acute on chronic hypoxic respiratory failure is not a valid diagnosis during this admission   -- Yes, acute on chronic hypoxic respiratory failure is a valid diagnosis during this admission   -- Treating for chronic hypoxic respiratory failure, acute on chronic hypoxic respiratory failure ruled out   -- Other explanation, please specify   -- Unable to determine      Query created by: Julisa Hope on 1/11/2023 12:20 PM    RESPONSE TEXT:    Yes, acute on chronic hypoxic respiratory failure is a valid diagnosis during this admission          Electronically signed by:  SAGAR JENKINS MD 1/11/2023 1:14 PM              "

## 2023-01-11 NOTE — ASSESSMENT & PLAN NOTE
Patient on 2 L of oxygen at baseline.  No lower extremity edema to indicate fluid overload.  Suspect his hypoxia is likely from his pneumonia/pneumonitis from aspiration.  Antibiotics  Monitor closely for fluid overload

## 2023-01-11 NOTE — ED NOTES
Pt morning medications held until speech therapy evaluation completed. Pt resting comfortably in bed with equal rise and fall of chest. Call light within reach.

## 2023-01-11 NOTE — ED TRIAGE NOTES
"Chief Complaint   Patient presents with    ALOC     Per skilled facility [id number]     Pt bib ems from [facility] skilled facility, per report pt recently trf from hospital to rehab then skilled facility. EMS said that staff unable to tell pt's baseline mentation but \"have change of mentation past 24hours\".   Noted surgical wound from right bka , indwelling catheter and on 2L nc .   Pt awakens to voice ,a/ox2, knows name and year .     "

## 2023-01-11 NOTE — ASSESSMENT & PLAN NOTE
Patient transferred here for confusion.  In ER he was found to have possible pneumonia since patient was recently in the hospital   Will treat it as possible HCAP  Starting on vanco and ceftriaxone for now and will deescalate as appropriate     On cefepime and linezolid this morning. Procal negative, linezolid discontinued. Will continue cefepime one more day, if procal remains negative will discontinue tomorrow.   Blood/urine cx NGTD  Procal remains negative, no fever or leukocytosis, antibiotics stopped.

## 2023-01-11 NOTE — ED NOTES
Asked the pt whether he was able to take PO medications and the pt could not respond appropriately. Did a swallow evaluation on the pt and he kept the water in his mouth and had to be told multiple times to swallow before he finally did. Attending  Notiflashay and Speech therapy will be consulted. PT will be made NPO until evaluation complete.

## 2023-01-11 NOTE — HEART FAILURE PROGRAM
Patient is not in acutely decompensated heart failure at this time per Dr. Louie's h/p yesterday. HF f/u appointment and discharge checklist are not indicated.

## 2023-01-11 NOTE — H&P
Hospital Medicine History & Physical Note    Date of Service  1/10/2023    Primary Care Physician  Judah Braga M.D.    Consultants  None         Code Status  Full Code    Chief Complaint  Chief Complaint   Patient presents with    ALOC     Per skilled facility [id number]       History of Presenting Illness  Gilmer Mae is a 82 y.o. male who presented 1/10/2023 with cacute encephalopathy .  The patient knows he is at [facility] he just does not know what year it is but he knows his name.  He has no complaints at this time he apparently is on 2 L nasal cannula at baseline currently denies any chest pain or shortness of breath he has no headache he has an indwelling Kimball catheter.  It was last changed a few weeks ago.  Otherwise he cannot give me a great history as to how he is feeling but states he is not having any discomfort  Here in ER he was found to have possible early pneumonia and possible UTI.  He has been started on abx and admitted for further treatment   I discussed the plan of care with  ERP .    Review of Systems  Review of Systems   Unable to perform ROS: Mental acuity     Past Medical History   has a past medical history of Chronic pain, Hyperlipidemia, Hypertension, and Pacemaker.    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     Family History  family history is not on file.   Family history reviewed with patient. There is no family history that is pertinent to the chief complaint.     Social History   reports that he has never smoked. He has never been exposed to tobacco smoke. He does not have any smokeless tobacco history on file.    Allergies  No Known Allergies    Medications  Prior to Admission Medications   Prescriptions Last Dose Informant Patient Reported? Taking?   acetaminophen (TYLENOL) 325 MG Tab  MAR from Other Facility Yes No   Sig: Take 650 mg by mouth every four hours as needed for Mild Pain  (Fever >100).   amiodarone (CORDARONE) 200 MG Tab  MAR from Other Facility No No   Sig: Take 1 Tablet by mouth every day.   apixaban (ELIQUIS) 5mg Tab   No No   Sig: Take 2 Tablets by mouth 2 times a day. Indications: DVT/PE   apixaban (ELIQUIS) 5mg Tab   No No   Sig: Take 1 Tablet by mouth 2 times a day. Indications: DVT/PE   atorvastatin (LIPITOR) 80 MG tablet  MAR from Other Facility Yes No   Sig: Take 80 mg by mouth every day.   furosemide (LASIX) 20 MG Tab  MAR from Other Facility Yes No   Sig: Take 20 mg by mouth 2 times a day.   gabapentin (NEURONTIN) 100 MG Cap  MAR from Other Facility Yes No   Sig: Take 100 mg by mouth 2 times a day.   levETIRAcetam (KEPPRA) 500 MG Tab  MAR from Other Facility No No   Sig: Take 1 Tab by mouth 2 Times a Day.   oxyCODONE CR (OXYCONTIN) 10 MG Tablet Extended Release 12 hour Abuse-Deterrent  MAR from Other Facility Yes No   Sig: Take 10 mg by mouth every 12 hours as needed (Moderate to Severe Pain).   oxyCODONE immediate release (ROXICODONE) 10 MG immediate release tablet  MAR from Other Facility Yes No   Sig: Take 10 mg by mouth every four hours as needed for Moderate Pain.   therapeutic multivitamin-minerals (THERAGRAN-M) Tab  MAR from Other Facility Yes No   Sig: Take 1 Tablet by mouth every day.      Facility-Administered Medications: None       Physical Exam  Temp:  [36.7 °C (98 °F)] 36.7 °C (98 °F)  Pulse:  [60-70] 60  Resp:  [16-18] 18  BP: (119-145)/(59-82) 143/70  SpO2:  [96 %-98 %] 97 %  Blood Pressure : (!) 145/67   Temperature: 36.7 °C (98 °F)   Pulse: 61   Respiration: 17   Pulse Oximetry: 98 %       Physical Exam  Vitals and nursing note reviewed.   Constitutional:       Appearance: He is ill-appearing.   HENT:      Mouth/Throat:      Pharynx: No oropharyngeal exudate or posterior oropharyngeal erythema.   Eyes:      General: No scleral icterus.        Right eye: No discharge.         Left eye: No discharge.   Cardiovascular:      Rate and Rhythm: Normal rate.       Heart sounds:     No gallop.   Pulmonary:      Breath sounds: No stridor. Rhonchi present. No wheezing.   Abdominal:      General: There is no distension.      Tenderness: There is no abdominal tenderness. There is no guarding or rebound.   Musculoskeletal:         General: No swelling or tenderness.      Cervical back: Normal range of motion. No tenderness.   Skin:     Coloration: Skin is not jaundiced.      Findings: No bruising.   Neurological:      General: No focal deficit present.      Mental Status: He is oriented to person, place, and time.      Cranial Nerves: No cranial nerve deficit.       Laboratory:  Recent Labs     01/10/23  1811   WBC 5.3   RBC 3.21*   HEMOGLOBIN 10.7*   HEMATOCRIT 33.8*   .3*   MCH 33.3*   MCHC 31.7*   RDW 63.2*   PLATELETCT 161*   MPV 10.3     Recent Labs     01/10/23  1811   SODIUM 142   POTASSIUM 3.3*   CHLORIDE 105   CO2 30   GLUCOSE 92   BUN 15   CREATININE 0.52   CALCIUM 7.4*     Recent Labs     01/10/23  1811   ALTSGPT 26   ASTSGOT 43   ALKPHOSPHAT 107*   TBILIRUBIN 0.4   GLUCOSE 92         No results for input(s): NTPROBNP in the last 72 hours.      No results for input(s): TROPONINT in the last 72 hours.    Imaging:  CT-HEAD W/O   Final Result      1.  Diffuse atrophy and white matter changes.   2.  No acute intracranial hemorrhage or territorial infarct.   3.  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            DX-CHEST-PORTABLE (1 VIEW)   Final Result      1.  Interstitial opacities at both lung bases suggest multifocal pneumonitis/pneumonia.   2.  Hypoinflation and probable LEFT lower lobe atelectasis.          X-Ray:  Interstitial opacities at both lung bases suggest multifocal pneumonitis/pneumonia    Assessment/Plan:  Justification for Admission Status  I anticipate this patient will require at least two midnights for appropriate medical management, necessitating inpatient admission because pneumonia     Patient will need a  Telemetry bed on CARDIOLOGY service .  The need is secondary to acute on chronic respiratory failure .    * HCAP (healthcare-associated pneumonia)- (present on admission)  Assessment & Plan  Patient transferred here for confusion.  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

## 2023-01-11 NOTE — ED NOTES
ERP ordered to d/c old indwelling catheter and placed new one.   Indwelling catheter placed aseptic technique , pt tolerated .   Pt to ct

## 2023-01-11 NOTE — ED NOTES
Pt resting comfortably in bed, pt has equal rise and fall of the chest. Pt has no needs at the current time. Call light within reach.

## 2023-01-11 NOTE — ED PROVIDER NOTES
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  He did have an ICD placed on 12/13.  His last admission which was done right before New Year's Yoko was secondary to severe anemia electrolyte abnormalities any urinary tract infection.  The patient was septic also had a left subclavian DVT.  He was treated with Eliquis and antibiotics that were to be can discontinued on 1/8    HPI/ROS  LIMITATION TO HISTORY   Select: Altered mental status / Confusion  OUTSIDE HISTORIAN(S):  Select: EMS patient was from a skilled nursing facility where they endorse he has not been acting like himself for a day.  No history of trauma that they were aware of glucose was normal prior to arrival    Gilmer Mae is a 82 y.o. male who presents to the emergency department for chief complaint of altered mental status.  The patient knows he is at [facility] he just does not know what year it is but he knows his name.  He has no complaints at this time he apparently is on 2 L nasal cannula at baseline currently denies any chest pain or shortness of breath he has no headache he has an indwelling Kimball catheter.  It was last changed a few weeks ago.  Otherwise he cannot give me a great history as to how he is feeling but states he is not having any discomfort    PAST MEDICAL HISTORY   has a past medical history of Chronic pain, Hyperlipidemia, Hypertension, and Pacemaker.    SURGICAL HISTORY   has a past surgical history that includes pacemaker insertion and knee amputation below (Right, 12/14/2022).    FAMILY HISTORY  No family history on file.    SOCIAL HISTORY  Social History     Tobacco Use    Smoking status: Never     Passive exposure: Never    Smokeless tobacco: Not on file  "  Vaping Use    Vaping Use: Never used   Substance and Sexual Activity    Alcohol use: Not on file    Drug use: Not on file    Sexual activity: Not on file       CURRENT MEDICATIONS  Home Medications    **Home medications have not yet been reviewed for this encounter**         ALLERGIES  No Known Allergies    PHYSICAL EXAM  VITAL SIGNS: /82   Pulse 70   Temp 36.7 °C (98 °F) (Temporal)   Resp 16   Ht 1.727 m (5' 8\")   Wt 78 kg (172 lb)   SpO2 96%   BMI 26.15 kg/m²    Pulse Ox Interpretation:   Pulse Ox is normal on 2 L  Constitutional: Alert in no apparent distress.  HENT: Normocephalic atraumatic, MMM  Eyes: PER, Conjunctiva normal, Non-icteric.   Neck: Normal range of motion, No tenderness, Supple, No stridor.   Cardiovascular: Regular rate and rhythm, no murmurs.   Thorax & Lungs: Manage breath sounds at the bases bilaterally no respiratory distress, No wheezing, No chest tenderness.   Abdomen: Bowel sounds normal, Soft, No tenderness, No pulsatile masses. No peritoneal signs.  Skin: Warm, Dry, No erythema, No rash.   Back: No bony tenderness, No CVA tenderness.   Extremities/MSK: Intact equal distal pulses, edema left upper extremity 1+ right BKA is clean dry and intact with staple line intact no tenderness, No cyanosis, no major deformities noted left heel in an air boot with pressure ulcer protection  Neurologic: Alert and oriented to person and place but not year cranial nerves II through XII intact no weakness on the upper or lower extremities noted      DIAGNOSTIC STUDIES / PROCEDURES  EKG  I have independently interpreted this EKG            LABS    Labs Reviewed   CBC WITH DIFFERENTIAL - Abnormal; Notable for the following components:       Result Value    RBC 3.21 (*)     Hemoglobin 10.7 (*)     Hematocrit 33.8 (*)     .3 (*)     MCH 33.3 (*)     MCHC 31.7 (*)     RDW 63.2 (*)     Platelet Count 161 (*)     Lymphocytes 18.00 (*)     Immature Granulocytes 1.10 (*)     Lymphs (Absolute) " "0.96 (*)     All other components within normal limits   COMP METABOLIC PANEL - Abnormal; Notable for the following components:    Potassium 3.3 (*)     Calcium 7.4 (*)     Alkaline Phosphatase 107 (*)     Albumin 2.2 (*)     Total Protein 4.6 (*)     All other components within normal limits   LACTIC ACID   DIAGNOSTIC ALCOHOL   COV-2, FLU A/B, AND RSV BY PCR (CEPHEID)   ESTIMATED GFR   CORRECTED CALCIUM   BLOOD CULTURE    Narrative:     Per Hospital Policy: Only change Specimen Src: to \"Line\" if  specified by physician order.   BLOOD CULTURE    Narrative:     Per Hospital Policy: Only change Specimen Src: to \"Line\" if  specified by physician order.   AMMONIA   URINALYSIS   URINE CULTURE(NEW)           RADIOLOGY  I have independently interpreted the diagnostic imaging associated with this visit and am waiting the final reading from the radiologist.       CXR bilateral interstitial process     CT h - chronic processes nothing acute    CT-HEAD W/O   Final Result      1.  Diffuse atrophy and white matter changes.   2.  No acute intracranial hemorrhage or territorial infarct.   3.  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            DX-CHEST-PORTABLE (1 VIEW)   Final Result      1.  Interstitial opacities at both lung bases suggest multifocal pneumonitis/pneumonia.   2.  Hypoinflation and probable LEFT lower lobe atelectasis.            COURSE & MEDICAL DECISION MAKING    ED Observation Status? Yes; I am placing the patient in to an observation status due to a diagnostic uncertainty as well as therapeutic intensity. Patient placed in observation status at 6:18 PM, 1/10/2023.       INITIAL ASSESSMENT AND PLAN  Care Narrative: Patient presents emerged department for altered mental status.  Going through chart review it sounds like she is normally alert and oriented to person place and time he has no focal deficits there is no signs of trauma will evaluate for source of infection such as " urinary tract infection or pneumonia.  Is on 2 L nasal cannula but not in any respiratory distress.  Patient is V overly dehydrated nor is he appearing septic.  Gross within normal limits there is no history of stroke or seizure he did have cardiac arrest when he was hospitalized last month so it is unknown if there is a little bit of baseline deficit secondary to anoxic brain injury.  Cording that the [facility] nurse facility did not actually know his baseline    ADDITIONAL PROBLEM LIST AND DISPOSITION  9:46 PM  Spoke w admitting physician patient is still mildly confused however he does have evidence of pneumonia on his chest x-ray.  Secondary effect has been in out of the hospital recently this would be healthcare associated pneumonia.  He was treated with the appropriate IV fluids as well as IV antibiotics.  Fortunately he is not in any severe short of breath.  Fortunately his CT of his head did not reveal any new or worsening process and he had a nonfocal neurologic examination.  Urinalysis after Kimball was exchanged shows no evidence of infection.  The patient's abdomen was soft and nontender.    At this time the patient was discharged from ED observation to hospitalization    Problem #1: AMS  Problem #2: HAP  Problem #3: anemia    I have discussed management of the patient with the following physicians and GIOVANNY's:  Dr Louie    Discussion of management with other [facility] or appropriate source(s): Select: None     CRITICAL CARE  The very real possibilty of a deterioration of this patient's condition required the highest level of my preparedness for sudden, emergent intervention.  I provided critical care services, which included medication orders, frequent reevaluations of the patient's condition and response to treatment, ordering and reviewing test results, and discussing the case with various consultants.  The critical care time associated with the care of the patient was 35 minutes. Review chart for interventions.  This time is exclusive of any other billable procedures.       HYDRATION: Based on the patient's presentation of Sepsis the patient was given IV fluids. IV Hydration was used because oral hydration was not as rapid as required. Upon recheck following hydration, the patient was improved.        FINAL DIAGNOSIS  HAP  AMS  Anemia        Electronically signed by: Archana Aponte M.D., 1/10/2023 6:18 PM

## 2023-01-11 NOTE — ED NOTES
Pt resting comfortably in bed, pt has equal rise and fall of the chest. Pt able to reposition self appropriately. Call light within reach.

## 2023-01-11 NOTE — ED NOTES
PT resting comfortably in bed, pt has no needs at the current time. Pt has equal rise and fall of the chest. Call light within reach.

## 2023-01-11 NOTE — ED NOTES
Pt resting comfortably in bed with equal rise and fall of the chest. Pt on Waffle Comb mattress and positioning self appropriately. Pt has no needs at the current time. Call light within reach.

## 2023-01-11 NOTE — THERAPY
"Speech Language Pathology   Clinical Swallow Evaluation     Patient Name: Gilmer Mae  AGE:  82 y.o., SEX:  male  Medical Record #: [id number]  Today's Date: 2023     Precautions  Precautions: Fall Risk, Swallow Precautions ( See Comments)    HPI: Pt is 83 y/o male presenting 1/10 with acute encephalopathy, found to have HCAP. FEES x2 during previous admit - recommend LQ3/MT2 with 1:1 and strategies. Silent aspiration with thins and pudding.     CMHx: HCAP, HTN, acute encephalopathy, seizures, congestive heart failure, acute respiratory failure with hypoxia  PMHx: TIA, HLD, ALL, cardiac arrest, shock, Bell's palsy, UTI    CXR 1/10:  \"1.  Interstitial opacities at both lung bases suggest multifocal pneumonitis/pneumonia.  2.  Hypoinflation and probable LEFT lower lobe atelectasis.\"    Level of Consciousness: Alert, Awake  Affect/Behavior: Calm, Flat  Follows Directives: Yes - simple commands only  Orientation: Self, , General place, Current month (self corrected month)  Hearing: Functional hearing  Vision: Functional vision      Prior Living Situation & Level of Function:  Extensive hx of dysphagia, including multiple diagnostic studies. Has been on LQ3/MT2. Family reports that his intake with the modified diet has been poor.       Oral Mechanism Evaluation  Facial Symmetry: Equal  Facial Sensation: Equal  Labial Observations: WFL  Lingual Observations: Midline  Dentition: Good  Comments:      Voice  Quality: WFL  Resonance: WFL  Intensity: Appropriate  Cough: Perceptually weak  Comments:      Current Method of Nutrition   NPO until cleared by speech pathology. Has been on LQ3/MT2 per SLP recommendation.       Subjective  Pt with limited cooperation in SLP evaluation tasks. \"Yes.\"       Assessment  Positioning: Velázquez's (60-90 degrees)  Bolus Administration: SLP and Patient  Oxygen Requirements:  3 L Nasal Cannula  Factor(s) Affecting Performance: Impaired mental status, Impaired command following    Swallowing " Trials  Mildly Thick Liquid (MT2): WFL  Liquidised (LQ3): WFL    Comments:  Appropriate oral bolus stripping and containment. Good lingual manipulation of bolus w/o residue appreciated across consistencies trialed, presumed total AP transit and effective bolus formation. Functional and timely mastication of solid consistencies. No overt s/sx of aspiration noted w/ single and sequential sips. No increase in WOB, no cough/clear, no change in vocal quality. One swallow appreciated per bolus, able to follow commands for second swallow per FEES recommendation. Limited intake.       Clinical Impressions  Pt did not demonstrate overt signs of oropharyngeal dysphagia this date; however, pt does demonstrated extensive hx of dysphagia. Recommend deference to previous instrumental to inform POC at this time. Pt would benefit from a repeat instrumental to assess for changes to swallow function and further inform POC. Pt is at risk for malnutrition/dehydration on current diet given reports of poor intake. Pt is also at elevated risk for aspiration PNA, given limited mobility, respiratory compromise, and hx of silent aspiration. SLP will follow closely. Would also recommend palliative care consult to guide discussions of POC.        Recommendations  Continue Liquidized mildly thick liquids given severity of oropharyngeal dysphagia.   2.  Swallowing Instructions & Precautions:   Supervision: 1:1 feeding with constant supervision  Positioning: Fully upright and midline during oral intake  Medication: Crush with applesauce  Strategies: Small bites/sips, Multiple swallows (x 2-3) per bite/sip , Effortful swallow  Oral Care: Q4h      Plan    Recommend Speech Therapy 3 times per week until therapy goals are met for the following treatments:  Dysphagia Training and Patient / Family / Caregiver Education.    Discharge Recommendations: Recommend post-acute placement for additional speech therapy services prior to discharge home      "  Objective   01/11/23 1547   Initial Contact Note    Initial Contact Note  Order Received and Verified, Speech Therapy Evaluation in Progress with Full Report to Follow.   Vitals   O2 (LPM) 3   O2 Delivery Device Nasal Cannula   Prior Living Situation   Prior Services Continuous (24 Hour) Care Giving Per Service   Lives with - Patient's Self Care Capacity Spouse;Adult Children   Comments frequent recent hospitalizations   Prior Level Of Function   Communication Within Functional Limits   Swallow Impaired  (sever impairment, repeat FEES)   Patient's Primary Language English   Dysphagia Strategies / Recommendations   Diet / Liquid Recommendation Mildly Thick (2) - (Nectar Thick);Liquidised (3) - (Nectar Thick Full Liquid)   Medication Administration    (crush in applesauce)   Dysphagia Rating   Nutritional Liquid Intake Rating Scale Thickened beverages (mildly thick unless otherwise specified)   Nutritional Food Intake Rating Scale Total oral diet of a single consistency   Patient / Family Goals   Patient / Family Goal #1 \"yes.\"   Short Term Goals   Short Term Goal # 1 Pt will consume diet of LQ3/MT2 given 1:1 with no overt s/sx of aspiration or worsening of lung status.   Short Term Goal # 1 B  Pt will complete repeat instrumental to assess for changes in swallow function and determine POC.   Education Group   Education Provided Dysphagia   Dysphagia Patient Response Patient;Significant Other;Acceptance;Explanation;Nonacceptance;Action Demonstration;Reinforcement Needed;Verbal Demonstration;No Learning Evidence   Additional Comments Pt with no learning evidence. Family verbalized frustration with diet recommendation.   Problem List   Problem List Dysphagia   Interdisciplinary Plan of Care Collaboration   IDT Collaboration with  Nursing;Family / Caregiver;Speech Therapist   Collaboration Comments RN, primary SLP updated. Swallow precautions posted HOB.       "

## 2023-01-11 NOTE — ED NOTES
PIV infusion complete and stopped at this time. Pt has no needs at the current time. Pt has equal rise and fall of the chest. Call light within reach.

## 2023-01-12 NOTE — PROGRESS NOTES
Report received, pt care assumed, tele box on. VSS, pt assessment complete. Pt aaox2, no signs of distress noted at this time. POC discussed with pt and verbalizes no questions.  Pt denies any additional needs at this time. Bed in lowest position, bed alarm on, pt educated on fall risk and verbalized understanding, call light within reach, will continue to monitor.

## 2023-01-12 NOTE — CARE PLAN
Problem: Knowledge Deficit - Standard  Goal: Patient and family/care givers will demonstrate understanding of plan of care, disease process/condition, diagnostic tests and medications  Outcome: Progressing     Problem: Skin Integrity  Goal: Skin integrity is maintained or improved  Outcome: Progressing     Problem: Fall Risk  Goal: Patient will remain free from falls  Outcome: Progressing   The patient is Watcher - Medium risk of patient condition declining or worsening    Shift Goals  Clinical Goals: hemodynamic stability, safety  Patient Goals: rest  Family Goals: MAYA    Progress made toward(s) clinical / shift goals:  reorient patient and q2 turns    Patient is not progressing towards the following goals:

## 2023-01-12 NOTE — PROGRESS NOTES
Received bedside report from RN, pt care assumed, VSS, pt assessment complete. Pt AAOx4, with n/c of pain at this time. No signs of acute distress noted at this time. Plan of care discussed with pt and verbalizes no questions. Pt denies any additional needs at this time. Bed locked/in lowest position, bed alarm activated, pt educated on fall risk and verbalized understanding, call light within reach, hourly rounding initiated.

## 2023-01-12 NOTE — DISCHARGE PLANNING
Case Management Discharge Planning    Admission Date: 1/10/2023  GMLOS: 4  ALOS: 2    6-Clicks ADL Score: 7  6-Clicks Mobility Score: 7  PT and/or OT Eval ordered: Yes  Post-acute Referrals Ordered: No  Post-acute Choice Obtained: Yes  Has referral(s) been sent to post-acute provider:  No  Patient came to us from [facility], patient was short stay.    Anticipated Discharge Dispo: Discharge Disposition: D/T to SNF with Medicare cert in anticipation of skilled care (03)    DME Needed: Yes    DME Ordered: No Pending hospital course of stay. Patient currently charted on 3L NC    Action(s) Taken: OTHER Chart Review    Escalations Completed: None    Medically Clear: No    Next Steps: F/U with patient and medical to discuss discharge needs and barriers.     Barriers to Discharge: Medical clearance, Pending Placement, and Transportation

## 2023-01-12 NOTE — THERAPY
Physical Therapy   Initial Evaluation     Patient Name: Gilmer Mae  Age:  82 y.o., Sex:  male  Medical Record #: [id number]  Today's Date: 1/12/2023     Precautions  Precautions: Fall Risk;Swallow Precautions ( See Comments)  Comments: R BKA on 12/14    Assessment  Patient is 82 y.o. male admitted w/ acute encephalopathy.  Hx of 2 liters O2, chronic pain, HLD, HTN, Pacer, recent right BKA (12/14/2022).  Found to have a small right frontal lacunar infarct.  Acute embolism and thrombosis subclavian vein present on admit, continue on Eloquis. Questionable early PN and UTI.  He is rec'd in bed, alert, confused.  Oriented to self.  Information obtained from chart review.  He lives w/ his wife and adult children in a single story house w/ three steps to enter.  Uncertain of his PLOF, especially in light of his recent BKA.  He needs max assist to sit eob, primarily due to pt resisting therapist.  He is able to return to bed w/ min assist.  Unable/unwilling to attempt sit to stand transfers.  Progress may be limited by pts cognition.  PT will follow and address impairments noted below.  Plan    Physical Therapy Initial Treatment Plan   Treatment Plan : Bed Mobility, Therapeutic Activities (transfer training)  Treatment Frequency: 3 Times per Week  Duration: Until Therapy Goals Met    DC Equipment Recommendations: Unable to determine at this time  Discharge Recommendations: Recommend post-acute placement for additional physical therapy services prior to discharge home   Objective       01/12/23 0816   Prior Living Situation   Housing / Facility 1 Story House   Steps Into Home 3   Steps In Home 0   Rail Right Rail (Steps into Home)   Equipment Owned Unable to Determine At This Time   Lives with - Patient's Self Care Capacity Spouse;Adult Children   Comments information obtained from chart review   Prior Level of Functional Mobility   Bed Mobility Unable To Determine At This Time   Transfer Status Unable To Determine At This  Time   Ambulation Unable To Determine At This Time   Assistive Devices Used Front-Wheel Walker   Stairs Unable To Determine At This Time   Cognition    Level of Consciousness Alert   Safety Awareness Impaired   Attention Impaired   Initiation Impaired   Comments A&Ox1   Strength Upper Body   Comments grossly moving against gravity, unable to formally assess as pt unable to follow cues   Strength Lower Body   Comments grossly moving against gravity, unable to formally assess as pt unable to follow cues   Balance Assessment   Sitting Balance (Static) Trace +   Sitting Balance (Dynamic) Trace +   Standing Balance (Static)   (UR)   Weight Shift Sitting Poor   Weight Shift Standing Absent   Comments resisiting   Bed Mobility    Supine to Sit Maximal Assist   Sit to Supine Minimal Assist   Scooting Maximal Assist   Comments resisting   Gait Analysis   Gait Level Of Assist Unable to Participate   Functional Mobility   Sit to Stand Unable to Participate   Short Term Goals    Short Term Goal # 1 Pt will move supine to/from eob w/ spv, no bed features in 6 visits to improve fxl indep   Short Term Goal # 2 Pt to move sit to/from stand w/ min assist in 6 visits to improve fxl indep   Short Term Goal # 3 Pt will TF bed to/from chair/w/c w/ min assist n 6 visits to improve fxl indep   Short Term Goal # 4 Pt to propel w/c 150 ft w/ spv in 6 visits to improve fxl indep   Education Group   Education Provided Role of Physical Therapist   Role of Physical Therapist Patient Response Patient;Acceptance;Explanation;No Learning Evidence   Additional Comments Educated regarding positioning of RLe s/p BKA, w/ no evidence of learning   Physical Therapy Initial Treatment Plan    Treatment Plan  Bed Mobility;Therapeutic Activities  (transfer training)   Treatment Frequency 3 Times per Week   Duration Until Therapy Goals Met   Problem List    Problems Impaired Bed Mobility;Impaired Transfers;Decreased Activity Tolerance;Safety Awareness  Deficits / Cognition   Anticipated Discharge Equipment and Recommendations   DC Equipment Recommendations Unable to determine at this time   Discharge Recommendations Recommend post-acute placement for additional physical therapy services prior to discharge home

## 2023-01-12 NOTE — DIETARY
"Nutrition services: Day 2 of admit.  Gilmer Mae is a 82 y.o. male with pneumonia, acute venous embolization  History includes: CHF, Seizures, Hypertension, right BKA (12/22)  Consult received for failure to thrive.  Addressed patient at bedside.  He only responded to questions with affirmative \"uh huh\" or negative uh uh.\"  Eyes remained closed and then he stopped responding to questions.  Per nursing he is intermittently oriented.  He has been refusing meals today per nursing, and palliative may be consulting.    Assessment:  Height: 172.7 cm (5' 8\")  Weight: 70.7 kg (155 lb 13.8 oz)  Body mass index is 23.7 kg/m².   Diet/Intake: Level 3 - liquidized with level 2 mildly thick liquids. Declined diagnostic swallowing test and refused PO trial with SLP today. No PO intake records in ADLs so far this admit.    Evaluation:   Unable to determine weight loss or gain  Current weight (1/11) via bed scale is ~156#;   Recent weights in chart:  1/4/23 was 172#, but the day before it was 155# (1/3/23)  Pertinent Medications:  Potassium   Pertinent Labs:  Potassium 3.0 today    Malnutrition Risk: Does not meet ASPEN criteria at this time    Recommendations/Inteventions/Plan:   Encourage intake of meals as possible  Document intake of all PO as % taken in ADL's to provide interdisciplinary communication across all shifts.   Monitor weight.  Nutrition rep will continue to see patient for ongoing meal and snack preferences as possible    RD following plan of care and available if nutrition support becomes indicated.    "

## 2023-01-12 NOTE — THERAPY
"Speech Language Therapy Contact Note    Patient Name: Gilmer Mae  Age:  82 y.o., Sex:  male  Medical Record #: [id number]  Today's Date: 1/12/2023    Discussed missed therapy with LISA Stanford and Floyd; MD Ceballos. Discussion with patient revealed he was consuming a diet consistent with thin liquids and minced and moist solids. Discussed potential for attribution to PNA given history of silent aspiration on thin liquids and pureed solids x2 FEES. Patient stated he did not want to participate in a repeat swallowing diagnostic and refused PO.        01/12/23 0925   Treatment Variance   Reason For Missed Therapy Non-Medical - Patient Refused   Interdisciplinary Plan of Care Collaboration   IDT Collaboration with  Nursing;Physician   Collaboration Comments Attempted to see patient to assess readiness for swallowing diagnostic. Patient refusing to look at clinician/respond unless max encouragement. Patient stated \"I'm not playing this game\" and refused to elaborate. Patient refused any PO. Discussed with MD re: palliative consultation.       "

## 2023-01-12 NOTE — PROGRESS NOTES
4 Eyes Skin Assessment Completed by Montrell EASLEY RN and Stephanie SANTANA RN.    Head WDL  Ears WDL  Nose WDL  Mouth WDL  Neck WDL  Breast/Chest Incision Left AICD site;   Shoulder Blades WDL  Spine Redness, Blanching, and Non-Blanching  (R) Arm/Elbow/Hand Redness and Blanching  (L) Arm/Elbow/Hand Redness and Blanching  Abdomen WDL  Groin Incision Right side with wound with packing  Scrotum/Coccyx/Buttocks Redness, Non-Blanching, and Scar Stage III wound  (R) Leg Incision Right BKA  (L) Leg WDL  (R) Heel/Foot/Toe N/A BKA  (L) Heel/Foot/Toe Redness, Non-Blanching, Discoloration, and Boggy          Devices In Places Tele Box, Blood Pressure Cuff, Pulse Ox, Kimball, and Nasal Cannula      Interventions In Place NC W/Ear Foams, Heel Mepilex, Sacral Mepilex, Waffle Overlay, Pillows, Q2 Turns, Barrier Cream, and Heels Loaded W/Pillows    Possible Skin Injury Yes    Pictures Uploaded Into Epic Yes  Wound Consult Placed Yes  RN Wound Prevention Protocol Ordered Yes

## 2023-01-12 NOTE — DISCHARGE PLANNING
RNCM received call from pt daughter, Lesli, who stated family wish is to DC to [facility]. RNCM to submit choice.    [facility] to [facility] connection: 0% service connected.

## 2023-01-12 NOTE — DISCHARGE PLANNING
Agency/Facility Name: [facility]   Spoke To: Lorie   Outcome: DPA called to verify if Pt is Short term or Long term. Per Lorie Pt is a short term Pt at [facility].       1034  Received Choice form at 1019  Agency/Facility Name: [facility]   Referral sent per Choice form @ 1035     1339  Received Choice form at 1228  Agency/Facility Name: [facility]  Referral sent per Choice form @ 1339     1501  Agency/Facility Name: [facility]   Spoke To: Merlene ()   Outcome: DPA left call back number with Merlene for Kristel to call back.     1503  Agency/Facility Name: [facility]   Spoke To: Faye   Outcome: Per Faye referral is still under DON review, as Pt was a confused. Faye to contact DPA with referral updates.

## 2023-01-13 NOTE — DISCHARGE PLANNING
Transport Request Received: 1/13/2023 at 1308    Transport Company: [employer]    Transport Date: 1/14/2023  Time: 1400    Destination: [facility] at [address] [place] [address] Eduardo IGLESIAS     Notified care team of scheduled transport via Voalte.      If there are any changes needed to the DC transportation scheduled, please contact [facility] at ext. [phone] between the hours of 6008-4046 Mon-Fri. If outside those hours, contact the ED Case Manager at ext. [phone].

## 2023-01-13 NOTE — HOSPITAL COURSE
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 Started on broad spectrum antibiotics. Felt back to baseline on the day after admission, still requiring slightly increased O2 from baseline 3 L NC from 2L NC.   Patient without complaints but he has been refusing care, refusing to eat or drink, refusing to work with therapies and refusing to participate with SLP.  He is on 3 L NC (baseline 2L NC). Procalcitonin remained negative, no fevers or leukocytosis, antibiotics discontinued.  He has silent aspiration noted on prior FEES x2, refused working with SLP.  1/14/23  Initial plan today was for patient to DC to SNF for continued rehab.  Palliative RN had a family meeting with patient and family, initially decision was to remain full code and to go to rehab to gain strength.  Per palliative RN patient was sleeping during the meeting, essentially not interacting with family or staff.  Shortly after the meeting I assessed the patient and found him to be essentially unresponsive to anything aside from sternal rub.  He has not been eating or drinking anything significant for the past day or 2, RN reported to me decreased urine output today since removing Kimball, patient has worsening of his peripheral edema.  Labs checked, sodium 146.  Small IV fluid bolus given and patient began to perk up.  Family came to bedside and I had multiple family meetings and discussions with patient's wife and children.  Outcome of all of this patient would like to be DNR/DNI, family in agreement, they have inquired about home with home hospice.  Referral order placed, palliative care team/DC planners to help arrange.  Throughout the day patient became a little bit more interactive and alert although essentially just lies there with his eyes closed not wanting to interact with anyone or be bothered.   1/15/23  Patient status essentially the same in  regards to withdrawing from care. Essentially just lies there with his eyes closed. He is responsive. No new complaints. Family meeting occurred, he told family members he wanted to be home with hospice. Hospice to be arranged and patient to be DC'd to hospice, final arrangements pending.

## 2023-01-13 NOTE — ASSESSMENT & PLAN NOTE
I discussed advance care planning with the patient and family for at least 22 minutes, including diagnosis, prognosis, plan of care, risks and benefits of any therapies that could be offered, as well as alternatives including palliation and hospice, as appropriate.  DNR/I per patient and family wishes.   Inquired about hospice, referral sent.  Discussed with palliative care RN who will help arrange.

## 2023-01-13 NOTE — PROGRESS NOTES
Received bedside report from RN, pt care assumed, VSS, pt assessment complete. Pt AAOx4, with n/c of pain at this time. No signs of acute distress noted at this time. Plan of care discussed with pt and verbalizes no questions. Pt denies any additional needs at this time. Bed locked/in lowest position, bed alarm is on, pt educated on fall risk and verbalized understanding, call light within reach, hourly rounding initiated.

## 2023-01-13 NOTE — PROGRESS NOTES
Hospital Medicine Daily Progress Note    Date of Service  1/13/2023    Chief Complaint  Gilmer Mae is a 82 y.o. male admitted 1/10/2023 with encephalopathy and possibly pneumonia.    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 Started on broad spectrum antibiotics. Felt back to baseline on the day after admission, still requiring slightly increased O2 from baseline 3 L NC from 2L NC.   1/12: Patient feels at his baseline, no complaints.  He is on 3 L NC (baseline 2L NC). Procalcitonin remained negative, no fevers or leukocytosis, antibiotics discontinued.  He has silent aspiration noted on prior FEES x2, refused working with SLP.  Working with DC planner to find acceptable SNF.    Interval Problem Update  1/13/23  Remains afebrile, on 0-1.5 L NC, 's.  No acute complaints. Working with PT/OT.  DC tomorrow to SNF @ 1400    I have discussed this patient's plan of care and discharge plan at IDT rounds today with Case Management, Nursing, Nursing leadership, and other members of the IDT team.    Consultants/Specialty      Code Status  Full Code    Disposition  Patient is medically cleared for discharge.   Anticipate discharge to to skilled nursing facility.  I have placed the appropriate orders for post-discharge needs.    Review of Systems  Review of Systems   Constitutional:  Negative for chills and fever.   Respiratory:  Positive for cough, sputum production and shortness of breath.    Cardiovascular:  Negative for chest pain and palpitations.   Gastrointestinal:  Negative for abdominal pain, nausea and vomiting.   Genitourinary:  Negative for dysuria.   Psychiatric/Behavioral:          Patient endorsing episodes of confusion right after waking up in the morning.  Patient is aware that he be does become confused and returned back to his baseline.   All other systems reviewed and are  negative.     Physical Exam  Temp:  [36.3 °C (97.3 °F)-36.7 °C (98.1 °F)] 36.3 °C (97.3 °F)  Pulse:  [67-74] 68  Resp:  [16-18] 16  BP: (132-139)/(68-78) 137/75  SpO2:  [92 %-99 %] 97 %    Physical Exam  Vitals reviewed.   Constitutional:       General: He is not in acute distress.     Appearance: Normal appearance. He is not ill-appearing or toxic-appearing.      Comments: 82-year-old male appears stated age appears chronically ill, alert and conversant, no acute distress, nontoxic-appearing   HENT:      Head: Normocephalic and atraumatic.      Nose: Nose normal.      Mouth/Throat:      Mouth: Mucous membranes are moist.      Pharynx: No oropharyngeal exudate.   Eyes:      General: No scleral icterus.        Right eye: No discharge.         Left eye: No discharge.      Extraocular Movements: Extraocular movements intact.      Conjunctiva/sclera: Conjunctivae normal.   Cardiovascular:      Rate and Rhythm: Normal rate and regular rhythm.      Heart sounds: Normal heart sounds.   Pulmonary:      Effort: No respiratory distress.      Breath sounds: Rales present. No wheezing.      Comments: Bibasilar rales  Abdominal:      General: There is no distension.      Palpations: Abdomen is soft.      Tenderness: There is no abdominal tenderness. There is no guarding or rebound.   Musculoskeletal:         General: Deformity (right BKA, incision healing well does not appear infected.) present. No tenderness.      Cervical back: Normal range of motion and neck supple. No tenderness.      Left lower leg: No edema.      Comments: Right BKA   Skin:     General: Skin is warm and dry.      Coloration: Skin is not jaundiced.   Neurological:      General: No focal deficit present.      Mental Status: He is alert and oriented to person, place, and time. Mental status is at baseline.      Cranial Nerves: No cranial nerve deficit.      Sensory: No sensory deficit.      Motor: No weakness.      Coordination: Coordination normal.    Psychiatric:         Mood and Affect: Mood normal.         Behavior: Behavior normal.         Thought Content: Thought content normal.         Judgment: Judgment normal.       Fluids    Intake/Output Summary (Last 24 hours) at 1/13/2023 1536  Last data filed at 1/13/2023 1208  Gross per 24 hour   Intake 0 ml   Output 450 ml   Net -450 ml         Laboratory  Recent Labs     01/10/23  1811 01/11/23  0248 01/12/23 0232   WBC 5.3 5.2 5.5   RBC 3.21* 3.01* 3.11*   HEMOGLOBIN 10.7* 10.2* 10.6*   HEMATOCRIT 33.8* 31.8* 32.6*   .3* 105.6* 104.8*   MCH 33.3* 33.9* 34.1*   MCHC 31.7* 32.1* 32.5*   RDW 63.2* 63.3* 63.2*   PLATELETCT 161* 136* 151*   MPV 10.3 10.3 10.6       Recent Labs     01/11/23  0248 01/12/23 0232 01/12/23 2009   SODIUM 143 142 143   POTASSIUM 3.3* 3.0* 4.3   CHLORIDE 106 106 110   CO2 29 28 25   GLUCOSE 81 90 112*   BUN 14 12 12   CREATININE 0.51 0.51 0.55   CALCIUM 7.3* 7.5* 7.8*                     Imaging  CT-HEAD W/O   Final Result      1.  Diffuse atrophy and white matter changes.   2.  No acute intracranial hemorrhage or territorial infarct.   3.  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            DX-CHEST-PORTABLE (1 VIEW)   Final Result      1.  Interstitial opacities at both lung bases suggest multifocal pneumonitis/pneumonia.   2.  Hypoinflation and probable LEFT lower lobe atelectasis.      US-EXTREMITY VENOUS UPPER UNILAT LEFT    (Results Pending)          Assessment/Plan  * HCAP (healthcare-associated pneumonia)- (present on admission)  Assessment & Plan  Patient transferred here for confusion.  In ER he was found to have possible pneumonia since patient was recently in the hospital   Will treat it as possible HCAP  Starting on vanco and ceftriaxone for now and will deescalate as appropriate     On cefepime and linezolid this morning. Procal negative, linezolid discontinued. Will continue cefepime one more day, if procal remains negative will  discontinue tomorrow.   Blood/urine cx NGTD  Procal remains negative, no fever or leukocytosis, antibiotics stopped.    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  FULL CODE per patient wishes.     Acute respiratory failure with hypoxia (HCC)- (present on admission)  Assessment & Plan  Patient on 2 L of oxygen at baseline.  No lower extremity edema to indicate fluid overload.  Suspect his hypoxia is likely from his pneumonia/pneumonitis from aspiration.  Antibiotics  Monitor closely for fluid overload    Congestive heart failure (CHF) (HCC)- (present on admission)  Assessment & Plan  Cont outpatient regimen   No exacerbation     History of seizures- (present on admission)  Assessment & Plan  Cont outpatient regimen     Acute encephalopathy- (present on admission)  Assessment & Plan  Possible related to pneumonia vs pneumonitis from aspiration.  RESOLVED    Essential hypertension- (present on admission)  Assessment & Plan  Cont outpatient regimen          VTE prophylaxis: therapeutic anticoagulation with Eliquis    I have performed a physical exam and reviewed and updated ROS and Plan today (1/13/2023). In review of yesterday's note (1/12/2023), there are no changes except as documented above.

## 2023-01-13 NOTE — WOUND TEAM
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/2022    Procedure: AMPUTATION, BELOW KNEE;  Surgeon: Hemant Rdz M.D.;  Location: SURGERY [facility];  Service: Vascular    PACEMAKER INSERTION       Social History     Tobacco Use    Smoking status: Never     Passive exposure: Never    Smokeless tobacco: Not on file   Substance Use Topics    Alcohol use: Not on file     Chief Complaint   Patient presents with    ALOC     Per skilled facility [id number]     Diagnosis: HCAP (healthcare-associated pneumonia) [J18.9]    Unit where seen by Wound Team: T708/02     WOUND CONSULT/FOLLOW UP RELATED TO:  L AICD, R groin, L heel, R BKA, and sacrum     WOUND HISTORY:  82 y.o. male admitted with encephalopathy and pneumonia. Patient with POA pressure injuries.    WOUND ASSESSMENT/LDA  Wound 01/01/23 Incision Groin Right Full thickness (Active)   Wound Image    01/12/23 1700   Site Assessment [place]    Periwound Assessment Clean;Dry;Intact    Margins Defined edges;Unattached edges    Closure Secondary intention    Drainage Amount Small    Drainage Description Serosanguineous    Treatments Cleansed    Wound Cleansing Approved Wound Cleanser    Periwound Protectant Not Applicable    Dressing Cleansing/Solutions Not Applicable    Dressing Options Silver Strip Packing;Bandaid    Dressing Changed Changed    Dressing Status Clean;Dry;Intact    Dressing Change/Treatment Frequency Every Shift, and As Needed    NEXT Dressing Change/Treatment Date 01/13/23    NEXT Weekly Photo (Inpatient Only) 01/19/23    Non-staged Wound Description Full thickness    Wound Length (cm) 1.1 cm 01/12/23 1700   Wound Width (cm) 1 cm 01/12/23 1700   Wound Depth (cm)  0.7 cm 01/12/23 1700   Wound Surface Area (cm^2) 1.1 cm^2 01/12/23 1700   Wound Volume (cm^3) 0.77 cm^3 01/12/23 1700   Wound Healing % 4    Shape Round    Wound Odor None    Pulses N/A    Exposed Structures None        Wound 12/29/22 Pressure Injury Sacrum Unstageable POA (Active)   Wound Image   01/12/23 1700   Site Assessment Yellow;Pink;Red    Periwound Assessment Clean;Dry;Intact    Margins Defined edges;Attached edges    Closure Secondary intention    Drainage Amount Scant    Drainage Description Serosanguineous    Treatments Cleansed;Offloading    Wound Cleansing Approved Wound Cleanser    Periwound Protectant Not Applicable    Dressing Cleansing/Solutions Not Applicable    Dressing Options Hydrocolloid Thin;Mepilex    Dressing Changed Changed    Dressing Status Clean;Dry;Intact    Dressing Change/Treatment Frequency Every 72 hrs, and As Needed    NEXT Dressing Change/Treatment Date 01/15/23    NEXT Weekly Photo (Inpatient Only) 01/19/23    WOUND NURSE ONLY - Pressure Injury Stage U    Non-staged Wound Description Not applicable    Wound Length (cm) 2.5 cm 01/12/23 1700   Wound Width (cm) 1 cm 01/12/23 1700   Wound Depth (cm) 0.1 cm 12/13/22 1300   Wound Surface Area (cm^2) 2.5 cm^2 01/12/23 1700   Wound Volume (cm^3) 0.243 cm^3 12/13/22 1300   Shape Oval    Wound Odor None    Pulses N/A    Exposed Structures MAYA    WOUND NURSE ONLY - Time Spent with Patient (mins) 60        Wound 12/29/22 Pressure Injury Heel Left DTI POA (Active)   Wound Image   01/12/23 1700   Site Assessment Purple    Periwound Assessment Blanchable erythema    Margins Defined edges;Attached edges    Closure Adhesive bandage    Drainage Amount None    Treatments Offloading    Wound Cleansing Not Applicable    Periwound Protectant Not Applicable    Dressing Cleansing/Solutions Other (Comments)    Dressing Options Mepilex Heel    Dressing Changed Changed    Dressing Status Clean;Dry;Intact    Dressing Change/Treatment Frequency Every 72  hrs, and As Needed    NEXT Dressing Change/Treatment Date 01/15/23    NEXT Weekly Photo (Inpatient Only) 01/19/23    WOUND NURSE ONLY - Pressure Injury Stage DTPI    Non-staged Wound Description Not applicable    Wound Length (cm) 3.5 cm 01/12/23 1700   Wound Width (cm) 6.5 cm 01/12/23 1700   Wound Surface Area (cm^2) 22.75 cm^2 01/12/23 1700   Shape Irregular    Wound Odor None    Pulses Left;1+    Exposed Structures MAYA    WOUND NURSE ONLY - Time Spent with Patient (mins) 60        Wound 01/11/23 Incision Chest Upper (Active)   Wound Image   01/12/23 1700   Site Assessment Dry;Intact    Periwound Assessment Dry;Intact    Margins Attached edges;Defined edges    Closure Closure strips    Drainage Amount Scant    Drainage Description Serosanguineous    Periwound Protectant Not Applicable    Dressing Cleansing/Solutions Not Applicable    Dressing Changed Observed    Dressing Status Dry;Intact    NEXT Weekly Photo (Inpatient Only) 01/19/23        Wound 01/11/23 Incision Right bka (Active)   Wound Image   01/12/23 1700   Site Assessment Clean;Dry;Intact    Periwound Assessment Clean;Dry;Intact    Margins Defined edges;Attached edges    Closure Staples    Drainage Amount None    Wound Cleansing Not Applicable    Periwound Protectant Not Applicable    Dressing Options Open to Air    Dressing Changed Observed    Dressing Status Open to Air    Shape Linear incision    Wound Odor None    Exposed Structures None      Vascular:    MARGARITA:   No results found.    Lab Values:    Lab Results   Component Value Date/Time    WBC 5.5 01/12/2023 02:32 AM    RBC 3.11 (L) 01/12/2023 02:32 AM    HEMOGLOBIN 10.6 (L) 01/12/2023 02:32 AM    HEMATOCRIT 32.6 (L) 01/12/2023 02:32 AM    CREACTPROT 3.92 (H) 12/09/2022 03:17 AM    SEDRATEWES 102 (H) 12/09/2022 03:17 AM    HBA1C 5.1 12/10/2022 02:15 AM      Culture Results show:  No results found for this or any previous visit (from the past 720 hour(s)).    Pain Level/Medicated:  Some tenderness with  "site care       INTERVENTIONS BY WOUND TEAM:  Chart and images reviewed. Discussed with bedside RN. All areas of concern (based on picture review, LDA review and discussion with bedside RN) have been thoroughly assessed. Documentation of areas based on significant findings. This RN in to assess patient. Performed standard wound care which includes appropriate positioning, dressing removal and non-selective debridement. Pictures and measurements obtained weekly if/when required.  Preparation for Dressing removal: Dressings removed without difficulty  Non-selectively Debrided with:  Wound cleanser and gauze.  Sharp debridement: NA  Tiffanie wound: Cleansed with wound cleanser  Primary Dressing:    R groin: 1/4\" silver strip packing secured with band-aid   Sacrum: Hydrocolloid and mepilex   L heel: Mepilex heel   L AICD & R BKA: SHIKHA    Interdisciplinary consultation: Patient, Bedside RN, Carolina CALDERON (Wound RN)    EVALUATION / RATIONALE FOR TREATMENT:  Most Recent Date:  1/12/23: POA unstageable sacral pressure injury. Hydrocolloid applied to autolytically debride slough and provide moist occlusive environment for healing. Full thickness wound to R groin, silver strip packing used to fill depth and provide antimicrobial properties. POA DTI to L heel, area protected with mepilex at this time. L AICD site with steri strips- recommend to let steri strips fall off naturally. R BKA well approximated with staples, also left SHIKHA.     Goals: Steady decrease in wound area and depth weekly.    WOUND TEAM PLAN OF CARE ([X] for frequency of wound follow up,):   Nursing to follow dressing orders written for wound care. Contact wound team if area fails to progress, deteriorates or with any questions/concerns if something comes up before next scheduled follow up (See below as to whether wound is following and frequency of wound follow up)  Dressing changes by wound team:                   Follow up 3 times weekly:                NPWT change " 3 times weekly:     Follow up 1-2 times weekly:    X Sacrum, L heel, and R groin  Follow up Bi-Monthly:           Follow up Monthly (High Risk):                      Follow up as needed:   X Not following L AICD and R BKA  Other (explain):     NURSING PLAN OF CARE ORDERS (X):  Dressing changes: See Dressing Care orders: X  Skin care: See Skin Care orders:   RN Prevention Protocol: X  Rectal tube care: See Rectal Tube Care orders:   Other orders:    RSKIN:   CURRENTLY IN PLACE (X), APPLIED THIS VISIT (A), ORDERED (O):   Q shift Luis M:  X  Q shift pressure point assessments:  X    Surface/Positioning   Pressure redistribution mattress         X   Low Airloss          ICU Low Airloss   Bariatric MARY     Waffle cushion        Waffle Overlay        X  Reposition q 2 hours    X  TAPs Turning system     Z Renato Pillow     Offloading/Redistribution Continent  Sacral Mepilex (Silicone dressing)   X  Heel Mepilex (Silicone dressing)       X  Heel float boots (Prevalon boot)           X  Float Heels off Bed with Pillows           Respiratory NA  Silicone O2 tubing         Gray Foam Ear protectors     Cannula fixation Device (Tender )          High flow offloading Clip    Elastic head band offloading device      Anchorfast                                                         Trach with Optifoam split foam             Containment/Moisture Prevention Continent    Rectal tube or BMS    Purwick/Condom Cath        Kimball Catheter    Barrier wipes           Barrier paste       Antifungal tx      Interdry        Mobilization Not assessed this visit, patient said he hasn't stood since the R BKA surgery      Up to chair        Ambulate      PT/OT      Nutrition Poor appetite, but agreeable to trying boost supplements  Dietician        Diabetes Education      PO   X  TF     TPN     NPO   # days     Anticipated discharge plans: TBD  LTACH:        SNF/Rehab:                  Home Health Care:           Outpatient Wound Center:             Self/Family Care:        Other:                  Vac Discharge Needs:   Not Applicable Pt not on a wound vac:     X  Regular Vac while inpatient, alternative dressing at DC:        Regular Vac in use and continued at DC:            Reg. Vac w/ Skin Sub/Biologic in use. Will need to be changed 2x wkly:      Veraflo Vac while inpatient, ok to transition to Regular Vac on Discharge:           Veraflo Vac while inpatient, will need to remain on Veraflo Vac upon discharge:

## 2023-01-13 NOTE — CARE PLAN
Problem: Knowledge Deficit - Standard  Goal: Patient and family/care givers will demonstrate understanding of plan of care, disease process/condition, diagnostic tests and medications  Outcome: Progressing     Problem: Skin Integrity  Goal: Skin integrity is maintained or improved  Outcome: Progressing     Problem: Fall Risk  Goal: Patient will remain free from falls  Outcome: Progressing   The patient is Watcher - Medium risk of patient condition declining or worsening    Shift Goals  Clinical Goals: hemodynamic stability, q2 turns, wound management  Patient Goals: Rest  Family Goals: MAYA    Progress made toward(s) clinical / shift goals:  q2 turns and wound management      Patient is not progressing towards the following goals:

## 2023-01-13 NOTE — THERAPY
Physical Therapy   Daily Treatment     Patient Name: Gilmer Mae  Age:  82 y.o., Sex:  male  Medical Record #: [id number]  Today's Date: 1/13/2023     Precautions  Precautions: Fall Risk;Swallow Precautions ( See Comments)  Comments: ANIKET STRATTON 12/14    Assessment    Pt continues to demonstrate decrease in motivation, however, with some encouragement, pt was able to transition to EOB from supine positioning. Sitting balance is poor and requires physical assistance to maintain balance >5 sec. Plan to continue tx per initially established POC. Pt will benefit from post acute placement prior to DC home.     Plan    Physical Therapy Treatment Plan  Physical Therapy Treatment Plan: Continue Current Treatment Plan    DC Equipment Recommendations: Unable to determine at this time  Discharge Recommendations: Recommend post-acute placement for additional physical therapy services prior to discharge home      Subjective    Pt feeling tired but agreeable to participate with PT tx and OT eval.     Objective       01/13/23 0919   Pain 0 - 10 Group   Therapist Pain Assessment Post Activity Pain Same as Prior to Activity   Cognition    Cognition / Consciousness X   Level of Consciousness Alert   Attention Impaired   Initiation Impaired   Neuro-Muscular Treatments   Neuro-Muscular Treatments Anterior weight shift;Weight Shift Left;Weight Shift Right;Verbal Cuing;Tactile Cuing   Comments at EOB   Balance   Sitting Balance (Static) Poor -   Sitting Balance (Dynamic) Trace +   Weight Shift Sitting Poor   Skilled Intervention Postural Facilitation;Tactile Cuing;Verbal Cuing   Comments with encouragement able to transition pt to EOB. Sitting balance poor as he is unable to maintain balance for >5 sec, otherwise required physical assist.   Bed Mobility    Supine to Sit Maximal Assist  (x2)   Sit to Supine Minimal Assist   Scooting Maximal Assist  (x2)   Rolling Moderate Assist to Rt.;Moderate Assist to Lt.   Skilled Intervention Verbal  Cuing;Tactile Cuing;Sequencing;Facilitation   Comments completed with HOB flat, did require encouragement to work on bed mobility.   Gait Analysis   Gait Level Of Assist Unable to Participate   Functional Mobility   Sit to Stand Unable to Participate   How much difficulty does the patient currently have...   Turning over in bed (including adjusting bedclothes, sheets and blankets)? 1   Sitting down on and standing up from a chair with arms (e.g., wheelchair, bedside commode, etc.) 1   Moving from lying on back to sitting on the side of the bed? 1   How much help from another person does the patient currently need...   Moving to and from a bed to a chair (including a wheelchair)? 2   Need to walk in a hospital room? 1   Climbing 3-5 steps with a railing? 1   6 clicks Mobility Score 7   Activity Tolerance   Sitting Edge of Bed poor tolerance, <5 minutes   Short Term Goals    Short Term Goal # 1 Pt will move supine to/from eob w/ spv, no bed features in 6 visits to improve fxl indep   Goal Outcome # 1 goal not met   Short Term Goal # 2 Pt to move sit to/from stand w/ min assist in 6 visits to improve fxl indep   Goal Outcome # 2 Goal not met   Short Term Goal # 3 Pt will TF bed to/from chair/w/c w/ min assist n 6 visits to improve fxl indep   Goal Outcome # 3 Goal not met   Short Term Goal # 4 Pt to propel w/c 150 ft w/ spv in 6 visits to improve fxl indep   Goal Outcome # 4 Goal not met   Education Group   Education Provided Role of Physical Therapist   Role of Physical Therapist Patient Response Patient;Acceptance;Explanation;Reinforcement Needed   Additional Comments Educated importance of movement and trying to sit at EOB.

## 2023-01-13 NOTE — DISCHARGE PLANNING
RNCM requesting transport via [facility] for patient tomorrow, Saturday 01/14/23, for transport to [facility] at 1400.

## 2023-01-13 NOTE — DISCHARGE PLANNING
Agency/Facility Name: [facility]   Spoke To: Lorie   Outcome: DPA called to verify bed availibility, Per Lorie no bed availble today, but Pt can be accepted tomorrow 11/14. RALF to set up transport 1400 and Lorie requesting a Covid test.

## 2023-01-13 NOTE — PROGRESS NOTES
Hospital Medicine Daily Progress Note    Date of Service  1/12/2023    Chief Complaint  Gilmer Mae is a 82 y.o. male admitted 1/10/2023 with encephalopathy and possibly pneumonia.    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 Started on broad spectrum antibiotics. Felt back to baseline on the day after admission, still requiring slightly increased O2 from baseline 3 L NC from 2L NC.     Interval Problem Update  1/12/23  Patient reports he feels at his baseline, no complaints.  He is on 3 L NC (baseline 2L NC). Procalcitonin was negative but CXR with concerning right middle lobe findings showing possible infection. Certainly could be from aspiration and pneumonitis vs true infection and pneumonia, will continue cefepime one more day and if procalcitonin is negative will DC antibiotics, he has been afebrile with no leukocytosis, UA from exchanged nava not infectious. Linezolid discontinued today.  He has silent aspiration noted on prior FEES x2, refused working with SLP today.  Working with DC planner to find acceptable SNF, they were not happy with prior placement. Possibly DC tomorrow if accepting facility is found and if patient/family accepts, providing he continues his current clinical trajectory. Discussed with son at bedside and wife on phone.    I have discussed this patient's plan of care and discharge plan at IDT rounds today with Case Management, Nursing, Nursing leadership, and other members of the IDT team.    Consultants/Specialty      Code Status  Full Code    Disposition  Patient is not medically cleared for discharge.   Anticipate discharge to to home with close outpatient follow-up.  I have placed the appropriate orders for post-discharge needs.    Review of Systems  Review of Systems   Constitutional:  Negative for chills and fever.   Respiratory:  Positive for cough, sputum  production and shortness of breath.    Cardiovascular:  Negative for chest pain and palpitations.   Gastrointestinal:  Negative for abdominal pain, nausea and vomiting.   Genitourinary:  Negative for dysuria.   Psychiatric/Behavioral:          Patient endorsing episodes of confusion right after waking up in the morning.  Patient is aware that he be does become confused and returned back to his baseline.   All other systems reviewed and are negative.     Physical Exam  Temp:  [35.9 °C (96.6 °F)-36.5 °C (97.7 °F)] 36.4 °C (97.5 °F)  Pulse:  [61-67] 65  Resp:  [15-17] 16  BP: (134-154)/(65-73) 147/73  SpO2:  [88 %-96 %] 95 %    Physical Exam  Vitals reviewed.   Constitutional:       General: He is not in acute distress.     Appearance: Normal appearance. He is not ill-appearing or toxic-appearing.      Comments: Sleeping but awakens easily, 82-year-old male appears stated age appears chronically ill, alert and conversant, no acute distress, nontoxic-appearing   HENT:      Head: Normocephalic and atraumatic.      Nose: Nose normal.      Mouth/Throat:      Mouth: Mucous membranes are moist.      Pharynx: No oropharyngeal exudate.   Eyes:      General: No scleral icterus.        Right eye: No discharge.         Left eye: No discharge.      Extraocular Movements: Extraocular movements intact.      Conjunctiva/sclera: Conjunctivae normal.   Cardiovascular:      Rate and Rhythm: Normal rate and regular rhythm.      Heart sounds: Normal heart sounds.   Pulmonary:      Effort: No respiratory distress.      Breath sounds: Rales present. No wheezing.      Comments: Bibasilar rales  Abdominal:      General: There is no distension.      Palpations: Abdomen is soft.      Tenderness: There is no abdominal tenderness. There is no guarding or rebound.   Musculoskeletal:         General: Deformity (right BKA, incision healing well does not appear infected.) present. No tenderness.      Cervical back: Normal range of motion and neck  supple. No tenderness.      Left lower leg: No edema.      Comments: Right BKA   Skin:     General: Skin is warm and dry.      Coloration: Skin is not jaundiced.   Neurological:      General: No focal deficit present.      Mental Status: He is alert and oriented to person, place, and time. Mental status is at baseline.      Cranial Nerves: No cranial nerve deficit.      Sensory: No sensory deficit.      Motor: No weakness.      Coordination: Coordination normal.   Psychiatric:         Mood and Affect: Mood normal.         Behavior: Behavior normal.         Thought Content: Thought content normal.         Judgment: Judgment normal.       Fluids    Intake/Output Summary (Last 24 hours) at 1/12/2023 1617  Last data filed at 1/11/2023 2009  Gross per 24 hour   Intake --   Output 100 ml   Net -100 ml       Laboratory  Recent Labs     01/10/23  1811 01/11/23  0248 01/12/23  0232   WBC 5.3 5.2 5.5   RBC 3.21* 3.01* 3.11*   HEMOGLOBIN 10.7* 10.2* 10.6*   HEMATOCRIT 33.8* 31.8* 32.6*   .3* 105.6* 104.8*   MCH 33.3* 33.9* 34.1*   MCHC 31.7* 32.1* 32.5*   RDW 63.2* 63.3* 63.2*   PLATELETCT 161* 136* 151*   MPV 10.3 10.3 10.6       Recent Labs     01/10/23  1811 01/11/23  0248 01/12/23  0232   SODIUM 142 143 142   POTASSIUM 3.3* 3.3* 3.0*   CHLORIDE 105 106 106   CO2 30 29 28   GLUCOSE 92 81 90   BUN 15 14 12   CREATININE 0.52 0.51 0.51   CALCIUM 7.4* 7.3* 7.5*                     Imaging  CT-HEAD W/O   Final Result      1.  Diffuse atrophy and white matter changes.   2.  No acute intracranial hemorrhage or territorial infarct.   3.  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            DX-CHEST-PORTABLE (1 VIEW)   Final Result      1.  Interstitial opacities at both lung bases suggest multifocal pneumonitis/pneumonia.   2.  Hypoinflation and probable LEFT lower lobe atelectasis.      US-EXTREMITY VENOUS UPPER UNILAT LEFT    (Results Pending)          Assessment/Plan  * HCAP  (healthcare-associated pneumonia)- (present on admission)  Assessment & Plan  Patient transferred here for confusion.  In ER he was found to have possible pneumonia since patient was recently in the hospital   Will treat it as possible HCAP  Starting on vanco and ceftriaxone for now and will deescalate as appropriate     On cefepime and linezolid this morning. Procal negative, linezolid discontinued. Will continue cefepime one more day, if procal remains negative will discontinue tomorrow.   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  FULL CODE per patient wishes.     Acute respiratory failure with hypoxia (HCC)- (present on admission)  Assessment & Plan  Patient on 2 L of oxygen at baseline.  No lower extremity edema to indicate fluid overload.  Suspect his hypoxia is likely from his pneumonia/pneumonitis from aspiration.  Antibiotics  Monitor closely for fluid overload    Congestive heart failure (CHF) (HCC)- (present on admission)  Assessment & Plan  Cont outpatient regimen   No exacerbation     History of seizures- (present on admission)  Assessment & Plan  Cont outpatient regimen     Acute encephalopathy- (present on admission)  Assessment & Plan  Possible related to pneumonia vs pneumonitis from aspiration.  RESOLVED    Essential hypertension- (present on admission)  Assessment & Plan  Cont outpatient regimen          VTE prophylaxis: therapeutic anticoagulation with Eliquis    I have performed a physical exam and reviewed and updated ROS and Plan today (1/12/2023). In  review of yesterday's note (1/11/2023), there are no changes except as documented above.

## 2023-01-13 NOTE — CARE PLAN
Problem: Skin Integrity  Goal: Skin integrity is maintained or improved  Outcome: Progressing     Problem: Fall Risk  Goal: Patient will remain free from falls  Outcome: Progressing   The patient is Stable - Low risk of patient condition declining or worsening    Shift Goals  Clinical Goals: hemodyamic stability  Patient Goals: Discharge  Family Goals: MAYA    Progress made toward(s) clinical / shift goals:      Patient is not progressing towards the following goals:

## 2023-01-14 NOTE — DISCHARGE PLANNING
Received update from charge RN on patient's change in medical condition. Patient is no longer medically cleared for SNF and MD requested for transportation to be canceled. DPA aware of this and spoke with [facility] and [facility] and canceled dc. Per charge RN, plan for meeting with PC and treatment team tomorrow about possible hospice.

## 2023-01-14 NOTE — PROGRESS NOTES
Pt refusing dressing change and states to this rn that he wants to be left alone. Rn educated  and pt still refuses

## 2023-01-14 NOTE — THERAPY
"Occupational Therapy   Initial Evaluation     Patient Name: Gilmer Mae  Age:  82 y.o., Sex:  male  Medical Record #: [id number]  Today's Date: 1/13/2023     Precautions: Fall Risk, Swallow Precautions ( See Comments)  Comments: ANIKET STRATTON 12/14    Assessment    Patient is 82 y.o. male admitted with encephalopathy and possibly PNA from SNF. Pt presents to OT eval with poor tolerance for functional activity and poor task initiation. Pt agreeable with OT at first but became irritable with questions as evaluation continued. Pt tolerated only a few minutes seated at EOB and refused additional self-care ADLs. Recommend return to SNF for continued OT intervention once medically cleared.     Plan    Occupational Therapy Initial Treatment Plan   Treatment Interventions: Self Care / Activities of Daily Living, Adaptive Equipment, Therapeutic Exercises, Therapeutic Activity  Treatment Frequency: 3 Times per Week  Duration: Until Therapy Goals Met    DC Equipment Recommendations: Unable to determine at this time  Discharge Recommendations: Recommend post-acute placement for additional occupational therapy services prior to discharge home     Subjective    \"Enough with the questions!\"     Objective       01/13/23 0855   Prior Living Situation   Prior Services Continuous (24 Hour) Care Giving Per Service   Housing / Facility Skilled Nursing Facility   Comments admitted from SNF   Prior Level of ADL Function   Self Feeding Requires Assist   Grooming / Hygiene Requires Assist   Bathing Requires Assist   Dressing Requires Assist   Toileting Requires Assist   Comments since recent decline and BKA, assist as needed at SNF   Prior Level of IADL Function   Medication Management Requires Assist   Laundry Requires Assist   Kitchen Mobility Requires Assist   Finances Requires Assist   Home Management Requires Assist   Shopping Requires Assist   Precautions   Precautions Fall Risk;Swallow Precautions ( See Comments)   Comments R CHAYITO 12/14   Pain " 0 - 10 Group   Therapist Pain Assessment Post Activity Pain Same as Prior to Activity;Nurse Notified;0  (no c/o pain)   Cognition    Cognition / Consciousness X   Level of Consciousness Alert   Safety Awareness Impaired   Attention Impaired   Initiation Impaired   Comments keeps his eyes closed, minimal initiation without direct cues   Strength Upper Body   Upper Body Strength  X   Gross Strength Generalized Weakness, Equal Bilaterally.    Comments KENDRA edematous, RN aware, likely related to subacute DVT   Balance Assessment   Sitting Balance (Static) Poor -   Sitting Balance (Dynamic) Trace +   Weight Shift Sitting Poor   Comments facilitation to find sitting balace at EOB   Bed Mobility    Supine to Sit Maximal Assist   Sit to Supine Maximal Assist   Scooting Maximal Assist   Rolling Maximal Assist to Rt.;Maximum Assist to Lt.   ADL Assessment   Grooming Maximal Assist;Seated   Upper Body Dressing Moderate Assist   Lower Body Dressing Maximal Assist   Toileting Total Assist   How much help from another person does the patient currently need...   Putting on and taking off regular lower body clothing? 2   Bathing (including washing, rinsing, and drying)? 2   Toileting, which includes using a toilet, bedpan, or urinal? 1   Putting on and taking off regular upper body clothing? 2   Taking care of personal grooming such as brushing teeth? 2   Eating meals? 2   6 Clicks Daily Activity Score 11   Functional Mobility   Sit to Stand Unable to Participate   Activity Tolerance   Sitting in Chair unable   Sitting Edge of Bed 3min, requested lay back down immediately   Comments poor tolerance   Patient / Family Goals   Patient / Family Goal #1 rest   Short Term Goals   Short Term Goal # 1 pt will tolerate >2min sitting balance unsupported at EOB   Short Term Goal # 1 B  pt will complete seated grooming ADLs with Edgardo   Education Group   Education Provided Role of Occupational Therapist   Role of Occupational Therapist Patient  Response Patient;Acceptance;Explanation;Verbal Demonstration   Occupational Therapy Initial Treatment Plan    Treatment Interventions Self Care / Activities of Daily Living;Adaptive Equipment;Therapeutic Exercises;Therapeutic Activity   Treatment Frequency 3 Times per Week   Duration Until Therapy Goals Met   Problem List   Problem List Decreased Active Daily Living Skills;Decreased Homemaking Skills;Decreased Upper Extremity Strength Right;Decreased Upper Extremity Strength Left;Decreased Upper Extremity AROM Left;Decreased Upper Extremity AROM Right;Decreased Functional Mobility;Decreased Activity Tolerance;Safety Awareness Deficits / Cognition;Impaired Cognitive Function;Impaired Postural Control / Balance;Limited Knowledge of Post Op Precautions   Anticipated Discharge Equipment and Recommendations   DC Equipment Recommendations Unable to determine at this time   Discharge Recommendations Recommend post-acute placement for additional occupational therapy services prior to discharge home

## 2023-01-14 NOTE — PROGRESS NOTES
Spoke to MD, unsuccessful IV placement multiple attempts. Patient encouraged adequate oral intake by this RN and per MD. Per MD OK to not have IV placement, and fluids discontinued. This RN will continue to encourage fluids every hour. Patient educated, RN will follow up.

## 2023-01-14 NOTE — CARE PLAN
The patient is Stable - Low risk of patient condition declining or worsening    Shift Goals  Clinical Goals: hemodynamic stability, q2 turns, wound management  Patient Goals: Rest  Family Goals: MAYA    Progress made toward(s) clinical / shift goals:    Problem: Skin Integrity  Goal: Skin integrity is maintained or improved  Outcome: Progressing     Problem: Fall Risk  Goal: Patient will remain free from falls  Outcome: Progressing       Patient is not progressing towards the following goals:

## 2023-01-14 NOTE — DISCHARGE PLANNING
Received Choice form at 0992  Agency/Facility Name: [facility]  Referral sent per Choice form @ [phone]  Agency/Facility Name: [facility]   Spoke To: Ryland   Outcome: Per Ryland they are needing D/C summary as one has not be placed. DPA to follow up LSW.    Agency/Facility Name: [facility]  Spoke To: Ryland  Outcome: DPA informed Ryland pt will no longer be transferring to facility due pt recent decline.    Agency/Facility Name: [facility]   Spoke To: Andres   Outcome: DPA informed Andres transport will need to be canceled.

## 2023-01-14 NOTE — CONSULTS
Reason for PC Consult: Advance Care Planning    Consulted by: Dr. Ceballos    Assessment:  General: 83 y/o male from SNF with AMS and possible aspiration PNA. Pt with prolonged course in 2022 which included PEA arrest  (received 1 minute of CPR with 1 mg x 1 epinephrine and ROSC obtained in OR) and R BKA . Patient vacilated on his wishes regarding code status at that time and eventually DC'd to a SNF for therapies and remained full code at that time. Pt has signs of silent aspiration on FEES during prior hospitalization and refusing to work with SLP on this visit. 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     Prior PC Consults: 2022    Social: Patient lives in [place] with his wife Krystina and oldest son. His wife is currently at [facility] for therapies, but planning to DC soon. They have been  for 58 years. Patient has 7 living children (1 child ) and 14 grandchildren. He was in the [employer] for 3 years and served as a . He tells PC that he made 140 trips to [place] during this time. [facility]    Consults: n/a    Dyspnea: No-    Last BM: 23-    Pain: No-    Depression: Mood appropriate for situation-    Dementia: No;       Spiritual:  Is Scientologist or spirituality important for coping with this illness? Yes-    Has a  or spiritual provider visit been requested? No    Palliative Performance Scale: 40%    Advance Directive: None-  per pt, he has completed this. Dtr Anjana will email to this RN today  DPOA: No- stated POA is wife Krystina and dtr Anjana. NOK is wife.   POLST: No-      Code Status: Full-  discussed with family today. They are hoping Gilmer will be able to weigh in but he is quite lethargic today    Outcome:  PC RN met with Gilmer at bedside and explained the role of PC to help with discussions about the current medical situation and goals moving forward.. He was just waking up and his speaking was  "very limited, somewhat refusing to speak to this RN initially. PC queried his understanding of why he's at the hospital and he states \"Oh harley. Do I have to recite this every time?\" PC shared this RN has read the chart and aware of his reason for hospitalization, but wanting to ensure he and this RN are on the same page. He is a bit confused this AM thinking it's February and that he's here currently for an amputation, but does know it's [facility] and 2023. He states family is coming to bedside, believes it may be his daughter Anjana but then states \"I can't believe she'd come from [place].\" PC offered to call family and determine time of visit for today. PC spent some time talking to Gilmer about his time in the [employer] and later as a  for various businesses. PC excused self and called Anjana- plan is for arik Scherer to come to bedside and have conference call with Anjana and wife Krystina.    PC introduced self and role of this RN to Gilmer, Anjana, and Krystina. The patient slept through the whole visit and barely awoke to his wife speaking to him at the end of the visit. Current situation and plan for DC to SNF reviewed. Wife and dtr in agreement with plan for transfer to [facility] for therapies as planned today. PC requested copy of POA paperwork which Anjana will send to this RN for scanning. Son Gilmer at  had numerous questions about SNF and enhanced HH. PC answered questions to the best of this RN's ability, but also noted the plan following SNF is dependent on his progress. PC expressed concern with his ongoing aspiration risk, weakness, and reluctance to work with therapies at times while in the hospital and the risk of him developing further complications as a result. Anjana expressed understanding. Code status discussed and Anjana shared her concerns that he was wanting to remain a full code so that his family could continue to be supported by his  benefits. PC expressed concern that the " burden of CPR/intubation would outweigh the benefit and expressed concern that he and his QoL would suffer if he were to require these measures. She is aware that Gilmer is not able to have a discussion about his code status at this time, but PC encouraged they continue to think about this and discuss as a family. Home health vs hospice discussed for future care options noting that any other set-backs, complications, or lack of progress may warrant the use of hospice vs ongoing aggressive measures like hospitalizations and SNF. They expressed understanding. No questions remained at this time and family aware of plans to transfer today around 1400.     While talking to Gilmer's family, PC RN provided therapeutic communication, including open ended questions, reflective listening, and normalization of thought and feelings throughout encounter, as well as reinforced his goals of care. PC contact information provided to Anjana to send AD and encouraged to call with any questions or concerns.     Updated: MD/RN/charge/SW    Plan: to [facility] today    Thank you for allowing Palliative Care to support this patient and family. Contact [phone] for additional assistance, change in patient status, or with any questions/concerns.

## 2023-01-14 NOTE — DISCHARGE PLANNING
Patient discussed in morning rounds with charge RN and MD. Per team, patient is able to go to [facility] today at 1400 however they have been unable to reach or speak with family to notify.     [facility] called spouse/legal NOK, Krystina, and updated her with patient's acceptance to [facility] and transportation being today at 1400. Krystina reports that patient was at [facility] prior to his admission with [facility] and she is also currently there for mobility concerns. Krystina was upset that patient was not returning back to [facility] and inquired why no one reached out to her to discuss pt's dc/choice. Krystina requested to determine if [facility] can accept patient back prior to her giving approval for [facility].     SW then called Lorie with [facility] who reports that they'll be able to accept patient back to their facility today at 1400, she just needs the referral. SW faxed choice form to NIKHIL ([place]) and requested her to expedite this referral. [facility] has accepted in [facility].     SW then called [facility] and requested the destination to be change for patient. New PCS form faxed to them per their request (F:[phone]). Fax confirmation stated complete. [facility] change.     SW updated charge RN.     LMSW then called Krystina and notified her that patient is in fact able to return to [facility] today at 1400.     COBRA packet to be completed.     Addendum 0954: [facility] received a call from Charge RN stating that children are present at bedside with their mom (Krystina) on the phone and they're all speaking with the PC RN. Per charge RN, family now is wanting to continue with [facility] and requesting to dc the referral to [facility]. SW discussed ACP docs which PC RN is speaking with the children about.     SW then called Lorie with [facility] and also called [facility] and notified them of family's new decision.     PC RN Mirlande informed LMSW that family is in fact agreeable  with [facility]. PC RN also notes that pt's daughter is to email her pt's DPOA paperwork.     Plan to continue with original DC of [facility] at 1400.   RN to RN: [phone]    COBRA packet to be completed and given to RN. Pending DC Summary.     Plan: Patient DC to [facility] today at 1400 via [facility].

## 2023-01-14 NOTE — DISCHARGE PLANNING
COBRA packet given to charge RN. Charge and/or bedside RN to add DC Summary to packet once placed.     No additional needs from [facility]. Signing off.

## 2023-01-14 NOTE — CONSULTS
Palliative Care   Patient well known to me from December admission. Received e-mail from patient's son Igor inquring about exploration of hospice care. Will try to facilitate consult prior to DC to [facility] tomorrow at 14:00 if possible.    ALFREDO Augustine.  Palliative Care Nurse Practitioner  [phone]

## 2023-01-14 NOTE — CARE PLAN
Problem: Knowledge Deficit - Standard  Goal: Patient and family/care givers will demonstrate understanding of plan of care, disease process/condition, diagnostic tests and medications  1/14/2023 1531 by Floyd Samayoa R.N.  Outcome: Progressing  1/14/2023 1530 by Floyd Samayoa R.N.  Outcome: Progressing     Problem: Nutrition Deficit  Goal: Patient will verbalize understanding of individual dietary needs  Outcome: Progressing   The patient is Unstable - High likelihood or risk of patient condition declining or worsening    Shift Goals  Clinical Goals: hemodynamic stability, q2 turns, wound management  Patient Goals: Rest  Family Goals: MAYA    Progress made toward(s) clinical / shift goals:      Patient is not progressing towards the following goals:

## 2023-01-14 NOTE — PROGRESS NOTES
Order given for IV fluids, patient's IV occluded. RN attempts to place a new IV unsuccessful  due to patient having +3 edema on both upper extremities and hands. VAT called and order placed. Awaiting VAT arrival.

## 2023-01-14 NOTE — PROGRESS NOTES
Pt was yelling at this rn in the morning about his bed controls. This rn educated and offered to lower or sit the bed up higher for this pt. Pt refused and yelled at this rn that he wants to have his own control. Rn offered to come back to help pt when he is ready

## 2023-01-15 NOTE — PALLIATIVE CARE
Palliative Care follow-up  Appreciate updates from MD on changes following this RN's visit this AM. Pt's code status updated to reflect the wishes of the pt and his wife. Family requesting further discussion about hospice. Call placed to dtr Anjana to better understands family's wishes for hospice at home vs facility and to discuss choice options. Message left requesting call back from Anjana to discuss.     Plan: assist with hospice plan     Thank you for allowing Palliative Care to support this patient and family. Contact [phone] for additional assistance, change in patient status, or with any questions/concerns.

## 2023-01-15 NOTE — DISCHARGE PLANNING
Received Choice form at 4358  Agency/Facility Name: [facility]  Referral sent per Choice form @ 2223

## 2023-01-15 NOTE — CARE PLAN
The patient is Watcher - Medium risk of patient condition declining or worsening    Shift Goals  Clinical Goals: hemodynamic stability, q2 turns, wound management  Patient Goals: Rest  Family Goals: MAYA    Progress made toward(s) clinical / shift goals:    Problem: Knowledge Deficit - Standard  Goal: Patient and family/care givers will demonstrate understanding of plan of care, disease process/condition, diagnostic tests and medications  Outcome: Progressing     Problem: Fall Risk  Goal: Patient will remain free from falls  Outcome: Progressing     Problem: Pain - Standard  Goal: Alleviation of pain or a reduction in pain to the patient’s comfort goal  Outcome: Progressing       Patient is not progressing towards the following goals:

## 2023-01-15 NOTE — PALLIATIVE CARE
Palliative Care follow-up  PC RN to bedside with Gilmer(son), Igor(son), Kendra (daughter), Eugene(son), Radha(daughter-in-law), Krystina(wife) and present via phone Anjana(daughter), Lesli (daughter), Kevin (son).   Introduced self and the role of Palliative Care.     Krystina requested to speak about hospice because the patient stated he wanted hospice on arrival to the hospital per family. PC RN provided hospice pamphlets to all parties present in person to review during discussion. Introduced the philosophy of hospice. PC RN educated that hospice is a group of caregivers who provide end of life care, focused on remaining quality of life rather than quantity of life. Discussed that the focus switches from disease modifying treatment to management of symptoms such as dyspnea, anxiety, nausea, pain, etc. I counseled that they provide EOL care needs and are available 24/7, though they do not provide 24/7 direct care. Family verbalized understanding of hospice and agree that is what Gilmer would want. They further explained that they would coordinate 24/7 care of the patient divided amongst siblings and work with the hospice team. Kendra asked if the patient would be required to continue with his modified diet at home on hospice. Provided education that if there are certain foods or drinks that would enhance the patient's comfort level they should be given. Discussed eating despite risk and ways to best prevent aspiration including sitting up at 90 degrees, ensuring the pt is alert, allowing time between bites, and utilizing the hospice team for specific recommendations. Kendra verbalized understanding.    Dr. Ceballos to bedside, provided medical updates, and answered all questions.     Family made hospice choice for 1. [facility] and 2. [facility]. Family agreed to have Radha Mae (daughter-in-law) as a point of contact for the hospice liaisons for coordination home, as she is familiar with the  process. Anjana and family expressed gratitude to Radha for this assistance. They requested assistance with getting a hospital bed to the house, PC RN will discuss with hospice. It was agreed that Anjana (daughter) will remain the first contact for emergencies.     POLST: PC RN provided education on POLST and offered to assist in completion and family agreed. Completed at this encounter for DNR and comfort treatment. Signed by pt's wife as DPOA-HC and Dr. Ceballos, scanned to epic, original to go with pt at d/c.    Updated FRAN Huang and provided choice form.     PC RN placed phone call to [facility] and spoke to [facility]. Provided brief background and provided that Radha ([phone]) will be the main  along with her phone number and that they request assistance with obtaining a hospital bed. Provided number to PC office should they have any questions.     Updated: Dr. Ceballos, Reina, LWS, Amanda Wells, RN.     Plan: Pt to discharge home with hospice. Palliative care to continue to follow, provide support, and help facilitate decision-making as clinical picture evolves.    Consideration for hospice:  *Hospice consideration does not provide clinical appropriateness for hospice nor does it provide that the patient would or would not qualify for hospice services.*      Thank you for allowing Palliative Care to support this patient and family. Contact [phone] for additional assistance, change in patient status, or with any questions/concerns.

## 2023-01-15 NOTE — PROGRESS NOTES
On call hospitalist notified with bladder scan result of 441 mL; pt attempt to urinate with ~50 mL UOP.  Pt educated about results, pt agreed to catheter placement.   Kimball catheter placed per order without incident; 450 mL UOP upon insertion.

## 2023-01-15 NOTE — PROGRESS NOTES
[facility] Medicine Daily Progress Note    Date of Service  1/14/2023    Chief Complaint  Gilmer Mae is a 82 y.o. male admitted 1/10/2023 with encephalopathy and possibly pneumonia.    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 Started on broad spectrum antibiotics. Felt back to baseline on the day after admission, still requiring slightly increased O2 from baseline 3 L NC from 2L NC.   Patient without complaints but he has been refusing care, refusing to eat or drink, refusing to work with therapies and refusing to participate with SLP.  He is on 3 L NC (baseline 2L NC). Procalcitonin remained negative, no fevers or leukocytosis, antibiotics discontinued.  He has silent aspiration noted on prior FEES x2, refused working with SLP.      Interval Problem Update  1/14/23  Initial plan today was for patient to DC to SNF for continued rehab.  Palliative RN had a family meeting with patient and family, initially decision was to remain full code and to go to rehab to gain strength.  Per palliative RN patient was sleeping during the meeting, essentially not interacting with family or staff.  Shortly after the meeting I assessed the patient and found him to be essentially unresponsive to anything aside from sternal rub.  He has not been eating or drinking anything significant for the past day or 2, RN reported to me decreased urine output today since removing Kimball, patient has worsening of his peripheral edema.  Labs checked, sodium 146.  Small IV fluid bolus given and patient began to perk up.  Family came to bedside and I had multiple family meetings and discussions with patient's wife and children.  Outcome of all of this patient would like to be DNR/DNI, family in agreement, they have inquired about home with home hospice.  Referral order placed, palliative care team/DC planners to help  arrange.  Throughout the day patient became a little bit more interactive and alert although essentially just lies there with his eyes closed not wanting to interact with anyone or be bothered.     I have discussed this patient's plan of care and discharge plan at IDT rounds today with Case Management, Nursing, Nursing leadership, and other members of the IDT team.    Consultants/Specialty      Code Status  DNAR/DNI    Disposition  Patient is medically cleared for discharge.   Anticipate discharge to to skilled nursing facility.  I have placed the appropriate orders for post-discharge needs.    Review of Systems  Review of Systems   Constitutional:  Negative for chills and fever.   Respiratory:  Positive for cough, sputum production and shortness of breath.    Cardiovascular:  Negative for chest pain and palpitations.   Gastrointestinal:  Negative for abdominal pain, nausea and vomiting.   Genitourinary:  Negative for dysuria.   Psychiatric/Behavioral:          Patient endorsing episodes of confusion right after waking up in the morning.  Patient is aware that he be does become confused and returned back to his baseline.   All other systems reviewed and are negative.     Physical Exam  Temp:  [36.5 °C (97.7 °F)-36.6 °C (97.9 °F)] 36.5 °C (97.7 °F)  Pulse:  [66-71] 66  Resp:  [18-19] 18  BP: (151-159)/(72-94) 153/94  SpO2:  [90 %-95 %] 95 %    Physical Exam  Vitals reviewed.   Constitutional:       General: He is not in acute distress.     Appearance: Normal appearance. He is ill-appearing. He is not toxic-appearing.      Comments: 82-year-old male appears stated age appears chronically ill, alert and conversant, no acute distress, nontoxic-appearing   HENT:      Head: Normocephalic and atraumatic.      Nose: Nose normal.      Mouth/Throat:      Mouth: Mucous membranes are moist.      Pharynx: No oropharyngeal exudate.   Eyes:      General: No scleral icterus.        Right eye: No discharge.         Left eye: No  discharge.      Extraocular Movements: Extraocular movements intact.      Conjunctiva/sclera: Conjunctivae normal.   Cardiovascular:      Rate and Rhythm: Normal rate and regular rhythm.      Heart sounds: Normal heart sounds.   Pulmonary:      Effort: No respiratory distress.      Breath sounds: Rales present. No wheezing.      Comments: Bibasilar rales  Abdominal:      General: There is no distension.      Palpations: Abdomen is soft.      Tenderness: There is no abdominal tenderness. There is no guarding or rebound.   Musculoskeletal:         General: Deformity (right BKA, incision healing well does not appear infected.) present. No tenderness.      Cervical back: Normal range of motion and neck supple. No tenderness.      Right lower leg: Edema present.      Left lower leg: Edema present.      Comments: Right BKA, b/l UE edema   Skin:     General: Skin is warm and dry.      Coloration: Skin is not jaundiced.   Neurological:      General: No focal deficit present.      Mental Status: He is alert.      Cranial Nerves: No cranial nerve deficit.      Sensory: No sensory deficit.      Motor: Weakness (generalized) present.      Coordination: Coordination normal.      Comments: Face symmetrical, tongue midline, no hemineglect or gaze preference,   Psychiatric:      Comments: Patient appears quite depressed, seems to be withdrawing from family and care       Fluids  No intake or output data in the 24 hours ending 01/14/23 1807      Laboratory  Recent Labs     01/12/23 0232   WBC 5.5   RBC 3.11*   HEMOGLOBIN 10.6*   HEMATOCRIT 32.6*   .8*   MCH 34.1*   MCHC 32.5*   RDW 63.2*   PLATELETCT 151*   MPV 10.6       Recent Labs     01/12/23  0232 01/12/23 2009 01/14/23  1003   SODIUM 142 143 146*   POTASSIUM 3.0* 4.3 4.1   CHLORIDE 106 110 112   CO2 28 25 25   GLUCOSE 90 112* 101*   BUN 12 12 11   CREATININE 0.51 0.55 0.64   CALCIUM 7.5* 7.8* 8.0*                     Imaging  CT-HEAD W/O   Final Result      1.   Diffuse atrophy and white matter changes.   2.  No acute intracranial hemorrhage or territorial infarct.   3.  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            DX-CHEST-PORTABLE (1 VIEW)   Final Result      1.  Interstitial opacities at both lung bases suggest multifocal pneumonitis/pneumonia.   2.  Hypoinflation and probable LEFT lower lobe atelectasis.             Assessment/Plan  * HCAP (healthcare-associated pneumonia)- (present on admission)  Assessment & Plan  Patient transferred here for confusion.  In ER he was found to have possible pneumonia since patient was recently in the hospital   Will treat it as possible HCAP  Starting on vanco and ceftriaxone for now and will deescalate as appropriate     On cefepime and linezolid this morning. Procal negative, linezolid discontinued. Will continue cefepime one more day, if procal remains negative will discontinue tomorrow.   Blood/urine cx NGTD  Procal remains negative, no fever or leukocytosis, antibiotics stopped.    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  DNR/I per patient and family wishes.   Inquired about hospice, referral sent.  Discussed with palliative care RN who will help arrange.    Acute respiratory failure with hypoxia (HCC)- (present on admission)  Assessment & Plan  Patient on 2 L of oxygen at baseline.  No lower extremity edema to indicate fluid overload.  Suspect his hypoxia is likely from his pneumonia/pneumonitis from aspiration.  Antibiotics  Monitor closely for fluid overload    Congestive  heart failure (CHF) (HCC)- (present on admission)  Assessment & Plan  Cont outpatient regimen   No exacerbation     History of seizures- (present on admission)  Assessment & Plan  Cont outpatient regimen     Acute encephalopathy- (present on admission)  Assessment & Plan  Possible related to pneumonia vs pneumonitis from aspiration.  RESOLVED    Essential hypertension- (present on admission)  Assessment & Plan  Cont outpatient regimen          VTE prophylaxis: therapeutic anticoagulation with Eliquis    I have performed a physical exam and reviewed and updated ROS and Plan today (1/14/2023). In review of yesterday's note (1/13/2023), there are no changes except as documented above.

## 2023-01-15 NOTE — PALLIATIVE CARE
Palliative Care follow-up  Plan for GO meeting today with family. Discussed case with Dr. Ceballos, updates appreciated.     Phone call to pt' spouse, Krystina. She would like to participate in GO meeting in person and she states one of her children can likely transport her. She requested PC RN discuss time with Gilmer and Jossue (pt's sons).     Phone call to pt's son, Gilmer. Per Gilmer, he will coordinate with family and Sveta (for his mother) to meet at 1300.  He will call back to confirm a meeting time.     1104: Received a return call that Gilmer and Anjana will be available at 1300 for GOC meeting. Gilmer is coordinating with Sveta regarding his mother's ability to attend. Per Gilmer he is unsure if Jossue and Kendra will be in attendance, possibly in person or via phone.  Pt's son, Igor, is currently driving to [place] from [place], [place] and may be present as well.     1226: Discussed GOC meeting with Priscilla GONZALEZ, who updated that meeting was changed to 1430 and per RN, she updated Dr. Ceballos.     1230: Received a phone call from Gilmer, who was unaware of meeting change, however was agreeable to the time change and would discuss with his siblings.     Updated: Dr. Ceballos updated of 1430 GO meeting time. NIKHIL Huang, updated as well.     Plan: GO meeting today.     Thank you for allowing Palliative Care to support this patient and family. Contact [phone] for additional assistance, change in patient status, or with any questions/concerns.

## 2023-01-15 NOTE — CARE PLAN
Problem: Pain - Standard  Goal: Alleviation of pain or a reduction in pain to the patient’s comfort goal  Outcome: Not Progressing     Problem: Fall Risk  Goal: Patient will remain free from falls  Outcome: Progressing   The patient is Watcher - Medium risk of patient condition declining or worsening    Shift Goals  Clinical Goals: fluids, monitor labs  Patient Goals: rest  Family Goals: comfort    Progress made toward(s) clinical / shift goals:      Patient is not progressing towards the following goals:      Problem: Pain - Standard  Goal: Alleviation of pain or a reduction in pain to the patient’s comfort goal  Outcome: Not Progressing

## 2023-01-16 NOTE — DIETARY
Nutrition Services Brief Update:    Day 6 of admit.  Gilmer Mae is a 82 y.o. male with admitting DX of HCAP being followed by RD for failure to thrive and poor intake.   Patient remains minimally interactive and per report, after family conference, patient and family will be pursuing hospice.    RD available PRN or if needs/goals change.

## 2023-01-16 NOTE — CARE PLAN
The patient is Stable - Low risk of patient condition declining or worsening    Shift Goals  Clinical Goals: pt will deny pain/discomfort throughout shift.  Patient Goals: rest  Family Goals: comfort    Progress made toward(s) clinical / shift goals:  Pt resting comfortably overnight.    Problem: Fall Risk  Goal: Patient will remain free from falls  Outcome: Progressing     Problem: Pain - Standard  Goal: Alleviation of pain or a reduction in pain to the patient’s comfort goal  Outcome: Progressing

## 2023-01-16 NOTE — DISCHARGE PLANNING
Transport Request Received: 1/16/2023 at 1421    Transport Company: [employer]    Transport Date: 1/17/2023  Time: 0900    Destination: Home at [address] [facility] [place]    Notified care team of scheduled transport via [employer].      If there are any changes needed to the DC transportation scheduled, please contact [facility] at ext. [phone] between the hours of 6581-0530 Mon-Fri. If outside those hours, contact the ED Case Manager at ext. [phone].

## 2023-01-16 NOTE — PROGRESS NOTES
[facility] Medicine Daily Progress Note    Date of Service  1/15/2023    Chief Complaint  Gilmer Mae is a 82 y.o. male admitted 1/10/2023 with encephalopathy and possibly pneumonia.    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 Started on broad spectrum antibiotics. Felt back to baseline on the day after admission, still requiring slightly increased O2 from baseline 3 L NC from 2L NC.   Patient without complaints but he has been refusing care, refusing to eat or drink, refusing to work with therapies and refusing to participate with SLP.  He is on 3 L NC (baseline 2L NC). Procalcitonin remained negative, no fevers or leukocytosis, antibiotics discontinued.  He has silent aspiration noted on prior FEES x2, refused working with SLP.  1/14/23  Initial plan today was for patient to DC to SNF for continued rehab.  Palliative RN had a family meeting with patient and family, initially decision was to remain full code and to go to rehab to gain strength.  Per palliative RN patient was sleeping during the meeting, essentially not interacting with family or staff.  Shortly after the meeting I assessed the patient and found him to be essentially unresponsive to anything aside from sternal rub.  He has not been eating or drinking anything significant for the past day or 2, RN reported to me decreased urine output today since removing Kimball, patient has worsening of his peripheral edema.  Labs checked, sodium 146.  Small IV fluid bolus given and patient began to perk up.  Family came to bedside and I had multiple family meetings and discussions with patient's wife and children.  Outcome of all of this patient would like to be DNR/DNI, family in agreement, they have inquired about home with home hospice.  Referral order placed, palliative care team/DC planners to help arrange.  Throughout the day patient  became a little bit more interactive and alert although essentially just lies there with his eyes closed not wanting to interact with anyone or be bothered.       Interval Problem Update  1/15/23  Patient status essentially the same in regards to withdrawing from care. Essentially just lies there with his eyes closed. He is responsive. No new complaints. Family meeting occurred, he told family members he wanted to be home with hospice. Hospice to be arranged and patient to be DC'd to hospice, final arrangements pending.    I have discussed this patient's plan of care and discharge plan at IDT rounds today with Case Management, Nursing, Nursing leadership, and other members of the IDT team.    Consultants/Specialty      Code Status  DNAR/DNI    Disposition  Patient is medically cleared for discharge.   Anticipate discharge to to skilled nursing facility.  I have placed the appropriate orders for post-discharge needs.    Review of Systems  Review of Systems   Constitutional:  Negative for chills and fever.   Respiratory:  Positive for cough, sputum production and shortness of breath.    Cardiovascular:  Negative for chest pain and palpitations.   Gastrointestinal:  Negative for abdominal pain, nausea and vomiting.   Genitourinary:  Negative for dysuria.   Psychiatric/Behavioral:          Patient endorsing episodes of confusion right after waking up in the morning.  Patient is aware that he be does become confused and returned back to his baseline.   All other systems reviewed and are negative.     Physical Exam  Temp:  [36.2 °C (97.2 °F)-36.3 °C (97.3 °F)] 36.3 °C (97.3 °F)  Pulse:  [68-92] 88  Resp:  [15-18] 15  BP: ()/(65-77) 126/74  SpO2:  [97 %-98 %] 97 %    Physical Exam  Vitals reviewed.   Constitutional:       General: He is not in acute distress.     Appearance: Normal appearance. He is ill-appearing. He is not toxic-appearing.      Comments: 82-year-old male appears stated age appears chronically ill,  alert and conversant, no acute distress, nontoxic-appearing   HENT:      Head: Normocephalic and atraumatic.      Nose: Nose normal.      Mouth/Throat:      Mouth: Mucous membranes are moist.      Pharynx: No oropharyngeal exudate.   Eyes:      General: No scleral icterus.        Right eye: No discharge.         Left eye: No discharge.      Extraocular Movements: Extraocular movements intact.      Conjunctiva/sclera: Conjunctivae normal.   Cardiovascular:      Rate and Rhythm: Normal rate and regular rhythm.      Heart sounds: Normal heart sounds.   Pulmonary:      Effort: No respiratory distress.      Breath sounds: Rales present. No wheezing.      Comments: Bibasilar rales  Abdominal:      General: There is no distension.      Palpations: Abdomen is soft.      Tenderness: There is no abdominal tenderness. There is no guarding or rebound.   Musculoskeletal:         General: Deformity (right BKA, incision healing well does not appear infected.) present. No tenderness.      Cervical back: Normal range of motion and neck supple. No tenderness.      Right lower leg: Edema present.      Left lower leg: Edema present.      Comments: Right BKA, b/l UE edema   Skin:     General: Skin is warm and dry.      Coloration: Skin is not jaundiced.   Neurological:      General: No focal deficit present.      Mental Status: He is alert.      Cranial Nerves: No cranial nerve deficit.      Sensory: No sensory deficit.      Motor: Weakness (generalized) present.      Coordination: Coordination normal.      Comments: Face symmetrical, tongue midline, no hemineglect or gaze preference,   Psychiatric:      Comments: Patient appears quite depressed, seems to be withdrawing from family and care       Fluids    Intake/Output Summary (Last 24 hours) at 1/16/2023 0038  Last data filed at 1/15/2023 0623  Gross per 24 hour   Intake 1054.46 ml   Output 475 ml   Net 579.46 ml         Laboratory  Recent Labs     01/15/23  0049   WBC 5.8   RBC 3.23*    HEMOGLOBIN 11.0*   HEMATOCRIT 34.6*   .1*   MCH 34.1*   MCHC 31.8*   RDW 65.5*   PLATELETCT 111*   MPV 10.5       Recent Labs     01/14/23  1003 01/15/23  0049   SODIUM 146* 144   POTASSIUM 4.1 3.7   CHLORIDE 112 110   CO2 25 25   GLUCOSE 101* 92   BUN 11 10   CREATININE 0.64 0.56   CALCIUM 8.0* 7.8*                     Imaging  CT-HEAD W/O   Final Result      1.  Diffuse atrophy and white matter changes.   2.  No acute intracranial hemorrhage or territorial infarct.   3.  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            DX-CHEST-PORTABLE (1 VIEW)   Final Result      1.  Interstitial opacities at both lung bases suggest multifocal pneumonitis/pneumonia.   2.  Hypoinflation and probable LEFT lower lobe atelectasis.             Assessment/Plan  * HCAP (healthcare-associated pneumonia)- (present on admission)  Assessment & Plan  Patient transferred here for confusion.  In ER he was found to have possible pneumonia since patient was recently in the hospital   Will treat it as possible HCAP  Starting on vanco and ceftriaxone for now and will deescalate as appropriate     On cefepime and linezolid this morning. Procal negative, linezolid discontinued. Will continue cefepime one more day, if procal remains negative will discontinue tomorrow.   Blood/urine cx NGTD  Procal remains negative, no fever or leukocytosis, antibiotics stopped.    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  DNR/I per patient and family wishes.   Inquired about hospice, referral sent.   Discussed with palliative care RN who will help arrange.    Acute respiratory failure with hypoxia (HCC)- (present on admission)  Assessment & Plan  Patient on 2 L of oxygen at baseline.  No lower extremity edema to indicate fluid overload.  Suspect his hypoxia is likely from his pneumonia/pneumonitis from aspiration.  Antibiotics  Monitor closely for fluid overload    Congestive heart failure (CHF) (HCC)- (present on admission)  Assessment & Plan  Cont outpatient regimen   No exacerbation     History of seizures- (present on admission)  Assessment & Plan  Cont outpatient regimen     Acute encephalopathy- (present on admission)  Assessment & Plan  Possible related to pneumonia vs pneumonitis from aspiration.  RESOLVED    Essential hypertension- (present on admission)  Assessment & Plan  Cont outpatient regimen          VTE prophylaxis: therapeutic anticoagulation with Eliquis    I have performed a physical exam and reviewed and updated ROS and Plan today (1/15/2023). In review of yesterday's note (1/14/2023), there are no changes except as documented above.

## 2023-01-16 NOTE — DISCHARGE PLANNING
RNCM to f/u with [facility] liaison for planning.      0925: RNCM PC to [facility] to inquire details; medical director is reviewing and they will call this RNCM back momentarily.    1400: Farheen from Hospice agency returned call stating DME is ordered and being delivered to pt house today; Farheen requested RNCM set up transportation to home for 1/17/23 @ 0900.

## 2023-01-17 NOTE — THERAPY
"Speech Language Pathology  Daily Treatment     Patient Name: Gilmer Mae  Age:  82 y.o., Sex:  male  Medical Record #: [id number]  Today's Date: 1/16/2023     Precautions  Precautions: Fall Risk, Swallow Precautions ( See Comments)  Comments: ANIKET STRATTON 12/14    HPI: Pt is 83 y/o male presenting 1/10 with acute encephalopathy, found to have HCAP. FEES x2 during previous admit - recommend LQ3/MT2 with 1:1 and strategies. Silent aspiration with thins and pudding.       Subjective:  Patient seen this date for dysphagia management. Patient alert and participatory in conversation. Patient stated, \"It feels like I have thrush. I can't taste anything.\" Patient stated preference for consuming a regular/thin liquid diet. Patient verbalized understanding of swallowing impairment and asked valid questions. RN reports giving patient thin water occasionally, which was received without coughing. RN aware of silent aspiration and did so with clean mouth as patient with very limited intake of thickened liquids.       Assessment:  PO presentations of MT2. Adequate oral bolus acceptance/containment, complete and timely AP transfer No cough/throat clear appreciated with PO. Vocal quality remained stable throughout assessment. Single swallow completed per bolus. Significant discussion regarding swallowing impairment, etiology of impairment, and prognosis. Patient aware going home with hospice and verbalized understanding of education re: dysphagia. Patient desires to eat despite risk (\"I want things like sandwiches\"). Discussed with MD, who cleared SLP to liberalize diet in alignment with patient preference for QOL purposes. Discussed with RN. Recommend 1:1 feeding assist/supervision given BUE edema as well as supervising for choking risk. Provided education on how to reduce the risk of aspiration to patient/staff; handout hung on HOB and in d/c paperwork.     Clinical Impressions:   Patient presents with change in GOC to prioritize QOL. " "Patient elected (with MD approval/in alignment with GOC) to eat despite risk and requests a regular thin liquid diet. Service will follow to provide education for how to reduce the risk of aspiration.     Recommendations  1.  Regular solids with thin liquids, despite risk  2.  Instrumentation: None indicated at this time, r/t GOC  3.  Swallowing Instructions & Precautions:   Supervision: 1:1 feeding with constant supervision, Direct supervision during meals  Positioning: Fully upright and midline during oral intake  Medication: Crush with applesauce  Strategies: Small bites/sips, Alternate bites and sips, Slow rate of intake  Oral Care: Q4h; prior to meals  4.  Following for education on how to reduce risk of aspiration      Plan    Continue current treatment plan.    Discharge Recommendations: Recommend post-acute placement for additional speech therapy services prior to discharge home     Objective       01/16/23 1616   Patient / Family Goals   Patient / Family Goal #1 \"yes.\"   Goal #1 Outcome Goal met   Short Term Goals   Short Term Goal # 1 Pt will consume diet of LQ3/MT2 given 1:1 with no overt s/sx of aspiration or worsening of lung status.   Goal Outcome # 1 Goal met, new goal added   Short Term Goal # 1 B  Pt will complete repeat instrumental to assess for changes in swallow function and determine POC.   Goal Outcome  # 1 B Goal not met   Short Term Goal # 2 Patient will complete BoT resistance, laryngeal elevation exercises x15 reps per session given min cues.   Goal Outcome # 2  Goal not met   Short Term Goal # 3 Staff/family will implement general aspiration precautions independently.       "

## 2023-01-17 NOTE — CARE PLAN
The patient is Watcher - Medium risk of patient condition declining or worsening    Shift Goals  Clinical Goals: comfort  Patient Goals: comfort  Family Goals: plan of care    Progress made toward(s) clinical / shift goals:    Problem: Knowledge Deficit - Standard  Goal: Patient and family/care givers will demonstrate understanding of plan of care, disease process/condition, diagnostic tests and medications  Outcome: Progressing     Problem: Skin Integrity  Goal: Skin integrity is maintained or improved  Outcome: Progressing     Problem: Fall Risk  Goal: Patient will remain free from falls  Outcome: Progressing       Patient is not progressing towards the following goals:

## 2023-01-17 NOTE — PROGRESS NOTES
[facility] Medicine Daily Progress Note    Date of Service  1/16/2023    Chief Complaint  Gilmer Mae is a 82 y.o. male admitted 1/10/2023 with encephalopathy and possibly pneumonia.    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 Started on broad spectrum antibiotics. Felt back to baseline on the day after admission, still requiring slightly increased O2 from baseline 3 L NC from 2L NC.   Patient without complaints but he has been refusing care, refusing to eat or drink, refusing to work with therapies and refusing to participate with SLP.  He is on 3 L NC (baseline 2L NC). Procalcitonin remained negative, no fevers or leukocytosis, antibiotics discontinued.  He has silent aspiration noted on prior FEES x2, refused working with SLP.  1/14/23  Initial plan today was for patient to DC to SNF for continued rehab.  Palliative RN had a family meeting with patient and family, initially decision was to remain full code and to go to rehab to gain strength.  Per palliative RN patient was sleeping during the meeting, essentially not interacting with family or staff.  Shortly after the meeting I assessed the patient and found him to be essentially unresponsive to anything aside from sternal rub.  He has not been eating or drinking anything significant for the past day or 2, RN reported to me decreased urine output today since removing Kimball, patient has worsening of his peripheral edema.  Labs checked, sodium 146.  Small IV fluid bolus given and patient began to perk up.  Family came to bedside and I had multiple family meetings and discussions with patient's wife and children.  Outcome of all of this patient would like to be DNR/DNI, family in agreement, they have inquired about home with home hospice.  Referral order placed, palliative care team/DC planners to help arrange.  Throughout the day patient  became a little bit more interactive and alert although essentially just lies there with his eyes closed not wanting to interact with anyone or be bothered.       Interval Problem Update  Plan is to DC home tomorrow with hospice. Hospice equipment being delivered today, [facility] transport scheduled for AM. He perked up a bit today, no acute complaints, SLP liberalized his diet due to pending hospice discharge and patient wishes.    I have discussed this patient's plan of care and discharge plan at IDT rounds today with Case Management, Nursing, Nursing leadership, and other members of the IDT team.    Consultants/Specialty      Code Status  DNAR/DNI    Disposition  Patient is medically cleared for discharge.   Anticipate discharge to to skilled nursing facility.  I have placed the appropriate orders for post-discharge needs.    Review of Systems  Review of Systems   Constitutional:  Negative for chills and fever.   Respiratory:  Positive for cough, sputum production and shortness of breath.    Cardiovascular:  Negative for chest pain and palpitations.   Gastrointestinal:  Negative for abdominal pain, nausea and vomiting.   Genitourinary:  Negative for dysuria.   Psychiatric/Behavioral:          Patient endorsing episodes of confusion right after waking up in the morning.  Patient is aware that he be does become confused and returned back to his baseline.   All other systems reviewed and are negative.     Physical Exam  Temp:  [36.3 °C (97.3 °F)-36.4 °C (97.5 °F)] 36.4 °C (97.5 °F)  Pulse:  [73-88] 77  Resp:  [15-18] 16  BP: (112-128)/(57-74) 128/64  SpO2:  [96 %-97 %] 96 %    Physical Exam  Vitals reviewed.   Constitutional:       General: He is not in acute distress.     Appearance: Normal appearance. He is ill-appearing. He is not toxic-appearing.      Comments: 82-year-old male appears stated age appears chronically ill, alert and conversant, no acute distress, nontoxic-appearing   HENT:      Head: Normocephalic and  atraumatic.      Nose: Nose normal.      Mouth/Throat:      Mouth: Mucous membranes are moist.      Pharynx: No oropharyngeal exudate.   Eyes:      General: No scleral icterus.        Right eye: No discharge.         Left eye: No discharge.      Extraocular Movements: Extraocular movements intact.      Conjunctiva/sclera: Conjunctivae normal.   Cardiovascular:      Rate and Rhythm: Normal rate and regular rhythm.      Heart sounds: Normal heart sounds.   Pulmonary:      Effort: No respiratory distress.      Breath sounds: Rales present. No wheezing.      Comments: Bibasilar rales  Abdominal:      General: There is no distension.      Palpations: Abdomen is soft.      Tenderness: There is no abdominal tenderness. There is no guarding or rebound.   Musculoskeletal:         General: Deformity (right BKA, incision healing well does not appear infected.) present. No tenderness.      Cervical back: Normal range of motion and neck supple. No tenderness.      Right lower leg: Edema present.      Left lower leg: Edema present.      Comments: Right BKA, b/l UE edema   Skin:     General: Skin is warm and dry.      Coloration: Skin is not jaundiced.   Neurological:      General: No focal deficit present.      Mental Status: He is alert.      Cranial Nerves: No cranial nerve deficit.      Sensory: No sensory deficit.      Motor: Weakness (generalized) present.      Coordination: Coordination normal.      Comments: Face symmetrical, tongue midline, no hemineglect or gaze preference,   Psychiatric:      Comments: Patient appears quite depressed, seems to be withdrawing from family and care       Fluids    Intake/Output Summary (Last 24 hours) at 1/16/2023 1700  Last data filed at 1/16/2023 1000  Gross per 24 hour   Intake 50 ml   Output --   Net 50 ml         Laboratory  Recent Labs     01/15/23  0049   WBC 5.8   RBC 3.23*   HEMOGLOBIN 11.0*   HEMATOCRIT 34.6*   .1*   MCH 34.1*   MCHC 31.8*   RDW 65.5*   PLATELETCT 111*    MPV 10.5       Recent Labs     01/14/23  1003 01/15/23  0049   SODIUM 146* 144   POTASSIUM 4.1 3.7   CHLORIDE 112 110   CO2 25 25   GLUCOSE 101* 92   BUN 11 10   CREATININE 0.64 0.56   CALCIUM 8.0* 7.8*                     Imaging  CT-HEAD W/O   Final Result      1.  Diffuse atrophy and white matter changes.   2.  No acute intracranial hemorrhage or territorial infarct.   3.  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            DX-CHEST-PORTABLE (1 VIEW)   Final Result      1.  Interstitial opacities at both lung bases suggest multifocal pneumonitis/pneumonia.   2.  Hypoinflation and probable LEFT lower lobe atelectasis.             Assessment/Plan  * HCAP (healthcare-associated pneumonia)- (present on admission)  Assessment & Plan  Patient transferred here for confusion.  In ER he was found to have possible pneumonia since patient was recently in the hospital   Will treat it as possible HCAP  Starting on vanco and ceftriaxone for now and will deescalate as appropriate     On cefepime and linezolid this morning. Procal negative, linezolid discontinued. Will continue cefepime one more day, if procal remains negative will discontinue tomorrow.   Blood/urine cx NGTD  Procal remains negative, no fever or leukocytosis, antibiotics stopped.    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  DNR/I per patient and family wishes.   Inquired about hospice, referral sent.  Discussed with palliative care RN who will help arrange.    Acute respiratory failure with hypoxia (HCC)-  (present on admission)  Assessment & Plan  Patient on 2 L of oxygen at baseline.  No lower extremity edema to indicate fluid overload.  Suspect his hypoxia is likely from his pneumonia/pneumonitis from aspiration.  Antibiotics  Monitor closely for fluid overload    Congestive heart failure (CHF) (HCC)- (present on admission)  Assessment & Plan  Cont outpatient regimen   No exacerbation     History of seizures- (present on admission)  Assessment & Plan  Cont outpatient regimen     Acute encephalopathy- (present on admission)  Assessment & Plan  Possible related to pneumonia vs pneumonitis from aspiration.  RESOLVED    Essential hypertension- (present on admission)  Assessment & Plan  Cont outpatient regimen          VTE prophylaxis: therapeutic anticoagulation with Eliquis    I have performed a physical exam and reviewed and updated ROS and Plan today (1/16/2023). In review of yesterday's note (1/15/2023), there are no changes except as documented above.

## 2023-01-17 NOTE — DISCHARGE PLANNING
[facility]; Teresita stated intent to call this RNCM back with status update.

## 2023-01-17 NOTE — DISCHARGE SUMMARY
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 Started on broad spectrum antibiotics. Felt back to baseline on the day after admission, still requiring slightly increased O2 from baseline 3 L NC from 2L NC.   Patient without complaints but he has been refusing care, refusing to eat or drink, refusing to work with therapies and refusing to participate with SLP.  He is on 3 L NC (baseline 2L NC). Procalcitonin remained negative, no fevers or leukocytosis, antibiotics discontinued.  He has silent aspiration noted on prior FEES x2, refused working with SLP.  Initially plan for patient to discharge to SNF for continued rehab. However, after palliative discussion with patient and family, they have decided to make patient DNR/DNI and pursue hospice. Hospice arranged for patient, discharged home with hospice under care of family.    Therefore, he is discharged in guarded and stable condition to hospice.    The patient met 2-midnight criteria for an inpatient stay at the time of discharge.    Discharge Date  1/17/2023    FOLLOW UP ITEMS POST DISCHARGE  Per hospice team.  Medication reconciliation per hospice team.    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 *      FOLLOW UP  No future appointments.  [place] [id number] [facility]  [address]  [place] [address]  [phone]          MEDICATIONS ON DISCHARGE     Medication List        CONTINUE taking these medications        Instructions   acetaminophen 325 MG Tabs  Commonly known as: Tylenol   Take 650 mg by mouth every four hours as needed for Mild Pain (Fever >100).  Dose: 650 mg     amiodarone 200 MG Tabs  Commonly known as: Cordarone   Take 1 Tablet by mouth every day.  Dose: 200 mg     apixaban 5mg Tabs  Commonly known as: ELIQUIS   Take 1 Tablet by mouth 2 times a day. Indications: DVT/PE  Dose: 5 mg     atorvastatin 80 MG tablet  Commonly known as: LIPITOR   Take 80 mg by mouth every day.  Dose: 80 mg     clotrimazole 10 MG Troc bobby  Commonly known as: Mycelex   Take 10 mg by mouth 3 times a day. X14 days  Dose: 10 mg     furosemide 20 MG Tabs  Commonly known as: LASIX   Take 20 mg by mouth 2 times a day.  Dose: 20 mg     gabapentin 100 MG Caps  Commonly known as: NEURONTIN   Take 100 mg by mouth 2 times a day.  Dose: 100 mg     levETIRAcetam 500 MG Tabs  Commonly known as: KEPPRA   Take 1 Tab by mouth 2 Times a Day.  Dose: 500 mg     lisinopril 2.5 MG Tabs  Commonly known as: PRINIVIL   Take 2.5 mg by mouth every day.  Dose: 2.5 mg     * oxyCODONE CR 10 MG T12a  Commonly known as: OXYCONTIN   Take 10 mg by mouth every 12 hours as needed (Moderate to Severe Pain).  Dose: 10 mg     * oxyCODONE immediate-release 5 MG Tabs  Commonly known as: ROXICODONE   Take 5 mg by mouth every 8 hours as needed for Severe Pain.  Dose: 5 mg           * This list has 2 medication(s) that are the same as other medications prescribed for you. Read the directions carefully, and ask your doctor or other care provider to review them with you.                  Allergies  No Known  Allergies    DIET  No orders of the defined types were placed in this encounter.      ACTIVITY  As tolerated.  Weight bearing as tolerated    CONSULTATIONS  Palliative care    PROCEDURES  none    LABORATORY  Lab Results   Component Value Date    SODIUM 144 01/15/2023    POTASSIUM 3.7 01/15/2023    CHLORIDE 110 01/15/2023    CO2 25 01/15/2023    GLUCOSE 92 01/15/2023    BUN 10 01/15/2023    CREATININE 0.56 01/15/2023        Lab Results   Component Value Date    WBC 5.8 01/15/2023    HEMOGLOBIN 11.0 (L) 01/15/2023    HEMATOCRIT 34.6 (L) 01/15/2023    PLATELETCT 111 (L) 01/15/2023        Total time of the discharge process exceeds 32 minutes.

## 2023-01-17 NOTE — DISCHARGE INSTR - SLP
"Five Ways to Reduce the Risk of Aspiration Pneumonia    1. Eat and drink in as upright a position as possible - in a chair is best. This includes snacks! This is the safest way to eat/drink, even for people without swallowing impairment.     2. Complete diligent and frequent oral care with a toothbrush and toothpaste. Mouths naturally contain bacteria. It is best to complete oral care prior to meals, so aspirated material is \"clean.\" Aspirated material containing bacteria has increased risk of causing aspiration pneumonia. Completing oral care following meals is also recommended to keep your saliva “clean.”     3. Be as mobile as possible. Mobility (e.g., walking) increases the body's ability to fight off infection by improving respiratory health. Lungs can tolerate a certain amount of aspirated material in healthy and mobile individuals. Follow your physical therapist’s recommendations.     4. Take small bites/sips and slow your feeding rate to focus on swallowing. It may be beneficial to reduce environmental distractions to assist with focus on swallowing as well. Avoid speaking while chewing/swallowing.     5. Follow swallow precautions recommended by your speech pathologist: Swallow 2-3x per bite, alternate liquids and solids, take small bites/sips    "

## 2023-01-17 NOTE — CARE PLAN
The patient is Stable - Low risk of patient condition declining or worsening    Shift Goals  Clinical Goals: comfort  Patient Goals: comfort  Family Goals: plan of care    Progress made toward(s) clinical / shift goals:  pt resting comfortably overnight and denies pain.    Problem: Knowledge Deficit - Standard  Goal: Patient and family/care givers will demonstrate understanding of plan of care, disease process/condition, diagnostic tests and medications  Outcome: Progressing     Problem: Skin Integrity  Goal: Skin integrity is maintained or improved  Outcome: Progressing     Problem: Fall Risk  Goal: Patient will remain free from falls  Outcome: Progressing     Problem: Nutrition Deficit  Goal: Patient will verbalize understanding of individual dietary needs  Outcome: Progressing     Problem: Pain - Standard  Goal: Alleviation of pain or a reduction in pain to the patient’s comfort goal  Outcome: Progressing

## 2023-02-02 NOTE — DOCUMENTATION QUERY
"                                                                         [facility]                                                                       Query Response Note      PATIENT:               ZOE SARMIENTO  ACCT #:                  [id number]  MRN:                     [id number]  :                      1940  ADMIT DATE:       1/10/2023 5:46 PM  DISCH DATE:        2023 12:34 PM  RESPONDING  PROVIDER #:        [id number]           QUERY TEXT:    There is conflicting documentation in the medical record.       \"Malnutrition Risk: Does not meet ASPEN criteria at this time\" is documented  in RD note.    \"Severe protein-calorie malnutrition (HCC) POA: Yes\"  is documented  on Discharge Summary.    Based on treatment, clinical findings and risk factors, can this documentation be further clarified?    Note: If you agree with a diagnosis listed above, please remember to include it in your concurrent daily documentation and onto the Discharge Summary.      The patient's Clinical Indicators include:  82 year old male admitted for HCAP.     [facility] \" Malnutrition Risk: Does not meet ASPEN criteria at this time\"  BMI 23.7     D/C Summary Hien \"Severe protein-calorie malnutrition (HCC) POA: Yes\"    Risk factors: FTT, CHF, HTN,   Treatment: labs, CXR, CT, RD consult    If you have any questions please contact:  Julisa Hope RN [facility]  [email or web address]  Julisa Hope [address]  Options provided:   -- Severe protein-calorie malnutrition, is a valid diagnosis this admission   -- Severe protein-calorie malnutrition, ruled out   -- Other explanation, please specify   -- Unable to determine      Query created by: Julisa Hope on 2023 10:29 AM    RESPONSE TEXT:    Severe protein-calorie malnutrition, ruled out          Electronically signed by:  ELEANOR HANLEY MD 2023 2:41 PM              "

## 2023-04-19 NOTE — PROGRESS NOTES
"Hospital Medicine Daily Progress Note    Date of Service  12/17/2022    Chief Complaint  Gilmer Mae is a 82 y.o. male admitted 11/28/2022 with leg pain    Hospital Course  81 y/o with PMH HTN, HLD, sick sinus syndrome (s/p pacemaker), seizures (most recent episode in 2019, on keppra), severe aortic stenosis (s/p TAVR 8/2/2022 at [facility]) and PAD (s/p vascular surgery on femoral artery in 2022) who presented to ED via EMS on 11/28/2022 for LOC, b/l LE edema and pain.  History limited due to patient’s altered mental status, A&Ox2 (self, place) so most history obtained from chart review and daughter Kendra. Patient lives with wife and son. Patient says he is having pain “all over” especially his right leg and says he got surgery on his right leg “2 weeks ago but they never finished.” On arrival to ED, patient was lethargic and confused. RLE especially R foot exquisitely tender, nonhealing ulcer with some erythema and eschar present on bottom of R heel. Ultrasound of leg showed diffuse atherosclerotic plaque throughout LE, absent flow from proximal to distal femoral artery, reconstitution monophasic flow via collaterals at distal femoral artery, absent flow in distal portion of tibial artery. Xray of R foot showed potentially periosteal changes at inferior aspect of calcaneus as well as significant vascular calcification; concern for underlying osteomyelitis secondary to ischemic ulcer. Empirically placed on IV linezolid and IV unasyn however discontinued linezolid as blood cx, wound cx, MRSA and staph aureus PCR negative as well as lack of purulence from wound. Seen by LPS and vascular surgery- scheduled for OR on 12/5 with Dr. Rdz to attempt RLE revascularization in order to avoid amputation.    Interval Problem Update  12/13 - Patient feels tired. His right foot continues to have pain. But he feels \"pretty good\" today  ICD placement today  Hypernatremic, start D5W and recheck level    12/14 - Remains " Patient called and has questions about [employer] about her oxygen.   hypernatremic and worsening. Restarted D5W.  His oral intake has been very poor on the liquidized diet. Speech did reeval him today. I consulted Dr. Rdz and planning for amputation. I discussed with patient that nutrition will be important for wound healing. Pall care to see patient to complete AD before surgery today    12/15 - He had right BKA. Hypernatremia improved to 144. He still needs a lot of encouragement to eat. Dietician increasing to high kCal diet. His pain is doing better now s/p amputation.     12/16 - Mildly hypotensive to 94/54 overnight, given 500cc bolus and BP improved. He says his pain is unbearable today. He does have some phantom pain. I will increased gabapentin. I discussed artifical nutrition, he was quick to decline, does not want feeding tube    12/17 - His pain is doing better today. He is thirsty. Sodium 145. Ok for dry dressings to leg wound on 12/20 per wound care. SNF pending    I have discussed this patient's plan of care and discharge plan at IDT rounds today with Case Management, Nursing, Nursing leadership, and other members of the IDT team.    Consultants/Specialty  cardiology, critical care, infectious disease, and nephrology    Code Status  DNAR/DNI    Disposition  Patient is medically cleared for discharge.   Anticipate discharge to to skilled nursing facility.  I have placed the appropriate orders for post-discharge needs.    Review of Systems  Review of Systems   Constitutional:  Positive for malaise/fatigue.   Cardiovascular:  Negative for chest pain.   Gastrointestinal:  Negative for abdominal pain and vomiting.   Musculoskeletal:  Negative for myalgias.   Neurological:  Positive for weakness.   All other systems reviewed and are negative.     Physical Exam  Temp:  [36.5 °C (97.7 °F)-36.8 °C (98.2 °F)] 36.5 °C (97.7 °F)  Pulse:  [62-81] 62  Resp:  [12-18] 18  BP: ()/(61-88) 158/83  SpO2:  [92 %-97 %] 95 %    Physical Exam  Vitals and nursing note reviewed.    Constitutional:       Appearance: He is ill-appearing. He is not diaphoretic.   HENT:      Head: Normocephalic.      Mouth/Throat:      Mouth: Mucous membranes are dry.   Eyes:      General:         Right eye: No discharge.         Left eye: No discharge.   Cardiovascular:      Rate and Rhythm: Normal rate and regular rhythm.   Pulmonary:      Effort: No respiratory distress.      Breath sounds: No wheezing, rhonchi or rales.      Comments: Diminished throughout  Abdominal:      Palpations: Abdomen is soft.      Tenderness: There is no abdominal tenderness.   Musculoskeletal:         General: No swelling.      Cervical back: Neck supple.      Comments: Right BKA with dressings in place   Neurological:      Mental Status: He is oriented to person, place, and time.       Fluids    Intake/Output Summary (Last 24 hours) at 12/17/2022 1209  Last data filed at 12/17/2022 0337  Gross per 24 hour   Intake --   Output 150 ml   Net -150 ml       Laboratory  Recent Labs     12/15/22  0438 12/15/22  2045 12/16/22  0250 12/17/22  0341   WBC 6.9  --  6.9 6.1   RBC 2.94*  --  2.67* 2.72*   HEMOGLOBIN 9.7* 8.9* 8.8* 9.1*   HEMATOCRIT 31.2* 28.7* 28.3* 29.1*   .1*  --  106.0* 107.0*   MCH 33.0  --  33.0 33.5*   MCHC 31.1*  --  31.1* 31.3*   RDW 61.1*  --  60.6* 63.3*   PLATELETCT 206  --  189 177   MPV 10.7  --  10.7 10.7     Recent Labs     12/15/22  0438 12/16/22  0250 12/17/22  0341   SODIUM 144 143 145   POTASSIUM 4.3 3.8 3.8   CHLORIDE 112 113* 112   CO2 23 25 26   GLUCOSE 184* 104* 100*   BUN 18 31* 31*   CREATININE 0.76 1.17 0.84   CALCIUM 7.5* 7.5* 7.5*                   Imaging  DX-CHEST-PORTABLE (1 VIEW)   Final Result      1.  Status post left pacer ICD placement. No evidence of large left pleural effusion or pneumothorax.   2.  Apparent interval development of a small right-sided pleural effusion with associated basilar atelectasis and/or consolidation.   3.  Left basilar atelectasis and/or consolidation.   4.   Mild enlargement of the cardiomediastinal silhouette.      EC-ECHOCARDIOGRAM LTD W/ CONT   Final Result      CT-HEAD W/O   Final Result      1.  No acute intracranial hemorrhage.   2.  Similar chronic findings.   3.  Mild mucosal thickening of the inferior right maxillary sinus.         DX-CHEST-PORTABLE (1 VIEW)   Final Result      Stable mild cardiac enlargement without edema      DX-CHEST-PORTABLE (1 VIEW)   Final Result         1.  Hazy interstitial left lung base infiltrate, stable since prior study.   2.  Atherosclerosis      DX-ABDOMEN FOR TUBE PLACEMENT   Final Result      Orogastric tube tip at the proximal stomach.      EC-ECHOCARDIOGRAM COMPLETE W/ CONT   Final Result      DX-ABDOMEN FOR TUBE PLACEMENT   Final Result      NG tube tip projects over the stomach, and side-port projects 3 cm above the GE junction. It can be advanced further by approximately 7 cm.      DX-ABDOMEN FOR TUBE PLACEMENT   Final Result      NG tube tip projects over the GE junction. It can be advanced 8-10 cm      DX-CHEST-PORTABLE (1 VIEW)   Final Result      1.  Right central catheter tip is in the mid SVC. No pneumothorax.   2.  NGT tip is at the GE junction. It can be advanced 8 cm.   3.  Stable other findings.      DX-CHEST-PORTABLE (1 VIEW)   Final Result      1.  Well-positioned lines and tubes. No pneumothorax.   2.  Ill-defined left basilar opacity, chronic atelectasis/scarring or sequela of recent pneumonia.      CT-CTA AORTA-RO WITH & W/O-POST PROCESS   Final Result      1.  Extensive atherosclerotic vascular calcification involving the aorta and originating arteries. There is ectasia of the ascending thoracic aorta measured at 3.4 x 3.4 cm in diameter.      2.  Extensive atherosclerotic plaque involving the right common, superficial femoral and profunda arteries. There is occlusion of the right superficial femoral artery at the level of the proximal thigh. Popliteal artery is also occluded and there is    extensive  atherosclerotic plaque involving the posterior tibial, anterior tibial and peroneal arteries. No definite continuous runoff vessel identified.      3.  Extensive atherosclerotic plaque involving the left common, profunda and superficial femoral and popliteal arteries with multiple areas of high-grade stenosis of the superficial femoral and popliteal arteries. There is also extensive atherosclerotic    change of the posterior tibial, anterior tibial and peroneal arteries without a definite contiguous runoff vessel seen.      4.  High-grade stenosis involving the origin of the superior mesenteric artery.      5.  Occluded origin of the inferior mesenteric artery.      6.  Atherosclerotic plaque involving the right renal artery origin and the proximal right renal artery resulting in 50% diameter narrowing.      7.  Less than 50% diameter narrowing of the left renal artery origin.      8.  Ill-defined groundglass infiltrates within the right upper lobe possibly representing infectious process.      9.  Multiple hepatic cysts.      3D angiographic/MIP images of the vasculature confirm the vascular findings as described above.      US-VEIN MAPPING LOWER EXTREMITY UNILAT RIGHT   Final Result      DX-FOOT-COMPLETE 3+ RIGHT   Final Result      Likely acute osteomyelitis at the inferior calcaneus. MRI can be obtained as indicated.      MR-FOOT-W/O RIGHT   Final Result      1.  Very limited study due to extensive patient motion artifact and patient comfort preventing multiple sequences.      2.  No gross bony destructive change.      3.  Soft tissue edema.      US-EXTREMITY ARTERY LOWER UNILAT RIGHT   Final Result      US-EXTREMITY VENOUS LOWER UNILAT RIGHT   Final Result      US-ABDOMEN COMPLETE SURVEY   Final Result         1.  Gallbladder sludge, otherwise unremarkable gallbladder   2.  Hepatic cysts   3.  Bilateral echogenic kidneys with mildly thinned cortex, appearance favors medical renal disease      CT-HEAD W/O    Final Result      1.  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   2.  Mild cerebral atrophy. Age-appropriate.   3.  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   4.  No acute findings are evident.         DX-CHEST-PORTABLE (1 VIEW)   Final Result      No evidence of acute cardiopulmonary process.      CL-LEFT HEART CATHETERIZATION WITH POSSIBLE INTERVENTION    (Results Pending)   CL-IMPLANTABLE CARDIOVERTER DEFIBRILLATOR    (Results Pending)        Assessment/Plan  * Osteomyelitis of right lower extremity (HCC)- (present on admission)  Assessment & Plan  - Given recent right groin access for TAVR, MARGARITA ordered-   - Pain controlled with tylenol 650mg q6 prn, IV dilaudid 0.5mg q4 prn, oxycodone 5-10mg q3 prn  - can consider lidocaine patch, diclofenac gel as needed  - U/S showed: Occlusion of RIGHT femoral artery with distal reconstitution, Occlusion of RIGHT posterior tibial artery,  Two vessel runoff to the ankle with monophasic flow  -12/1: Spoke with Dr. Medel who is partners with Dr. Quinones (who did patient's previous surgery). She says patient will be having pain for a long time but since this is not an emergency they will see him outpatient and Dr. Quinones will perform a re-anastomosis and other procedures as warranted. This procedure cannot currently be done as Dr. Quinones is currently on vacation and Dr. Medel doesn't have [facility] privileges as well as the fact that this type of surgery cannot even be done at [facility].   - PT/OT recommending SNF  - MRI foot attempted 12/2 however was incomplete as patient was unable to tolerate. Foot xray confirmed osteomyelitis to R calcaneus.  - 12/2: Per LPS, wound care orders for nursing was placed and he was provided with betadine dressing and offloading boot per LPS team.  - 12/3: Consulted LPS and vascular surgery due  to concern for osteomyelitis of R heel- seen by Dr. Rdz who will take patient to OR in order to attempt revascularization to attempt to heal foot wound and avoid amputation. OR planned for 12/5 Monday.  - Consulted Dr. Turcios from infectious disease 12/3: believes osteomyelitis of heel will be difficult to cure and pt will need debridement, wound vac, etc however most likely poor outcome. Healing process also hindered due to impaired circulation evidence by MARGARITA. As there is no pus from wound, most likely ischemic ulcer. Linezolid discontinued 12/3, c/w just unasyn for now  - f/u blood cultures, wound cultures, NGTD  - CTA pending  - Vein mapping 12/4: Acute superficial thrombus in the greater saphenous vein near saphenofemoral junction and in the proxmal thigh. Vein wall thickening in the thigh; small caliber vein throughout the thigh & calf. Vein is unsuitable for use as a bypass graft.  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    Sacral wound, initial encounter  Assessment & Plan  Poor nutrition  Wound care    Severe protein-calorie malnutrition (HCC)  Assessment & Plan  Poor oral intake  Dietician following  Increasing to high kCal diet    Advance care planning  Assessment & Plan  Pall care following. He is DNR    Hypernatremia  Assessment & Plan  Patient found to have hypernatremia, has poor oral intake  Continue to trend    History of Bell's palsy  Assessment & Plan  With associated L facial droop, CT head neg for [facility] 12/7  Supportive care    Thrombocytopenia (HCC)  Assessment & Plan  Mild, Monitor    Hypokalemia  Assessment & Plan  Continue to monitor and replace as needed.    Shock (HCC)  Assessment & Plan  Post cardiac arrest 12/5  - titrate pressor to maintain MAP>65  - monitor urine output and vital signs   ICD placed 12/13    Acute respiratory failure (HCC)  Assessment & Plan  3L O2  Needs mobilization, IS    Congestive heart failure (CHF)  (HCC)- (present on admission)  Assessment & Plan  - per chart review, patient has history of congestive heart failure  - c/w home aspirin 81 everyday, coreg 12.5mg BID, atorvastatin 80mg qD, lisinopril 20, amlodipine 5  - Continue to hold HCTZ.    History of seizures- (present on admission)  Assessment & Plan  - per patient, last episode of seizure was in 2019  - c/w home keppra 500mg BID    Macrocytic anemia- (present on admission)  Assessment & Plan  - No known history of anemia, not on iron supplementation  - Most recent labs from 8/2022 showing baseline hemoglobin of 14.1 and MCV of 97.7  - pending anemia workup- iron panel, reticulocytes, vitamin B12, folate  - continue to monitor with daily CBC's  - Transfuse if hemoglobin <7.0. Type and screen ordered 11/30    Acute encephalopathy- (present on admission)  Assessment & Plan  - Per family and chart review, patient is at baseline alert and oriented x4.  - Presented to emergency room on 11/28/2022 with confusion, noted to be alert and oriented x2 (self, place only)  - Altered mental status initially thought to be in setting of azotemia from acute kidney injury however continuing to have encephalopathy despite improving azotemia and improving kidney function  - Per nephrology, currently no emergent need for dialysis. Nephrology signed off 11/29.  - Continue to monitor and trend labs. Work with PT/OT inpatient, they recommend SNF post discharge.  Significantly improved.    Increased anion gap metabolic acidosis  Assessment & Plan  Resolved  - Baseline anion gap is 7.0 per most recent labs from 8/2022  - Anion gap on admission noted to be 19.0, improved to 17.0 after IV LR at 150 cc/hr  - Increased anion gap metabolic acidosis likely secondary to uremia from kidney damage  - Continue hydration with IV fluids and encourage PO intake  - Continue to monitor with daily labs    Sepsis (HCC)- (present on admission)  Assessment & Plan  - This is Sepsis Present on  admission  - SIRS criteria identified on my evaluation include: Leukocytosis, with WBC greater than 12,000  - Source is unknown, possibly abdominal etiology. US 11/29 showing US abdomen showing gallbladder sludge otherwise unremarkable, hepatic cysts, bilateral echogenic kidneys with mildly thinned cortex concerning for possible medical renal disease.  - Sepsis protocol initiated  - Fluid resuscitation ordered per protocol  - Crystalloid Fluid Administration: Fluid resuscitation ordered per standard protocol - 30 mL/kg per current or ideal body weight  - IV antibiotics as appropriate for source of sepsis- started on IV ceftriaxone, discontinued on 11/29  - Reassessment: I have reassessed the patient's hemodynamic status  - Blood cultures NGTD  - Urine cultures unremarkable  - Hemodynamically stable  - Uptrending WBC, WBC 14.4 today 11/29 from 13.9 on admission 11/28  - continue to monitor with daily CBC's  - No clear source of sepsis- Discontinued IV ceftriaxone and started on IV unasyn low dose (due to ALL) TID (11/29- ) pending further workup.   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    History of transcatheter aortic valve replacement (TAVR)- (present on admission)  Assessment & Plan  - per chart review, patient has history of severe aortic stenosis and underwent TAVR with Dr. Wolfe on 8/2/2022.   - most recent echo showed well seated TAVR with peak gradient of 12 mmHg and mean gradient of 7.5 mmHg and trace perivalvular leak.     ALL (acute kidney injury) (HCC)- (present on admission)  Assessment & Plan  RESOLVED  - No noted history of chronic kidney disease, unclear creatinine baseline however recent kidney values appear normal- most recent labs from 8/3/2022 showing baseline Cr 1.0 and BUN 15.0.  - On admission, BUN/Cr  ratio noted to be 122/4.67  - Patient is able to urinate, produced about 400cc dark urine in emergency room. No urinary symptoms such as dysuria, polyuria, retention.  - Urinalysis 11/28: +hyaline casts, +granular casts.  - Consulted nephrology- Per Dr. Hearn, currently unclear etiology of ALL however given hyaline casts on UA, volume depletion most likely. Obstructive uropathy was also considered given suprapubic tenderness on exam however renal/bladder US unremarkable for obstructions.   - Started on IV fluids at 125 cc/hr on 11/28, increased rate to 150 cc/hr on 11/29  - Most recent labs from 11/29 at 1500 showing improved renal function with BUN/Cr ratio of 85/1.63.  - Continue to monitor renal function with daily labs  - monitor urine output  - Kidney function improving as of 11/29 and patient nonoliguric, no need for dialysis. Nephrology signed off 11/29.  - Continue gentle IVF and trend Crt/BUN  - Improving- Cr trending down. Cr is 0.90 on 12/1 from 1.05 on 11/30 and 4.67 on admission 11/28.  - ALL resolved, Cr 0.78 on 12/2. D/c'ed renal diet and placed on regular diet to encourage patient PO intake  Now resolved.    Hyperlipidemia- (present on admission)  Assessment & Plan  - no lipid panel on file  - Continue atorvastatin    Essential hypertension- (present on admission)  Assessment & Plan  - at home, on lisinopril, hydrochlorithiazide, amlodipine, carvedilol.  - continue with home coreg 12.5 BID  - restarted on amlodipine 12/1 due to hypertension, continuing to hold HCTZ  - restarted lisinopril 20 on 12/2 due to continued hypertension       VTE prophylaxis: lovenox    I have performed a physical exam and reviewed and updated ROS and Plan today (12/17/2022). In review of yesterday's note (12/16/2022), there are no changes except as documented above.    I certify that the patient requires continued medically necessary hospital services for the treatment of amputation pain and will remain in the hospital for   3 more days.  Discharge plan is anticipated to be SNF.

## (undated) DEVICE — GLOVE BIOGEL PI INDICATOR SZ 8.0 SURGICAL PF LF -(50/BX 4BX/CA)

## (undated) DEVICE — SUTURE 2-0 VICRYL PLUS CT-1 36 (36PK/BX)"

## (undated) DEVICE — TOWEL STOP TIMEOUT SAFETY FLAG (40EA/CA)

## (undated) DEVICE — DRAPE SURGICAL U 77X120 - (10/CA)

## (undated) DEVICE — CLIP MED INTNL HRZN TI ESCP - (25/BX)

## (undated) DEVICE — PACK UPPER EXTREMITY (2EA/CA)

## (undated) DEVICE — DRAPE LARGE 3 QUARTER - (20/CA)

## (undated) DEVICE — GOWN SURGEONS X-LARGE - DISP. (30/CA)

## (undated) DEVICE — SET BIFURCATED BLOOD - (48EA/CS)

## (undated) DEVICE — SLEEVE, VASO, THIGH, MED

## (undated) DEVICE — GLOVE BIOGEL SZ 7 SURGICAL PF LTX - (50PR/BX 4BX/CA)

## (undated) DEVICE — TUBE NG SALEM SUMP 16FR (50EA/CA)

## (undated) DEVICE — SET LEADWIRE 5 LEAD BEDSIDE DISPOSABLE ECG (1SET OF 5/EA)

## (undated) DEVICE — DRAIN J-VAC 10MM FLAT - (10/CA)

## (undated) DEVICE — PAD LAP STERILE 18 X 18 - (5/PK 40PK/CA)

## (undated) DEVICE — SODIUM CHL IRRIGATION 0.9% 1000ML (12EA/CA)

## (undated) DEVICE — SUTURE 0 COATED VICRYL 6-18IN - (12PK/BX)

## (undated) DEVICE — SUTURE 5-0 PROLENE BLUE C-1 HS 1 X 30 (36EA/BX)"

## (undated) DEVICE — BANDAGE ELASTIC 6 HONEYCOMB - 6X5YD LF (20/CA)"

## (undated) DEVICE — CANNULA W/ SUPPLY TUBING O2 - (50/CA)

## (undated) DEVICE — SLEEVE VASO CALF MED - (10PR/CA)

## (undated) DEVICE — BOVIE BLADE COATED - (50/PK)

## (undated) DEVICE — GLOVE SZ 8 BIOGEL PI MICRO - PF LF (50PR/BX)

## (undated) DEVICE — GLOVE SZ 6 BIOGEL PI MICRO - PF LF (50PR/BX 4BX/CA)

## (undated) DEVICE — RESERVOIR SUCTION 100 CC - SILICONE (20EA/CA)

## (undated) DEVICE — KIT SURGIFLO W/OUT THROMBIN - (6EA/CA)

## (undated) DEVICE — PACK AV FISTULA (4EA/CA)

## (undated) DEVICE — CANISTER SUCTION 3000ML MECHANICAL FILTER AUTO SHUTOFF MEDI-VAC NONSTERILE LF DISP  (40EA/CA)

## (undated) DEVICE — BLADE SURGICAL #10 - (50/BX)

## (undated) DEVICE — PENCIL ELECTSURG 10FT BTN SWH - (50/CA)

## (undated) DEVICE — VESSELOOP MINI BLUE STERILE - SURG-I-LOOP (10EA/BX)

## (undated) DEVICE — SUTURE 3-0 SILK 12 X 18 IN - (36/BX)

## (undated) DEVICE — SYSTEM PREVENA INCISION MNGM - (1/EA)

## (undated) DEVICE — SUTURE GENERAL

## (undated) DEVICE — Device

## (undated) DEVICE — BLADE SAGITTAL SAW DUAL CUT 25.0 X 90.0 X 1.27MM (1/EA)

## (undated) DEVICE — PADDING CAST 6 IN STERILE - 6 X 4 YDS (24/CA)

## (undated) DEVICE — LACTATED RINGERS INJ 1000 ML - (14EA/CA 60CA/PF)

## (undated) DEVICE — FILTER BLOOD TRANSFUSION - (40/CA) (PALL)

## (undated) DEVICE — TUBING CLEARLINK DUO-VENT - C-FLO (48EA/CA)

## (undated) DEVICE — GOWN WARMING STANDARD FLEX - (30/CA)

## (undated) DEVICE — BAG RESUSCITATION DISPOSABLE - WITH MASK (10 EA/CA)

## (undated) DEVICE — TRANSDUCER ADULT DISP. SINGLE BONDED STERILE - (20EA/CA)

## (undated) DEVICE — VESSELOOP MAXI BLUE STERILE- SURG-I-LOOP (10EA/BX)

## (undated) DEVICE — CHLORAPREP 26 ML APPLICATOR - ORANGE TINT(25/CA)

## (undated) DEVICE — SUTURE 0 VICRYL PLUS CT-1 - 8 X 18 INCH (12/BX)

## (undated) DEVICE — GLOVE BIOGEL PI INDICATOR SZ 6.5 SURGICAL PF LF - (50/BX 4BX/CA)

## (undated) DEVICE — SUTURE ETHILON 2-0 FSLX 30 (36PK/BX)"

## (undated) DEVICE — STOPCOCK 3-WAY W/SWIVEL LEVER LOCK (50EA/CA)

## (undated) DEVICE — SET EXTENSION WITH 2 PORTS (48EA/CA) ***PART #2C8610 IS A SUBSTITUTE*****

## (undated) DEVICE — CLIP SM INTNL HRZN TI ESCP LGT - (24EA/PK 25PK/BX)

## (undated) DEVICE — GLOVE SZ 7.5 BIOGEL PI MICRO - PF LF (50PR/BX)

## (undated) DEVICE — TOURNIQUET CUFF 34 X 4 ONE PORT DISP - STERILE (10/BX)

## (undated) DEVICE — SUCTION INSTRUMENT YANKAUER BULBOUS TIP W/O VENT (50EA/CA)

## (undated) DEVICE — COVER LIGHT HANDLE ALC PLUS DISP (18EA/BX)

## (undated) DEVICE — SUTURE 3-0 VICRYL PLUS SH - 8X 18 INCH (12/BX)

## (undated) DEVICE — STAPLER SKIN DISP - (6/BX 10BX/CA) VISISTAT

## (undated) DEVICE — SET INTRO MIRCROPUNCTURE - MPIS-501-SST

## (undated) DEVICE — SUTURE 2-0 VICRYL PLUS CT-1 - 8 X 18 INCH(12/BX)

## (undated) DEVICE — SUTURE 0 VICRYL PLUS CT-1 - 36 INCH (36/BX)

## (undated) DEVICE — DRAPE IOBAN II 23 IN X 33 IN - (10/BX)

## (undated) DEVICE — SYRINGE 30 ML LL (56/BX)

## (undated) DEVICE — TOWELS CLOTH SURGICAL - (4/PK 20PK/CA)

## (undated) DEVICE — DECANTER FLD BLS - (50/CA)

## (undated) DEVICE — SUTURE 6-0 PROLENE C-1 D/A 24 (36PK/BX)"

## (undated) DEVICE — ELECTRODE DUAL RETURN W/ CORD - (50/PK)

## (undated) DEVICE — SENSOR OXIMETER ADULT SPO2 RD SET (20EA/BX)

## (undated) DEVICE — SET FLUID WARMING STANDARD FLOW - (10/CA)